# Patient Record
Sex: FEMALE | Race: OTHER | Employment: UNEMPLOYED | URBAN - METROPOLITAN AREA
[De-identification: names, ages, dates, MRNs, and addresses within clinical notes are randomized per-mention and may not be internally consistent; named-entity substitution may affect disease eponyms.]

---

## 2018-06-15 ENCOUNTER — HOSPITAL ENCOUNTER (OUTPATIENT)
Dept: LAB | Age: 58
Discharge: HOME | End: 2018-06-15
Payer: SELF-PAY

## 2018-06-15 ENCOUNTER — OFFICE VISIT (OUTPATIENT)
Dept: FAMILY MEDICINE CLINIC | Age: 58
End: 2018-06-15

## 2018-06-15 VITALS
HEIGHT: 62 IN | DIASTOLIC BLOOD PRESSURE: 79 MMHG | TEMPERATURE: 97.7 F | OXYGEN SATURATION: 97 % | SYSTOLIC BLOOD PRESSURE: 138 MMHG | HEART RATE: 77 BPM | BODY MASS INDEX: 50.24 KG/M2 | WEIGHT: 273 LBS | RESPIRATION RATE: 16 BRPM

## 2018-06-15 DIAGNOSIS — J31.0 RHINITIS, UNSPECIFIED TYPE: ICD-10-CM

## 2018-06-15 DIAGNOSIS — I10 ESSENTIAL HYPERTENSION: ICD-10-CM

## 2018-06-15 DIAGNOSIS — K21.9 GASTROESOPHAGEAL REFLUX DISEASE, ESOPHAGITIS PRESENCE NOT SPECIFIED: ICD-10-CM

## 2018-06-15 DIAGNOSIS — M06.9 RHEUMATOID ARTHRITIS INVOLVING MULTIPLE SITES, UNSPECIFIED RHEUMATOID FACTOR PRESENCE: ICD-10-CM

## 2018-06-15 DIAGNOSIS — M06.9 RHEUMATOID ARTHRITIS INVOLVING MULTIPLE SITES, UNSPECIFIED RHEUMATOID FACTOR PRESENCE: Primary | ICD-10-CM

## 2018-06-15 LAB
ALBUMIN SERPL-MCNC: 3.8 G/DL (ref 3.4–5)
ALBUMIN/GLOB SERPL: 0.8 {RATIO} (ref 0.8–1.7)
ALP SERPL-CCNC: 121 U/L (ref 45–117)
ALT SERPL-CCNC: 97 U/L (ref 13–56)
ANION GAP SERPL CALC-SCNC: 8 MMOL/L (ref 3–18)
AST SERPL-CCNC: 102 U/L (ref 15–37)
BASOPHILS # BLD: 0 K/UL (ref 0–0.06)
BASOPHILS NFR BLD: 1 % (ref 0–2)
BILIRUB SERPL-MCNC: 0.5 MG/DL (ref 0.2–1)
BUN SERPL-MCNC: 13 MG/DL (ref 7–18)
BUN/CREAT SERPL: 16 (ref 12–20)
CALCIUM SERPL-MCNC: 9.5 MG/DL (ref 8.5–10.1)
CHLORIDE SERPL-SCNC: 106 MMOL/L (ref 100–108)
CO2 SERPL-SCNC: 31 MMOL/L (ref 21–32)
CREAT SERPL-MCNC: 0.79 MG/DL (ref 0.6–1.3)
CRP SERPL-MCNC: 0.6 MG/DL (ref 0–0.3)
DIFFERENTIAL METHOD BLD: ABNORMAL
EOSINOPHIL # BLD: 0.2 K/UL (ref 0–0.4)
EOSINOPHIL NFR BLD: 3 % (ref 0–5)
ERYTHROCYTE [DISTWIDTH] IN BLOOD BY AUTOMATED COUNT: 13.6 % (ref 11.6–14.5)
ERYTHROCYTE [SEDIMENTATION RATE] IN BLOOD: 10 MM/HR (ref 0–30)
GLOBULIN SER CALC-MCNC: 4.5 G/DL (ref 2–4)
GLUCOSE SERPL-MCNC: 72 MG/DL (ref 74–99)
HCT VFR BLD AUTO: 47.4 % (ref 35–45)
HGB BLD-MCNC: 15.5 G/DL (ref 12–16)
LYMPHOCYTES # BLD: 2.5 K/UL (ref 0.9–3.6)
LYMPHOCYTES NFR BLD: 38 % (ref 21–52)
MCH RBC QN AUTO: 30.7 PG (ref 24–34)
MCHC RBC AUTO-ENTMCNC: 32.7 G/DL (ref 31–37)
MCV RBC AUTO: 93.9 FL (ref 74–97)
MONOCYTES # BLD: 0.5 K/UL (ref 0.05–1.2)
MONOCYTES NFR BLD: 8 % (ref 3–10)
NEUTS SEG # BLD: 3.3 K/UL (ref 1.8–8)
NEUTS SEG NFR BLD: 50 % (ref 40–73)
PLATELET # BLD AUTO: 149 K/UL (ref 135–420)
POTASSIUM SERPL-SCNC: 3.4 MMOL/L (ref 3.5–5.5)
PROT SERPL-MCNC: 8.3 G/DL (ref 6.4–8.2)
RBC # BLD AUTO: 5.05 M/UL (ref 4.2–5.3)
SODIUM SERPL-SCNC: 145 MMOL/L (ref 136–145)
WBC # BLD AUTO: 6.5 K/UL (ref 4.6–13.2)

## 2018-06-15 PROCEDURE — 85025 COMPLETE CBC W/AUTO DIFF WBC: CPT | Performed by: FAMILY MEDICINE

## 2018-06-15 PROCEDURE — 86235 NUCLEAR ANTIGEN ANTIBODY: CPT | Performed by: FAMILY MEDICINE

## 2018-06-15 PROCEDURE — 80053 COMPREHEN METABOLIC PANEL: CPT | Performed by: FAMILY MEDICINE

## 2018-06-15 PROCEDURE — 86038 ANTINUCLEAR ANTIBODIES: CPT | Performed by: FAMILY MEDICINE

## 2018-06-15 PROCEDURE — 36415 COLL VENOUS BLD VENIPUNCTURE: CPT | Performed by: FAMILY MEDICINE

## 2018-06-15 PROCEDURE — 86140 C-REACTIVE PROTEIN: CPT | Performed by: FAMILY MEDICINE

## 2018-06-15 PROCEDURE — 85652 RBC SED RATE AUTOMATED: CPT | Performed by: FAMILY MEDICINE

## 2018-06-15 PROCEDURE — 86200 CCP ANTIBODY: CPT | Performed by: FAMILY MEDICINE

## 2018-06-15 PROCEDURE — 86225 DNA ANTIBODY NATIVE: CPT | Performed by: FAMILY MEDICINE

## 2018-06-15 RX ORDER — ACETAMINOPHEN 325 MG/1
TABLET ORAL
COMMUNITY
End: 2019-11-20

## 2018-06-15 RX ORDER — RANITIDINE 150 MG/1
150 TABLET, FILM COATED ORAL 2 TIMES DAILY
Qty: 180 TAB | Refills: 1 | Status: SHIPPED | OUTPATIENT
Start: 2018-06-15 | End: 2019-11-20

## 2018-06-15 RX ORDER — FLUTICASONE PROPIONATE 50 MCG
2 SPRAY, SUSPENSION (ML) NASAL DAILY
Qty: 1 BOTTLE | Refills: 11 | Status: SHIPPED | OUTPATIENT
Start: 2018-06-15 | End: 2020-03-01

## 2018-06-15 NOTE — PROGRESS NOTES
[facility]    CC: [facility] for chronic disease management    HPI:     Rheumatoid Arthritis:  - Diagnosed 2 years ago  - Reports that blood work was positive  - Reports that it has affected her legs, shoulder, wrists, fingers, and back  - States she was never started on any immunosuppressive/immunotherapy  - Currently on no medication for her arthritis pain      HTN:  - On no medication  - Does not check BP at home  - Quit smoking 2 years ago  - Diet is unrestricted      GERD:  - Reports issues with reflux  - Using 's prescription of Zantac (Daily)  - reports it has been well controlled with Zantac  - Denies any side effects from the medication      ROS: Positive items marked in RED  CON: fever, chills  ENT: frontal headache, rhinorrhea, sneezing, post nasal drip, sore throat  Cardiovascular: CP, SOB  Resp: cough, SOB  GI: nausea, vomiting, diarrhea  : dysuria, hematuria  MS: arthralgias, myalgias  Neuro: numbness, tingling, weakness      Past Medical History:   Diagnosis Date    Arthritis     Hypertension     Diagnosed in [address] a few months ago       Past Surgical History:   Procedure Laterality Date    HX CHOLECYSTECTOMY  2013    HX COLONOSCOPY      [facility]       Family History   Problem Relation Age of Onset    Diabetes Mother     Hypertension Father     No Known Problems Sister     No Known Problems Brother     No Known Problems Sister     No Known Problems Sister     No Known Problems Brother     No Known Problems Brother     No Known Problems Brother        Social History     Social History    Marital status: SINGLE     Spouse name: N/A    Number of children: N/A    Years of education: N/A     Social History Main Topics    Smoking status: Former Smoker     Quit date: 1/1/2017    Smokeless tobacco: Never Used    Alcohol use No    Drug use: No    Sexual activity: Yes     Partners: Male     Birth control/ protection: None     Other Topics Concern    None     Social History Narrative    None       Allergies no known allergies      Current Outpatient Prescriptions:     acetaminophen (TYLENOL) 325 mg tablet, Take  by mouth every four (4) hours as needed for Pain., Disp: , Rfl:     Physical Exam:      /79 (BP 1 Location: Left arm, BP Patient Position: Sitting)  Pulse 77  Temp 97.7 °F (36.5 °C) (Oral)   Resp 16  Ht 5' 2\" (1.575 m)  Wt 273 lb (123.8 kg)  SpO2 97%  BMI 49.93 kg/m2    General: Obese habitus, NAD  Eyes: sclera clear bilaterally, no discharge noted, eyelids normal in appearance  HENT: NCAT, no sinus tenderness, nasal turbinates enlarged bilaterally, oropharynx clear, MMM  Lungs: CTAB, Normal respiratory effort and rate  CV: RRR, no MRGs  ABD: soft, non-tender, non-distended, normal bowel sounds  Ext: no joint swelling, erythema, or calor noted. no peripheral edema or digital cyanosis noted.    Skin: normal temperature, turgor, color, and texture  Psych: alert and oriented to person, place and time, normal affect  Neuro: speech normal, moving all extremities      Assessment/Plan     Rheumatoid Arthritis:  - Will check RA & CCP ABS, ESR, CRP, VIANEY W/ reflex cascade  - Will plan to refer to rheumatology, pending results of lab tests  - Handout given on RA care and RA diet      HTN:  - Currently stage I on no medication  - Patient counseled on lifestyle changes  - Will check CMP and CBC   - Handout given on DASH diet, low sodium diet, and reading sodium content on food labels  - Follow up in one month      GERD, Inadequately Controlled:  - Will start on Zantac regimen  - Follow up in one month      Rhinitis:  - Suspected cause of her frontal HAs  - Will start on Flonase regimen  - Handout given on rhinitis care        Shanta Schreiber MD  6/15/2018, 2:12 PM

## 2018-06-15 NOTE — PROGRESS NOTES
Chief Complaint   Patient presents with    Physical    Knee Pain     aching pain to bilaleral knees    Shoulder Pain     aching pain     Back Pain     aching pain      Visit Vitals    /79 (BP 1 Location: Left arm, BP Patient Position: Sitting)    Pulse 77    Temp 97.7 °F (36.5 °C) (Oral)    Resp 16    Ht 5' 2\" (1.575 m)    Wt 273 lb (123.8 kg)    SpO2 97%    BMI 49.93 kg/m2     PHQ over the last two weeks 6/15/2018   Little interest or pleasure in doing things Several days   Feeling down, depressed or hopeless Several days   Total Score PHQ 2 2      Visual Acuity Screening    Right eye Left eye Both eyes   Without correction: 20/50 20/50 20/40   With correction: 20/20 20/20 20/20

## 2018-06-15 NOTE — MR AVS SNAPSHOT
Tomi Mccormick [place] [address] [facility] Mart[address]. [address] [facility] 83 [address] 
[phone] Patient: Randee Holley MRN: [id number] LBF:[id number] Visit Information Date & Time Provider Department Dept. Phone Encounter #  
 6/15/2018  1:45 PM Ramakrishna Marie [id number] Follow-up Instructions Return in about 1 month (around 7/15/2018). Upcoming Health Maintenance Date Due Hepatitis C Screening 1960 DTaP/Tdap/Td series (1 - Tdap) 11/12/1981 PAP AKA CERVICAL CYTOLOGY 11/12/1981 BREAST CANCER SCRN MAMMOGRAM 11/12/2010 FOBT Q 1 YEAR AGE 50-75 11/12/2010 Influenza Age 5 to Adult 8/1/2018 Allergies as of 6/15/2018  Never Reviewed No Known Allergies Current Immunizations  Never Reviewed No immunizations on file. Not reviewed this visit You Were Diagnosed With   
  
 Codes Comments Rheumatoid arthritis involving multiple sites, unspecified rheumatoid factor presence [facility]    -  Primary ICD-10-CM: M06.9 ICD-9-CM: 714.0 Essential hypertension     ICD-10-CM: I10 
ICD-9-CM: 401.9 Rhinitis, unspecified type     ICD-10-CM: J31.0 ICD-9-CM: 472.0 Gastroesophageal reflux disease, esophagitis presence not specified     ICD-10-CM: K21.9 ICD-9-CM: 530.81 Vitals BP Pulse Temp Resp Height(growth percentile) Weight(growth percentile) 138/79 (BP 1 Location: Left arm, BP Patient Position: Sitting) 77 97.7 °F (36.5 °C) (Oral) 16 5' 2\" (1.575 m) 273 lb (123.8 kg) SpO2 BMI OB Status Smoking Status 97% 49.93 kg/m2 Menopause Former Smoker Vitals History BMI and BSA Data Body Mass Index Body Surface Area  
 49.93 kg/m 2 2.33 m 2 Preferred Pharmacy Pharmacy Name Phone [id number] [place], [address] [place] [address] Road [address] [place] [address] [phone] Your Updated Medication List  
  
   
 This list is accurate as of 6/15/18  2:36 PM.  Always use your most recent med list.  
  
  
  
  
 fluticasone 50 mcg/actuation nasal spray Commonly known as:  [employer] 2 Sprays by Both Nostrils route daily. raNITIdine 150 mg tablet Commonly known as:  ZANTAC Take 1 Tab by mouth two (2) times a day. TYLENOL 325 mg tablet Generic drug:  acetaminophen Take  by mouth every four (4) hours as needed for Pain. Prescriptions Sent to Pharmacy Refills  
 fluticasone (FLONASE) 50 mcg/actuation nasal spray 11 Si Sprays by Both Nostrils route daily. Class: Normal  
 Pharmacy: [address] [place] [address] Ph #: [phone] Route: Both Nostrils  
 raNITIdine (ZANTAC) 150 mg tablet 1 Sig: Take 1 Tab by mouth two (2) times a day. Class: Normal  
 Pharmacy: [address] [place] [address] Ph #: [phone] Route: Oral  
  
Follow-up Instructions Return in about 1 month (around 7/15/2018). To-Do List   
 06/15/2018 Lab:  VIANEY QL, W/REFLEX CASCADE   
  
 06/15/2018 Lab:  C REACTIVE PROTEIN, QT   
  
 06/15/2018 Lab:  CBC WITH AUTOMATED DIFF   
  
 06/15/2018 Lab:  METABOLIC PANEL, COMPREHENSIVE   
  
 06/15/2018 Lab:  RA + CCP ABS   
  
 06/15/2018 Lab:  SED RATE (ESR) Patient Instructions Artritis reumatoide: Instrucciones de cuidado - [ Rheumatoid Arthritis: Care Instructions ] Instrucciones de cuidado La artritis es un problema común de carloz que se caracteriza por la inflamación de las articulaciones. Hay muchos tipos de artritis. En la artritis reumatoide, el propio sistema inmunitario del organismo ataca las articulaciones. [place] causa dolor, rigidez e hinchazón en las articulaciones, sobre todo en las bruno y los pies. Puede dificultarle abrir frascos, escribir y [address] diarias.  A veces la artritis reumatoide también puede causar bultos debajo de la piel. Con el tiempo la artritis reumatoide puede dañar y deformar las articulaciones. El tratamiento temprano con medicamentos podría reducir kaylyn probabilidades de tener joelle discapacidad prolongada. La atención de seguimiento es joelle parte clave de sebastian tratamiento y seguridad. Asegúrese de hacer y acudir a todas las citas, y llame a sebastian médico si está teniendo problemas. También es joelle buena idea saber los resultados de los exámenes y mantener joelle lista de los medicamentos que francisco. Cómo puede cuidarse en el hogar? · Priya más ejercicios si sebastian médico se lo recomienda. Caminar es joelle buena opción. Si le duelen las rodillas o los tobillos, trate de montar en bicicleta estática o nadar. · Mueva cada articulación de forma suave en toda sebastian amplitud de movimiento joelle o dos veces al día. · Descanse las articulaciones cuando estén adoloridas o hayan hecho mucho esfuerzo. Los descansos cortos podrían ayudar más que quedarse en cama. · [place] y [place] un peso saludable. El ejercicio hecho regularmente y joelle dieta saludable le ayudarán a lograrlo. El sobrepeso puede tensar las articulaciones, [place] 1978 y [de-identified], y empeorar el dolor. Bajar incluso unas pocas libras podría ayudar. · Consuma suficiente calcio y vitamina D para ayudar a prevenir la osteoporosis, que causa huesos débiles. Hable con sebastian médico acerca de qué cantidad debe montez. · Proteja kaylyn articulaciones de las lesiones. No las use en exceso. Trate de limitar o evitar actividades que causan dolor o hinchazón en las articulaciones. Si lo necesita, use utensilios de cocina especiales y otros aparatos para ayudarse, wander andadores, tablillas (férulas) o bastones. · Use calor para aliviar el dolor. [place] kilo o duchas tibias. Utilice compresas calientes o joelle almohadilla térmica ajustada a baja temperatura. Duerma con Claryce Eric cobija eléctrica ajustada a joelle temperatura media. · Aplíquese hielo o joelle compresa fría sobre la selena nawaf 10 a 20 minutos cada vez. Póngase un paño ivey entre el hielo y la piel. · [employer] analgésicos (medicamentos para el dolor) exactamente según las indicaciones. ¨ Si el médico le recetó un analgésico, tómelo según las indicaciones. ¨ Si no está tomando un analgésico recetado, pregúntele a sebastian médico si puede montez aleisha de [employer]. · Participe de Karina Slice en el manejo de sebastian afección. [place] un plan de tratamiento con sebastian médico y aprenda todo lo que pueda sobre la artritis reumatoide. [place] le ayudará a controlar el dolor y [place]. Cuándo debe pedir ayuda? Llame a sebastian médico ahora mismo o busque atención médica inmediata si: 
? · Tiene fiebre o salpullido junto con dolor en las articulaciones. ? · Usted tiene un dolor en joelle articulación que es vallecillo intenso que no puede usar dicha articulación. ? · Tiene hinchazón, enrojecimiento o dolor repentinos en joelle o más articulaciones y no sabe por qué. ? · Tiene dolor en la espalda o el chritsopher, además de debilitamiento en los brazos o las piernas. ? · Pierde el control de la vejiga o los intestinos. ?Preste especial atención a los cambios en sebastian carloz y asegúrese de comunicarse con sebastian médico si: 
? · Tiene dolor en las articulaciones que dura más de 6 semanas. ? · Tiene efectos secundarios por los medicamentos para la artritis, wander dolor de [place], [place], [place] o heces oscuras parecidas al alquitrán. Dónde puede encontrar más información en inglés? Wyvonne Soda a http://christina-bj.info/. Escriba K205 en la búsqueda para aprender más acerca de \"Artritis reumatoide: Instrucciones de cuidado - [ Rheumatoid Arthritis: Care Instructions ]. \" 
Revisado: 31 octubre, 2016 Versión del contenido: 11.4 © 7649-0365 Healthwise, Simple Tithe.  Las instrucciones de cuidado fueron adaptadas bajo licencia por Good Help Connections (which disclaims liability or warranty for this information). Si usted tiene [place] afección médica o sobre estas instrucciones, siempre pregunte a sebastian profesional de carloz. [facility] niega toda garantía o responsabilidad por sebastian uso de esta información. Dieta para la artritis reumatoide: Instrucciones de cuidado - [ Rheumatoid Arthritis Diet: Care Instructions ] Instrucciones de cuidado La mejor dieta para personas con artritis reumatoide es joelle dieta latasha y equilibrada que sea baja en grasas saturadas y sal, y ana en [place] y carbohidratos complejos (granos integrales, frijoles, frutas y verduras). El aceite de pescado (ácidos grasos omega-3) tiene un efecto anastasiia en reducir la inflamación, y consumir pescado puede mejorar los síntomas. Las personas que tienen artritis reumatoide tienen un alto riesgo de desarrollar osteoporosis. Para ayudar a prevenir esta enfermedad, obtenga suficiente calcio y vitamina D. La atención de seguimiento es joelle parte clave de sebastian tratamiento y seguridad. Asegúrese de hacer y acudir a todas las citas, y llame a sebastina médico si está teniendo problemas. También es joelle buena idea saber los resultados de los exámenes y mantener joelle lista de los medicamentos que francisco. Cómo puede cuidarse en el hogar? · Trate de comer por lo menos 2 porciones de pescado a la semana. Los pescados grasosos, que contienen ácidos grasos omega-3, incluyen: ¨ Atún. ¨ Salmón. ¨ Caballa. ¨ Trucha de manasa. ¨ Arenque. ¨ Darren. · Si usted está embarazada, hable con sebastian médico sobre comer pescado. Las mujeres embarazadas no deben comer ciertos tipos de pescado que tienen alto contenido de lexie. · Usted puede obtener calcio y vitamina D bebiendo leche fortificada con vitamina D. Cuatro vasos de leche al día proporcionan alrededor de 1,200 miligramos (mg) de calcio. Otros alimentos comunes con calcio: ¨ Yogur (normal o bajo en grasa). Joelle porción de 8 onzas provee 415 mg de calcio. ¨ Queso cheddar. Joelle porción de 1½ onzas provee 306 mg. 
¨ Leche (descremada, al 2% o entera). Joelle porción de 1 taza provee aproximadamente 300 mg. ¨ Requesón (1% de grasa de [place]). Joelle porción de 1 taza provee 138 mg. 
· Si no puede comer o beber productos lácteos, puede obtener calcio y vitamina D de: 
¨ Jugo de naranja fortificado con calcio. Joelle porción de 1 taza provee 500 mg de calcio. ¨ Leche de soya enriquecida con calcio. Joelle porción de 1 taza provee 282 mg de calcio. ¨ Almendras. Joelle porción de 1 onza (alrededor de 24 almendras) provee 75 mg de calcio. ¨ Salmón enlatado. Joelle porción de 3 onzas provee 180 mg de calcio. ¨ Tofu (firme, elaborado con sulfato de calcio). Joelle porción de ½ taza provee 204 mg. 
· Es posible que deba montez un suplemento de calcio para asegurarse de que está recibiendo el calcio que necesita. Dónde puede encontrar más información en inglés? Andrew Marker a http://christina-bj.info/. Escriba Q201 en la búsqueda para aprender más acerca de \"Dieta para la artritis reumatoide: Instrucciones de cuidado - [ Rheumatoid Arthritis Diet: Care Instructions ]. \" 
Revisado: 12 [place], 2017 Versión del contenido: 11.4 © 9660-4038 Healthwise, Incorporated. Las instrucciones de cuidado fueron adaptadas bajo licencia por Good Help Connections (which disclaims liability or warranty for this information). Si usted tiene [place] afección médica o sobre estas instrucciones, siempre pregunte a sebastian profesional de carloz. Healthwise, Incorporated niega toda garantía o responsabilidad por sebastian uso de esta información. Rinitis: Instrucciones de cuidado - [ Rhinitis: Care Instructions ] Instrucciones de cuidado La rinitis es joelle hinchazón e irritación en la nariz. Con frecuencia, las alergias y las infecciones son la causa. Puede tener congestión o secreción nasal. Otros síntomas son la comezón y la irritación de ojos, oídos, garganta y boca. Si las alergias son la causa, es posible que el médico le yosvany pruebas para averiguar a qué es alérgico. Casimiro vez pueda detener los síntomas si maico las cosas que los causan. El médico puede sugerir o recetar medicamentos para [employer]. La atención de seguimiento es joelle parte clave de sebastian tratamiento y seguridad. Asegúrese de hacer y acudir a todas las citas, y llame a sebastian médico si está teniendo problemas. También es joelle buena idea saber los resultados de los exámenes y mantener joelle lista de los medicamentos que francisco. Cómo puede cuidarse en el hogar? · Si sebastian rinitis es causada por alergias, trate de averiguar qué es lo que [employer]. Ronda pasos para evitar los desencadenantes. ¨ Evite los trabajos [facility]. Estos pueden remover el polen y el moho. ¨ No fume ni permita que otros fumen cerca de usted. Si necesita ayuda para dejar de fumar, hable con sebastian médico sobre programas y medicamentos para dejar de fumar. Estos pueden aumentar kaylyn probabilidades de dejar el hábito para siempre. ¨ No use aerosoles, productos de limpieza ni perfumes. [address] sebastian casa y automóvil nawaf la época de floración si el polen es aleisha de los desencadenantes. ¨ Limpie sebastian casa con frecuencia para controlar el polvo. ¨ Mantenga las [place] fuera de sebastian casa. · Si sebastian médico le recomienda un medicamento de venta yan para [employer] síntomas, tómelo exactamente wandre le fue recetado. Llame a sebastian médico si kristen estar teniendo problemas con sebastian medicamento. · Use lavados nasales con solución salina (agua salada) para ayudar a mantener las fosas nasales despejadas y eliminar la mucosidad y las bacterias. Puede comprar gotas nasales de solución salina en un supermercado o joelle farmacia. O puede prepararlas usted mismo en casa agregándole 1 cucharadita de sal y 1 cucharadita de bicarbonato de sodio a 2 tazas de agua destilada.  Si las prepara usted mismo, llene Tammie de goma con la solución, introduzca la punta en la fosa nasal y apriete con suavidad. Suénese la nariz. Cuándo debe pedir ayuda? Llame a sebastian médico ahora mismo o busque atención médica inmediata si: 
? · Tiene problemas para respirar. ?Preste especial atención a los cambios en sebastian carloz y asegúrese de comunicarse con sebastian médico si: 
? · La mucosidad de la nariz se vuelve más espesa (wander pus) o contiene devonte. ? · Tiene síntomas nuevos o que empeoran. ? · No mejora wander se esperaba. Dónde puede encontrar más información en inglés? Carl Jacobs a http://christina-bj.info/. Shiloh Tejada I618 en la búsqueda para aprender más acerca de \"Rinitis: Instrucciones de cuidado - [ Rhinitis: Care Instructions ]. \" 
Revisado: 12 [place], 2017 Versión del contenido: 11.4 © 4045-5562 Healthwise, Incorporated. Las instrucciones de cuidado fueron adaptadas bajo licencia por Good Help Connections (which disclaims liability or warranty for this information). Si usted tiene [place] Wil[place] afección médica o sobre estas instrucciones, siempre pregunte a sebastian profesional de carloz. Healthwise, Incorporated niega toda garantía o responsabilidad por sebastian uso de esta información. Dieta DASH: Instrucciones de cuidado - [ DASH Diet: Care Instructions ] Instrucciones de cuidado La dieta DASH es un plan de alimentación que puede ayudar a bajar la presión arterial. DASH es la sigla en inglés de \"Dietary Approaches to Stop Hypertension\" (medidas dietéticas para disminuir la hipertensión). Hipertensión significa presión arterial radha. La dieta DASH se concentra en el consumo de alimentos con alto contenido de calcio, potasio y [place]. Estos nutrientes pueden disminuir la presión arterial. Los alimentos con el mayor contenido de [place] nutrientes son las frutas, las verduras, los productos lácteos de bajo contenido de [place], las nueces, las semillas y las legumbres.  Bobbi montez suplementos de calcio, potasio y magnesio, en lugar de comer alimentos ricos en estos nutrientes, no tiene el [facility] Books [employer]. La dieta DASH también incluye granos integrales, pescado y aves. La dieta DASH es aleisha de los cambios de estilo de ky que quizá le recomiende sebastian médico para reducir la presión arterial radha. Es posible que sebastian médico también quiera que usted reduzca la cantidad de sodio en sebastian Cecillia Right. Reducir el sodio mientras sigue la dieta DASH puede bajar la presión arterial incluso más que únicamente con la dieta DASH. La atención de seguimiento es joelle parte clave de sebastian tratamiento y seguridad. Asegúrese de hacer y acudir a todas las citas, y llame a sebastian médico si está teniendo problemas. También es joelle buena idea saber los resultados de los exámenes y mantener joelle lista de los medicamentos que francisco. Cómo puede cuidarse en el hogar? Cómo seguir la dieta DASH 
· Coma entre 4 y 5 porciones de fruta al día. Joelle porción incluye 1 fruta de [place], ½ taza de fruta enlatada o cortada en trozos, 1/4 taza de fruta seca o 4 onzas (½ taza) de jugo de frutas. Elija fruta con más frecuencia que jugo. · Consuma entre 4 y 11 porciones de verduras al día. Joelle porción incluye 1 taza de Marge o de verduras de hojas crudas, ½ taza de verduras cocidas o cortadas en trozos, o 4 onzas (½ taza) de jugo de verduras. Elija comer verduras con más frecuencia que montez sebastian jugo. · Consuma entre 2 y 3 porciones de lácteos semidescremados y descremados al día. Joelle porción incluye 8 onzas de [place], 1 taza de yogur o 1½ onzas de Adry-barre. · Coma entre 6 y 6 porciones de granos todos los [address] Joelle porción incluye 1 rebanada de pan, 1 onza de cereal seco, o ½ taza de arroz, pasta o cereal cocido. Trate de elegir productos de grano integral con la mayor frecuencia posible. · Limite la cantidad de [place] 60 [place]), aves y pescado a 2 porciones al día. [place] porción son 3 onzas, lo que es aproximadamente el tamaño de un zelda de naipes. · Coma entre 4 y 5 porciones de nueces, semillas y legumbres (frijoles [habichuelas], lentejas y arvejas [chícharos] secos y cocidos) a la semana. Joelle porción incluye 1/3 taza de nueces, 2 cucharadas de semillas o ½ taza de frijoles o arvejas cocidos. · [address] y aceites a 2 o 3 porciones al día. Joelle porción es 1 cucharadita de aceite vegetal o 2 cucharadas de aderezo para ensaladas. · Limite los dulces y el azúcar adicional a 5 porciones o menos por semana. Debbie Falling porción es 1 cucharada de [place] o [place], ½ taza de sorbete o 1 taza de limonada. · Consuma menos de 2,300 miligramos (mg) de sodio al día. Si limita sebastian consumo de sodio a 1,500 mg al día, puede reducir sebastian presión arterial aún más. Consejos para tener éxito · Inicie con cambios pequeños. No intente hacer cambios radicales en sebastian dieta de manera repentina. Podría sentir que extraña kaylyn comidas favoritas y, por lo [place], mel joelle mayor probabilidad de que no cumpla el plan. Boy [place]. Edgardo Lowery que esos cambios se conviertan en un hábito, incorpore algunos [employer]. · Pruebe algo de lo siguiente: 
¨ Fíjese el objetivo de comer joelle porción de fruta o de verduras en todas las comidas y los refrigerios. [place] facilitará alcanzar la cantidad diaria recomendada de frutas y verduras. ¨ Pruebe comer yogur cubierto con frutas frescas y nueces wander refrigerio o wander postre saludable. ¨ Agregue robson, tomate, pepino y cebolla a kaylyn sándwiches. ¨ Combine joelle masa de pizza precocida con queso mozzarella de bajo contenido de grasa (\"low-fat\") y cúbralo con abundantes verduras. Intente utilizar tomates, calabaza, espinaca, brócoli, zanahorias, coliflor y cebollas. ¨ Opte por comer joelle variedad de verduras cortadas en trozos con un aderezo de bajo contenido de grasa wander refrigerio, en lugar de \"chips\" (tipo mohan fritas) y un \"dip\" (producto untable). ¨ [place] semillas de girasol o almendras picadas [employer]. O intente agregar nueces o almendras picadas a las verduras cocidas. ¨ Pruebe algunas comidas vegetarianas con frijoles y arvejas. Cathye Dinesh o frijoles rojos a las ensaladas. Prepare burritos y tacos con puré de frijoles pintos o negros. Dónde puede encontrar más información en inglés? Lizbeth Chen a http://christina-bj.info/. Lakshmi So U742 en la búsqueda para aprender más acerca de \"Dieta DASH: Instrucciones de cuidado - [ DASH Diet: Care Instructions ]. \" 
Revisado: 21 septiembre, 2016 Versión del contenido: 11.4 © 7296-7829 Healthwise, Incorporated. Las instrucciones de cuidado fueron adaptadas bajo licencia por Good [employer] Connections (which disclaims liability or warranty for this information). Si usted tiene [place] afección médica o sobre estas instrucciones, siempre pregunte a sebastian profesional de carloz. Healthwise, Incorporated niega toda garantía o responsabilidad por sebastian uso de esta información. Dieta baja en sodio (2,000 miligramos): Instrucciones de cuidado - [ Low Sodium Diet (2,000 Milligram): Care Instructions ] Instrucciones de cuidado Demasiado sodio hace que el organismo retenga agua en exceso. [place] puede aumentar la presión arterial y obligar al corazón y a los riñones a trabajar Karalee Purchase. En casos muy graves, podrían tener que hospitalizarlo. Hasta podría poner en peligro la ky. Si limita el consumo de sodio, se sentirá mejor y reducirá sebastian riesgo de tener problemas graves. La diana más común de sodio es la sal. Las personas obtienen la mayor parte de la sal en sebastian dieta de los alimentos enlatados, preparados y de paquete. La comida rápida y los platillos de restaurante también tienen niveles muy altos de [place]. Es probable que sebastian médico le limite el sodio a menos de 2,000 miligramos (mg) al día.  Cyndy límite tiene en cuenta todo el sodio presente en los alimentos preparados y de Apolinar [place], y toda la sal que añada a kaylyn alimentos. La atención de seguimiento es joelle parte clave de sebastian tratamiento y seguridad. Asegúrese de hacer y acudir a todas las citas, y llame a sebastian médico si está teniendo problemas. También es joelle buena idea saber los resultados de los exámenes y mantener joelle lista de los medicamentos que francisco. Cómo puede cuidarse en el [facility]? Roberta las etiquetas de los alimentos · [address] latas y los alimentos de paquete. La [employer] qué cantidad de sodio (\"sodium\") hay en cada porción. Asegúrese de fijarse en el tamaño de la porción. Si usted come Mehreen[employer] porción, consumirá [employer]. · Las etiquetas de los alimentos también indican el Porcentaje del Valor Diario (\"Percent Daily Value\") recomendado para el sodio. Escoja productos con un bajo Porcentaje del Valor Diario de [place]. · Tenga en cuenta que el sodio puede venir en otras formas además de la sal, entre las que se incluyen el glutamato monosódico (MSG, por kaylyn siglas en inglés), el citrato de sodio y el bicarbonato de [place]. La comida asiática frecuentemente contiene MSG añadido. Si sale a comer, a veces puede pedir que no le pongan MSG ni sal a kaylyn alimentos. Compre alimentos bajos en sodio · Compre alimentos que indiquen en la etiqueta \"sin sal\" (\"unsalted\") (sin sal añadida), \"sin sodio\" (\"sodium-free\") (menos de 5 mg de sodio por porción) o \"bajo en sodio\" (\"low-sodium\") (menos de 140 mg de sodio por porción). Los alimentos que indiquen en la etiqueta \"reducido en sodio\" (\"reduced-sodium\") y \"ligero contenido de sodio\" (\"light sodium\") aún pueden contener demasiado sodio. Asegúrese de leer la etiqueta para verificar cuánto sodio está consumiendo. · Compre verduras frescas o congeladas que no tengan salsas [id number]. Compre las versiones de bajo sodio de verduras Pérez, sopas y otros productos enlatados. Prepare comidas bajas en sodio · Reduzca la cantidad de sal que [place] para cocinar. [place] le ayudará a acostumbrarse al [employer]. No añada marie después de mel cocinado. Joelle cucharadita de sal contiene alrededor de 2,300 mg de sodio. · Retire el salero de la martinez. · Sazone kaylyn comidas con ajo, jugo de wendy, cebolla, vinagre, hierbas y especias. No use salsa de soya, salsa de soya \"lite\", salsa para milo, sal de cebolla, sal de ajo o de apio, mostaza ni ketchup (salsa de tomate) en kaylyn comidas. · Use aderezos para ensaladas, salsas y ketchup de bajo contenido de [place]. O prepare kaylyn propios aderezos para ensaladas y salsas sin añadir sal. 
· Use menos sal (o nada) cuando la receta lo pida. Por lo general puede añadir la mitad de la cantidad de marie que pide la receta sin que esta pierda el sabor. Otros alimentos wander el arroz, las pastas y los cereales no necesitan sal adicional. 
· Enjuague las verduras enlatadas y cocínelas en agua fresca. [place] le cory algo de sal, xuan no toda. · Evite el agua que naturalmente tenga un alto contenido de sodio o que haya sido tratada con ablandadores, los cuales TUMBY [place]. Llame a sebastian compañía de agua local para averiguar cuál es el contenido de sodio del agua. Si compra agua embotellada, guevara la etiqueta y escoja joelle johnathan sin sodio. Evite los alimentos altos en sodio · Evite comer: ¨ Milo, pescados y aves [place], curados, salados y [place]. ¨ Jamón, tocino, perros calientes (\"hot dogs\") y milo frías. ¨ Queso común, alma y procesado y mantequilla de cacahuate (maní) común. ¨ Galletas saladas y otros refrigerios salados wander \"pretzels\", \"chips\" (wander mohan fritas) y palomitas de maíz saladas. ¨ Comidas preparadas y congeladas, a menos que tengan joelle etiqueta que diga \"bajo en sodio\" (\"low-sodium\"). ¨ [place]opas, caldos y consomés enlatados y secos o deshidratados, a menos que digan \"sin sodio\" (\"sodium-free\") o \"bajo en sodio\" (\"low-sodium\"). ¨ Verduras enlatadas, a menos que digan \"sin sodio\" (\"sodium-free\") o \"bajo en sodio\" (\"low-sodium\"). ¨ Med fritas (a la francesa), pizzas, tacos y otras comidas rápidas. ¨ Pepinillos, aceitunas, ketchup y otros condimentos, en especial la salsa de soya, a menos que digan \"sin sodio\" (\"sodium-free\") o \"bajo en sodio\" (\"low-sodium\"). Dónde puede encontrar más información en inglés? [place] a http://[email or web address]-bj.info/. Keith Her [id number] en la búsqueda para aprender más acerca de \"Dieta baja en sodio (2,000 miligramos): Instrucciones de cuidado - [ Low Sodium Diet (2,000 Milligram): Care Instructions ]. \" 
Revisado: 12 mayo, 2017 Versión del contenido: 11.4 © 9137-9466 Healthwise, Incorporated. Las instrucciones de cuidado fueron adaptadas bajo licencia por [employer] (which disclaims liability or warranty for this information). Si usted tiene [place] afección médica o sobre estas instrucciones, siempre pregunte a sebastian profesional de carloz. Healthwise, Incorporated niega toda garantía o responsabilidad por sebastian uso de esta información. Cómo leer las etiquetas de los alimentos para limitar el consumo de sodio: Instrucciones de cuidado - [ How to Read a Food Label to Limit Sodium: Care Instructions ] Instrucciones de cuidado El sodio hace que el cuerpo retenga agua en exceso. [place] puede aumentar la presión arterial y obligar al corazón y a los riñones a trabajar [place]. En casos muy graves, podrían tener que hospitalizarlo. Hasta podría poner en peligro la ky. Si limita el consumo de sodio, se sentirá mejor y reducirá sebastian riesgo de tener problemas graves. Los alimentos procesados, las comidas rápidas y los platillos de restaurante son las tuttle principales de sodio en la alimentación. El [facility] común del sodio es \"sal\". Trate de limitar la cantidad de sodio que consume a menos de 2,300 miligramos (mg) al día.  Si limita sebastian consumo de sodio a 1,500 mg al día, puede reducir sebastian presión arterial aún más. Teresa límite incluye toda la sal que consuma en los alimentos que cocine o en los de paquete. Lleve janine lista de todo lo que come y dante. La atención de seguimiento es janine parte clave de sebastian tratamiento y seguridad. Asegúrese de hacer y acudir a todas las citas, y llame a sebastian médico si está teniendo problemas. También es janine buena idea saber los resultados de los exámenes y mantener janine lista de los medicamentos que francisco. Cómo puede cuidarse en el hogar? Roberta la lista de ingredientes en las etiquetas de los alimentos · Roberta la lista de ingredientes en las etiquetas de los alimentos para saber cuánto sodio contienen. La etiqueta indica los ingredientes de un alimento en orden descendente (de mayor a herminia). Si la sal o el sodio es aleisha de los primeros elementos de la lista, jolene alimento puede contener mucho sodio. · El sodio tiene nombres [place]. El sodio también se conoce wander glutamato monosódico (MSG, por kaylyn siglas en inglés, común en la comida [place]), citrato de [place], alginato de [place], hidróxido de sodio y fosfato de sodio. Roberta la información nutricional en las etiquetas · La mayoría de los alimentos tienen janine etiqueta con información nutricional. Allí encontrará la cantidad de sodio que tiene janine porción del alimento. Roberta los datos acerca del tamaño de la porción y la cantidad de [place]. El tamaño de la porción se encuentra en la parte superior de la etiqueta, por lo general, debajo del título \"Información nutricional\" (Nutrition Facts). La cantidad de sodio se encuentra en la lista debajo del título. Se expresa en miligramos (mg). ¨ Verifique detenidamente el tamaño de la porción. Janine mary porción suele ser Ayla Heritage, y es posible que coma más de Ambika Guiles. Si teresa es el wendy, la cantidad de sodio que consuma será mayor que la que se menciona en la etiqueta.  Por ejemplo, si el tamaño de la porción de janine sopa enlatada es 1 taza y la cantidad de sodio es 470 mg, si francisco 2 tazas, consumirá 940 mg de sodio. · La información nutricional de las frutas y las verduras frescas no aparece en esos alimentos. Es posible que se encuentre detallada en algún lado de la evan. Estos alimentos suelen no tener sodio o son bajos en sodio. · La etiqueta de información nutricional también proporciona el porcentaje del valor diario de [place]. Cyndy valor es la cantidad de sodio recomendada que contiene joelle porción. El valor diario para el sodio es menos de 2,300 mg. Así, si el porcentaje del valor diario dice 50%, significa que joelle porción le está aportando la mitad, o sea, 1,150 mg. Compre alimentos con bajo contenido de [place] · Busque alimentos que hayan sido elaborados con herminia cantidad de sodio. Busque las siguientes palabras en la etiqueta. ¨ \"Unsalted\" (sin sal) significa que el alimento no tiene sodio agregado. Bobbi es posible que el alimento ya tenga sodio en forma natural. 
¨ \"Sodium-free\" (sin sodio) significa que joelle porción tiene menos de 5 mg de sodio. ¨ \"Very low sodium\" (muy bajo contenido de [place]) significa que joelle porción tiene 35 mg de sodio o menos. ¨ \"Low-sodium\" (bajo contenido de sodio) significa que joelle porción tiene 140 mg de sodio o menos. · \"Reduced-sodium\" (reducido contenido de sodio) significa que el alimento tiene un 25% menos de sodio que lo normal. [place] esta cantidad de sodio es aún muy radha. Trate de no comprar alimentos que digan \"reducido contenido de sodio\" en la etiqueta. · Compre verduras frescas o verduras congeladas que no tengan salsas añadidas. Compre las versiones de bajo sodio de verduras, sopas y otros productos enlatados. Dónde puede encontrar más información en inglés? Carl Jacobs a [email or web address]-bj.[email or web address]  
Escriba B757 en la búsqueda para aprender más acerca de \"Cómo leer las etiquetas de los alimentos para limitar el consumo de sodio: Instrucciones de cuidado - [ How to Read a Food Label to Limit Sodium: Care Instructions ]. \" 
Revisado: 12 altamirano, 2017 Versión del contenido: 11.4 © 3124-7135 Healthwise, Incorporated. Las instrucciones de cuidado fueron adaptadas bajo licencia por [employer] (which disclaims liability or warranty for this information). Si usted tiene [place] afección médica o sobre estas instrucciones, siempre pregunte a sebastian profesional de carloz. Healthwise, Incorporated niega toda garantía o responsabilidad por sebastian uso de esta información. Introducing [facility]! Dawna London introduces [employer] patient portal. Now you can access parts of your medical record, email your doctor's office, and request medication refills online. 1. In your internet browser, go to [email or web address] 2. Click on the First Time User? Click Here link in the Sign In box. You will see the New Member Sign Up page. 3. Enter your [employer] Access Code exactly as it appears below. You will not need to use this code after youve completed the sign-up process. If you do not sign up before the expiration date, you must request a new code. · [employer] Access Code: [email or web address] Expires: 8/28/2018  4:41 PM 
 
4. Enter the last four digits of your Social Security Number (xxxx) and Date of Birth (mm/dd/yyyy) as indicated and click Submit. You will be taken to the next sign-up page. 5. Create a [employer] ID. This will be your [employer] login ID and cannot be changed, so think of one that is secure and easy to remember. 6. Create a [employer] password. You can change your password at any time. 7. Enter your Password Reset Question and Answer. This can be used at a later time if you forget your password. 8. Enter your e-mail address. You will receive e-mail notification when new information is available in [address] 9. Click Sign Up. You can now view and download portions of your medical record. 10. Click the Download Summary menu link to download a portable copy of your medical information. If you have questions, please visit the Frequently Asked Questions section of the [employer] website. Remember, [employer] is NOT to be used for urgent needs. For medical emergencies, dial 911. Now available from your iPhone and Android! Please provide this summary of care documentation to your next provider. Your primary care clinician is listed as David Brumfield. If you have any questions after today's visit, please call [phone].

## 2018-06-15 NOTE — PATIENT INSTRUCTIONS
Artritis reumatoide: Instrucciones de cuidado - [ Rheumatoid Arthritis: Care Instructions ]  Instrucciones de cuidado    La artritis es un problema común de carloz que se caracteriza por la inflamación de las articulaciones. Hay muchos tipos de artritis. En la artritis reumatoide, el propio sistema inmunitario del organismo ataca las articulaciones. [place] causa dolor, rigidez e hinchazón en las articulaciones, sobre todo en las bruno y los pies. Puede dificultarle abrir frascos, escribir y [address] diarias. A veces la artritis reumatoide también puede causar bultos debajo de la piel. Con el tiempo la artritis reumatoide puede dañar y deformar las articulaciones. El tratamiento temprano con medicamentos podría reducir kaylyn probabilidades de tener joelle discapacidad prolongada. La atención de seguimiento es joelle parte clave de sebastian tratamiento y seguridad. Asegúrese de hacer y acudir a todas las citas, y llame a sebastian médico si está teniendo problemas. También es joelle buena idea saber los resultados de los exámenes y mantener joelle lista de los medicamentos que francisco. Cómo puede cuidarse en el hogar? · Priya más ejercicios si sebastian médico se lo recomienda. Caminar es joelle buena opción. Si le duelen las rodillas o los tobillos, trate de montar en bicicleta estática o nadar. · Mueva cada articulación de forma suave en toda sebastian amplitud de movimiento joelle o dos veces al día. · Descanse las articulaciones cuando estén adoloridas o hayan hecho mucho esfuerzo. Los descansos cortos podrían ayudar más que quedarse en cama. · [place] y [place] un peso saludable. El ejercicio hecho regularmente y joelle dieta saludable le ayudarán a lograrlo. El sobrepeso puede tensar las articulaciones, [place] 1978 y [de-identified], y empeorar el dolor. Bajar incluso unas pocas libras podría ayudar. · Consuma suficiente calcio y vitamina D para ayudar a prevenir la osteoporosis, que causa huesos débiles.  Hable con sebastian médico acerca de qué cantidad debe montez. · Proteja kaylyn articulaciones de las lesiones. No las use en exceso. Trate de limitar o evitar actividades que causan dolor o hinchazón en las articulaciones. Si lo necesita, use utensilios de cocina especiales y otros aparatos para ayudarse, wander andadores, tablillas (férulas) o bastones. · Use calor para aliviar el dolor. [place] kilo o duchas tibias. Utilice compresas calientes o joelle almohadilla térmica ajustada a baja temperatura. [place] con Johnna Ezio cobija eléctrica ajustada a joelle temperatura media. · Aplíquese hielo o joelle compresa fría sobre la selena nawaf 10 a 20 minutos cada vez. Póngase un paño ivey entre el hielo y la piel. · [employer] analgésicos (medicamentos para el dolor) exactamente según las indicaciones. ¨ Si el médico le recetó un analgésico, tómelo según las indicaciones. ¨ Si no está tomando un analgésico recetado, pregúntele a sebastian médico si puede montez aleisha de [employer]. · Participe de Tori Licea en el manejo de sebastian afección. [place] un plan de tratamiento con sebastian médico y aprenda todo lo que pueda sobre la artritis reumatoide. [place] le ayudará a controlar el dolor y [place]. Cuándo debe pedir ayuda? Llame a sebastian médico ahora mismo o busque atención médica inmediata si:  ? · Tiene fiebre o salpullido junto con dolor en las articulaciones. ? · Usted tiene un dolor en joelle articulación que es vallecillo intenso que no puede usar dicha articulación. ? · Tiene hinchazón, enrojecimiento o dolor repentinos en joelle o más articulaciones y no sabe por qué. ? · Tiene dolor en la espalda o el christopher, además de debilitamiento en los brazos o las piernas. ? · Pierde el control de la vejiga o los intestinos. ?Preste especial atención a los cambios en sebastian carloz y asegúrese de comunicarse con sebastian médico si:  ? · Tiene dolor en las articulaciones que dura más de 6 semanas.    ? · Tiene efectos secundarios por los medicamentos para la artritis, wander dolor de LAURI, náuseas, acidez gástrica o heces oscuras parecidas al alquitrán. Dónde puede encontrar más información en inglés? Carl Jacobs a [email or web address]-bj.[email or web address] Mago K205 en la búsqueda para aprender más acerca de \"Artritis reumatoide: Instrucciones de cuidado - [ Rheumatoid Arthritis: Care Instructions ]. \"  Revisado: 31 octubre, 2016  Versión del contenido: 11.4  © 8577-2092 Healthwise, Incorporated. Las instrucciones de cuidado fueron adaptadas bajo licencia por Good [employer]iesta [employer] Connections (which disclaims liability or warranty for this information). Si usted tiene [place] afección médica o sobre estas instrucciones, siempre pregunte a sebastian profesional de carloz. Healthwise, Incorporated niega toda garantía o responsabilidad por sebastian uso de esta información. Dieta para la artritis reumatoide: Instrucciones de cuidado - [ Rheumatoid Arthritis Diet: Care Instructions ]  Instrucciones de cuidado    La mejor dieta para personas con artritis reumatoide es joelle dieta latasha y equilibrada que sea baja en grasas saturadas y marie, y ana en Rajeev Kye y carbohidratos complejos (granos integrales, frijoles, frutas y verduras). El aceite de pescado (ácidos grasos omega-3) tiene un efecto anastasiia en reducir la inflamación, y consumir pescado puede mejorar los síntomas. Las personas que tienen artritis reumatoide tienen un alto riesgo de desarrollar osteoporosis. Para ayudar a prevenir esta enfermedad, obtenga suficiente calcio y vitamina D. La atención de seguimiento es joelle parte clave de sebastian tratamiento y seguridad. Asegúrese de hacer y acudir a todas las citas, y llame a sebastian médico si está teniendo problemas. También es joelle buena idea saber los resultados de los exámenes y mantener joelle lista de los medicamentos que francisco. Cómo puede cuidarse en el hogar? · Trate de comer por lo menos 2 porciones de pescado a la semana.  Los pescados grasosos, que contienen ácidos grasos omega-3, incluyen:  ¨ Atún. ¨ Salmón. ¨ Caballa. ¨ Trucha de manasa. ¨ Arenque. ¨ Darren. · Si usted está embarazada, hable con sebastian médico sobre comer pescado. Las mujeres embarazadas no deben comer ciertos tipos de pescado que tienen alto contenido de lexie. · Usted puede obtener calcio y vitamina D bebiendo leche fortificada con vitamina D. Cuatro vasos de leche al día proporcionan alrededor de 1,200 miligramos (mg) de calcio. Otros alimentos comunes con calcio:  ¨ Yogur (normal o bajo en grasa). Janine porción de 8 onzas provee 415 mg de calcio. ¨ Queso cheddar. Janine porción de 1½ onzas provee 306 mg.  ¨ Leche (descremada, al 2% o entera). Janine porción de 1 taza provee aproximadamente 300 mg. ¨ Requesón (1% de grasa de [place]). Janine porción de 1 taza provee 138 mg.  · Si no puede comer o beber productos lácteos, puede obtener calcio y vitamina D de:  ¨ Jugo de naranja fortificado con calcio. Janine porción de 1 taza provee 500 mg de calcio. ¨ Leche de soya enriquecida con calcio. Janine porción de 1 taza provee 282 mg de calcio. ¨ Almendras. Janine porción de 1 onza (alrededor de 24 almendras) provee 75 mg de calcio. ¨ Salmón enlatado. Janine porción de 3 onzas provee 180 mg de calcio. ¨ Tofu (firme, elaborado con sulfato de calcio). Janine porción de ½ taza provee 204 mg.  · Es posible que deba montez un suplemento de calcio para asegurarse de que está recibiendo el calcio que necesita. Dónde puede encontrar más información en inglés? Mireille Jimenez a [email or web address]-bj.info/. Escriba Q201 en la búsqueda para aprender más acerca de \"Dieta para la artritis reumatoide: Instrucciones de cuidado - [ Rheumatoid Arthritis Diet: Care Instructions ]. \"  Revisado: 12 mayo, 2017  Versión del contenido: 11.4  © 6666-1233 Healthwise, DecisionView. Las instrucciones de cuidado fueron adaptadas bajo licencia por Good Help Connections (which disclaims liability or warranty for this information).  Si usted tiene [place] afección médica o sobre estas instrucciones, siempre pregunte a sebastian profesional de carloz. [facility] Incorporated niega toda garantía o responsabilidad por sebastian uso de esta información. Rinitis: Instrucciones de cuidado - [ Rhinitis: Care Instructions ]  Instrucciones de cuidado  La rinitis es joelle hinchazón e irritación en la nariz. Con frecuencia, las alergias y las infecciones son la causa. Puede tener congestión o secreción nasal. Otros síntomas son la comezón y la irritación de ojos, oídos, garganta y boca. Si las alergias son la causa, es posible que el médico le yosvany pruebas para averiguar a qué es alérgico. Casimiro vez pueda detener los síntomas si maico las cosas que los causan. El médico puede sugerir o recetar medicamentos para [employer]. La atención de seguimiento es joelle parte clave de sebastian tratamiento y seguridad. Asegúrese de hacer y acudir a todas las citas, y llame a sebastian médico si está teniendo problemas. También es joelle buena idea saber los resultados de los exámenes y mantener joelle lista de los medicamentos que francisco. Cómo puede cuidarse en el hogar? · Si sebastian rinitis es causada por alergias, trate de averiguar qué es lo que [employer]. [place] pasos para evitar los desencadenantes. ¨ Evite los trabajos [facility]. Estos pueden remover el polen y el moho. ¨ No fume ni permita que otros fumen cerca de usted. Si necesita ayuda para dejar de fumar, hable con sebastian médico sobre programas y medicamentos para dejar de fumar. Estos pueden aumentar kaylyn probabilidades de dejar el hábito para siempre. ¨ No use aerosoles, productos de limpieza ni perfumes. [address] sebastian casa y automóvil nawaf la época de floración si el polen es aleisha de los desencadenantes. ¨ Limpie sebastian casa con frecuencia para controlar el polvo. ¨ Mantenga las [place] fuera de sebastian casa.   · Si sebastian médico le recomienda un medicamento de venta yan para [employer] síntomas, tómelo exactamente [employer] recetado. Llame a sebastian médico si kristen estar teniendo problemas con sebastian medicamento. · Use lavados nasales con solución salina (agua salada) para ayudar a mantener las fosas nasales despejadas y eliminar la mucosidad y las bacterias. Puede comprar gotas nasales de solución salina en un supermercado o joelle farmacia. O puede prepararlas usted mismo en casa agregándole 1 cucharadita de sal y 1 cucharadita de bicarbonato de sodio a 2 tazas de agua destilada. Si las prepara usted mismo, llene joelle sandra de goma con la solución, introduzca la punta en la fosa nasal y apriete con suavidad. Suénese la nariz. Cuándo debe pedir ayuda? Llame a sebastian médico ahora mismo o busque atención médica inmediata si:  ? · Tiene problemas para respirar. ?Preste especial atención a los cambios en sebastian carloz y asegúrese de comunicarse con sebastian médico si:  ? · La mucosidad de la nariz se vuelve más espesa (wander pus) o contiene devonte. ? · Tiene síntomas nuevos o que empeoran. ? · No mejora wander se esperaba. Dónde puede encontrar más información en inglés? Radha Isabela a [email or web address]-bj.[email or web address]/. Alana Gasteluming Y429 en la búsqueda para aprender más acerca de \"Rinitis: Instrucciones de cuidado - [ Rhinitis: Care Instructions ]. \"  Revisado: 12 altamirano, 2017  Versión del contenido: 11.4  © 6454-0043 Healthwise, Incorporated. Las instrucciones de cuidado fueron adaptadas bajo licencia por Good Help Connections (which disclaims liability or warranty for this information). Si usted tiene [place] afección médica o sobre estas instrucciones, siempre pregunte a sebastian profesional de carloz. [facility], Incorporated niega toda garantía o responsabilidad por sebastian uso de esta información.        Dieta DASH: Instrucciones de cuidado - [ DASH Diet: Care Instructions ]  Instrucciones de cuidado    La dieta DASH es un plan de alimentación que puede ayudar a bajar la presión arterial. DASH es la sigla en inglés de \"Dietary Approaches to Stop Hypertension\" (medidas dietéticas para disminuir la hipertensión). Hipertensión significa presión arterial radha. La dieta DASH se concentra en el consumo de alimentos con alto contenido de calcio, potasio y [place]. Estos nutrientes pueden disminuir la presión arterial. Los alimentos con el mayor contenido de [place] nutrientes son las frutas, las verduras, los productos lácteos de bajo contenido de Richard Bowling, las nueces, las semillas y las legumbres. Bobbi montez suplementos de calcio, potasio y magnesio, en lugar de comer alimentos ricos en estos nutrientes, no tiene el mismo efecto. La dieta DASH también incluye granos integrales, pescado y aves. La dieta DASH es aleisha de los cambios de estilo de ky que quizá le recomiende sebastian médico para reducir la presión arterial radha. Es posible que sebastian médico también quiera que usted reduzca la cantidad de sodio en sebastian Patsey Her. Reducir el sodio mientras sigue la dieta DASH puede bajar la presión arterial incluso más que únicamente con la dieta DASH. La atención de seguimiento es joelle parte clave de sebastian tratamiento y seguridad. Asegúrese de hacer y acudir a todas las citas, y llame a sebastian médico si está teniendo problemas. También es joelle buena idea saber los resultados de los exámenes y mantener joelle lista de los medicamentos que francisco. Cómo puede cuidarse en el hogar? Cómo seguir la dieta DASH  · Coma entre 4 y 5 porciones de fruta al día. Joelle porción incluye 1 fruta de [place], ½ taza de fruta enlatada o cortada en trozos, 1/4 taza de fruta seca o 4 onzas (½ taza) de jugo de frutas. Elija fruta con más frecuencia que jugo. · Consuma entre 4 y 11 porciones de verduras al día. Joelle porción incluye 1 taza de Marge o de verduras de hojas crudas, ½ taza de verduras cocidas o cortadas en trozos, o 4 onzas (½ taza) de jugo de verduras. Elija comer verduras con más frecuencia que montez sebastian jugo. · Consuma entre 2 y 3 porciones de lácteos semidescremados y descremados al día.  Nissa Garcia porción incluye 8 onzas de [place], 1 taza de [place] o 1½ onzas de [place]. · Coma entre 6 y 6 porciones de granos todos los [address] Joelle porción incluye 1 rebanada de pan, 1 onza de cereal seco, o ½ taza de arroz, pasta o cereal cocido. Trate de elegir productos de grano integral con la mayor frecuencia posible. · Limite la cantidad de [place] aves y pescado a 2 porciones al día. Cherylyn Antes porción son 3 onzas, lo que es aproximadamente el tamaño de un zelda de naipes. · Coma entre 4 y 5 porciones de nueces, semillas y legumbres (frijoles [habichuelas], lentejas y arvejas [chícharos] secos y cocidos) a la semana. Joelle porción incluye 1/3 taza de nueces, 2 cucharadas de semillas o ½ taza de frijoles o arvejas cocidos. · [address] [facility] y aceites a 2 o 3 porciones al día. Joelle porción es 1 cucharadita de aceite vegetal o 2 cucharadas de aderezo para ensaladas. · Limite los dulces y el azúcar adicional a 5 porciones o menos por semana. Cherylyn Antes porción es 1 cucharada de [place] o [place], ½ taza de sorbete o 1 taza de limonada. · Consuma menos de 2,300 miligramos (mg) de sodio al día. Si limita sebastian consumo de sodio a 1,500 mg al día, puede reducir sebastian presión arterial aún más. Consejos para tener éxito  · Inicie con cambios pequeños. No intente hacer cambios radicales en sebastian dieta de manera repentina. Podría sentir que extraña kaylyn comidas favoritas y, por lo [place], mel joelle mayor probabilidad de que no cumpla el plan. Rhea Arvizu y Sergio. Wil [place] que esos cambios se conviertan en un hábito, incorpore algunos [employer]. · Pruebe algo de lo siguiente:  ¨ Fíjese el objetivo de comer joelle porción de fruta o de verduras en todas las comidas y los refrigerios. [place] facilitará alcanzar la cantidad diaria recomendada de frutas y verduras. ¨ Pruebe comer yogur cubierto con frutas frescas y nueces wander refrigerio o wander postre saludable. ¨ Agregue robson, tomate, pepino y cebolla a kaylyn sándwiches.   ¨ Combine joelle masa de pizza precocida con queso mozzarella de bajo contenido de grasa (\"low-fat\") y cúbralo con abundantes verduras. Intente utilizar tomates, calabaza, espinaca, brócoli, zanahorias, coliflor y cebollas. ¨ Opte por comer joelle variedad de verduras cortadas en trozos con un aderezo de bajo contenido de grasa wander refrigerio, en lugar de \"chips\" (tipo mohan fritas) y un \"dip\" (producto untable). ¨ [place] semillas de girasol o almendras picadas [employer]. O intente agregar nueces o almendras picadas a las verduras cocidas. ¨ Pruebe algunas comidas vegetarianas con frijoles y arvejas. Suellyn Lucy o frijoles rojos a las ensaladas. Prepare burritos y tacos con puré de frijoles pintos o negros. Dónde puede encontrar más información en inglés? Pernell Benitez a [email or web address]-[email or web address] Theodore Montero M340 en la búsqueda para aprender más acerca de \"Dieta DASH: Instrucciones de cuidado - [ DASH Diet: Care Instructions ]. \"  Revisado: 21 septiembre, 2016  Versión del contenido: 11.4  © [id number] Healthwise, Incorporated. Las instrucciones de cuidado fueron adaptadas bajo licencia por Good Help Connections (which disclaims liability or warranty for this information). Si usted tiene [place] afección médica o sobre estas instrucciones, siempre pregunte a sebastian profesional de carloz. Healthwise, Incorporated niega toda garantía o responsabilidad por sebastian uso de esta información. Dieta baja en sodio (2,000 miligramos): Instrucciones de cuidado - [ Low Sodium Diet (2,000 Milligram): Care Instructions ]  Instrucciones de cuidado    Demasiado sodio hace que el organismo retenga agua en exceso. [place] puede aumentar la presión arterial y obligar al corazón y a los riñones a trabajar Marni Sour. En casos muy graves, podrían tener que hospitalizarlo. Hasta podría poner en peligro la ky. Si limita el consumo de sodio, se sentirá mejor y reducirá sebastian riesgo de tener problemas graves.   La diana más común de sodio es la marie. Las personas obtienen la mayor parte de la sal en sebastian dieta de los alimentos enlatados, preparados y de paquete. La comida rápida y los platillos de restaurante también tienen niveles muy altos de [place]. Es probable que sebastian médico le limite el sodio a menos de 2,000 miligramos (mg) al día. Cyndy límite tiene en cuenta todo el sodio presente en los alimentos preparados y de [place] [employer], y toda la sal que añada a kaylyn alimentos. La atención de seguimiento es joelle parte clave de sebastian tratamiento y seguridad. Asegúrese de hacer y acudir a todas las citas, y llame a sebastian médico si está teniendo problemas. También es joelle buena idea saber los resultados de los exámenes y mantener joelle lista de los medicamentos que francisco. Cómo puede cuidarse en el hogar? Roberta las etiquetas de los alimentos  · Roberta las etiquetas de las latas y los alimentos de paquete. [employer] qué cantidad de sodio (\"sodium\") hay en cada porción. Asegúrese de fijarse en el tamaño de la porción. Si usted come Mehreen[employer] porción, consumirá [employer]. · Las etiquetas de los alimentos también indican el Porcentaje del Valor Diario (\"Percent Daily Value\") recomendado para el sodio. Escoja productos con un bajo Porcentaje del Valor Diario de [place]. · Tenga en cuenta que el sodio puede venir en otras formas además de la sal, entre las que se incluyen el glutamato monosódico (MSG, por kaylyn siglas en inglés), el citrato de sodio y el bicarbonato de [place]. La comida asiática frecuentemente contiene MSG añadido. Si sale a comer, a veces puede pedir que no le pongan MSG ni sal a kaylyn alimentos. Compre alimentos bajos en sodio  · Compre alimentos que indiquen en la etiqueta \"sin sal\" (\"unsalted\") (sin sal añadida), \"sin sodio\" (\"sodium-free\") (menos de 5 mg de sodio por porción) o \"bajo en sodio\" (\"low-sodium\") (menos de 140 mg de sodio por porción).  Los alimentos que indiquen en la etiqueta \"reducido en sodio\" (\"reduced-sodium\") y \"ligero contenido de sodio\" (\"light sodium\") aún pueden contener [employer]. Asegúrese de leer la etiqueta para verificar cuánto sodio está consumiendo. · Compre verduras frescas o congeladas que no tengan salsas [id number]. Compre las versiones de bajo sodio de verduras [place], sopas y otros productos enlatados. Prepare comidas bajas en sodio  · Reduzca la cantidad de sal que [place] para cocinar. [place] le ayudará a acostumbrarse al [employer]. No añada marie después de mel cocinado. Joelle cucharadita de sal contiene alrededor de 2,300 mg de sodio. · Retire el salero de la martinez. · Sazone kaylyn comidas con ajo, jugo de wendy, cebolla, vinagre, hierbas y especias. No use salsa de soya, salsa de soya \"lite\", salsa para milo, sal de cebolla, sal de ajo o de apio, mostaza ni ketchup (salsa de tomate) en kaylyn comidas. · Use aderezos para ensaladas, salsas y ketchup de bajo contenido de [place]. O prepare kaylyn propios aderezos para ensaladas y salsas sin añadir sal.  · Use menos sal (o nada) cuando la receta lo pida. Por lo general puede añadir la mitad de la cantidad de marie que pide la receta sin que esta pierda el sabor. Otros alimentos wander el arroz, las pastas y los cereales no necesitan sal adicional.  · Enjuague las verduras enlatadas y cocínelas en agua fresca. Turner le cory algo de sal, xuan no toda. · Evite el agua que naturalmente tenga un alto contenido de sodio o que haya sido tratada con ablandadores, los cuales [place]. Llame a sebastian compañía de agua local para averiguar cuál es el contenido de sodio del agua. Si compra agua embotellada, guevara la etiqueta y escoja joelle johnathan sin sodio. Evite los alimentos altos en sodio  · Evite comer:  ¨ Milo, pescados y aves [place], curados, salados y [place]. ¨ Jamón, tocino, perros calientes (\"hot dogs\") y milo frías. ¨ Queso común, alma y procesado y mantequilla de cacahuate (maní) común.   ¨ Galletas saladas y otros refrigerios salados wander \"pretzels\", \"chips\" (wander mohan fritas) y palomitas de [employer]. ¨ Comidas preparadas y congeladas, a menos que tengan joelle etiqueta que diga \"bajo en sodio\" (\"low-sodium\"). ¨ Sopas, caldos y consomés enlatados y secos o deshidratados, a menos que digan \"sin sodio\" (\"sodium-free\") o \"bajo en sodio\" (\"low-sodium\"). ¨ Verduras enlatadas, a menos que digan \"sin sodio\" (\"sodium-free\") o \"bajo en sodio\" (\"low-sodium\"). ¨ Med fritas (a la francesa), pizzas, tacos y otras comidas rápidas. ¨ Pepinillos, aceitunas, ketchup y otros condimentos, en especial la salsa de soya, a menos que digan \"sin sodio\" (\"sodium-free\") o \"bajo en sodio\" (\"low-sodium\"). Dónde puede encontrar más información en inglés? Wanda Rich a [email or web address] [facility] C532 en la búsqueda para aprender más acerca de \"Dieta baja en sodio (2,000 miligramos): Instrucciones de cuidado - [ Low Sodium Diet (2,000 Milligram): Care Instructions ]. \"  Revisado: 12 altamirano, 2017  Versión del contenido: 11.4  © 5385-9428 Healthwise, Incorporated. Las instrucciones de cuidado fueron adaptadas bajo licencia por Good Help Connections (which disclaims liability or warranty for this information). Si usted tiene [place] afección médica o sobre estas instrucciones, siempre pregunte a sebastian profesional de carloz. Healthwise, Incorporated niega toda garantía o responsabilidad por sebastian uso de esta información. Cómo leer las etiquetas de los alimentos para limitar el consumo de sodio: Instrucciones de cuidado - [ How to Read a Food Label to Limit Sodium: Care Instructions ]  Instrucciones de cuidado  El sodio hace que el cuerpo retenga agua en exceso. [place] puede aumentar la presión arterial y obligar al corazón y a los riñones a trabajar [employer]. En casos muy graves, podrían tener que hospitalizarlo. Hasta podría poner en peligro la ky. Si limita el consumo de sodio, se sentirá mejor y reducirá sebastian riesgo de tener problemas graves.   Los alimentos procesados, las comidas rápidas y los platillos de restaurante son las tuttle principales de sodio en la alimentación. El [address] común del sodio es \"sal\". Trate de limitar la cantidad de sodio que consume a menos de 2,300 miligramos (mg) al día. Si limita sebastian consumo de sodio a 1,500 mg al día, puede reducir sebastian presión arterial aún más. Cyndy límite incluye toda la sal que consuma en los alimentos que cocine o en los de paquete. Lleve joelle lista de todo lo que come y dante. La atención de seguimiento es joelle parte clave de sebastian tratamiento y seguridad. Asegúrese de hacer y acudir a todas las citas, y llame a sebastian médico si está teniendo problemas. También es joelle buena idea saber los resultados de los exámenes y mantener joelle lista de los medicamentos que francisco. Cómo puede cuidarse en el hogar? Roberta la lista de ingredientes en las etiquetas de los alimentos  · Roberta la lista de ingredientes en las etiquetas de los alimentos para saber cuánto sodio contienen. La etiqueta indica los ingredientes de un alimento en orden descendente (de mayor a herminia). Si la sal o el sodio es aleisha de los primeros elementos de la lista, jolene alimento puede contener mucho sodio. · El sodio tiene nombres [place]. El sodio también se conoce wander glutamato monosódico (MSG, por kaylyn siglas en inglés, común en la comida [place]), citrato de Ch[place], alginato de [place], hidróxido de sodio y fosfato de sodio. Roberta la información nutricional en las etiquetas  · La mayoría de los alimentos tienen joelle etiqueta con información nutricional. Allí encontrará la cantidad de sodio que tiene joelle porción del alimento. Roberta los datos acerca del tamaño de la porción y la cantidad de [place]. El tamaño de la porción se encuentra en la parte superior de la etiqueta, por lo general, debajo del título \"Información nutricional\" (Nutrition Facts). La cantidad de sodio se encuentra en la lista debajo del título. Se expresa en miligramos (mg). ¨ Verifique detenidamente el tamaño de la porción.  [facility] porción suele ser Freada Buster, y es posible que coma más de [facility]. Si teresa es el wendy, la cantidad de sodio que consuma será mayor que la que se menciona en la etiqueta. Por ejemplo, si el tamaño de la porción de joelle sopa enlatada es 1 taza y la cantidad de sodio es 470 mg, si francisco 2 tazas, consumirá 940 mg de sodio. · La información nutricional de las frutas y las verduras frescas no aparece en esos alimentos. Es posible que se encuentre detallada en algún lado de la evan. Estos alimentos suelen no tener sodio o son bajos en sodio. · La etiqueta de información nutricional también proporciona el porcentaje del valor diario de [place]. Teresa valor es la cantidad de sodio recomendada que contiene joelle porción. El valor diario para el sodio es menos de 2,300 mg. Así, si el porcentaje del valor diario dice 50%, significa que joelle porción le está aportando la mitad, o sea, 1,150 mg. Compre alimentos con bajo contenido de sodio  · Busque alimentos que hayan sido elaborados con herminia cantidad de sodio. Busque las siguientes palabras en la etiqueta. ¨ \"Unsalted\" (sin sal) significa que el alimento no tiene sodio agregado. Bobbi es posible que el alimento ya tenga sodio en forma natural.  ¨ \"Sodium-free\" (sin sodio) significa que joelle porción tiene menos de 5 mg de sodio. ¨ \"Very low sodium\" (muy bajo contenido de [place]) significa que joelle porción tiene 35 mg de sodio o menos. ¨ \"Low-sodium\" (bajo contenido de sodio) significa que joelle porción tiene 140 mg de sodio o menos. · \"Reduced-sodium\" (reducido contenido de sodio) significa que el alimento tiene un 25% menos de sodio que lo normal. [place], esta cantidad de sodio es aún muy radha. Trate de no comprar alimentos que digan \"reducido contenido de sodio\" en la etiqueta. · Compre verduras frescas o verduras congeladas que no tengan salsas añadidas. Compre las versiones de bajo sodio de verduras, sopas y otros productos enlatados.   
Dónde puede encontrar más información en inglés? [place] a http://christina-bj.info/. Escriba B757 en la búsqueda para aprender más acerca de \"Cómo leer las etiquetas de los alimentos para limitar el consumo de sodio: Instrucciones de cuidado - [ How to Read a Food Label to Limit Sodium: Care Instructions ]. \"  Revisado: 12 altamirano, 2017  Versión del contenido: 11.4  © 7359-3897 Healthwise, Incorporated. Las instrucciones de cuidado fueron adaptadas bajo licencia por [employer] (which disclaims liability or warranty for this information). Si usted tiene [place] afección médica o sobre estas instrucciones, siempre pregunte a sebastian profesional de carloz. Healthwise, Incorporated niega toda garantía o responsabilidad por sebastian uso de esta información.

## 2018-06-15 NOTE — PROGRESS NOTES
1. Have you been to the ER, urgent care clinic since your last visit? Hospitalized since your last visit? No     2. Have you seen or consulted any other health care providers outside of the [address] [place] [address] since your last visit? Include any pap smears or colon screening.  No     Chief Complaint   Patient presents with    Physical    Knee Pain     aching pain to bilaleral knees    Shoulder Pain     aching pain     Back Pain     aching pain

## 2018-06-16 LAB
ANA SER QL: POSITIVE
ANTICHROMATIN ABS, ACHRLT: <0.2 AI (ref 0–0.9)
DSDNA AB SER-ACNC: 8 IU/ML (ref 0–9)
ENA SM AB SER-ACNC: <0.2 AI (ref 0–0.9)
ENA SM+RNP AB SER-ACNC: 0.3 AI (ref 0–0.9)
RNP ABS, RNPRLT: 6.9 AI (ref 0–0.9)
SEE BELOW:, 164879: ABNORMAL

## 2018-06-17 LAB
CCP IGA+IGG SERPL IA-ACNC: 8 UNITS (ref 0–19)
RHEUMATOID FACT SERPL-ACNC: 19.3 IU/ML (ref 0–13.9)

## 2018-06-20 DIAGNOSIS — M05.79 RHEUMATOID ARTHRITIS INVOLVING MULTIPLE SITES WITH POSITIVE RHEUMATOID FACTOR (HCC): Primary | ICD-10-CM

## 2018-06-21 NOTE — PROGRESS NOTES
Patient contacted about lab results. Will refer to rheumatology. Patient will have daughter contact us for address, as she does not know the area.

## 2018-06-27 DIAGNOSIS — M05.79 RHEUMATOID ARTHRITIS INVOLVING MULTIPLE SITES WITH POSITIVE RHEUMATOID FACTOR (HCC): Primary | ICD-10-CM

## 2018-06-27 RX ORDER — MELOXICAM 7.5 MG/1
7.5 TABLET ORAL DAILY
Qty: 30 TAB | Refills: 1 | Status: SHIPPED | OUTPATIENT
Start: 2018-06-27 | End: 2018-12-03 | Stop reason: ALTCHOICE

## 2018-08-07 ENCOUNTER — OFFICE VISIT (OUTPATIENT)
Dept: FAMILY MEDICINE CLINIC | Age: 58
End: 2018-08-07

## 2018-08-07 ENCOUNTER — HOSPITAL ENCOUNTER (OUTPATIENT)
Dept: LAB | Age: 58
Discharge: HOME | End: 2018-08-07
Payer: SELF-PAY

## 2018-08-07 VITALS
BODY MASS INDEX: 50.64 KG/M2 | RESPIRATION RATE: 16 BRPM | HEIGHT: 62 IN | SYSTOLIC BLOOD PRESSURE: 129 MMHG | OXYGEN SATURATION: 99 % | TEMPERATURE: 97.4 F | HEART RATE: 66 BPM | DIASTOLIC BLOOD PRESSURE: 76 MMHG | WEIGHT: 275.2 LBS

## 2018-08-07 DIAGNOSIS — M32.9 SYSTEMIC LUPUS ERYTHEMATOSUS ARTHRITIS (HCC): ICD-10-CM

## 2018-08-07 DIAGNOSIS — M05.9 RHEUMATOID ARTHRITIS WITH POSITIVE RHEUMATOID FACTOR, INVOLVING UNSPECIFIED SITE (HCC): Primary | ICD-10-CM

## 2018-08-07 DIAGNOSIS — R74.8 ELEVATED LIVER ENZYMES: ICD-10-CM

## 2018-08-07 PROCEDURE — 36415 COLL VENOUS BLD VENIPUNCTURE: CPT | Performed by: FAMILY MEDICINE

## 2018-08-07 PROCEDURE — 80076 HEPATIC FUNCTION PANEL: CPT | Performed by: FAMILY MEDICINE

## 2018-08-07 PROCEDURE — 80074 ACUTE HEPATITIS PANEL: CPT | Performed by: FAMILY MEDICINE

## 2018-08-07 RX ORDER — PREDNISONE 5 MG/1
TABLET ORAL DAILY
COMMUNITY
End: 2020-03-01

## 2018-08-07 RX ORDER — SULFASALAZINE 500 MG/1
500 TABLET ORAL 2 TIMES DAILY
COMMUNITY
End: 2020-03-01

## 2018-08-07 NOTE — PROGRESS NOTES
Dexter Mortensen    CC: F/U of rheumatoid arthritis    HPI:     Rheumatoid Arthritis:  - Saw rheumatologist on 7/20/2018 and was diagnoses with SLE arthritis  - Lab work and XR were done  - Started on sulfasalazine and prednisone regimen  - Patient got steroid injection at that visit  - Has been taking the medication as prescribed  - Reports possible side effect of cramps  - Reports that pain has improved since her prior visit  - Next appointment with rheumatology is on 8/16/2018      Elevated Liver Enzymes:  - Lab work ordered by rheumatologist for further evaluation  - Patient denies any alcohol use and reports only using 2 tablets of tylenol daily      ROS: Positive items marked in RED  CON: fever, chills, fatigue  Cardiovascular: palpitations, CP  Resp: SOB, cough  GI: nausea, vomiting, diarrhea  : dysuria, hematuria      Past Medical History:   Diagnosis Date    Arthritis     Hypertension     Diagnosed in [address] a few months ago       Past Surgical History:   Procedure Laterality Date    HX CHOLECYSTECTOMY  2013    HX COLONOSCOPY      Completed in [facility]       Family History   Problem Relation Age of Onset    Diabetes Mother     Hypertension Father     No Known Problems Sister     No Known Problems Brother     No Known Problems Sister     No Known Problems Sister     No Known Problems Brother     No Known Problems Brother     No Known Problems Brother        Social History     Social History    Marital status: SINGLE     Spouse name: N/A    Number of children: N/A    Years of education: N/A     Social History Main Topics    Smoking status: Former Smoker     Quit date: 1/1/2017    Smokeless tobacco: Never Used    Alcohol use No    Drug use: No    Sexual activity: Yes     Partners: Male     Birth control/ protection: None     Other Topics Concern    None     Social History Narrative       No Known Allergies      Current Outpatient Prescriptions:     predniSONE (DELTASONE) 5 mg tablet, Take  by mouth daily. , Disp: , Rfl:     sulfaSALAzine (AZULFIDINE) 500 mg tablet, Take 500 mg by mouth two (2) times a day., Disp: , Rfl:     meloxicam (MOBIC) 7.5 mg tablet, Take 1 Tab by mouth daily. Indications: Rheumatoid Arthritis, Disp: 30 Tab, Rfl: 1    raNITIdine (ZANTAC) 150 mg tablet, Take 1 Tab by mouth two (2) times a day., Disp: 180 Tab, Rfl: 1    acetaminophen (TYLENOL) 325 mg tablet, Take  by mouth every four (4) hours as needed for Pain., Disp: , Rfl:     fluticasone (FLONASE) 50 mcg/actuation nasal spray, 2 Sprays by Both Nostrils route daily. , Disp: 1 Bottle, Rfl: 11    Physical Exam:      /76 (BP 1 Location: Left arm, BP Patient Position: Sitting)  Pulse 66  Temp 97.4 °F (36.3 °C) (Oral)   Resp 16  Ht 5' 2\" (1.575 m)  Wt 275 lb 3.2 oz (124.8 kg)  SpO2 99%  BMI 50.33 kg/m2    General: Obese habitus, NAD  Eyes: sclera clear bilaterally, no discharge noted, eyelids normal in appearance  HENT: NCAT  Lungs: CTAB, Normal respiratory effort and rate  CV: RRR, no MRGs  ABD: soft, non-tender, non-distended, normal bowel sounds  Ext: no peripheral edema or digital cyanosis noted. Skin: normal temperature, turgor, color, and texture  Psych: alert and oriented to person, place and time, normal affect  Neuro: speech normal, moving all extremities, gait is normal    Bilateral Knee XR (7/20/2018):  1.  No acute osseous abnormalities either knee.        2.  Bilateral osteoarthrosis, as described     CPK (07/20/2018 10:21 AM)  CPK (07/20/2018 10:21 AM)   Component Value Ref Range    (H) 30 - 165 U/L     ANTI MITOCHONDRIAL ANTIBODY (07/20/2018 10:21 AM)  ANTI MITOCHONDRIAL ANTIBODY (07/20/2018 10:21 AM)   Component Value Ref Range   Mitochondrial Antibody 6.0  Comment:                                   Negative    0.0 - 20.0                                  Equivocal  20.1 - 24.9                                  Positive         >24.9  Mitochondrial (M2) Antibodies are found in 90-96% of  patients with primary biliary cirrhosis. 0.0 - 20.0 Units     SMOOTH MUSCLE (ACTIN ANTIBODIES) (07/20/2018 10:21 AM)  SMOOTH MUSCLE (ACTIN ANTIBODIES) (07/20/2018 10:21 AM)   Component Value Ref Range   Smooth (Actin) Muscle Abs 24 (H)  Comment:                    Negative                     0 - 19                   Weak positive               20 - 30                   Moderate to strong positive     >30  Actin Antibodies are found in 52-85% of patients with  autoimmune hepatitis or chronic active hepatitis and  in 22% of patients with primary biliary cirrhosis. 0 - 19 Units     C Reactive Protein (07/20/2018 10:21 AM)  C Reactive Protein (07/20/2018 10:21 AM)   Component Value Ref Range   C-REACTIVE PROTEIN 0.5 0.0 - 0.5 mg/dL     SJOGREN ANTIBODIES (07/20/2018 10:21 AM)  SJOGREN ANTIBODIES (07/20/2018 10:21 AM)   Component Value Ref Range   SS-A Antibody <0.2 <1.0 AI   SS-B Antibody 0.2 <1.0 AI     Urinalysis (07/20/2018 10:21 AM)  Urinalysis (07/20/2018 10:21 AM)   Component Value Ref Range   Urine pH 7.0 5.0 - 8.0 pH   Urine Protein Screen Negative Negative, Trace mg/dL   Urine Glucose Negative Negative mg/dL   Urine Ketones Negative Negative mg/dL   Urine Occult Blood Negative Negative   Urine Specific Gravity 1.017 1.005 - 1.030   Urine Nitrite Negative Negative   Urine Leukocyte Esterase Negative Negative   Urine Bilirubin Negative Negative   Urine Urobilinogen 4.0 <2.0 mg/dL   Urine RBC 0-2 Negative, 0-2 /hpf   Urine WBC 0-2 0 - 2 /hpf   Squamous Epithelial Cells 0-2 0 - 2 /hpf         Assessment/Plan     Rheumatoid Arthritis/SLE Arthritis, Improving:  - Will defer management to Rheumatology  - Patient advised to discuss her concern of possible cramps 2/2 her medication with the rheumatologist  - Handout given on lupus care  - Follow up in one month      Elevated Liver Panel:  - Etiology unclear. Suspect autoimmune hepatitis.   - Will check liver function panel and hepatitis panel  - Handout given on liver function test  - Follow up in one month        Charli Najera MD  8/7/2018, 10:27 AM

## 2018-08-07 NOTE — PATIENT INSTRUCTIONS
Pruebas funcionales hepáticas: Sobre estas pruebas - [ Liver Function Tests: About These Tests ]  ¿Qué es? Las pruebas funcionales hepáticas revisan lo karley que funciona el hígado. Algunas pruebas miden la cantidad de determinadas enzimas en la devonte para examinar si el hígado está dañado o inflamado. Otras pruebas miden lo karley que el hígado es capaz de producir proteínas importantes o eliminar los productos de desecho del cuerpo. Sanchez médico utilizará los [place] de las pruebas para ayudar a detectar determinadas afecciones, wander hepatitis, cirrosis o problemas de la vesícula biliar. El hecho de que las pruebas den resultados que no son normales no siempre significa que haya un problema de hígado. Sanchez médico puede determinar si hay un problema basándose en kaylyn resultados. Las pruebas podrían incluir:  · Fosfatasa alcalina (ALP, por kaylyn siglas en inglés). Esta prueba mide la cantidad de la enzima ALP en la devonte. · Proteína total. Joelle prueba de proteína sérica total mide las cantidades de [address] grupos principales de proteínas en la devonte (albúmina y globulina) y la cantidad total de proteína. · Bilirrubina. Esta prueba mide el nivel de bilirrubina en la [place]. Cuando los niveles de bilirrubina son altos, la piel y parte camille de los ojos pueden parecer rojelio (ictericia). [place] puede estar causado por joelle enfermedad hepática. · Aspartato aminotransferasa (AST, por kaylyn siglas en inglés) y alanina aminotransferasa (ALT, por kaylyn siglas en inglés). Estas pruebas miden la cantidad de las enzimas AST y ALT en la devonte. (La aminotransferasa también es conocida wander transaminasa. Los niveles altos podrían llamarse transaminitis). ¿Por qué se hace esta prueba? Las pruebas funcionales hepáticas revisan lo karley que funciona el hígado. Algunas pruebas ayudan a mostrar si usted tiene el hígado dañado o inflamado.  Sanchez médico podría indicarle pruebas funcionales hepáticas si usted tiene síntomas de enfermedad del hígado. Estas pruebas también pueden realizarse para determinar la eficacia del tratamiento para la enfermedad hepática. ¿Cómo puede prepararse para la prueba? Por lo general, usted no necesita prepararse antes de someterse a estas pruebas. Sebastian médico le puede zhou algunas instrucciones específicas para que usted se prepare. ¿Qué ocurre nawaf la prueba? Un profesional de [employer] francisco joelle muestra de [place]. ¿Qué ocurre después de la prueba? Probablemente pueda irse a casa de inmediato. ¿Cuándo debe pedir ayuda? Vigile atentamente los [facility], y asegúrese de comunicarse con sebastian médico si tiene algún problema. La atención de seguimiento es joelle parte clave de sebastian tratamiento y seguridad. Asegúrese de hacer y acudir a todas las citas, y llame a sebastian médico si está teniendo problemas. También es joelle buena idea mantener joelle lista de los medicamentos que francisco. Pregúntele a sebastian médico cuándo puede esperar [employer] de la prueba. ¿Dónde puede encontrar más información en inglés? Laurie Muhammad a http://christina-bj.info/. Beverley Shan [id number] en la búsqueda para aprender más acerca de \"Pruebas funcionales hepáticas: Sobre estas pruebas - [ Liver Function Tests: About These Tests ]. \"  Revisado: 9 octubre, 2017  Versión del contenido: 11.7  © 5578-4354 [employer], Incorporated. Las instrucciones de cuidado fueron adaptadas bajo licencia por Good Help Connections (which disclaims liability or warranty for this information). Si usted tiene [place] afección médica o sobre estas instrucciones, siempre pregunte a sebastian profesional de carloz. Healthwise, Incorporated niega toda garantía o responsabilidad por sebastian uso de esta información. Lupus: Instrucciones de cuidado - [ Lupus: Care Instructions ]  Instrucciones de cuidado  El lupus es joelle enfermedad a rochelle plazo que puede causar inflamación, dolor y daño en los tejidos del cuerpo. Es joelle enfermedad autoinmunitaria. [place] significa que el sistema inmunitario ataca kaylyn propios tejidos. El lupus puede causarle problemas en la piel, los riñones, el corazón, los [place], los nervios o las células sanguíneas. Esta información es sobre el lupus eritematoso sistémico (SLE, por kaylyn siglas en inglés). El SLE es el tipo más común y [place] grave de lupus. Bobbi hay otros tipos de lupus, wander el lupus discoide o cutáneo, el lupus sistémico inducido por fármacos y el lupus .  Cuando tiene síntomas de lupus, tiene exacerbaciones o recaídas. Cuando los síntomas mejoran, usted está en remisión. El lupus puede empeorar muy rápidamente. No hay manera de saber cuándo se producirá joelle exacerbación ni lo grave que será. Cuando tiene Drema Apo exacerbación del lupus, puede tener síntomas nuevos así wander síntomas que ya tuvo anteriormente. Conozca las señales de sebastian cuerpo que indiquen joelle recaída, tales wander dolor articular, salpullido, fiebre o sentirse más cansado. Cuando observe alguna de [employer], tome las medidas necesarias para controlar los síntomas. La atención de seguimiento es joelle parte clave de sebastian tratamiento y seguridad. Asegúrese de hacer y acudir a todas las citas, y llame a sebastian médico si está teniendo problemas. También es joelle buena idea saber los resultados de kaylyn exámenes y mantener joelle lista de los medicamentos que francisco. ¿Cómo puede cuidarse en el hogar? Reduzca el estrés y la fatiga  · Mantenga sebastian programa diario tan simple wander sea posible. · Mantenga la lista de cosas pendientes tan corta wander pueda. · Yosvany ejercicio con regularidad. Caminar o nadar a diario, por ejemplo, puede reducir el estrés, despejarle la mente, mejorar sebastian Maikel de ánimo y ayudarle a combatir la fatiga. · Utilice meditación, yoga o imágenes guiadas para relajarse. · Descanse lo suficiente. Algunas personas que tienen lupus necesitan dormir hasta 12 horas todas las noches. · Siga sebastian propio ritmo. No yosvany demasiadas actividades.   · Pida ayuda a otras personas. No intente hacer todo por sí solo. · [place] breves descansos en kaylyn actividades habituales. Piense en reducir la cantidad de horas de trabajo cuando los síntomas son intensos. · Si piensa que la depresión o ansiedad está aumentándole el cansancio, consulte a sebastian médico o profesional de la carloz mental, o a ambos. Cuídese la piel  · Consulte a sebastian médico sobre el uso de cremas con corticosteroides para tratar los síntomas de la piel. · Si le [employer] en que un salpullido causado por el lupus se le manifiesta en la ayse o si tiene cicatrices causadas por el lupus, puede utilizar [place], wander [place], para cubrir el salpullido o las cicatrices. · Manténgase fuera de los yolande del sol, sobre todo cuando son más alyse, por lo general entre las 10 a.m. y las 4 p.m. Si va a exponerse al sol, cúbrase los brazos y UNTERTAUTENDORFERAMT, y use un sombrero. Asegúrese de usar un protector solar de amplio espectro que tenga factor de protección (SPF, por kaylyn siglas en inglés) de 48 o más alto. Póngase más protector solar después de nadar, sudar o secarse. Practique un buen cuidado personal  · Aprenda más sobre el lupus y cómo cuidarse. · [place] los medicamentos exactamente según las indicaciones. Llame a sebastian médico si tiene algún problema con los medicamentos. · No fume. Si necesita ayuda para dejar de fumar, hable con sebastian médico sobre programas y medicamentos para dejar de fumar. Estos pueden aumentar kaylyn probabilidades de dejar el hábito para siempre. · Siga joelle Merrilyn Sheila y saludable. Florestine Breech dieta equilibrada incluye granos integrales, productos lácteos, frutas y verduras, y proteínas. Coma joelle variedad de alimentos de cada aleisha de esos grupos para obtener todos los nutrientes que necesita. · Evite estar en contacto con personas que tienen un resfriado o la gripe. Estas enfermedades pueden causar recaídas del lupus.  Hable con sebastian médico respecto a la vacuna contra la gripe (\"flu shot\") y la vacuna antineumocócica. Si se enferma o piensa que está a punto de [employer], hable con sebastian médico para que los síntomas cheri tratados de inmediato. · Cepíllese los dientes y use hilo dental a diario. Visite al [place] al año. · Hágase exámenes de la vista con regularidad. · Establezca un sistema de apoyo con kaylyn familiares, amigos y profesionales de la carloz. ¿Cuándo debe pedir ayuda? Llame al 911 en cualquier momento que considere que necesita atención de [place]. Por ejemplo, llame si:    · Tiene síntomas de un ataque al corazón. Estos pueden incluir:  ¨ Dolor o presión en el pecho, o joelle sensación extraña en el pecho. ¨ Sudoración. ¨ Falta de aire. ¨ Náuseas o vómito. ¨ Dolor, presión o joelle sensación extraña en la espalda, el christopher, la mandíbula, la parte superior del abdomen, o en aleisha o ambos hombros o brazos. ¨ Aturdimiento o debilidad repentina. ¨ Latidos del corazón rápidos o irregulares.    Después de llamar al 911, es posible que el operador le diga que mastique 1 aspirina para adultos o 2 a 4 aspirinas de dosis baja. Espere la ambulancia. No trate de conducir por sí mismo.   Llame a sebastian médico ahora mismo o busque atención médica inmediata si:    · Le falta el aire.     · Orina con devonte u orina con menos frecuencia y en cantidades menores que las habituales.     · Tiene fiebre.     · Se siente deprimido o nota cambios de comportamiento o en sebastian manera de pensar.     · Está mareado o siente debilidad muscular.     · Se le hincha la parte inferior de las piernas o los pies.    Preste especial atención a los cambios en sebastian carloz y asegúrese de comunicarse con sebastian médico si:    · Presenta algún síntoma nuevo o empeoran los que ya tenía. Estos pueden incluir dolor o hinchazón de las articulaciones, aumento de la fatiga, pérdida del apetito, caída del damaris, salpullido en la piel, o nuevas llagas en la boca o la nariz. ¿Dónde puede encontrar más información en inglés?   Marleni Jones a http://christina-bj.info/. Beatris Junior [id number] en la búsqueda para aprender más acerca de \"Lupus: Instrucciones de cuidado - [ Lupus: Care Instructions ]. \"  Revisado: 10 octubre, 2017  Versión del contenido: 11.7  © 3447-6433 Healthwise, Incorporated. Las instrucciones de cuidado fueron adaptadas bajo licencia por Good Help Connections (which disclaims liability or warranty for this information). Si usted tiene [place] afección médica o sobre estas instrucciones, siempre pregunte a sebastian profesional de carloz. Healthwise, Incorporated niega toda garantía o responsabilidad por sebastian uso de esta información.

## 2018-08-07 NOTE — PROGRESS NOTES
Chief Complaint   Patient presents with    Follow Up Chronic Condition     1. Have you been to the ER, urgent care clinic since your last visit? Hospitalized since your last visit? No    2. Have you seen or consulted any other health care providers outside of the [address] [place] [address] since your last visit? Include any pap smears or colon screening.  No     Visit Vitals    /76 (BP 1 Location: Left arm, BP Patient Position: Sitting)    Pulse 66    Temp 97.4 °F (36.3 °C) (Oral)    Resp 16    Ht 5' 2\" (1.575 m)    Wt 275 lb 3.2 oz (124.8 kg)    SpO2 99%    BMI 50.33 kg/m2       PHQ over the last two weeks 8/7/2018   Little interest or pleasure in doing things Nearly every day   Feeling down, depressed, irritable, or hopeless Several days   Total Score PHQ 2 4

## 2018-08-07 NOTE — MR AVS SNAPSHOT
Tomi Mccormick [place] [address] [facility]. [address] [facility] 83 [address] 
[phone] Patient: Tari Mendoza MRN: [id number] PEE:[id number] Visit Information Date & Time Provider Department Dept. Phone Encounter #  
 8/7/2018 10:45 AM Alvin Ingram, [address] [place], [place] [id number] Follow-up Instructions Return in about 1 month (around 9/7/2018). Upcoming Health Maintenance Date Due Hepatitis C Screening 1960 DTaP/Tdap/Td series (1 - Tdap) 11/12/1981 PAP AKA CERVICAL CYTOLOGY 11/12/1981 BREAST CANCER SCRN MAMMOGRAM 11/12/2010 FOBT Q 1 YEAR AGE 50-75 11/12/2010 Influenza Age 5 to Adult 8/1/2018 Allergies as of 8/7/2018  Review Complete On: 8/7/2018 By: Chanda Ford LPN No Known Allergies Current Immunizations  Never Reviewed No immunizations on file. Not reviewed this visit You Were Diagnosed With   
  
 Codes Comments Rheumatoid arthritis with positive rheumatoid factor, involving unspecified site [facility])    -  Primary ICD-10-CM: M05.9 ICD-9-CM: 714.0 Systemic lupus erythematosus arthritis (HCC)     ICD-10-CM: M32.9 ICD-9-CM: 710.0 Elevated liver enzymes     ICD-10-CM: R74.8 ICD-9-CM: 790.5 Vitals BP Pulse Temp Resp Height(growth percentile) Weight(growth percentile) 129/76 (BP 1 Location: Left arm, BP Patient Position: Sitting) 66 97.4 °F (36.3 °C) (Oral) 16 5' 2\" (1.575 m) 275 lb 3.2 oz (124.8 kg) SpO2 BMI OB Status Smoking Status 99% 50.33 kg/m2 Menopause Former Smoker Vitals History BMI and BSA Data Body Mass Index Body Surface Area  
 50.33 kg/m 2 2.34 m 2 Preferred Pharmacy Pharmacy Name Phone [id number] [address], [address] [place] [address] [phone] Your Updated Medication List  
  
   
This list is accurate as of 8/7/18 11:11 AM.  Always use your most recent med list.  
  
  
  
  
 fluticasone 50 mcg/actuation nasal spray Commonly known as:  Artie [place] 2 Sprays by Both Nostrils route daily. meloxicam 7.5 mg tablet Commonly known as:  MOBIC Take 1 Tab by mouth daily. Indications: Rheumatoid Arthritis  
  
 predniSONE 5 mg tablet Commonly known as:  Zia Gun Take  by mouth daily. raNITIdine 150 mg tablet Commonly known as:  ZANTAC Take 1 Tab by mouth two (2) times a day. sulfaSALAzine 500 mg tablet Commonly known as:  AZULFIDINE Take 500 mg by mouth two (2) times a day. TYLENOL 325 mg tablet Generic drug:  acetaminophen Take  by mouth every four (4) hours as needed for Pain. Follow-up Instructions Return in about 1 month (around 9/7/2018). To-Do List   
 08/07/2018 Lab:  HEPATIC FUNCTION PANEL   
  
 08/07/2018 Lab:  HEPATITIS C AB   
  
 08/07/2018 Lab:  HEPATITIS PANEL, ACUTE Patient Instructions Pruebas funcionales hepáticas: Sobre estas pruebas - [ Liver Function Tests: About These Tests ] Ghulam Dash es? Las pruebas funcionales hepáticas revisan lo karley que funciona el hígado. Algunas pruebas miden la cantidad de determinadas enzimas en la devonte para examinar si el hígado está dañado o inflamado. Otras pruebas miden lo karley que el hígado es capaz de producir proteínas importantes o eliminar los productos de desecho del cuerpo. Sanchez médico utilizará los [employer] de las pruebas para ayudar a detectar determinadas afecciones, wander hepatitis, cirrosis o problemas de la vesícula biliar. El hecho de que las pruebas den resultados que no son normales no siempre significa que haya un problema de hígado. Sanchez médico puede determinar si hay un problema basándose en kaylyn resultados. Las pruebas podrían incluir: · Fosfatasa alcalina (ALP, por kaylyn siglas en inglés). Esta prueba mide la cantidad de la enzima ALP en la devonte.  
· Proteína total. Janine prueba de proteína sérica total mide las cantidades de [employer] principales de proteínas en la devonte (albúmina y globulina) y la cantidad total de proteína. · Bilirrubina. Esta prueba mide el nivel de bilirrubina en la [place]. Cuando los niveles de bilirrubina son altos, la piel y parte camille de los ojos pueden parecer rojelio (ictericia). [place] puede estar causado por joelle enfermedad hepática. · Aspartato aminotransferasa (AST, por kaylyn siglas en inglés) y alanina aminotransferasa (ALT, por kaylyn siglas en inglés). Estas pruebas miden la cantidad de las enzimas AST y ALT en la devonte. (La aminotransferasa también es conocida wander transaminasa. Los niveles altos podrían llamarse transaminitis). Por qué se hace esta prueba? Las pruebas funcionales hepáticas revisan lo karley que funciona el hígado. Algunas pruebas ayudan a mostrar si usted tiene el hígado dañado o inflamado. Sebsatian médico podría indicarle pruebas funcionales hepáticas si usted tiene síntomas de enfermedad del hígado. Estas pruebas también pueden realizarse para determinar la eficacia del tratamiento para la enfermedad hepática. Cómo puede prepararse para la prueba? Por lo general, usted no necesita prepararse antes de someterse a estas pruebas. Sebastian médico le puede zhou algunas instrucciones específicas para que usted se prepare. Ornigele Montserrat nawaf la prueba? Un profesional de [facility] francisco joelle muestra de Maral. Orlene Montserrat después de la prueba? Probablemente pueda irse a casa de inmediato. Cuándo debe pedir ayuda? Vigile atentamente los [place], y asegúrese de comunicarse con sebastian médico si tiene algún problema. La atención de seguimiento es joelle parte clave de sebastian tratamiento y seguridad. Asegúrese de hacer y acudir a todas las citas, y llame a sebastian médico si está teniendo problemas. También es joelle buena idea mantener joelle lista de los medicamentos que francisco. Pregúntele a sebastian médico cuándo puede esperar [employer] de la prueba. Dónde puede encontrar más información en inglés? Laurie Muhammad a http://christina-bj.info/. Beverley Wynn [id number] en la búsqueda para aprender más acerca de \"Pruebas funcionales hepáticas: Sobre estas pruebas - [ Liver Function Tests: About These Tests ]. \" 
Revisado: 2017 Versión del contenido: 11.7 © 1552-7736 Healthwise, Incorporated. Las instrucciones de cuidado fueron adaptadas bajo licencia por Good Help [employer]ctions (which disclaims liability or warranty for this information). Si usted tiene [place] afección médica o sobre estas instrucciones, siempre pregunte a sebastian profesional de carloz. Healthwise, Incorporated niega toda garantía o responsabilidad por sebastian uso de esta información. Lupus: Instrucciones de cuidado - [ Lupus: Care Instructions ] Instrucciones de cuidado El lupus es joelle enfermedad a rochelle plazo que puede causar inflamación, dolor y [employer] tejidos del cuerpo. Es joelle enfermedad autoinmunitaria. [place] significa que el sistema inmunitario ataca kaylyn propios tejidos. El lupus puede causarle problemas en la piel, los riñones, el corazón, los [place], los nervios o las células sanguíneas. Esta información es sobre el lupus eritematoso sistémico (SLE, por kaylyn siglas en inglés). El SLE es el tipo más común y Maisha Moulder grave de lupus. Bobbi hay otros tipos de lupus, wander el lupus discoide o cutáneo, el lupus sistémico inducido por fármacos y el lupus . 
Cuando tiene síntomas de lupus, tiene exacerbaciones o recaídas. Cuando los síntomas mejoran, usted está en remisión. El lupus puede empeorar muy rápidamente. No hay manera de saber cuándo se producirá joelle exacerbación ni lo grave que será. Cuando tiene Betty Roller exacerbación del lupus, puede tener síntomas nuevos así wander síntomas que ya tuvo anteriormente. Conozca las señales de sebastian cuerpo que indiquen joelle recaída, tales wander dolor articular, salpullido, fiebre o sentirse más cansado.  Cuando observe alguna de [employer], tome las medidas necesarias para Mary Ellen Davenport. La atención de seguimiento es joelle parte clave de sebastian tratamiento y seguridad. Asegúrese de hacer y acudir a todas las citas, y llame a sebastian médico si está teniendo problemas. También es joelle buena idea saber los resultados de kaylyn exámenes y mantener joelle lista de los medicamentos que francisco. Cómo puede cuidarse en el hogar? Kacie Del Valle y la fatiga · Mantenga sebastian programa diario tan simple wander sea posible. · Mantenga la lista de cosas pendientes tan corta wander pueda. · Yosvany ejercicio con regularidad. Caminar o nadar a diario, por ejemplo, puede reducir el estrés, despejarle la mente, mejorar sebastian Maikel de ánimo y ayudarle a combatir la fatiga. · Utilice meditación, yoga o imágenes guiadas para relajarse. · Descanse lo suficiente. Algunas personas que tienen lupus necesitan dormir hasta 12 horas todas las noches. · Siga sebastian propio ritmo. No yosvany demasiadas actividades. · Pida ayuda a [employer]. No intente hacer todo por sí solo. · Elsie breves descansos en kaylyn actividades habituales. Piense en reducir la cantidad de horas de trabajo cuando los síntomas son intensos. · Si piensa que la depresión o ansiedad está aumentándole el cansancio, consulte a sebastian médico o profesional de la carloz mental, o a ambos. Cuídese la piel · Consulte a sebastian médico sobre el uso de cremas con corticosteroides para tratar los síntomas de la piel. · Si le [employer] en que un salpullido causado por el lupus se le manifiesta en la ayse o si tiene cicatrices causadas por el lupus, puede utilizar [place], wander [place], para cubrir el salpullido o las cicatrices. · Manténgase fuera de los yolande del sol, sobre todo cuando son más alyse, por lo general entre las 10 a.m. y las 4 p.m. Si va a exponerse al sol, cúbrase los brazos y UNTERTAUTENDORFERAMT, y use un sombrero.  Asegúrese de usar un protector solar de amplio espectro que tenga factor de protección (SPF, por kaylyn siglas en inglés) de 48 o más alto. Póngase más protector solar después de nadar, sudar o secarse. Practique un buen cuidado personal 
· Aprenda más sobre el lupus y cómo cuidarse. · [place] los medicamentos exactamente según las indicaciones. Llame a sebastian médico si tiene algún problema con los medicamentos. · No fume. Si necesita ayuda para dejar de fumar, hable con sebastian médico sobre programas y medicamentos para dejar de fumar. Estos pueden aumentar kaylyn probabilidades de dejar el hábito para siempre. · Siga joelle [place] y saludable. Hu [place]k dieta equilibrada incluye granos integrales, productos lácteos, frutas y verduras, y proteínas. Coma joelle variedad de alimentos de cada aleisha de esos grupos para obtener todos los nutrientes que necesita. · Evite estar en contacto con personas que tienen un resfriado o la gripe. Estas enfermedades pueden causar recaídas del lupus. Hable con sebastian médico respecto a la vacuna contra la gripe (\"flu shot\") y la vacuna antineumocócica. Si se enferma o piensa que está a punto de [employer], hable con sebastian médico para que los síntomas cheri tratados de inmediato. · Cepíllese los dientes y use hilo dental a diario. Visite al [place] al año. · Hágase exámenes de la vista con regularidad. · Establezca un sistema de apoyo con kaylyn familiares, amigos y profesionales de la carloz. Cuándo debe pedir ayuda? Llame al 911 en cualquier momento que considere que necesita atención de [place]. Por ejemplo, llame si: 
  · Tiene síntomas de un ataque al corazón. Estos pueden incluir: ¨ Dolor o presión en el pecho, o joelle sensación extraña en el pecho. ¨ Sudoración. ¨ Falta de aire. ¨ Náuseas o vómito. ¨ Dolor, presión o joelle sensación extraña en la espalda, el christopher, la mandíbula, la parte superior del abdomen, o en aleisha o ambos hombros o brazos. ¨ Aturdimiento o debilidad repentina. ¨ Latidos del corazón rápidos o irregulares.  
 Después de llamar al 911, es posible que el operador le diga que mastique 1 aspirina para adultos o 2 a 4 aspirinas de dosis baja. Espere la ambulancia. No trate de conducir por sí mismo. 
 Llame a sebastian médico ahora mismo o busque atención médica inmediata si: 
  · Le falta el aire.  
  · Orina con devonte u orina con menos frecuencia y en cantidades menores que las habituales.  
  · Tiene fiebre.  
  · Se siente deprimido o nota cambios de comportamiento o en sebastian manera de pensar.  
  · Está mareado o siente debilidad muscular.  
  · Se le hincha la parte inferior de las piernas o los pies.  
 Preste especial atención a los cambios en sebastian carloz y asegúrese de comunicarse con sebastian médico si: 
  · Presenta algún síntoma nuevo o empeoran los que ya tenía. Estos pueden incluir dolor o hinchazón de las articulaciones, aumento de la fatiga, pérdida del apetito, caída del damaris, salpullido en la piel, o nuevas llagas en la boca o la nariz. Dónde puede encontrar más información en inglés? Jeffrey Harrell a http://christina-bj.[email or web address] Dmitriy Rios B142 en la búsqueda para aprender más acerca de \"Lupus: Instrucciones de cuidado - [ Lupus: Care Instructions ]. \" 
Revisado: 10 octubre, 2017 Versión del contenido: 11.7 © 2352-0431 Healthwise, Incorporated. Las instrucciones de cuidado fueron adaptadas bajo licencia por Good Help Connections (which disclaims liability or warranty for this information). Si usted tiene [place] afección médica o sobre estas instrucciones, siempre pregunte a sebastian profesional de carloz. Healthwise, Incorporated niega toda garantía o responsabilidad por sebastian uso de esta información. Introducing \[facility] [place] [facility]\"" & HEALTH SERVICES! Dear Severino Valero: Thank you for requesting a [employer] account. Our records indicate that you already have an active [employer] account. You can access your account anytime at [email or web address]. [employer] Did you know that you can access your hospital and ER discharge instructions at any time in [employer]? You can also review all of your test results from your hospital stay or ER visit. Additional Information If you have questions, please visit the Frequently Asked Questions section of the [employer] website at [email or web address] Remember, [employer] is NOT to be used for urgent needs. For medical emergencies, dial 911. Now available from your iPhone and Android! Please provide this summary of care documentation to your next provider. Your primary care clinician is listed as Thomas Quintanilla. If you have any questions after today's visit, please call [phone].

## 2018-08-08 LAB
ALBUMIN SERPL-MCNC: 3.5 G/DL (ref 3.4–5)
ALBUMIN/GLOB SERPL: 0.8 {RATIO} (ref 0.8–1.7)
ALP SERPL-CCNC: 120 U/L (ref 45–117)
ALT SERPL-CCNC: 90 U/L (ref 13–56)
AST SERPL-CCNC: 74 U/L (ref 15–37)
BILIRUB DIRECT SERPL-MCNC: 0.2 MG/DL (ref 0–0.2)
BILIRUB SERPL-MCNC: 0.4 MG/DL (ref 0.2–1)
GLOBULIN SER CALC-MCNC: 4.4 G/DL (ref 2–4)
HAV IGM SER QL: NEGATIVE
HBV CORE IGM SER QL: NEGATIVE
HBV SURFACE AG SER QL: <0.1 INDEX
HBV SURFACE AG SER QL: NEGATIVE
HCV AB SER IA-ACNC: >11 INDEX
HCV AB SERPL QL IA: POSITIVE
HCV COMMENT,HCGAC: ABNORMAL
PROT SERPL-MCNC: 7.9 G/DL (ref 6.4–8.2)
SP1: ABNORMAL
SP2: ABNORMAL
SP3: ABNORMAL

## 2018-08-14 DIAGNOSIS — B19.20 HEPATITIS C VIRUS INFECTION WITHOUT HEPATIC COMA, UNSPECIFIED CHRONICITY: Primary | ICD-10-CM

## 2018-08-20 ENCOUNTER — TELEPHONE (OUTPATIENT)
Dept: FAMILY MEDICINE CLINIC | Age: 58
End: 2018-08-20

## 2018-08-20 NOTE — TELEPHONE ENCOUNTER
Daughter called stating that her mother went to rheumatologist and mother informed Dr. Luis Antonio Urena that she was tested for Hep C. Dr. Luis Antonio Urena had diagnosed patient's mother with lupus and she was diagnosed with RA in [facility]. Daughter stated that Dr Luis Antonio Urena told her that her mother does not have RA nor lupus now that he has been informed that she tested positive for Hep C. Daughter is unsure if rheumatologist is correct. Daughter would like to know if that makes since.      Please contact Roxy Babb (daughter): [phone]

## 2018-08-21 NOTE — TELEPHONE ENCOUNTER
Daughter ( Ms Kierra San) made aware that per Dr Myah Randall has been linked as a cause of rheumatic diseases.

## 2018-10-04 ENCOUNTER — HOSPITAL ENCOUNTER (OUTPATIENT)
Dept: ULTRASOUND IMAGING | Age: 58
Discharge: HOME | End: 2018-10-04
Attending: INTERNAL MEDICINE
Payer: SELF-PAY

## 2018-10-04 ENCOUNTER — HOSPITAL ENCOUNTER (OUTPATIENT)
Dept: LAB | Age: 58
End: 2018-10-04
Attending: INTERNAL MEDICINE
Payer: SELF-PAY

## 2018-10-04 DIAGNOSIS — B19.20 HEPATITIS C: ICD-10-CM

## 2018-10-04 PROCEDURE — 76705 ECHO EXAM OF ABDOMEN: CPT

## 2018-10-08 ENCOUNTER — HOSPITAL ENCOUNTER (OUTPATIENT)
Dept: LAB | Age: 58
Discharge: HOME | End: 2018-10-08
Payer: SELF-PAY

## 2018-10-08 LAB
ALBUMIN SERPL-MCNC: 3.6 G/DL (ref 3.4–5)
ALBUMIN/GLOB SERPL: 0.8 {RATIO} (ref 0.8–1.7)
ALP SERPL-CCNC: 118 U/L (ref 45–117)
ALT SERPL-CCNC: 125 U/L (ref 13–56)
AST SERPL-CCNC: 123 U/L (ref 15–37)
BASOPHILS # BLD: 0 K/UL (ref 0–0.1)
BASOPHILS NFR BLD: 1 % (ref 0–2)
BILIRUB DIRECT SERPL-MCNC: 0.2 MG/DL (ref 0–0.2)
BILIRUB SERPL-MCNC: 0.5 MG/DL (ref 0.2–1)
DIFFERENTIAL METHOD BLD: ABNORMAL
EOSINOPHIL # BLD: 0.2 K/UL (ref 0–0.4)
EOSINOPHIL NFR BLD: 3 % (ref 0–5)
ERYTHROCYTE [DISTWIDTH] IN BLOOD BY AUTOMATED COUNT: 13.7 % (ref 11.6–14.5)
ERYTHROCYTE [SEDIMENTATION RATE] IN BLOOD: 21 MM/HR (ref 0–30)
GLOBULIN SER CALC-MCNC: 4.3 G/DL (ref 2–4)
HCT VFR BLD AUTO: 45.5 % (ref 35–45)
HGB BLD-MCNC: 14.7 G/DL (ref 12–16)
INR PPP: 1 (ref 0.8–1.2)
IRON SATN MFR SERPL: 24 %
IRON SERPL-MCNC: 94 UG/DL (ref 50–175)
LYMPHOCYTES # BLD: 1.8 K/UL (ref 0.9–3.6)
LYMPHOCYTES NFR BLD: 33 % (ref 21–52)
MCH RBC QN AUTO: 30.9 PG (ref 24–34)
MCHC RBC AUTO-ENTMCNC: 32.3 G/DL (ref 31–37)
MCV RBC AUTO: 95.8 FL (ref 74–97)
MONOCYTES # BLD: 0.4 K/UL (ref 0.05–1.2)
MONOCYTES NFR BLD: 8 % (ref 3–10)
NEUTS SEG # BLD: 3.2 K/UL (ref 1.8–8)
NEUTS SEG NFR BLD: 55 % (ref 40–73)
PLATELET # BLD AUTO: 130 K/UL (ref 135–420)
PMV BLD AUTO: 14.1 FL (ref 9.2–11.8)
PROT SERPL-MCNC: 7.9 G/DL (ref 6.4–8.2)
PROTHROMBIN TIME: 12.8 SEC (ref 11.5–15.2)
RBC # BLD AUTO: 4.75 M/UL (ref 4.2–5.3)
TIBC SERPL-MCNC: 392 UG/DL (ref 250–450)
WBC # BLD AUTO: 5.7 K/UL (ref 4.6–13.2)

## 2018-10-08 PROCEDURE — 83883 ASSAY NEPHELOMETRY NOT SPEC: CPT

## 2018-10-08 PROCEDURE — 85025 COMPLETE CBC W/AUTO DIFF WBC: CPT | Performed by: INTERNAL MEDICINE

## 2018-10-08 PROCEDURE — 86704 HEP B CORE ANTIBODY TOTAL: CPT | Performed by: INTERNAL MEDICINE

## 2018-10-08 PROCEDURE — 36415 COLL VENOUS BLD VENIPUNCTURE: CPT | Performed by: INTERNAL MEDICINE

## 2018-10-08 PROCEDURE — 86235 NUCLEAR ANTIGEN ANTIBODY: CPT | Performed by: INTERNAL MEDICINE

## 2018-10-08 PROCEDURE — 82172 ASSAY OF APOLIPOPROTEIN: CPT

## 2018-10-08 PROCEDURE — 83010 ASSAY OF HAPTOGLOBIN QUANT: CPT

## 2018-10-08 PROCEDURE — 87902 NFCT AGT GNTYP ALYS HEP C: CPT | Performed by: INTERNAL MEDICINE

## 2018-10-08 PROCEDURE — 82977 ASSAY OF GGT: CPT

## 2018-10-08 PROCEDURE — 86038 ANTINUCLEAR ANTIBODIES: CPT | Performed by: INTERNAL MEDICINE

## 2018-10-08 PROCEDURE — 85652 RBC SED RATE AUTOMATED: CPT | Performed by: INTERNAL MEDICINE

## 2018-10-08 PROCEDURE — 82784 ASSAY IGA/IGD/IGG/IGM EACH: CPT | Performed by: INTERNAL MEDICINE

## 2018-10-08 PROCEDURE — 86706 HEP B SURFACE ANTIBODY: CPT | Performed by: INTERNAL MEDICINE

## 2018-10-08 PROCEDURE — 80076 HEPATIC FUNCTION PANEL: CPT | Performed by: INTERNAL MEDICINE

## 2018-10-08 PROCEDURE — 82390 ASSAY OF CERULOPLASMIN: CPT | Performed by: INTERNAL MEDICINE

## 2018-10-08 PROCEDURE — 87522 HEPATITIS C REVRS TRNSCRPJ: CPT | Performed by: INTERNAL MEDICINE

## 2018-10-08 PROCEDURE — 82247 BILIRUBIN TOTAL: CPT

## 2018-10-08 PROCEDURE — 86225 DNA ANTIBODY NATIVE: CPT | Performed by: INTERNAL MEDICINE

## 2018-10-08 PROCEDURE — 83540 ASSAY OF IRON: CPT | Performed by: INTERNAL MEDICINE

## 2018-10-08 PROCEDURE — 83516 IMMUNOASSAY NONANTIBODY: CPT | Performed by: INTERNAL MEDICINE

## 2018-10-08 PROCEDURE — 87340 HEPATITIS B SURFACE AG IA: CPT | Performed by: INTERNAL MEDICINE

## 2018-10-08 PROCEDURE — 82103 ALPHA-1-ANTITRYPSIN TOTAL: CPT | Performed by: INTERNAL MEDICINE

## 2018-10-08 PROCEDURE — 85610 PROTHROMBIN TIME: CPT | Performed by: INTERNAL MEDICINE

## 2018-10-08 PROCEDURE — 83520 IMMUNOASSAY QUANT NOS NONAB: CPT | Performed by: INTERNAL MEDICINE

## 2018-10-08 PROCEDURE — 84460 ALANINE AMINO (ALT) (SGPT): CPT

## 2018-10-08 PROCEDURE — 86708 HEPATITIS A ANTIBODY: CPT | Performed by: INTERNAL MEDICINE

## 2018-10-09 LAB
A1AT SERPL-MCNC: 145 MG/DL (ref 90–200)
ACTIN IGG SERPL-ACNC: 18 UNITS (ref 0–19)
ANA SER QL: POSITIVE
ANTICHROMATIN ABS, ACHRLT: <0.2 AI (ref 0–0.9)
C-ANCA TITR SER IF: NORMAL TITER
CERULOPLASMIN SERPL-MCNC: 25.4 MG/DL (ref 19–39)
DSDNA AB SER-ACNC: 8 IU/ML (ref 0–9)
ENA SM AB SER-ACNC: <0.2 AI (ref 0–0.9)
ENA SM+RNP AB SER-ACNC: 0.2 AI (ref 0–0.9)
HAV AB SER QL IA: NEGATIVE
HBV CORE AB SERPL QL IA: NEGATIVE
HBV SURFACE AB SER QL IA: NEGATIVE
HBV SURFACE AB SERPL IA-ACNC: <3.1 MIU/ML
HBV SURFACE AG SER QL: 0.74 INDEX
HBV SURFACE AG SER QL: NEGATIVE
HEP BS AB COMMENT,HBSAC: ABNORMAL
MYELOPEROXIDASE AB SER IA-ACNC: <9 U/ML (ref 0–9)
P-ANCA ATYPICAL TITR SER IF: NORMAL TITER
P-ANCA TITR SER IF: NORMAL TITER
PROTEINASE3 AB SER IA-ACNC: <3.5 U/ML (ref 0–3.5)
RNP ABS, RNPRLT: >8 AI (ref 0–0.9)
SEE BELOW:, 164879: ABNORMAL

## 2018-10-10 LAB
A1AT PHENOTYP SERPL IFE: NORMAL
A1AT SERPL-MCNC: 147 MG/DL (ref 90–200)
IGA SERPL-MCNC: 212 MG/DL (ref 87–352)
IGE SERPL-ACNC: 194 IU/ML (ref 0–100)
IGG SERPL-MCNC: 1781 MG/DL (ref 700–1600)
IGM SERPL-MCNC: 176 MG/DL (ref 26–217)

## 2018-10-12 LAB
HCV GENOTYPE: NORMAL
HCV GENOTYPE: NORMAL
HCV GENTYP SERPL NAA+PROBE: 3
HCV GENTYP SERPL NAA+PROBE: 3
HCV RNA SERPL NAA+PROBE-ACNC: NORMAL IU/ML
HCV RNA SERPL NAA+PROBE-ACNC: NORMAL IU/ML
HCV RNA SERPL NAA+PROBE-LOG IU: 6.15 LOG10 IU/ML
HCV RNA SERPL NAA+PROBE-LOG IU: 6.26 LOG10 IU/ML
PLEASE NOTE, 550474: NORMAL
PLEASE NOTE, 550474: NORMAL
TEST INFORMATION, 550045: NORMAL
TEST INFORMATION, 550045: NORMAL

## 2018-10-17 ENCOUNTER — HOSPITAL ENCOUNTER (OUTPATIENT)
Dept: LAB | Age: 58
Discharge: HOME | End: 2018-10-17
Payer: SELF-PAY

## 2018-10-17 DIAGNOSIS — B19.20 HEPATITIS C: ICD-10-CM

## 2018-10-17 PROCEDURE — 83883 ASSAY NEPHELOMETRY NOT SPEC: CPT

## 2018-10-17 PROCEDURE — 82977 ASSAY OF GGT: CPT

## 2018-10-17 PROCEDURE — 82247 BILIRUBIN TOTAL: CPT

## 2018-10-17 PROCEDURE — 83010 ASSAY OF HAPTOGLOBIN QUANT: CPT

## 2018-10-17 PROCEDURE — 36415 COLL VENOUS BLD VENIPUNCTURE: CPT | Performed by: INTERNAL MEDICINE

## 2018-10-17 PROCEDURE — 84460 ALANINE AMINO (ALT) (SGPT): CPT

## 2018-10-17 PROCEDURE — 82172 ASSAY OF APOLIPOPROTEIN: CPT

## 2018-11-13 LAB
Lab: NORMAL
REFERENCE LAB,REFLB: NORMAL
TEST DESCRIPTION:,ATST: NORMAL

## 2018-12-03 ENCOUNTER — OFFICE VISIT (OUTPATIENT)
Dept: FAMILY MEDICINE CLINIC | Age: 58
End: 2018-12-03

## 2018-12-03 VITALS
BODY MASS INDEX: 50.42 KG/M2 | DIASTOLIC BLOOD PRESSURE: 70 MMHG | TEMPERATURE: 97.7 F | WEIGHT: 274 LBS | HEART RATE: 64 BPM | RESPIRATION RATE: 18 BRPM | OXYGEN SATURATION: 97 % | HEIGHT: 62 IN | SYSTOLIC BLOOD PRESSURE: 133 MMHG

## 2018-12-03 DIAGNOSIS — G89.4 CHRONIC PAIN SYNDROME: Primary | ICD-10-CM

## 2018-12-03 DIAGNOSIS — B18.2 CHRONIC HEPATITIS C WITHOUT HEPATIC COMA (HCC): ICD-10-CM

## 2018-12-03 PROBLEM — E66.01 OBESITY, MORBID (HCC): Status: ACTIVE | Noted: 2018-12-03

## 2018-12-03 RX ORDER — KETOROLAC TROMETHAMINE 30 MG/ML
30 INJECTION, SOLUTION INTRAMUSCULAR; INTRAVENOUS ONCE
Qty: 1 VIAL | Refills: 0
Start: 2018-12-03 | End: 2018-12-03

## 2018-12-03 RX ORDER — DICLOFENAC SODIUM 50 MG/1
50 TABLET, DELAYED RELEASE ORAL 3 TIMES DAILY
Qty: 90 TAB | Refills: 3 | Status: SHIPPED | OUTPATIENT
Start: 2018-12-03 | End: 2019-03-27

## 2018-12-03 RX ORDER — CHLORPHENIRAMINE MALEATE 4 MG
1 TABLET ORAL 2 TIMES DAILY
COMMUNITY
Start: 2018-08-16 | End: 2019-07-24 | Stop reason: SDUPTHER

## 2018-12-03 NOTE — PATIENT INSTRUCTIONS
Hepatitis C: Instrucciones de cuidado - [ Hepatitis C: Care Instructions ]  Instrucciones de cuidado    La hepatitis C es joelle infección del hígado causada por un virus. Teresa se contagia cuando la devonte o los líquidos corporales de joelle persona infectada entran en el organismo de [place]. [place] ocurre con mayor frecuencia cuando se comparten agujas contaminadas con el virus. En el pasado, la gente se contagiaba con teresa virus por transfusiones de [place] y trasplantes de [employer]. Bobbi desde 1992, toda la devonte y los órganos donados se analizan para determinar si tienen hepatitis C. Así que contagiarse con teresa virus de esta manera es algo muy raro. Con menos frecuencia, la hepatitis C se puede contagiar a través de [employer] sexuales y por compartir artículos wander cuchillas de afeitar o cepillos de dientes. Las agujas para tatuajes y los \"piercings\" corporales también pueden transmitirel virus. El virus no siempre causa síntomas. Bobbi usted podría sentirse cansado. Y podría tener dolor de georgina, dolor de músculos, náuseas y dolor en la parte superior derecha del abdomen. Otros síntomas incluyen piel amarillenta y [place]. El tratamiento en el hogar puede ayudarle a [employer] síntomas. Y sebastian médico podría recetarle medicamentos antivirales. La infección a rochelle plazo puede conducir a daños graves en el hígado. Por lo tanto, asegúrese de acudir a todas las citas de Sergey Macias. La atención de seguimiento es joelle parte clave de sebastian tratamiento y seguridad. Asegúrese de hacer y acudir a todas las citas, y llame a sebastian médico si está teniendo problemas. También es joelle buena idea saber los resultados de kaylyn exámenes y mantener joelle lista de los medicamentos que francisco. ¿Cómo puede cuidarse en el hogar? · Sea amelia con los medicamentos. Si sebastian médico le receta un medicamento antiviral, tómelo exactamente según las indicaciones. Llame a sebastian médico si kristen estar teniendo problemas con sebastian medicamento.   · No clara alcohol. El alcohol puede dañar el hígado. Avísele a sebastian médico si necesita ayuda para dejarlo. La asesoría psicológica, los grupos de apoyo y a veces algunos medicamentos pueden ayudarle a mantenerse sobrio. · No consuma drogas ilegales y no use medicamentos herbarios. Pueden empeorar los problemas del hígado. · Asegúrese de que sebastian médico sepa todos los medicamentos que francisco. Algunos medicamentos, wander el acetaminofén (Tylenol), pueden [employer] problemas del hígado. No tome ningún medicamento nuevo a menos que sebastian médico se lo indique. [place] incluye medicamentos de [address] · Mantenga un estilo de ky saludable. Priya abundante ejercicio si se siente capaz de hacerlo. Siga joelle dieta saludable. · Clara abundantes líquidos, los suficientes para que sebastian orina sea de color amarillo isidoro o transparente wander el agua. Si tiene [employer], el corazón o el hígado y tiene que Staci's líquidos, hable con sebastian médico antes de aumentar sebastian consumo. · Si no lo ha hecho todavía, vacúnese para protegerse de la hepatitis A y B, la gripe y el neumococo.  · La infección puede causarle comezón. Nile Salk y evite el sol. Trate de usar ropa de algodón. Hable con sebastian médico acerca de medicamentos de venta yan para el picor. Estos incluyen difenhidramina (Benadryl) y clorfeniramina (Chlor-[place]eton). Siga las instrucciones de la etiqueta. · Si se siente deprimido, hable con sebastian médico acerca del tratamiento. Muchas personas se deprimen cuando tienen enfermedades crónicas. Tenga en cuenta que un medicamento antiviral puede empeorar la depresión. Para evitar contagiar la hepatitis C a otras personas  · Avísele a las personas con las que vive o tiene relaciones sexuales acerca de sebastian enfermedad lo antes que pueda. · No comparta agujas para inyectarse drogas. No comparta otros objetos (wander algodón, cucharas y [place]) con otros.  Averig[place]e si hay un programa de intercambio de agujas en sebastian localidad y utilícelo. Inscríbase en un programa para el tratamiento de la drogadicción. · Tenga relaciones sexuales más seguras. Reduzca la cantidad de parejas sexuales si tiene más de Tez Patrick. A menos que esté en jolele relación de larga duración en la que ninguno de los dos tenga relaciones sexuales con [employer], use siempre condones de látex cuando tenga relaciones sexuales. · No done devonte ni productos de [place], semen ni óvulos. · Asegúrese de que todo el equipo esté esterilizado si se va a hacer un tatuaje, un \"piercing\" corporal o si va a recibir acupuntura. · No comparta artículos personales. Estos incluyen cuchillas de afeitar, cepillos dentales, toallas y emil de uñas. · Avise de sebastian enfermedad a sebastian médico, dentista o cualquier otra persona que pudiera estar en contacto con sebastian devonte. · Evite que otras personas entren en contacto con sebastian devonte y otros líquidos corporales. Mantenga cubiertos cualquier Pablo francis. · [employer] bruno a fondo con [facility] y [place] wander cualquier objeto que haya estado en contacto con sebastian devonte. ¿Cuándo debe pedir ayuda? Llame al 911 en cualquier momento que considere que necesita atención de [place]. Por ejemplo, llame si:    · Se desmayó (perdió el conocimiento).     · Tiene graves dificultades para respirar.     · Se siente muy confuso y no puede pensar con claridad.     · [place] o algo parecido a granos de café molido.     · [place] heces son de color rojizo o muy sanguinolentas (con devonte).    Llame a sebastian médico ahora mismo o busque atención médica inmediata si:    · Tiene cualquier dificultad para respirar.     · Tiene nuevos moretones o manchas de devonte debajo de la piel.     · [place] heces son negruzcas y parecidas al alquitrán o tienen rastros de [place].     · Tiene señales de necesitar más líquidos.  Tiene los ojos hundidos y la boca seca, y [facility] solo poca cantidad de color oscuro.    Preste especial atención a los cambios en sebastian carloz y asegúrese de comunicarse con sebastian médico si:    · Le sangra la [place].     · Le sangran las encías cuando se cepilla los dientes. ¿Dónde puede encontrar más información en inglés? Bienvenido Bertrand a http://christina-bj.info/. Jenaro [id number] en la búsqueda para aprender más acerca de \"Hepatitis C: Instrucciones de cuidado - [ Hepatitis C: Care Instructions ]. \"  Revisado: 18 noviembre, 2017  Versión del contenido: 11.8  © 1242-2667 Healthwise, Incorporated. Las instrucciones de cuidado fueron adaptadas bajo licencia por Good The Simple [employer] (which disclaims liability or warranty for this information). Si usted tiene [place] afección médica o sobre estas instrucciones, siempre pregunte a sebastian profesional de carloz. Healthwise, Incorporated niega toda garantía o responsabilidad por sebastian uso de esta información. Aprenda sobre la hepatitis C - [ Learning About Hepatitis C ]  ¿Qué es la hepatitis C? La hepatitis C es joelle infección del hígado. Es causada por el virus de la hepatitis C. El virus se transmite a través de la devonte y de los líquidos corporales infectados. La hepatitis C a menudo se transmite cuando joelle persona comparte agujas infectadas utilizadas para inyectarse drogas ilegales. También se puede transmitir cuando joelle persona utiliza joelle aguja con devonte infectada. [place] podría ocurrir al [employer] un tatuaje o \"piercing\". O podría suceder al ponerse joelle inyección en algunos países en vías de desarrollo en donde se utilizan agujas más de joelle vez para zhou inyecciones. En casos raros, joelle madre con hepatitis C puede contagiar el virus a sebastian bebé en el momento del nacimiento. O un trabajador de atención médica puede exponerse accidentalmente a devonte infectada con hepatitis C. Hay un riesgo muy pequeño de contraer el virus por contacto sexual. El riesgo es mayor si sebastian ian sexual también tiene VIH u otra infección de transmisión sexual, o si tiene usted muchas parejas sexuales.   Usted no puede infectarse de la hepatitis C debido a un contacto casual. [place] es un contacto wander abrazarse, besarse, estornudar, toser y compartir alimentos o bebidas. ¿Qué sucede cuando usted tiene hepatitis C? Algunas personas que contraen hepatitis C la tienen nawaf un tiempo corto y [place]. [place] se llama hepatitis C aguda. Bobbi la mayoría de las personas tienen hepatitis C crónica. [place] puede conducir a daño hepático, así wander a cirrosis, cáncer de hígado e insuficiencia hepática. Los expertos recomiendan que determinados grupos de personas se amy la prueba para el virus. [place] incluye a personas que tienen señales de enfermedad del hígado o que alguna vez jackson compartido agujas al usar drogas ilegales. Pregúntele a sebastian médico si hacerse la prueba es adecuado para usted. También puede comprar joelle prueba para hacerse en casa llamada \"Home Access Hepatitis C Check kit\" disponible en la mayoría de las farmacias. Si la prueba indica que usted ha estado expuesto al virus en el pasado, asegúrese de hablar con sebastian médico para averiguar si tiene el virus actualmente. ¿Cuáles son los síntomas? [address] con hepatitis C no tienen síntomas al principio. Los síntomas podrían incluir:  · Cansancio. · Dolor de georgina. · Músculos adoloridos. · Náuseas. · Dolor en la parte superior derecha del abdomen. · Coloración amarillenta de la piel y de los ojos (ictericia). · Norlene Clore. ¿Cómo se puede prevenir la hepatitis C? No hay vacuna para prevenir la enfermedad. Cualquier persona que tenga hepatitis C puede transmitir el virus a otra persona. Usted puede montez medidas para reducir las probabilidades de infección. · No comparta agujas para inyectarse drogas. · [address] en un entorno de atención de [place]. Use guantes y ropa de protección. Deseche adecuadamente agujas y otros objetos afilados.   · Asegúrese de que todos los instrumentos y los suministros estén esterilizados si se va a hacer un tatuaje, un \"piercing\" o si le van a hacer acupuntura. Para evitar contagiar la hepatitis C, si la tiene:  · No comparta agujas ni otros enseres, wander algodón, cucharas y [place], si [place] agujas para inyectarse drogas. · Mantenga cubiertos los venegas, los raspones y las [place]. [place] prevendrá que otras personas entren en contacto con sebastian devonte y otros líquidos corporales. Deseche cualquier artículo empapado de devonte, wander, por ejemplo, vendas usadas. · No done devonte ni semen. · [place] y todo lo que haya estado en contacto con sebastian devonte. Use jabón y [place]. · No comparta sebastian cepillo de dientes, navajas de afeitar, cortaúñas ni nada que pueda [employer] · Use condones de látex al [employer] sexuales si tiene VIH, más de Brandi ian sexual o joelle infección de transmisión sexual.  ¿Cómo se trata la hepatitis C?  · Si tiene hepatitis C aguda, sebastian médico probablemente le recete un medicamento. · Si tiene hepatitis C crónica, el tratamiento depende de si tiene daño hepático, otros problemas de carloz y la cantidad que tenga de virus en sebastian organismo y de qué tipo sea. · Usted tendrá que corie a sebastian médico con regularidad para [employer] de devonte a fin de revisarse el hígado. La atención de seguimiento es joelle parte clave de sebastian tratamiento y seguridad. Asegúrese de hacer y acudir a todas las citas, y llame a sebastian médico si está teniendo problemas. También es joelle buena idea saber los resultados de kaylyn exámenes y mantener joelle lista de los medicamentos que francisco. ¿Dónde puede encontrar más información en inglés? Joyce Man a http://christina-bj.info/. Escriba C666 en la búsqueda para aprender más acerca de \"Aprenda sobre la hepatitis C - [ Learning About Hepatitis C ]. \"  Revisado: 18 noviembre, 2017  Versión del contenido: 11.8  © 8311-2754 Healthwise, [employer].  Las instrucciones de cuidado fueron adaptadas bajo licencia por Good Help Connections (which disclaims liability or warranty for this information). Si usted tiene [place] afección médica o sobre estas instrucciones, siempre pregunte a sebastian profesional de carloz. [facility] niega toda garantía o responsabilidad por sebastian uso de esta información.

## 2018-12-03 NOTE — PROGRESS NOTES
1. Have you been to the ER, urgent care clinic since your last visit? Hospitalized since your last visit? No    2. Have you seen or consulted any other health care providers outside of the [facility] since your last visit? Include any pap smears or colon screening.  Yes Rheumatologist, liver specialist.    Chief Complaint   Patient presents with    Pain (Chronic)     Generalized pain because Rheumatologist stopped her medications for liver test.     Visit Vitals  /70   Pulse 64   Temp 97.7 °F (36.5 °C) (Oral)   Resp 18   Ht 5' 2\" (1.575 m)   Wt 274 lb (124.3 kg)   SpO2 97%   BMI 50.12 kg/m²

## 2018-12-03 NOTE — PROGRESS NOTES
Reynaldo Mortensen    CC: F/U of Rheumatoid Arthritis and Elevated Liver Enzymes    HPI:     Rheumatoid Arthritis/Elevated Liver Enzymes:  - Got requested blood work  - Positive hepatitis C test discussed with rheumatologist  - Rheumatologist told her that her pain issues are likely 2/2 Hepatitis C  - Has stopped her treatment until her hepatitis C is first treated  - Currently seeing Dr. Flako Schreiber for her hepatitis C infection   - Has not started treatment due to cost  - New insurance will become active at the beginning of the year. Plans to try starting her treatment at that time.   - Currently in a lot of pain and would to get something today for her pain      ROS: Positive items marked in RED  CON: fever, chills  Cardiovascular: palpitations, CP  Resp: SOB, cough  GI: nausea, vomiting, diarrhea  : dysuria, hematuria      Past Medical History:   Diagnosis Date    Arthritis     Hepatitis C     Hypertension     Diagnosed in [place] a few months ago       Past Surgical History:   Procedure Laterality Date    HX CHOLECYSTECTOMY  2013    HX [facility]ONOSCOPY      [facility]       Family History   Problem Relation Age of Onset    Diabetes Mother     Hypertension Father     No Known Problems Sister     No Known Problems Brother     No Known Problems Sister     No Known Problems Sister     No Known Problems Brother     No Known Problems Brother     No Known Problems Brother        Social History     Socioeconomic History    Marital status: SINGLE     Spouse name: Not on file    Number of children: Not on file    Years of education: Not on file    Highest education level: Not on file   Tobacco Use    Smoking status: Former Smoker     Last attempt to quit: 2017     Years since quittin.9    Smokeless tobacco: Never Used   Substance and Sexual Activity    Alcohol use: No    Drug use: No    Sexual activity: Yes     Partners: Male     Birth control/protection: None       No Known Allergies      Current Outpatient Medications:     clotrimazole (LOTRIMIN) 1 % topical cream, Apply 1 Applicator to affected area two (2) times a day., Disp: , Rfl:     acetaminophen (TYLENOL) 325 mg tablet, Take  by mouth every four (4) hours as needed for Pain., Disp: , Rfl:     fluticasone (FLONASE) 50 mcg/actuation nasal spray, 2 Sprays by Both Nostrils route daily. , Disp: 1 Bottle, Rfl: 11    raNITIdine (ZANTAC) 150 mg tablet, Take 1 Tab by mouth two (2) times a day., Disp: 180 Tab, Rfl: 1    predniSONE (DELTASONE) 5 mg tablet, Take  by mouth daily. , Disp: , Rfl:     sulfaSALAzine (AZULFIDINE) 500 mg tablet, Take 500 mg by mouth two (2) times a day., Disp: , Rfl:     meloxicam (MOBIC) 7.5 mg tablet, Take 1 Tab by mouth daily. Indications: Rheumatoid Arthritis, Disp: 30 Tab, Rfl: 1    Physical Exam:      /70   Pulse 64   Temp 97.7 °F (36.5 °C) (Oral)   Resp 18   Ht 5' 2\" (1.575 m)   Wt 274 lb (124.3 kg)   SpO2 97%   BMI 50.12 kg/m²     General: Obese habitus, NAD  Eyes: sclera clear bilaterally, no discharge noted, eyelids normal in appearance  HENT: NCAT  Lungs: CTAB, Normal respiratory effort and rate  CV: RRR, no MRGs  ABD: soft, non-tender, non-distended, normal bowel sounds  Skin: normal temperature, turgor, color, and texture  Psych: alert and oriented to person, place and time, normal affect  Neuro: speech normal, moving all extremities, gait is normal      Results for Scheryl Bloodgood (MRN [id number]):   Ref. Range 8/7/2018 11:17   Bilirubin, total Latest Ref Range: 0.2 - 1.0 MG/DL 0.4   Bilirubin, direct Latest Ref Range: 0.0 - 0.2 MG/DL 0.2   Protein, total Latest Ref Range: 6.4 - 8.2 g/dL 7.9   Albumin Latest Ref Range: 3.4 - 5.0 g/dL 3.5   Globulin Latest Ref Range: 2.0 - 4.0 g/dL 4.4 (H)   A-G Ratio Latest Ref Range: 0.8 - 1.7   0.8   ALT (SGPT) Latest Ref Range: 13 - 56 U/L 90 (H)   AST Latest Ref Range: 15 - 37 U/L 74 (H)   Alk.  phosphatase Latest Ref Range: 45 - 117 U/L 120 (H)   Hepatitis A, IgM Latest Ref Range: NEG   NEGATIVE   Hepatitis B surface Ag Latest Ref Range: <1.00 Index <0.10   Hep B surface Ag Interp. Latest Ref Range: NEG   NEGATIVE   Hepatitis B core, IgM Latest Ref Range: NEG   NEGATIVE   Hepatitis C virus Ab Latest Ref Range: <0.80 Index >11.00 (H)   Hep C  virus Ab Interp.  Latest Ref Range: NEG   POSITIVE (A)   Hep C  virus Ab comment Latest Units:   Pend       Assessment/Plan     Chronic Pain:  - Likely 2/2 HCV-related rheumatic disease   - Toradol injection given in office   - Will start on diclofenac regimen  - Advised on importance of treating hepatitis C infection  - F/U in one month      Chronic Hepatitis C:  - Will defer treatment to GI  - Handout given on hepatitis C and hepatitis C care  - F/U in one month        Martha Islas MD  12/3/2018, 11:50 AM

## 2018-12-04 PROBLEM — B18.2 CHRONIC HEPATITIS C WITHOUT HEPATIC COMA (HCC): Status: ACTIVE | Noted: 2018-12-04

## 2018-12-04 PROBLEM — G89.4 CHRONIC PAIN SYNDROME: Status: ACTIVE | Noted: 2018-12-04

## 2019-01-11 ENCOUNTER — HOSPITAL ENCOUNTER (OUTPATIENT)
Dept: LAB | Age: 59
Discharge: HOME | End: 2019-01-11
Payer: SELF-PAY

## 2019-01-11 ENCOUNTER — OFFICE VISIT (OUTPATIENT)
Dept: FAMILY MEDICINE CLINIC | Age: 59
End: 2019-01-11

## 2019-01-11 VITALS
WEIGHT: 277 LBS | DIASTOLIC BLOOD PRESSURE: 66 MMHG | HEART RATE: 70 BPM | SYSTOLIC BLOOD PRESSURE: 140 MMHG | BODY MASS INDEX: 50.97 KG/M2 | TEMPERATURE: 97.2 F | OXYGEN SATURATION: 98 % | RESPIRATION RATE: 22 BRPM | HEIGHT: 62 IN

## 2019-01-11 DIAGNOSIS — R06.02 SOB (SHORTNESS OF BREATH): ICD-10-CM

## 2019-01-11 DIAGNOSIS — Z23 ENCOUNTER FOR IMMUNIZATION: ICD-10-CM

## 2019-01-11 DIAGNOSIS — M79.89 LEFT LEG SWELLING: Primary | ICD-10-CM

## 2019-01-11 DIAGNOSIS — M79.89 LEFT LEG SWELLING: ICD-10-CM

## 2019-01-11 DIAGNOSIS — Z12.31 ENCOUNTER FOR SCREENING MAMMOGRAM FOR MALIGNANT NEOPLASM OF BREAST: ICD-10-CM

## 2019-01-11 LAB — D DIMER PPP FEU-MCNC: 0.78 UG/ML(FEU)

## 2019-01-11 PROCEDURE — 82306 VITAMIN D 25 HYDROXY: CPT

## 2019-01-11 PROCEDURE — 85379 FIBRIN DEGRADATION QUANT: CPT

## 2019-01-11 PROCEDURE — 36415 COLL VENOUS BLD VENIPUNCTURE: CPT

## 2019-01-11 NOTE — PATIENT INSTRUCTIONS
Ecocardiograma transtorácico: Sobre esta prueba - [ Transthoracic Echocardiogram: About This Test ]  ¿Qué es? El ecocardiograma (también llamado \"eco\") utiliza ondas de aldo para crear joelle imagen del corazón. Un dispositivo llamado transductor envía ondas sonoras que rebotan en sebastian corazón y regresan al transductor. Estos ecos se convierten en imágenes en movimiento de sebastian corazón que pueden verse en joelle pantalla. En un ecocardiograma transtorácico (TTE, por kaylyn siglas en inglés), el transductor se mueve a través de sebastian pecho o sebastian abdomen. El TTE es el tipo más común de ecocardiograma. ¿Por qué se hace esta prueba? Esta prueba se realiza para revisar la carloz de sebastian corazón. Se [place] por muchas razones. Es posible que sebastian médico le realice un ecocardiograma para:  · Verificar si hay un soplo cardíaco.  · Detectar la causa de la falta de aire o de dolor o presión inexplicables en el pecho. · Verificar lo karley que el corazón está bombeando Maral. · Verificar lo karley que están funcionando las válvulas cardíacas. · Detectar coágulos de devonte en el corazón. ¿Qué ocurre nawaf la prueba? · Se deberá quitar la ropa de la cintura para [facility]. Le pueden zhou un protector de mercedes o de papel para que lo use nawaf la prueba. · Deberá recostarse boca arriba o sobre sebastian lado adrian en joelle cama o martinez. · Es posible que reciba medicamentos a través de joelle vena (por vía intravenosa o IV). La IV puede utilizarse para administrarle un material de [place], que ayuda a sebastian médico a tener joelle buena visión de sebastian corazón. · Se le colocarán pequeños parches o almohadillas (electrodos) en los brazos y las piernas para registrar sebastian frecuencia cardíaca nawaf la prueba. · Le aplicarán joelle pequeña cantidad de gel sobre un lado del pecho para ayudar a recibir las [employer] [employer] [place]. · El transductor se presiona firmemente contra la piel y se mueve lentamente hacia atrás y [place].  Por lo general, se mueve a diferentes zonas de sebastian pecho o sebastian abdomen para obtener vistas específicas de sebastian corazón. · Se le pedirá que yosvany varias cosas, wander permanecer muy quieto, inhalar y exhalar muy lentamente, contener la respiración o acostarse sobre sebastian lado adrian. Esta prueba suele durar de 30 a 60 minutos. ¿Qué más debe saber acerca de la prueba? · No tendrá ningún dolor por un ecocardiograma. Es posible que sienta un dolor breve y maribel cuando la aguja intravenosa (IV) se coloque en joelle vena del brazo. · No pasa electricidad a través de sebastian cuerpo nawaf la prueba. No hay peligro de recibir joelle descarga eléctrica. · No recibe ninguna radiación. ¿Qué ocurre después de la prueba? · Probablemente podrá irse a sebastian casa enseguida. · Puede volver a kaylyn actividades habituales de inmediato. La atención de seguimiento es joelle parte clave de sebastian tratamiento y seguridad. Asegúrese de hacer y acudir a todas las citas, y llame a sebastian médico si está teniendo problemas. También es joelle buena idea mantener joelle lista de los medicamentos que francisco. Pregúntele a sebastian médico cuándo puede esperar [employer] de la prueba. ¿Dónde puede encontrar más información en inglés? Oziel Harrell a http://christina-bj.info/. Escriba E130 en la búsqueda para aprender más acerca de \"Ecocardiograma transtorácico: Sobre esta prueba - [ Transthoracic Echocardiogram: About This Test ]. \"  Revisado: 6 paulina, 2017  Versión del contenido: 11.8  © 5495-5924 Healthwise, Incorporated. Las instrucciones de cuidado fueron adaptadas bajo licencia por Good Help Connections (which disclaims liability or warranty for this information). Si usted tiene [place] afección médica o sobre estas instrucciones, siempre pregunte a sebastian profesional de carloz. [facility] niega toda garantía o responsabilidad por sebastian uso de esta información.

## 2019-01-11 NOTE — PROGRESS NOTES
1. Have you been to the ER, urgent care clinic since your last visit? Hospitalized since your last visit? No    2. Have you seen or consulted any other health care providers outside of the [address] [place] [address] since your last visit? Include any pap smears or colon screening. Yes Gi 2018 September    Chief Complaint   Patient presents with    Follow-up    Other     Chest swellingm SOB with wheezing in the stairs, Legs sweeling and cold     After obtaining consent, and per orders of Dr. Kaila Zavaleta, injection of Flu given by Nina Spann LPN. Patient instructed to remain in clinic for 20 minutes afterwards, and to report any adverse reaction to me immediately.         Visit Vitals  /66   Pulse 70   Temp 97.2 °F (36.2 °C) (Oral)   Resp 22   Ht 5' 2\" (1.575 m)   Wt 277 lb (125.6 kg)   SpO2 98%   BMI 50.66 kg/m²

## 2019-01-12 LAB — 25(OH)D3 SERPL-MCNC: 21.9 NG/ML (ref 30–100)

## 2019-01-14 NOTE — PROGRESS NOTES
Claudio Mortensen    CC: Leg Swelling    HPI:     -Started after she flew to [facility]  -Initially swelling was bilateral in LEs  -Swelling improved on right side, but they did not improve on left  -Swelling has been a constant issue  -Swelling of left leg is associated with pain  -Has tried elevating leg  -Also endorses issues with SOB/fatigue/chest discomfort (Not true pain) with climbing stairs      ROS: Positive items marked in RED  CON: fever, chills  Cardiovascular: palpitations, CP  Resp: SOB (See HPI), cough  GI: nausea, vomiting, diarrhea  : dysuria, hematuria      Past Medical History:   Diagnosis Date    Arthritis     Hepatitis C     Hypertension     Diagnosed in [place] a few months ago       Past Surgical History:   Procedure Laterality Date    HX CHOLECYSTECTOMY  2013    HX COLONOSCOPY      Completed in [facility]       Family History   Problem Relation Age of Onset    Diabetes Mother     Hypertension Father     No Known Problems Sister     No Known Problems Brother     No Known Problems Sister     No Known Problems Sister     No Known Problems Brother     No Known Problems Brother     No Known Problems Brother        Social History     Socioeconomic History    Marital status: SINGLE     Spouse name: Not on file    Number of children: Not on file    Years of education: Not on file    Highest education level: Not on file   Tobacco Use    Smoking status: Former Smoker     Last attempt to quit: 2017     Years since quittin.0    Smokeless tobacco: Never Used   Substance and Sexual Activity    Alcohol use: No    Drug use: No    Sexual activity: Yes     Partners: Male     Birth control/protection: None       No Known Allergies      Current Outpatient Medications:     varicella-zoster recombinant, PF, (SHINGRIX, PF,) 50 mcg/0.5 mL susr injection, 0.5mL by IntraMUSCular route once now and then repeat in 2-6 months, Disp: 0.5 mL, Rfl: 1    clotrimazole (LOTRIMIN) 1 % topical cream, Apply 1 Applicator to affected area two (2) times a day., Disp: , Rfl:     acetaminophen (TYLENOL) 325 mg tablet, Take  by mouth every four (4) hours as needed for Pain., Disp: , Rfl:     fluticasone (FLONASE) 50 mcg/actuation nasal spray, 2 Sprays by Both Nostrils route daily. , Disp: 1 Bottle, Rfl: 11    raNITIdine (ZANTAC) 150 mg tablet, Take 1 Tab by mouth two (2) times a day., Disp: 180 Tab, Rfl: 1    diclofenac EC (VOLTAREN) 50 mg EC tablet, Take 1 Tab by mouth three (3) times daily. , Disp: 90 Tab, Rfl: 3    predniSONE (DELTASONE) 5 mg tablet, Take  by mouth daily. , Disp: , Rfl:     sulfaSALAzine (AZULFIDINE) 500 mg tablet, Take 500 mg by mouth two (2) times a day., Disp: , Rfl:     Physical Exam:      /66   Pulse 70   Temp 97.2 °F (36.2 °C) (Oral)   Resp 22   Ht 5' 2\" (1.575 m)   Wt 277 lb (125.6 kg)   SpO2 98%   BMI 50.66 kg/m²     General: obese habitus, NAD, conversant  Eyes: sclera clear bilaterally, no discharge noted, eyelids normal in appearance  HENT: NCAT  Lungs: CTAB, normal respiratory effort and rate  CV: RRR, no MRGs  ABD: soft, non-tender, non-distended, normal bowel sounds  Ext: Left lower leg notably larger than right. Left upper calf with some tenderness.   Skin: normal temperature, turgor, color, and texture  Psych: alert and oriented to person, place and situation, normal affect  Neuro: speech normal, moving all extremities, gait normal      Assessment/Plan     Left Leg Swelling:  -Concern for possible DVT, given obesity and occurrence of symptom after flight to [facility] [[email or web address]  -D-dimer and PVL ordered  -Follow-up in 1 month (Or sooner if needed)      Shortness of Breath:  -Unclear wether this is worsening of her fatigue that was previously thought to be due to rheumatoid disease  -Concern for possible component of heart failure, especially considering recent issue with swelling of lower extremities  -Will check vitamin D/B12 level, as this maybe a source of her fatigue   -D-dimer, cardiac enzymes, and echocardiogram ordered  -Handout given on echocardiogram  -Follow-up in 1 month (Or sooner if needed)      Health Maintenance:  -Flu shot given and mammogram ordered  -Prescriptions given for TDAP and Shingrix        Yoni Mcduffie MD  1/11/2019

## 2019-01-18 ENCOUNTER — TELEPHONE (OUTPATIENT)
Dept: FAMILY MEDICINE CLINIC | Age: 59
End: 2019-01-18

## 2019-01-18 DIAGNOSIS — R06.02 SOB (SHORTNESS OF BREATH): Primary | ICD-10-CM

## 2019-01-18 NOTE — TELEPHONE ENCOUNTER
Patients daughter called and stated [facility] is [facility] with Dr. Cyndie Gant. Daughter is requesting a referral and stated that she forgot to ask you at last appointment. Per that office they need a referral. Please call daughter back.

## 2019-01-22 DIAGNOSIS — R06.02 SOB (SHORTNESS OF BREATH): Primary | ICD-10-CM

## 2019-01-22 DIAGNOSIS — R79.89 ELEVATED D-DIMER: ICD-10-CM

## 2019-01-22 NOTE — TELEPHONE ENCOUNTER
Per Dr. Valdes Left, appointment for PVL moved for tomorrow at 3:00 pm and CTA for chest scheduled at 4:15 pm. Let patient know that can not eat 4 hours before test and needs to bring id and check at 2:30 pm

## 2019-01-22 NOTE — TELEPHONE ENCOUNTER
I called daughter and left a message with her to please tell mother that patient needs to call [facility] to schedule an appointment for scan (leg) and CT for (chest-lungs). Per Dr Jolly Gomes, patient's blood test indicates that she possibly has a clot. Home phone number [phone] and cell phone number [phone] updated in [place]. Also called patient's both phone numbers and left a message with [facility] phone number to call as soon as possible to schedule both appointments.

## 2019-01-23 ENCOUNTER — HOSPITAL ENCOUNTER (EMERGENCY)
Age: 59
Discharge: HOME | End: 2019-01-23
Attending: EMERGENCY MEDICINE
Payer: MEDICAID

## 2019-01-23 ENCOUNTER — HOSPITAL ENCOUNTER (OUTPATIENT)
Dept: VASCULAR SURGERY | Age: 59
Discharge: HOME | End: 2019-01-23
Attending: FAMILY MEDICINE
Payer: MEDICAID

## 2019-01-23 ENCOUNTER — HOSPITAL ENCOUNTER (OUTPATIENT)
Dept: CT IMAGING | Age: 59
End: 2019-01-23
Attending: FAMILY MEDICINE
Payer: MEDICAID

## 2019-01-23 ENCOUNTER — APPOINTMENT (OUTPATIENT)
Dept: NUCLEAR MEDICINE | Age: 59
End: 2019-01-23
Attending: EMERGENCY MEDICINE
Payer: MEDICAID

## 2019-01-23 VITALS
DIASTOLIC BLOOD PRESSURE: 140 MMHG | RESPIRATION RATE: 15 BRPM | HEIGHT: 65 IN | HEART RATE: 80 BPM | BODY MASS INDEX: 46.15 KG/M2 | WEIGHT: 277 LBS | OXYGEN SATURATION: 100 % | TEMPERATURE: 98.1 F | SYSTOLIC BLOOD PRESSURE: 158 MMHG

## 2019-01-23 DIAGNOSIS — R07.9 CHRONIC CHEST PAIN: ICD-10-CM

## 2019-01-23 DIAGNOSIS — R06.02 CHRONIC SHORTNESS OF BREATH: ICD-10-CM

## 2019-01-23 DIAGNOSIS — G89.29 CHRONIC CHEST PAIN: ICD-10-CM

## 2019-01-23 DIAGNOSIS — R03.0 ELEVATED BLOOD PRESSURE READING: Primary | ICD-10-CM

## 2019-01-23 DIAGNOSIS — M79.89 LEFT LEG SWELLING: ICD-10-CM

## 2019-01-23 LAB
ANION GAP BLD CALC-SCNC: 12 MMOL/L (ref 10–20)
BASOPHILS # BLD: 0.1 K/UL (ref 0–0.1)
BASOPHILS NFR BLD: 1 % (ref 0–2)
BNP SERPL-MCNC: 29 PG/ML (ref 0–900)
BUN BLD-MCNC: 12 MG/DL (ref 7–18)
CA-I BLD-MCNC: 0.93 MMOL/L (ref 1.12–1.32)
CHLORIDE BLD-SCNC: 107 MMOL/L (ref 100–108)
CO2 BLD-SCNC: 26 MMOL/L (ref 19–24)
CREAT UR-MCNC: 0.5 MG/DL (ref 0.6–1.3)
DIFFERENTIAL METHOD BLD: ABNORMAL
EOSINOPHIL # BLD: 0.2 K/UL (ref 0–0.4)
EOSINOPHIL NFR BLD: 3 % (ref 0–5)
ERYTHROCYTE [DISTWIDTH] IN BLOOD BY AUTOMATED COUNT: 14 % (ref 11.6–14.5)
GLUCOSE BLD STRIP.AUTO-MCNC: 86 MG/DL (ref 74–106)
HCT VFR BLD AUTO: 46.9 % (ref 35–45)
HCT VFR BLD CALC: 45 % (ref 36–49)
HGB BLD-MCNC: 15.1 G/DL (ref 12–16)
HGB BLD-MCNC: 15.3 G/DL (ref 12–16)
LYMPHOCYTES # BLD: 2.4 K/UL (ref 0.9–3.6)
LYMPHOCYTES NFR BLD: 32 % (ref 21–52)
MCH RBC QN AUTO: 30.9 PG (ref 24–34)
MCHC RBC AUTO-ENTMCNC: 32.2 G/DL (ref 31–37)
MCV RBC AUTO: 96.1 FL (ref 74–97)
MONOCYTES # BLD: 0.7 K/UL (ref 0.05–1.2)
MONOCYTES NFR BLD: 10 % (ref 3–10)
NEUTS SEG # BLD: 4 K/UL (ref 1.8–8)
NEUTS SEG NFR BLD: 54 % (ref 40–73)
PLATELET # BLD AUTO: 124 K/UL (ref 135–420)
PMV BLD AUTO: 13.6 FL (ref 9.2–11.8)
POTASSIUM BLD-SCNC: 5.2 MMOL/L (ref 3.5–5.5)
RBC # BLD AUTO: 4.88 M/UL (ref 4.2–5.3)
SODIUM BLD-SCNC: 139 MMOL/L (ref 136–145)
TROPONIN I SERPL-MCNC: <0.02 NG/ML (ref 0–0.04)
WBC # BLD AUTO: 7.4 K/UL (ref 4.6–13.2)

## 2019-01-23 PROCEDURE — 85025 COMPLETE CBC W/AUTO DIFF WBC: CPT

## 2019-01-23 PROCEDURE — 83880 ASSAY OF NATRIURETIC PEPTIDE: CPT

## 2019-01-23 PROCEDURE — 99285 EMERGENCY DEPT VISIT HI MDM: CPT

## 2019-01-23 PROCEDURE — 93971 EXTREMITY STUDY: CPT

## 2019-01-23 PROCEDURE — 80047 BASIC METABLC PNL IONIZED CA: CPT

## 2019-01-23 PROCEDURE — 93005 ELECTROCARDIOGRAM TRACING: CPT

## 2019-01-23 PROCEDURE — 74011250637 HC RX REV CODE- 250/637: Performed by: EMERGENCY MEDICINE

## 2019-01-23 PROCEDURE — 84484 ASSAY OF TROPONIN QUANT: CPT

## 2019-01-23 RX ORDER — ENOXAPARIN SODIUM 150 MG/ML
130 INJECTION SUBCUTANEOUS
Status: DISCONTINUED | OUTPATIENT
Start: 2019-01-23 | End: 2019-01-23

## 2019-01-23 RX ORDER — ACETAMINOPHEN 500 MG
1000 TABLET ORAL
Status: COMPLETED | OUTPATIENT
Start: 2019-01-23 | End: 2019-01-23

## 2019-01-23 RX ADMIN — ACETAMINOPHEN 1000 MG: 500 TABLET, FILM COATED ORAL at 19:52

## 2019-01-23 NOTE — ED NOTES
Attempted IV access with no success. Patient states she usually has the IV in her hand but as this is for a CTA it must be higher than hand.  Provider made aware, will look with US

## 2019-01-23 NOTE — ED NOTES
Pt was seen by PCP for chronic CP, had elevated d-dimer and had PVL today. Has ordered CTA but outpatient imaging center did not perform outpt CTA d/t elevated d-dimer but referred pt to ED for CTA. Last Cr 0.79 6/15/18 I performed a brief evaluation, including history and physical, of the patient here in triage and I have determined that pt will need further treatment and evaluation from the main side ER physician. I have placed initial orders to help in expediting patients care. January 23, 2019 at 5:10 PM - Jonnie Sanabria Visit Vitals BP (!) 164/95 (BP 1 Location: Right arm, BP Patient Position: At rest) Pulse 89 Temp 98.1 °F (36.7 °C) Resp 16 Ht 5' 5\" (1.651 m) Wt 125.6 kg (277 lb) SpO2 100% BMI 46.10 kg/m²

## 2019-01-23 NOTE — ED TRIAGE NOTES
Had elevated d dimer today. Over a month of chest pain and sob . Leg swelling. Had PVL as outpt and was to have ct chest. outpt wouldn't do it due to elevated d dimer. Told to go to ED.

## 2019-01-23 NOTE — ED PROVIDER NOTES
[facility] DEPARTMENT HISTORY AND PHYSICAL EXAM 
 
5:25 PM 
 
 
Date: 1/23/2019 Patient Name: Breanne Fagan History of Presenting Illness Chief Complaint Patient presents with  Chest Pain  Shortness of Breath History Provided By: Patient and Patient's Daughter Chief Complaint: Chest Pain Duration: Several months Timing:  Intermittent Location: N/A Quality: N/A Severity: Moderate Modifying Factors: N/A Associated Symptoms: SOB, leg swelling, and cough. Denies any other associate signs or symptoms. Additional History (Context): Breanne Fagan is a 62 y.o. female with a history of hypertension and hepatitis C who presents with moderate intermittent chest pain with associated SOB for several months. The patient was referred to the ED by Carlos Everett MD (PCP) to get a CTA for PE. Patient seeing her PCP and being worked up as an outpatient. Daughter reports recent negative ultrasound today, she was supposed to get the CTA as well but it could not be preformed because pt did not have a pre-authorization so here PCP told her to come to the ER to get it done. The daughter states her legs were swollen bilaterally last week and now improved. Daughter reports chronic cough. Former smoker. The patient is not currently on hypertension medication. Patient is Setswana speaking only. From [address] by patient's daughter. PCP: Carlos Everett MD 
 
Current Outpatient Medications Medication Sig Dispense Refill  varicella-zoster recombinant, PF, (SHINGRIX, PF,) 50 mcg/0.5 mL susr injection 0.5mL by IntraMUSCular route once now and then repeat in 2-6 months 0.5 mL 1  clotrimazole (LOTRIMIN) 1 % topical cream Apply 1 Applicator to affected area two (2) times a day.  diclofenac EC (VOLTAREN) 50 mg EC tablet Take 1 Tab by mouth three (3) times daily. 90 Tab 3  predniSONE (DELTASONE) 5 mg tablet Take  by mouth daily.  sulfaSALAzine (AZULFIDINE) 500 mg tablet Take 500 mg by mouth two (2) times a day.  acetaminophen (TYLENOL) 325 mg tablet Take  by mouth every four (4) hours as needed for Pain.  fluticasone (FLONASE) 50 mcg/actuation nasal spray 2 Sprays by Both Nostrils route daily. 1 Bottle 11  
 raNITIdine (ZANTAC) 150 mg tablet Take 1 Tab by mouth two (2) times a day. 180 Tab 1 Past History Past Medical History: 
Past Medical History:  
Diagnosis Date  Arthritis  Hepatitis C   
 Hypertension Diagnosed in [place] a few months ago Past Surgical History: 
Past Surgical History:  
Procedure Laterality Date  HX CHOLECYSTECTOMY  2013  HX COLONOSCOPY Completed in [facility]  
 
 
Family History: 
Family History Problem Relation Age of Onset  Diabetes Mother  Hypertension Father  No Known Problems Sister  No Known Problems Brother  No Known Problems Sister  No Known Problems Sister  No Known Problems Brother  No Known Problems Brother  No Known Problems Brother Social History: 
Social History Tobacco Use  Smoking status: Former Smoker Last attempt to quit: 2017 Years since quittin.0  Smokeless tobacco: Never Used Substance Use Topics  Alcohol use: No  
 Drug use: No  
 
 
Allergies: 
No Known Allergies Review of Systems Review of Systems Constitutional: Negative for chills and fever. HENT: Negative for ear pain and sore throat. Eyes: Negative for pain and visual disturbance. Respiratory: Positive for cough and shortness of breath. Cardiovascular: Positive for chest pain. Negative for palpitations. Gastrointestinal: Negative for abdominal pain, diarrhea, nausea and vomiting. Genitourinary: Negative for flank pain. Musculoskeletal: Negative for back pain and neck pain. Neurological: Negative for syncope and headaches. Psychiatric/Behavioral: Negative for agitation. The patient is not nervous/anxious. All other systems reviewed and are negative. Physical Exam  
 
Visit Vitals BP (!) 158/140 Pulse 80 Temp 98.1 °F (36.7 °C) Resp 15 Ht 5' 5\" (1.651 m) Wt 125.6 kg (277 lb) SpO2 100% BMI 46.10 kg/m² Physical Exam  
Constitutional: She is oriented to person, place, and time. She appears well-developed and well-nourished. Overweight. HENT:  
Head: Normocephalic and atraumatic. Mouth/Throat: Oropharynx is clear and moist.  
Eyes: Pupils are equal, round, and reactive to light. No scleral icterus. Neck: Neck supple. No tracheal deviation present. Cardiovascular: Normal rate and regular rhythm. No murmur heard. Pulmonary/Chest: Effort normal and breath sounds normal. No respiratory distress. Abdominal: Soft. There is no tenderness. Musculoskeletal: Normal range of motion. She exhibits no deformity. 0 to 1+ bilateral lower extremity edema. Neurological: She is alert and oriented to person, place, and time. No gross neuro deficit Skin: Skin is warm and dry. No rash noted. She is not diaphoretic. Psychiatric: She has a normal mood and affect. Nursing note and vitals reviewed. Diagnostic Study Results Labs - Labs Reviewed CBC WITH AUTOMATED DIFF - Abnormal; Notable for the following components:  
    Result Value HCT 46.9 (*) PLATELET 840 (*) MPV 13.6 (*) All other components within normal limits POC CHEM8 - Abnormal; Notable for the following components:  
 CO2, POC 26 (*) Creatinine, POC 0.5 (*) Calcium, ionized (POC) 0.93 (*) All other components within normal limits NT-PRO BNP  
TROPONIN I Radiologic Studies -  
NM LUNG PERFUSION W VENT Final Result IMPRESSION:  
  
Normal pulmonary perfusion. _______________ Preliminary report provided by on call Radiology resident.  
_______________ Medical Decision Making I am the first provider for this patient. I reviewed the vital signs, available nursing notes, past medical history, past surgical history, family history and social history. Vital Signs-Reviewed the patient's vital signs. Pulse Oximetry Analysis -  100% on room air (Interpretation) WNL EKG: Interpreted by the EP. Time 16:50 Rate 86. Normal axis. Intervals WNL. No acute ST elevations. Nonspecific ST-T wave abnormality. Records Reviewed: Nursing Notes and Old Medical Records (Time of Review: 5:25 PM) MDM, Progress Notes, Reevaluation, and Consults: 
 
 
ED Course as of Jan 26 0725 Wed Jan 23, 2019  
1801 CP/SOB intermittent for months being worked by PCP as outpatient, sent to ED specifically for CTA to rule out pulmonary embolus as she was unable to get it as an outpatient today because she needed a prior authorization. [KG] [id number] Nursing and tech unable to get IV, will place US guided IV for CTA. [KG] ED Course User Index [KG] Nicci Masters, DO Unable to place US guided IV, incredibly difficult to find any vessels appropriate for access in her [facility] of upper arm for CTA. An IV was placed in the left hand by nursing so we will pursue VQ scan to evaluate for pulmonary embolus, oxygen saturation 100% on room air. Will also check labs per taining to her chronic complaint but do not suspect ACS, BNP sent to eval for CHF. The patient was given: 
Medications  
acetaminophen (TYLENOL) tablet 1,000 mg (1,000 mg Oral Given 1/23/19 1952) technetium pentetate (Tc-DTPA) injection 1 millicurie (1 millicurie Inhalation Given 1/23/19 2111) technetium albumin aggregated (MAA) solution 5.3 millicurie (5.3 millicuries IntraVENous Given 1/23/19 2111) Diagnosis Clinical Impression: 1. Elevated blood pressure reading 2. Chronic chest pain 3. Chronic shortness of breath Disposition: Sign out to Dr. Marlen Del Rosario Follow-up Information Follow up With Specialties Details Why Contact Info Preston Hu MD Family Practice Call in 2 days [phone] 
[phone] [facility] EMERGENCY DEPT Emergency Medicine  If symptoms worsen, As needed [address] 
[phone] Medication List  
  
ASK your doctor about these medications   
clotrimazole 1 % topical cream 
Commonly known as:  LOTRIMIN 
  
diclofenac EC 50 mg EC tablet Commonly known as:  VOLTAREN Take 1 Tab by mouth three (3) times daily. fluticasone 50 mcg/actuation nasal spray Commonly known as:  [place] Para 2 Sprays by Both Nostrils route daily. predniSONE 5 mg tablet Commonly known as:  DELTASONE 
  
raNITIdine 150 mg tablet Commonly known as:  ZANTAC Take 1 Tab by mouth two (2) times a day. sulfaSALAzine 500 mg tablet Commonly known as:  AZULFIDINE 
  
TYLENOL 325 mg tablet Generic drug:  acetaminophen 
  
varicella-zoster recombinant (PF) 50 mcg/0.5 mL Susr injection Commonly known as:  SHINGRIX (PF) 
0.5mL by IntraMUSCular route once now and then repeat in 2-6 months 
  
  
 
_______________________________ Scribe Attestation Vilma Messina acting as a scribe for and in the presence of Ilia Carrasco DO     
January 26, 2019 at [facility] AM 
    
Provider Attestation:     
I personally performed the services described in the documentation, reviewed the documentation, as recorded by the scribe in my presence, and it accurately and completely records my words and actions.  January 26, 2019 at 7:25 AM - Ilia Carrasco DO

## 2019-01-24 LAB
ATRIAL RATE: 86 BPM
CALCULATED P AXIS, ECG09: 69 DEGREES
CALCULATED R AXIS, ECG10: 55 DEGREES
CALCULATED T AXIS, ECG11: 37 DEGREES
DIAGNOSIS, 93000: NORMAL
P-R INTERVAL, ECG05: 130 MS
Q-T INTERVAL, ECG07: 396 MS
QRS DURATION, ECG06: 88 MS
QTC CALCULATION (BEZET), ECG08: 473 MS
VENTRICULAR RATE, ECG03: 86 BPM

## 2019-01-24 NOTE — ED NOTES
Provider in the room attempting to obtain an IV with US, 18 G in L  Upper arm was obtained, but it was not patent. ED tech also attempted to obtain an IV but was not able to find a patent vein. Attempts were made to contact PICC team but they are not in the hospital at this time.

## 2019-01-24 NOTE — DISCHARGE INSTRUCTIONS
Patient Education     Please return tomorrow during business hours, ideally int he morning for CTA testing as your will special IV access. Presión arterial elevada: Instrucciones de cuidado - [ Elevated Blood Pressure: Care Instructions ]  Instrucciones de cuidado    La presión arterial es joelle medida de la fuerza que ejerce la devonte contra las buck de las arterias. Es normal que la presión arterial suba y baje a lo rochelle del día. Bobbi si se mantiene radha por un tiempo, usted tiene presión arterial radha. Dos números indican sebastian presión arterial. El primer número es la presión sistólica. Muestra qué tan tr presiona la devonte cuando el corazón está bombeando. El ale número es la presión diastólica. Muestra qué tan tr presiona la [employer] latidos, cuando el corazón está relajado y llenándose de Jose garcia. Rajani Ores arterial ideal para adultos es menos de 120/80 (diga \"120 sobre 80\"). La presión arterial radha es de 140/90 o superior. Usted tiene la presión arterial radha si el número de [place] es 140 o superior o el número de [facility] es 90 o superior, o ambas cosas. La prueba principal para la presión arterial radha es simple, rápida e indolora. Para diagnosticar presión arterial radha, sebastian médico examinará sebastian presión arterial en momentos diferentes. Después de tomarle la presión, es posible que sebastian médico le pida que se vuelva a montez la presión en casa. Si se le diagnostica presión arterial radha, puede colaborar con sebastian médico para elaborar un plan a rochelle plazo para manejarla. La atención de seguimiento es joelle parte clave de sebastian tratamiento y seguridad. Asegúrese de hacer y acudir a todas las citas, y llame a sebastian médico si está teniendo problemas. También es joelle buena idea saber los resultados de los exámenes y mantener joelle lista de los medicamentos que francisco. ¿Cómo puede cuidarse en el hogar? · No fume. Fumar aumenta sebastian riesgo de ataque cerebral y ataque al corazón.  Si necesita ayuda para dejarlo, hable con sebastian médico sobre programas y medicamentos para dejar de fumar. Estos pueden aumentar kaylyn probabilidades de dejar de fumar para siempre. · Mantenga un peso saludable. · Trate de limitar la cantidad de sodio que ingiere a menos de 2,300 miligramos (mg) al día. Sebastian médico podría pedirle que trate de ingerir menos de 1,500 mg al día. · Manténgase físicamente activo. Priya al menos 30 minutos de ejercicio la mayoría de los días de la [place]. Caminar es joelle buena opción. Es posible que también quiera hacer otras actividades, wander correr, nadar, [employer], o practicar tenis o deportes de equipo. · No tome alcohol o limite la cantidad que dante. Hable con sebastian médico acerca de si puede montez alcohol. · Coma abundantes frutas, verduras y productos lácteos bajos en grasa. Consuma menos grasa saturada y total.  · Aprenda a revisarse la presión arterial en sebastian hogar. ¿Cuándo debe pedir ayuda? Llame a sebastian médico ahora mismo o busque atención médica inmediata si:  ? · Sebastian presión arterial es mucho más radha de lo normal (wander 180/110 o superior). ? · Ashleigh que la presión arterial radha está causando síntomas wander:  ¨ Dolor de georgina intenso. [address]. ? Vigile muy de cerca los cambios en sebastian carloz, y asegúrese de comunicarse con sebastian médico si:  ? · No mejora wander se esperaba. ¿Dónde puede encontrar más información en inglés? Tami [place] a http://christina-bj.info/. Wandy Akers F701 en la búsqueda para aprender más acerca de \"Presión arterial elevada: Instrucciones de cuidado - [ Elevated Blood Pressure: Care Instructions ]. \"  Revisado: 21 septiembre, 2016  Versión del contenido: 11.4  © 0447-1411 Healthwise, [place]. Las instrucciones de cuidado fueron adaptadas bajo licencia por [employer] (which disclaims liability or warranty for this information). Si usted tiene [place] afección médica o sobre estas instrucciones, siempre pregunte a sebastian profesional de carloz. [facility] niega toda garantía o responsabilidad por sebastian uso de esta información. Patient Education        Dolor de pecho: Instrucciones de cuidado - [ Chest Pain: Care Instructions ]  Instrucciones de cuidado    El dolor de pecho puede tener muchas causas. Algunas no son graves y mejorarán por sí solas en pocos días. Bobbi algunos tipos de dolor de pecho requieren más pruebas y [place]. Es posible que sebastian médico le haya recomendado joelle visita de seguimiento dentro de las 8 a 12 horas siguientes. Si no mejora, es posible que necesite [address] pruebas o [place]. Aunque sebastian médico le haya dado de radha, es necesario que esté atento a cualquier problema que se presente. El médico le hizo un cuidadoso chequeo, bobbi a veces los problemas pueden aparecer posteriormente. Si tiene nuevos síntomas o éstos no mejoran, obtenga atención médica de inmediato. Si tiene dolor o presión en el pecho que empeora o es diferente y que dura más de 5 minutos, o se desmayó (perdió el conocimiento), llame al 911 o busque otra ayuda de emergencia de inmediato. Acudir a joelle consulta médica es sólo un paso en sebastian tratamiento. Aunque se sienta mejor, todavía deberá hacer lo que sebastian médico le recomiende, wander asistir a todas las visitas de seguimiento sugeridas y montez los medicamentos exactamente [employer]	[place] fueron indicados. [place] le ayudará a recuperarse y prevenir problemas futuros. ¿Cómo puede cuidarse en el hogar? · Descanse hasta que se sienta mejor. · [place] kaylyn medicamentos exactamente wander le fueron recetados. Llame a sebastian médico si kristen estar teniendo problemas con sebastian medicamento. · No conduzca después de montez un analgésico (medicamento para el dolor) recetado. ¿Cuándo debe pedir ayuda? Llame al 911 si:    · Se desmayó (perdió el conocimiento).     · Tiene graves dificultades para respirar.     · Tiene síntomas de un ataque al corazón. Estos podrían incluir:  ?  Pennelope Lalitha en el pecho, o joelle sensación extraña en el pecho.  ? Sudoración. ? Falta de aire. ? Náuseas o vómito. ? Dolor, presión o joelle sensación extraña en la espalda, el christopher, la mandíbula, la parte superior del abdomen o en aleisha o ambos hombros o brazos. ? Aturdimiento o debilidad repentina. ? Latidos del corazón rápidos o irregulares. Después de llamar al 911, es posible que el operador le diga que mastique 1 aspirina para adultos o de 2 a 4 aspirinas de dosis baja. Espere a joelle ambulancia. No intente conducir usted mismo.    Llame hoy a sebastian médico si:    · Tiene cualquier dificultad para respirar.     · El dolor en el pecho empeora.     · Siente mareos o aturdimiento, o que está a punto de desmayarse.     · No mejora wander se esperaba.     · Tiene dolor de pecho nuevo o diferente. ¿Dónde puede encontrar más información en inglés? Samuel Sagastume a http://christina-bj.info/. Escriba A120 en la búsqueda para aprender más acerca de \"Dolor de pecho: Instrucciones de cuidado - [ Chest Pain: Care Instructions ]. \"  Revisado: 23 septiembre, 2018  Versión del contenido: 11.9  © [id number] Healthwise, Incorporated. Las instrucciones de cuidado fueron adaptadas bajo licencia por [employer] [place] [employer] (which disclaims liability or warranty for this information). Si usted tiene [place] afección médica o sobre estas instrucciones, siempre pregunte a sebastian profesional de carloz. Healthwise, Incorporated niega toda garantía o responsabilidad por sebastian uso de esta información. Patient Education        Falta de aire: Instrucciones de cuidado - [ Shortness of Breath: Care Instructions ]  Instrucciones de cuidado  La falta de aire tiene muchas causas. A veces, las afecciones [address] [place] [address], wandre la ansiedad, pueden ocasionar falta de [place]. Algunas personas tienen joelle leve falta de aire cuando hacen ejercicio.  Las dificultades para respirar también pueden ser síntoma de un problema grave, wander asma, enfermedad de los pulmones, Nakia Holden en Chris Castillo y neumonía. Si continúa sebastian falta de aire, es posible que necesite exámenes y tratamiento. Esté alerta a cualquier cambio en la respiración y otros síntomas. La atención de seguimiento es joelle parte clave de sebastian tratamiento y seguridad. Asegúrese de hacer y acudir a todas las citas, y llame a sebastian médico si está teniendo problemas. También es joelle buena idea saber los resultados de kaylyn exámenes y mantener joelle lista de los medicamentos que francisco. ¿Cómo puede cuidarse en el hogar? · No fume ni permita que otros fumen cerca de usted. Si necesita ayuda para dejar de fumar, hable con sebastian médico [employer] y medicamentos para dejar de fumar. Éstos pueden aumentar kaylyn probabilidades de dejar el hábito para siempre. · Descanse y duerma lo suficiente. · [employer] medicamentos exactamente wander le fueron recetados. Llame a sebastian médico si kristen que está teniendo un problema con sebastian medicamento. · Encuentre maneras saludables de [employer]. ? [employer] a diario. ? Duerma lo suficiente. ? Coma con regularidad y karley. ¿Cuándo debe pedir ayuda? Llame al 911 en cualquier momento que considere que necesita atención de emergencia. Por ejemplo, llame si:    · Tiene joelle grave falta de aire.     · Tiene síntomas de un ataque al corazón. Estos podrían incluir:  ? Mary Duos en el pecho, o joelle sensación extraña en el pecho.  ? Sudoración. ? Falta de aire. ? Náuseas o vómito. ? Dolor, presión o joelle sensación extraña en la espalda, el christopher, la mandíbula, la parte superior del abdomen, o en aleisha o ambos hombros o brazos. ? Aturdimiento o debilidad repentina. ? Latidos cardíacos rápidos o irregulares. Cuando llame al 911, es posible que el operador le diga que mastique 1 aspirina para adultos o 2 a 4 aspirinas de dosis baja. Espere a la ambulancia.  No trate de conducir usted mismo un automóvil.    Llame a sebastian médico ahora mismo o busque atención médica inmediata si:    · La falta de aire empeora o empieza a tener respiración sibilante (con silbidos). La respiración sibilante es un aldo maribel que se hace al respirar.     · Se despierta en la noche sin aire o necesita elevar sebastian georgina sobre varias almohadas para poder respirar.     · Le falta el aire después de joelle actividad suave o cuando está descansando.    Preste especial atención a los cambios en sebastian carloz y asegúrese de comunicarse con sebastian médico si:    · No mejora en los siguientes 1 a 2 días. ¿Dónde puede encontrar más información en inglés? Terence Houser a http://christina-bj.info/. Jenaro S780 en la búsqueda para aprender más acerca de \"Falta de aire: Instrucciones de cuidado - [ Shortness of Breath: Care Instructions ]. \"  Revisado: 5 septiembre, 2018  Versión del contenido: 11.9  © 9933-7672 Healthwise, Incorporated. Las instrucciones de cuidado fueron adaptadas bajo licencia por Good Help [employer] (which disclaims liability or warranty for this information). Si usted tiene [place] afección médica o sobre estas instrucciones, siempre pregunte a sebastian profesional de carloz. Healthwise, Incorporated niega toda garantía o responsabilidad por sebastian uso de esta información.

## 2019-01-24 NOTE — ED NOTES
I have reviewed discharge instructions with the patient and daughter. The patient and daughter verbalized understanding. Patient armband removed and shredded. No prescriptions given. Pt ambulated out of the ED accompanied by daughter.

## 2019-01-24 NOTE — ED NOTES
7:19 PM :Pt care assumed from Dr. Kandi Montes De Oca, DO 
 , ED provider. Pt complaint(s), current treatment plan, progression and available diagnostic results have been discussed thoroughly. Rounding occurred: yes Intended Disposition:TBD Pending diagnostic reports and/or labs (please list): Lung perfusion w/ vent Lung perfusion w/ vent: 
Finding: Low probability for PE. 
 
9:33 PM 
Received pt in sign-out from Dr. Selma Lama, pending VQ scan, which was low probability PE. Work-up likewise unremarkable. Pt doing ok. Discussed results and poc for dc home, symptom management, follow-up, return precautions.

## 2019-01-24 NOTE — ED NOTES
It was explained to the patient (using her daughter as an ) that if we could obtain an IV in her hand, then a VQ scan could be done to test if she has a PE, patient was willing to stay for this test.

## 2019-01-25 ENCOUNTER — TELEPHONE (OUTPATIENT)
Dept: FAMILY MEDICINE CLINIC | Age: 59
End: 2019-01-25

## 2019-01-28 NOTE — TELEPHONE ENCOUNTER
Returned patient's call. Daughter does not remember why she called 3 days ago, she have not more questions.

## 2019-01-29 NOTE — TELEPHONE ENCOUNTER
Daughter Jacqueline Granados called to tell us that her mother's insurance did not get the referral for the allergy's appointment at Dr Olga Estrada on Friday 02-01-19  Verified addres, and uppdated it on [place]. Also mailed the referral to patient and faxed it again to Allergy's off.

## 2019-03-27 ENCOUNTER — OFFICE VISIT (OUTPATIENT)
Dept: ORTHOPEDIC SURGERY | Age: 59
End: 2019-03-27

## 2019-03-27 VITALS
TEMPERATURE: 96.6 F | OXYGEN SATURATION: 100 % | HEART RATE: 82 BPM | DIASTOLIC BLOOD PRESSURE: 94 MMHG | WEIGHT: 280 LBS | BODY MASS INDEX: 46.65 KG/M2 | SYSTOLIC BLOOD PRESSURE: 170 MMHG | RESPIRATION RATE: 16 BRPM | HEIGHT: 65 IN

## 2019-03-27 DIAGNOSIS — G89.29 CHRONIC PAIN OF LEFT ANKLE: ICD-10-CM

## 2019-03-27 DIAGNOSIS — S80.12XA CONTUSION OF LEFT KNEE AND LOWER LEG, INITIAL ENCOUNTER: ICD-10-CM

## 2019-03-27 DIAGNOSIS — S80.02XA CONTUSION OF LEFT KNEE AND LOWER LEG, INITIAL ENCOUNTER: ICD-10-CM

## 2019-03-27 DIAGNOSIS — M76.62 ACHILLES TENDINITIS OF LEFT LOWER EXTREMITY: Primary | ICD-10-CM

## 2019-03-27 DIAGNOSIS — M79.672 LEFT FOOT PAIN: ICD-10-CM

## 2019-03-27 DIAGNOSIS — I87.8 VENOUS STASIS: ICD-10-CM

## 2019-03-27 DIAGNOSIS — M25.572 CHRONIC PAIN OF LEFT ANKLE: ICD-10-CM

## 2019-03-27 RX ORDER — DICLOFENAC SODIUM 75 MG/1
75 TABLET, DELAYED RELEASE ORAL 2 TIMES DAILY WITH MEALS
Qty: 60 TAB | Refills: 0 | Status: SHIPPED | OUTPATIENT
Start: 2019-03-27 | End: 2019-04-23 | Stop reason: SDUPTHER

## 2019-03-27 RX ORDER — CEPHALEXIN 500 MG/1
500 CAPSULE ORAL 4 TIMES DAILY
Qty: 20 CAP | Refills: 0 | Status: SHIPPED | OUTPATIENT
Start: 2019-03-27 | End: 2019-04-01

## 2019-03-27 RX ORDER — FAMOTIDINE 40 MG/1
40 TABLET, FILM COATED ORAL DAILY
Qty: 30 TAB | Refills: 0 | Status: SHIPPED | OUTPATIENT
Start: 2019-03-27 | End: 2019-04-23 | Stop reason: SDUPTHER
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
## 2019-03-27 NOTE — PROGRESS NOTES
1. Have you been to the ER, urgent care clinic since your last visit? Hospitalized since your last visit? No 
 
2. Have you seen or consulted any other health care providers outside of the [address] [place] [address] since your last visit? Include any pap smears or colon screening.  No

## 2019-03-27 NOTE — PATIENT INSTRUCTIONS
Achilles Tendinitis: Exercises Complete at least 2 sessions of each exercise each day to get started (no days off). If beneficial and able to fit into your schedule, more sessions are recommended. Towel stretch (calf) 1. Sit with your legs extended and knees straight. 2. Place a towel around your foot just under the toes. 3. Hold each end of the towel in each hand, with your hands above your knees. 4. Pull back with the towel so that your foot stretches toward you. 5. Hold the position for 30 seconds. 6. Repeat 3 times a session. 7. When 3 sets are completed, slightly bend the knee by placing a towel or pillow under it and completing 3 more sets per leg. Calf wall stretch 1. Stand facing a wall with your hands on the wall at about eye level. Put your affected leg about a step behind your other leg. 2. Keeping your back leg straight and your back heel on the floor, bend your front knee and gently bring your hip and chest toward the wall until you feel a stretch in the calf of your back leg. 3. Hold the stretch for 30 seconds. 4. Repeat 3 times per leg. 5. Repeat steps 1 through 4, but this time keep your back knee bent. Stair stretch 1. Stand with the balls of both feet on the edge of a step or curb. With at least one hand, hold onto something solid for balance, such as a banister or handrail. 2. Keeping your knees straight, slowly let the heels hang down off of the step or curb until you feel a stretch in the back of your calf and/or Achilles area. 3. Hold this position for 30 seconds. 4. Repeat 3 times a session. This stretch should also be repeated with your knees slightly bent. If too easy, progress to one leg at a time. Heel raises (eccentric emphasis) 1. Stand on a step with your heel off the edge of the step. Hold on to a handrail or wall for balance.  
2. Push up on your toes, then slowly count to 5 as you lower yourself back down until your heel is below the step. If it hurts to push up on your toes, try putting most of your weight on your other foot as you push up, or try using your arms to help you. If you can't do this exercise without causing pain, stop the exercise. 3. Repeat 10 times for 3 sets.

## 2019-03-29 ENCOUNTER — TELEPHONE (OUTPATIENT)
Dept: ORTHOPEDIC SURGERY | Age: 59
End: 2019-03-29

## 2019-03-29 NOTE — TELEPHONE ENCOUNTER
Patient daughter Jennifer Dickey ( on hipaa ) called for . Ms. Etelvina Burciaga said that Roberta Guerra told them if the patient swelling passes the blanka he placed on her leg to call him. Ms. Etelvina Burciaga said that the Swelling has passed the blanka. Ms. Etelvina Burciaga is requesting a call back as soon as possible  Tel. [phone]. Note : patient seen by Roberta Guerra on 03/27/19 [facility].

## 2019-03-29 NOTE — TELEPHONE ENCOUNTER
Dr. Stephanie Basilio outlined the area on the skin with a skin marker to track the progression of any redness. If the redness has progressed beyond the outline of the marker on the skin then the patient should go to the ED.     Asha Hearn PA-C  3/29/2019   4:19 PM

## 2019-04-04 ENCOUNTER — OFFICE VISIT (OUTPATIENT)
Dept: FAMILY MEDICINE CLINIC | Age: 59
End: 2019-04-04

## 2019-04-04 ENCOUNTER — HOSPITAL ENCOUNTER (OUTPATIENT)
Dept: LAB | Age: 59
Discharge: HOME | End: 2019-04-04
Payer: MEDICAID

## 2019-04-04 VITALS
TEMPERATURE: 96.8 F | WEIGHT: 279 LBS | HEIGHT: 65 IN | HEART RATE: 62 BPM | OXYGEN SATURATION: 100 % | BODY MASS INDEX: 46.48 KG/M2 | DIASTOLIC BLOOD PRESSURE: 76 MMHG | RESPIRATION RATE: 16 BRPM | SYSTOLIC BLOOD PRESSURE: 137 MMHG

## 2019-04-04 DIAGNOSIS — L03.116 CELLULITIS OF LEFT LOWER EXTREMITY: ICD-10-CM

## 2019-04-04 DIAGNOSIS — L03.116 CELLULITIS OF LEFT LOWER EXTREMITY: Primary | ICD-10-CM

## 2019-04-04 LAB
BASOPHILS # BLD: 0 K/UL (ref 0–0.1)
BASOPHILS NFR BLD: 1 % (ref 0–2)
DIFFERENTIAL METHOD BLD: ABNORMAL
EOSINOPHIL # BLD: 0.3 K/UL (ref 0–0.4)
EOSINOPHIL NFR BLD: 5 % (ref 0–5)
ERYTHROCYTE [DISTWIDTH] IN BLOOD BY AUTOMATED COUNT: 13.2 % (ref 11.6–14.5)
HCT VFR BLD AUTO: 44.9 % (ref 35–45)
HGB BLD-MCNC: 14.4 G/DL (ref 12–16)
LYMPHOCYTES # BLD: 2.3 K/UL (ref 0.9–3.6)
LYMPHOCYTES NFR BLD: 40 % (ref 21–52)
MCH RBC QN AUTO: 30 PG (ref 24–34)
MCHC RBC AUTO-ENTMCNC: 32.1 G/DL (ref 31–37)
MCV RBC AUTO: 93.5 FL (ref 74–97)
MONOCYTES # BLD: 0.6 K/UL (ref 0.05–1.2)
MONOCYTES NFR BLD: 11 % (ref 3–10)
NEUTS SEG # BLD: 2.5 K/UL (ref 1.8–8)
NEUTS SEG NFR BLD: 43 % (ref 40–73)
PLATELET # BLD AUTO: 204 K/UL (ref 135–420)
PMV BLD AUTO: 14.2 FL (ref 9.2–11.8)
RBC # BLD AUTO: 4.8 M/UL (ref 4.2–5.3)
WBC # BLD AUTO: 5.7 K/UL (ref 4.6–13.2)

## 2019-04-04 PROCEDURE — 85025 COMPLETE CBC W/AUTO DIFF WBC: CPT

## 2019-04-04 PROCEDURE — 36415 COLL VENOUS BLD VENIPUNCTURE: CPT

## 2019-04-04 RX ORDER — CEPHALEXIN 500 MG/1
500 CAPSULE ORAL 4 TIMES DAILY
COMMUNITY
End: 2019-04-19

## 2019-04-04 RX ORDER — DOXYCYCLINE 100 MG/1
100 CAPSULE ORAL
COMMUNITY
Start: 2019-03-30 | End: 2019-04-09

## 2019-04-04 NOTE — PATIENT INSTRUCTIONS
Celulitis: Instrucciones de cuidado - [ Cellulitis: Care Instructions ]  Instrucciones de cuidado    La celulitis es joelle infección cutánea causada por bacterias, con mayor frecuencia estreptococos o estafilococos. Suele producirse tras joelle ruptura en la piel por joelle raspadura, tito, mordedura o punción, o tras un salpullido. La celulitis puede tratarse sin hacer pruebas para detectar sebastian causa. Bobbi sebastian médico podría hacer pruebas, si es necesario, para detectar bacterias específicas, wander Staphylococcus aureus resistente a la meticilina (MRSA, por kaylyn siglas en inglés). El médico lo lucero examinado minuciosamente, bobbi pueden presentarse problemas más tarde. Si nota algún problema o nuevos síntomas, busque tratamiento médico de inmediato. La atención de seguimiento es joelle parte clave de sebastian tratamiento y seguridad. Asegúrese de hacer y acudir a todas las citas, y llame a sebastian médico si está teniendo problemas. También es joelle buena idea saber los resultados de kaylyn exámenes y mantener joelle lista de los medicamentos que francisco. ¿Cómo puede cuidarse en el hogar? · [place] kaylyn antibióticos de la manera indicada. No deje de tomarlos solo porque se sienta mejor. Debe montez todos los antibióticos hasta terminarlos. · Eleve la selena infectada sobre joelle almohada para reducir el dolor y la hinchazón. Trate de mantener la selena por encima del nivel del corazón tan a menudo wander sea posible. · Si sebastian médico le dijo cómo cuidarse la herida, siga las instrucciones de sebastian médico. Si no le harry instrucciones, siga estos consejos generales:  ? Lávese la herida con agua limpia 2 veces al día. No use peróxido de hidrógeno (agua [place]) ni alcohol, los cuales pueden retrasar la sanación. ? Puede cubrirse la herida con joelle capa delgada de vaselina y [place]elon [place] venda no adherente. ? Aplíquese más vaselina y reemplace la venda según sea necesario. · Sea amelia con los medicamentos.  [place] los analgésicos (medicamentos para el dolor) exactamente wander le fueron indicados. ? Si el [employer] harry un analgésico recetado, tómelo wander se lo indicó. ? Si usted no está tomando un analgésico recetado, pregúntele a sebastian médico si puede montez un medicamento de [employer] to [facility]. Para prevenir la celulitis en el futuro  · Trate de evitar los venegas, los rasguños u otras lesiones en la piel. La celulitis suele ocurrir con mayor frecuencia donde haya joelle ruptura de la piel. · Si tiene [place], tito, [place] leve o mordedura, lávese la herida con agua limpia lo antes que pueda para ayudar a evitar joelle infección. No use peróxido de hidrógeno (agua [place]) ni alcohol, los cuales pueden retrasar la sanación. · Si tiene hinchazón en las piernas (edema), el uso de medias de soporte y un buen cuidado de la piel pueden ayudarle a prevenir llagas en la piel y celulitis. · Cuide de kaylyn pies, sobre todo si tiene diabetes u otras afecciones que aumenten el riesgo de [place]. Use zapatos y calcetines. No camine descalzo. Si tiene pie de atleta u otros problemas cutáneos en los pies, hable con sebastian médico de cómo tratarlos. ¿Cuándo debe pedir ayuda? Llame a sebastian médico ahora mismo o busque atención médica inmediata si:    · Tiene señales de que la infección está empeorando, tales wander:  ? Aumento del dolor, la hinchazón, la temperatura o el enrojecimiento. ? Vetas rojizas que salen de la selena. ? Pus que sale de la selena. ? Elbridge Esters.     · Tiene un salpullido.    Preste especial atención a los cambios en sebastian carloz y asegúrese de comunicarse con sebastian médico si:    · No mejora wander se esperaba. ¿Dónde puede encontrar más información en inglés? Lucia Hammond a http://christina-bj.info/. Tremayne Resides en la búsqueda para aprender más acerca de \"Celulitis: Instrucciones de cuidado - [ Cellulitis: Care Instructions ]. \"  Revisado: 17 oskar, 2018  Versión del contenido: 11.9  © 1607-3076 [employer].  Las instrucciones de cuidado fueron adaptadas bajo [place] por [employer] (which disclaims liability or warranty for this information). Si usted tiene [place] afección médica o sobre estas instrucciones, siempre pregunte a sebastian profesional de carloz. [facility] niega toda garantía o responsabilidad por sebastian uso de esta información.

## 2019-04-04 NOTE — PROGRESS NOTES
[facility]    CC: F/U of Cellulitis    HPI:     Cellulitis:  -LLE  -Associated with redness, pain, calor, swelling, and subjective fever  -Diagnosed by Orthopedist ~3/27/2019  -Was started on Keflex  -Went to ED for evaluation due to worsening cellulitis on 3/30/2018  -Keflex D/C'd and she was started on doxycycline regimen  -Taking antibiotic as prescribed and denies any side effects of issues with the medication  -Cellulitis has been improving      ROS: Positive items marked in RED  CON: fever, chills  Cardiovascular: palpitations, CP  Resp: SOB, cough  GI: nausea, vomiting, diarrhea  : dysuria, hematuria      Past Medical History:   Diagnosis Date    Arthritis     Hepatitis C     Hypertension     Diagnosed in [place] a few months ago       Past Surgical History:   Procedure Laterality Date    HX CHOLECYSTECTOMY      HX COLONOSCOPY      Completed in [facility]       Family History   Problem Relation Age of Onset    Diabetes Mother     Hypertension Father     No Known Problems Sister     No Known Problems Brother     No Known Problems Sister     No Known Problems Sister     No Known Problems Brother     No Known Problems Brother     No Known Problems Brother        Social History     Socioeconomic History    Marital status: SINGLE     Spouse name: Not on file    Number of children: Not on file    Years of education: Not on file    Highest education level: Not on file   Tobacco Use    Smoking status: Former Smoker     Last attempt to quit: 2017     Years since quittin.2    Smokeless tobacco: Never Used   Substance and Sexual Activity    Alcohol use: No    Drug use: No    Sexual activity: Yes     Partners: Male     Birth control/protection: None       No Known Allergies      Current Outpatient Medications:     diclofenac EC (VOLTAREN) 75 mg EC tablet, Take 1 Tab by mouth two (2) times daily (with meals). , Disp: 60 Tab, Rfl: 0    famotidine (PEPCID) 40 mg tablet, Take 1 Tab by mouth daily. , Disp: 30 Tab, Rfl: 0    clotrimazole (LOTRIMIN) 1 % topical cream, Apply 1 Applicator to affected area two (2) times a day., Disp: , Rfl:     predniSONE (DELTASONE) 5 mg tablet, Take  by mouth daily. , Disp: , Rfl:     sulfaSALAzine (AZULFIDINE) 500 mg tablet, Take 500 mg by mouth two (2) times a day., Disp: , Rfl:     acetaminophen (TYLENOL) 325 mg tablet, Take  by mouth every four (4) hours as needed for Pain., Disp: , Rfl:     fluticasone (FLONASE) 50 mcg/actuation nasal spray, 2 Sprays by Both Nostrils route daily. , Disp: 1 Bottle, Rfl: 11    raNITIdine (ZANTAC) 150 mg tablet, Take 1 Tab by mouth two (2) times a day., Disp: 180 Tab, Rfl: 1    Physical Exam:      /76   Pulse 62   Temp 96.8 °F (36 °C) (Oral)   Resp 16   Ht 5' 5\" (1.651 m)   Wt 279 lb (126.6 kg)   SpO2 100%   BMI 46.43 kg/m²     General: obese habitus, NAD, conversant  Eyes: sclera clear bilaterally, no discharge noted, eyelids normal in appearance  HENT: NCAT  Lungs: CTAB, normal respiratory effort and rate  CV: RRR, no MRGs  ABD: soft, non-tender, non-distended, normal bowel sounds  Ext: LLE non tender. Skin: normal temperature. slight erythema noted of left lower leg as pictured below.     Psych: alert and oriented to person, place and situation, normal affect  Neuro: speech normal, moving all extremities      Assessment/Plan     Cellulitis of Left Lower Extremity, Improving:  -Advised to complete antibiotic as prescribed  -CBC ordered  -Handout given on cellulitis care  -F/U in one week        Nitin Garcia MD  4/4/2019, 1:58 PM

## 2019-04-04 NOTE — PROGRESS NOTES
Chief Complaint   Patient presents with    Follow-up     follow up on swelling in left leg     1. Have you been to the ER, urgent care clinic since your last visit? Hospitalized since your last visit? Went to [facility] on 3/26 and 3/30.    2. Have you seen or consulted any other health care providers outside of the [address] [place] [address] since your last visit? Include any pap smears or colon screening. Saw Dr. Michelle Moore.     Visit Vitals  /76   Pulse 62   Temp 96.8 °F (36 °C) (Oral)   Resp 16   Ht 5' 5\" (1.651 m)   Wt 279 lb (126.6 kg)   SpO2 100%   BMI 46.43 kg/m²

## 2019-04-23 DIAGNOSIS — M76.62 ACHILLES TENDINITIS OF LEFT LOWER EXTREMITY: ICD-10-CM

## 2019-04-23 RX ORDER — DICLOFENAC SODIUM 75 MG/1
TABLET, DELAYED RELEASE ORAL
Qty: 60 TAB | Refills: 0 | Status: SHIPPED | OUTPATIENT
Start: 2019-04-23 | End: 2019-05-22

## 2019-04-23 RX ORDER — FAMOTIDINE 40 MG/1
TABLET, FILM COATED ORAL
Qty: 30 TAB | Refills: 0 | Status: SHIPPED | OUTPATIENT
Start: 2019-04-23 | End: 2019-05-15 | Stop reason: SDUPTHER

## 2019-05-15 DIAGNOSIS — M76.62 ACHILLES TENDINITIS OF LEFT LOWER EXTREMITY: ICD-10-CM

## 2019-05-15 RX ORDER — FAMOTIDINE 40 MG/1
TABLET, FILM COATED ORAL
Qty: 30 TAB | Refills: 0 | Status: SHIPPED | OUTPATIENT
Start: 2019-05-15 | End: 2019-11-18

## 2019-05-16 ENCOUNTER — OFFICE VISIT (OUTPATIENT)
Dept: FAMILY MEDICINE CLINIC | Age: 59
End: 2019-05-16

## 2019-05-16 VITALS
OXYGEN SATURATION: 98 % | WEIGHT: 279 LBS | RESPIRATION RATE: 14 BRPM | BODY MASS INDEX: 46.48 KG/M2 | HEART RATE: 64 BPM | TEMPERATURE: 98.2 F | HEIGHT: 65 IN | DIASTOLIC BLOOD PRESSURE: 71 MMHG | SYSTOLIC BLOOD PRESSURE: 110 MMHG

## 2019-05-16 DIAGNOSIS — B18.2 CHRONIC HEPATITIS C WITHOUT HEPATIC COMA (HCC): ICD-10-CM

## 2019-05-16 DIAGNOSIS — M76.62 LEFT ACHILLES TENDINITIS: Primary | ICD-10-CM

## 2019-05-16 DIAGNOSIS — L03.116 CELLULITIS OF LEFT LOWER EXTREMITY: ICD-10-CM

## 2019-05-16 DIAGNOSIS — M25.871 ANKLE IMPINGEMENT SYNDROME, RIGHT: ICD-10-CM

## 2019-05-16 NOTE — PROGRESS NOTES
1. Have you been to the ER, urgent care clinic since your last visit? Hospitalized since your last visit? No    2. Have you seen or consulted any other health care providers outside of the [address] [place] [address] since your last visit? Include any pap smears or colon screening.  No     Patient was advised by Madelaine Pritchett to stop all medications before beginning Hep C treatment

## 2019-05-16 NOTE — PROGRESS NOTES
[address] CC: *** HPI:  
5 months 
-dr. Gilford Jesus in march 22 of this mo Given a boot 2-3 weeks 
righ anllke No fall or ws Cellulitis Hepatis C: 
tomorro appot with garst 
- Monday medteudk - Timing/onset: - Duration:  
- Location:  
- Quality:  
- Severity:  
- Context:  
- Progression/Course: - Associated Symptoms/signs: - Tried: - Alleviating factors:  
- Aggravating factors: - Triggers:  
- Exposures: sick contacts, smoke, fumes - Notable pertinent PMH of *** Health Maintenance: 
 
Health Maintenance Due Topic Date Due  Pneumococcal 0-64 years (1 of 1 - PPSV23) 11/12/1966  
 DTaP/Tdap/Td series (1 - Tdap) 11/12/1981  PAP AKA CERVICAL CYTOLOGY  11/12/1981  Shingrix Vaccine Age 50> (1 of 2) 11/12/2010  BREAST CANCER SCRN MAMMOGRAM  11/12/2010  
 FOBT Q 1 YEAR AGE 50-75  11/12/2010 ROS: Positive items marked in RED 
CON: fever, chills Cardiovascular: palpitations, CP Resp: SOB, cough GI: nausea, vomiting, diarrhea : dysuria, hematuria Lymph/Heme: swollen/enlarged lymph nodes, tender/painful lymph nodes, easy bruising/bleeding SKIN: rash, itching Eyes: itchy eyes, watery eyes, red eyes ENT: rhinorrhea, sneezing, post nasal drip, sore throat MS: arthralgias, myalgias Neuro: numbness, weakness Psych: depression, anxiety Endo: polyuria, polydipsia, heat intolerance, cold intolerance Past Medical History:  
Diagnosis Date  Arthritis  Hepatitis C   
 Hypertension Diagnosed in [place] a few months ago Past Surgical History:  
Procedure Laterality Date  HX CHOLECYSTECTOMY  2013  HX COLONOSCOPY Com[facility] in [facility]  
 
 
Family History Problem Relation Age of Onset  Diabetes Mother  Hypertension Father  No Known Problems Sister  No Known Problems Brother  No Known Problems Sister  No Known Problems Sister  No Known Problems Brother  No Known Problems Brother  No Known Problems Brother Social History Socioeconomic History  Marital status: SINGLE Spouse name: Not on file  Number of children: Not on file  Years of education: Not on file  Highest education level: Not on file Tobacco Use  Smoking status: Former Smoker Last attempt to quit: 2017 Years since quittin.3  Smokeless tobacco: Never Used Substance and Sexual Activity  Alcohol use: No  
 Drug use: No  
 Sexual activity: Yes  
  Partners: Male Birth control/protection: None No Known Allergies Current Outpatient Medications:  
  famotidine (PEPCID) 40 mg tablet, TAKE 1 TABLET BY MOUTH EVERY DAY, Disp: 30 Tab, Rfl: 0 
  diclofenac EC (VOLTAREN) 75 mg EC tablet, TAKE 1 TABLET BY MOUTH TWICE A DAY WITH MEALS, Disp: 60 Tab, Rfl: 0 
  clotrimazole (LOTRIMIN) 1 % topical cream, Apply 1 Applicator to affected area two (2) times a day., Disp: , Rfl:  
  predniSONE (DELTASONE) 5 mg tablet, Take  by mouth daily. , Disp: , Rfl:  
  sulfaSALAzine (AZULFIDINE) 500 mg tablet, Take 500 mg by mouth two (2) times a day., Disp: , Rfl:  
  acetaminophen (TYLENOL) 325 mg tablet, Take  by mouth every four (4) hours as needed for Pain., Disp: , Rfl:  
  fluticasone (FLONASE) 50 mcg/actuation nasal spray, 2 Sprays by Both Nostrils route daily. , Disp: 1 Bottle, Rfl: 11 
  raNITIdine (ZANTAC) 150 mg tablet, Take 1 Tab by mouth two (2) times a day., Disp: 180 Tab, Rfl: 1 Physical Exam:  
  
/71   Pulse 64   Temp 98.2 °F (36.8 °C) (Oral)   Resp 14   Ht 5' 5\" (1.651 m)   Wt 279 lb (126.6 kg)   SpO2 98%   BMI 46.43 kg/m² General:  WD, WN, NAD, conversant Eyes: sclera clear bilaterally, no discharge noted, eyelids normal in appearance HENT: NCAT, nasal turbinates, oropharynx clear, MMM Neck: supple, no lymphadenopathy Lungs: CTAB, normal respiratory effort and rate CV: RRR, no MRGs ABD: soft, non-tender, non-distended, normal bowel sounds Ext: no peripheral edema or digital cyanosis noted Skin: normal temperature, turgor, color, and texture Psych: alert and oriented to person, place and situation, normal affect Neuro: speech normal, moving all extremities, gait normal 
 
 
Assessment/Plan Health Maintenance: - Dougie Oliveira MD 
5/16/2019, 4:36 PM

## 2019-05-22 ENCOUNTER — OFFICE VISIT (OUTPATIENT)
Dept: ORTHOPEDIC SURGERY | Age: 59
End: 2019-05-22

## 2019-05-22 VITALS
HEIGHT: 65 IN | DIASTOLIC BLOOD PRESSURE: 83 MMHG | BODY MASS INDEX: 45.72 KG/M2 | RESPIRATION RATE: 18 BRPM | SYSTOLIC BLOOD PRESSURE: 138 MMHG | OXYGEN SATURATION: 97 % | HEART RATE: 62 BPM | TEMPERATURE: 98.1 F | WEIGHT: 274.4 LBS

## 2019-05-22 DIAGNOSIS — I87.8 VENOUS STASIS: ICD-10-CM

## 2019-05-22 DIAGNOSIS — M76.62 ACHILLES TENDINITIS OF LEFT LOWER EXTREMITY: Primary | ICD-10-CM

## 2019-05-22 RX ORDER — FAMOTIDINE 40 MG/1
40 TABLET, FILM COATED ORAL DAILY
Qty: 30 TAB | Refills: 0 | Status: SHIPPED | OUTPATIENT
Start: 2019-05-22 | End: 2019-07-26 | Stop reason: SDUPTHER

## 2019-05-22 RX ORDER — ETODOLAC 400 MG/1
400 TABLET, FILM COATED ORAL 2 TIMES DAILY WITH MEALS
Qty: 60 TAB | Refills: 0 | Status: SHIPPED | OUTPATIENT
Start: 2019-05-22 | End: 2019-11-20

## 2019-05-22 NOTE — PROGRESS NOTES
AMBULATORY PROGRESS NOTE      Patient: Jc Roberts             MRN: [id number]     SSN: [id number] Body mass index is 45.66 kg/m². YOB: 1960     AGE: 62 y.o. EX: female    PCP: Darshana Escalante MD     IMPRESSION/DIAGNOSIS AND TREATMENT PLAN     DIAGNOSES  1. Achilles tendinitis of left lower extremity    2. Venous stasis        Orders Placed This Encounter    REFERRAL TO PHYSICAL THERAPY    etodolac (LODINE) 400 mg tablet    famotidine (PEPCID) 40 mg tablet      Jc Roberts understands her diagnoses and the proposed plan. Plan:    1) Referral to Physical Therapy. 2) Lodine 400 m PO BID; 60 tablets, 0 refills. 3) Pepcid 40 m PO every day; 30 tablets, 0 refills. 4) Anticipate MRI if condition does not improve. 5) Continue activity modification as directed. RTO - 5 weeks     HPI AND EXAMINATION     Jc Roberts IS A 62 y.o. female who presents to my outpatient office for follow up of Achilles tendinitis of the LLE and a contusion of the left knee and lower leg. At the last visit, I provided an order for a short left CAM walker boot with wedges, prescribed Voltaren 75 mg, Pepcid 40 mg, and Keflex 500 mg, provided an HEP, and instructed the patient to continue activity modification as directed. Since we saw her last, Ms. Brenda Bender reports that she is still experiencing pain in her left Achilles tendon and that she has a difficult time walking due to the pain. She notes that she has developed pain in her medial right ankle within the past few weeks, as well. She adds that she has been experiencing pain along her lateral foot, along the left 5th metatarsal, too. She has been seen by a vascular specialist in the past, and she reports that her vascular studies came back normal. Ms. Brenda Bender reports that the Voltaren only helped a bit with her pain. She states that she experiences more pain when she wears the CAM walker boot. She denies receiving a referral to PT.  She notes that she does have h/o RA. The patient has h/o hepatitis C and RA. She denies h/o liver failure. She starts a new medication for her liver on June 1st, which she will take for two months. She will be unable to take any other medications during her liver treatment. Visit Vitals  /83 (BP 1 Location: Left arm, BP Patient Position: Sitting)   Pulse 62   Temp 98.1 °F (36.7 °C) (Oral)   Resp 18   Ht 5' 5\" (1.651 m)   Wt 274 lb 6.4 oz (124.5 kg)   SpO2 97%   BMI 45.66 kg/m²     Appearance: Alert, well appearing and pleasant patient who is in no distress, oriented to person, place/time, and who follows commands. This patient is accompanied in the examination room by her daughter and . Dementia: no dementia  Psychiatric: Affect and mood are appropriate. Patient arrives to office via: without assistive device  HEENT: Head normocephalic & atraumatic. Both pupils are round, non icteric sclera              Eye: EOM are intact and sclera are clear               Neck: ROM WNL and JVD neck is not present              Hearings Intact, does not require hearing aid device  Respiratory: Breathing is unlabored without accessory chest muscle use  Cardiovascular/Peripheral Vascular: Normal Pulses to each foot     Left ACHILLES GASTROCNEMIUS COMPLEX,PLANTAR FASCIA     Gait: antalgic  Tenderness: mild Achilles tendon sheath Insertional point               NO Noninsertional Achilles tendon tenderness              Calf tenderness: Absent for calf or gastrocnemius muscle regions              Soft, supple, non tender, non taut lower extremity compartments              NONE to Medial Malleolar, 4/5 Met base midfoot, achilles, tib post, or              NONE to syndesmosis.               no to plantar fascia, or central calcaneal region  Deformity/Swelling:  YES  Insertional point of Distal Achilles Tendon:               NO Fusiform noninsertional focal tendinopathy              NO Mervin's Deformity Present  Cutaneous: No rashes, skin patches, wounds, or abrasions to the lower legs                      Warm and Normal color. No regions of expressible drainage. Medial Border of Tibia Region: absent                      Skin color, texture, turgor normal.                       Anterior lower tibia area is reddened in color, relative to the right leg, and warm to the touch                      Pre-tibial edema is present. Joint Motion: Not tested at this time. Neurologic Exam: Neuro: Motor: normal 5/5 strength in all tested muscle groups and Sensory : no sensory deficits noted. No abnormal hand/wrist, foot/ankle, or facial/neck tremors. Contractures: Gastrocnemius or Achilles Contractures absent. Joint / Tendon Stability:               No anterolateral or varus instability of the Ankle or Subtalar Joints              No peroneal tendon instability present with ankle circumduction  Alignment:  Normal Foot Alignment and  Flexible  Vascular: Normal Pulses/ NL Capillary refill, No evidence of DVT seen on physical exam.              No calf swelling, no tenderness to calf. Varicosities Lower Limbs : few large varicose veins present  Lymphatic: pre-tibial edema is present    ANKLE/FOOT right    Tenderness: Mild tenderness to the anterior tibial tendon. Cutaneous: WNL. Joint Motion: WNL. Joint / Tendon Stability: No Ankle or Subtalar instability or joint laxity. No peroneal sublux ability or dislocation  Alignment: Forefoot, Midfoot, Hindfoot WNL. Neuro Motor/Sensory: NL/NL. Vascular: NL foot/ankle pulses. Lymphatics: No extremity lymphedema, No calf swelling, no tenderness to calf muscles.     CHART REVIEW     Past Medical History:   Diagnosis Date    Arthritis     Hepatitis C     Hypertension     Diagnosed in [place] a few months ago     Current Outpatient Medications   Medication Sig    etodolac (LODINE) 400 mg tablet Take 400 mg by mouth two (2) times daily (with meals).  famotidine (PEPCID) 40 mg tablet Take 1 Tab by mouth daily.  raNITIdine (ZANTAC) 150 mg tablet Take 1 Tab by mouth two (2) times a day.  famotidine (PEPCID) 40 mg tablet TAKE 1 TABLET BY MOUTH EVERY DAY    clotrimazole (LOTRIMIN) 1 % topical cream Apply 1 Applicator to affected area two (2) times a day.  predniSONE (DELTASONE) 5 mg tablet Take  by mouth daily.  sulfaSALAzine (AZULFIDINE) 500 mg tablet Take 500 mg by mouth two (2) times a day.  acetaminophen (TYLENOL) 325 mg tablet Take  by mouth every four (4) hours as needed for Pain.  fluticasone (FLONASE) 50 mcg/actuation nasal spray 2 Sprays by Both Nostrils route daily. No current facility-administered medications for this visit. No Known Allergies  Past Surgical History:   Procedure Laterality Date    HX CHOLECYSTECTOMY      HX COLONOSCOPY      Completed in  [address] Not on file   Tobacco Use    Smoking status: Former Smoker     Last attempt to quit: 2017     Years since quittin.3    Smokeless tobacco: Never Used   Substance and Sexual Activity    Alcohol use: No    Drug use: No    Sexual activity: Yes     Partners: Male     Birth control/protection: None     Family History   Problem Relation Age of Onset    Diabetes Mother     Hypertension Father     No Known Problems Sister     No Known Problems Brother     No Known Problems Sister     No Known Problems Sister     No Known Problems Brother     No Known Problems Brother     No Known Problems Brother         REVIEW OF SYSTEMS : 2019  ALL BELOW ARE Negative except : SEE HPI     Constitutional: Negative for fever, chills and weight loss. Neg Weight Loss  Cardiovascular: Negative for chest pain, claudication and leg swelling.  SOB, ELLIS   Gastrointestinal/Urological: Negative for  pain, N/V/D/C, Blood in stool or urine,dysuria                         Hematuria, Incontinence, pelvic pain  Musculoskeletal: see HPI. Neurological: Negative for dizziness and weakness, headaches,Visual Changes             Confusion,  Or Seizures,   Psychiatric/Behavioral: Negative for depression, memory loss and substance abuse. Extremities:  Negative for hair changes, rash or skin lesion changes. Hematologic: Negative for Bleeding problems, bruising, pallor or swollen lymph nodes. Peripheral Vascular: No calf pain, vascular vein tenderness to calf pain              No calf throbbing, posterior knee throbbing pain     DIAGNOSTIC IMAGING     No notes on file    Please see above section of this report. I have personally reviewed the results of the above study. The interpretation of this study is my professional opinion. Written by Rafa Barger, as dictated by Dr. Anneliese Soriano. I, Dr. Anneliese Soriano, confirm that all documentation is accurate.

## 2019-05-22 NOTE — PROGRESS NOTES
1. Have you been to the ER, urgent care clinic since your last visit? Hospitalized since your last visit? No    2. Have you seen or consulted any other health care providers outside of the [address] [place] [address] since your last visit? Include any pap smears or colon screening.  No

## 2019-05-22 NOTE — PATIENT INSTRUCTIONS
Achilles Tendon: Exercises Your Care Instructions Here are some examples of exercises for your achilles tendon. Start each exercise slowly. Ease off the exercise if you start to have pain. Your doctor or physical therapist will tell you when you can start these exercises and which ones will work best for you. Toe stretch Toe stretch 1. Sit in a chair, and extend your affected leg so that your heel is on the floor. 2. With your hand, reach down and pull your big toe up and back. Pull toward your ankle and away from the floor. 3. Hold the position for at least 15 to 30 seconds. 4. Repeat 2 to 4 times a session, several times a day. Calf-plantar fascia stretch 1. Sit with your legs extended and knees straight. 2. Place a towel around your foot just under the toes. 3. Hold each end of the towel in each hand, with your hands above your knees. 4. Pull back with the towel so that your foot stretches toward you. 5. Hold the position for at least 15 to 30 seconds. 6. Repeat 2 to 4 times a session, up to 5 sessions a day. Floor stretch 1. Stand about 2 feet from a wall, and place your hands on the wall at about shoulder height. Or you can stand behind a chair, placing your hands on the back of it for balance. 2. Step back with the leg you want to stretch. Keep the leg straight, and press your heel into the floor with your toe turned slightly in. 
3. Lean forward, and bend your other leg slightly. Feel the stretch in the Achilles tendon of your back leg. Hold for at least 15 to 30 seconds. 4. Repeat 2 to 4 times a session, up to 5 sessions a day. Stair stretch 1. Stand with the balls of both feet on the edge of a step or curb (or a medium-sized phone book). With at least one hand, hold onto something solid for balance, such as a banister or handrail.  
2. Keeping your affected leg straight, slowly let that heel hang down off of the step or curb until you feel a stretch in the back of your calf and/or Achilles area. Some of your weight should still be on the other leg. 3. Hold this position for at least 15 to 30 seconds. 4. Repeat 2 to 4 times a session, up to 5 times a day or whenever your Achilles tendon starts to feel tight. This stretch can also be done with your knee slightly bent. Strength exercise 1. This exercise will get you started on building strength after an Achilles tendon injury. Your doctor or physical therapist can help you move on to more challenging exercises as you heal and get stronger. 2. Stand on a step with your heel off the edge of the step. Hold on to a handrail or wall for balance. 3. Push up on your toes, then slowly count to 10 as you lower yourself back down until your heel is below the step. If it hurts to push up on your toes, try putting most of your weight on your other foot as you push up, or try using your arms to help you. If you can't do this exercise without causing pain, stop the exercise and talk to your doctor. 4. Repeat the exercise 8 to 12 times, half with the knee straight and half with the knee bent. Follow-up care is a key part of your treatment and safety. Be sure to make and go to all appointments, and call your doctor if you are having problems. It's also a good idea to know your test results and keep a list of the medicines you take. Where can you learn more? Go to http://christina-bj.info/. Enter P578 in the search box to learn more about \"Achilles Tendon: Exercises. \" Current as of: September 20, 2018 Content Version: 11.9 © 3347-5190 [employer]. Care instructions adapted under license by [employer] (which disclaims liability or warranty for this information).  If you have questions about a medical condition or this instruction, always ask your healthcare professional. [employer] disclaims any warranty or liability for your use of this information.

## 2019-05-28 ENCOUNTER — ANESTHESIA EVENT (OUTPATIENT)
Dept: ENDOSCOPY | Age: 59
End: 2019-05-28
Payer: MEDICAID

## 2019-05-28 NOTE — PERIOP NOTES
PAT - SURGICAL PRE-ADMISSION INSTRUCTIONS    NAME:  Adalberto Ingram                                                          TODAY'S DATE:  5/28/2019    SURGERY DATE:  5/29/2019                                  SURGERY ARRIVAL TIME:   0715 (TBV)    1. Do NOT eat or drink anything, including candy or gum, after MIDNIGHT on 5/28/19 , unless you have specific instructions from your Surgeon or Anesthesia Provider to do so. 2. No smoking on the day of surgery. 3. No alcohol 24 hours prior to the day of surgery. 4. No recreational drugs for one week prior to the day of surgery. 5. Leave all valuables, including money/purse, at home. 6. Remove all jewelry, nail polish, makeup (including mascara); no lotions, powders, deodorant, or perfume/cologne/after shave. 7. Glasses/Contact lenses and Dentures may be worn to the hospital.  They will be removed prior to surgery. 8. Call your doctor if symptoms of a cold or illness develop within 24 ours prior to surgery. 9. AN ADULT MUST DRIVE YOU HOME AFTER OUTPATIENT SURGERY. 10. If you are having an OUTPATIENT procedure, please make arrangements for a responsible adult to be with you for 24 hours after your surgery. 11. If you are admitted to the hospital, you will be assigned to a bed after surgery is complete. Normally a family member will not be able to see you until you are in your assigned bed. 15. Family is encouraged to accompany you to the hospital.  We ask visitors in the treatment area to be limited to ONE person at a time to ensure patient privacy. EXCEPTIONS WILL BE MADE AS NEEDED. 15. Children under 12 are discouraged from entering the treatment area and need to be supervised by an adult when in the waiting room. Special Instructions:    Complete bowel prep per MD instructions. Patient Prep:    shower with anti-bacterial soap    These surgical instructions were reviewed with PATIENT'S DAUGHTER during the PAT PHONE CALL. Directions:   On the morning of surgery, please go to the [facility]. Enter the building from the [facility] entrance, 1st floor (next to the Emergency Room entrance). Take the elevator to the 2nd floor. Sign in at the Registration Desk.     If you have any questions and/or concerns, please do not hesitate to call:  (Prior to the day of surgery)  [facility] unit:  [phone]  (Day of surgery)  [facility] unit:  [phone]

## 2019-05-29 ENCOUNTER — HOSPITAL ENCOUNTER (OUTPATIENT)
Age: 59
Setting detail: OUTPATIENT SURGERY
Discharge: HOME | End: 2019-05-29
Attending: INTERNAL MEDICINE | Admitting: INTERNAL MEDICINE
Payer: MEDICAID

## 2019-05-29 ENCOUNTER — ANESTHESIA (OUTPATIENT)
Dept: ENDOSCOPY | Age: 59
End: 2019-05-29
Payer: MEDICAID

## 2019-05-29 VITALS
DIASTOLIC BLOOD PRESSURE: 88 MMHG | BODY MASS INDEX: 46.32 KG/M2 | OXYGEN SATURATION: 98 % | TEMPERATURE: 98 F | SYSTOLIC BLOOD PRESSURE: 152 MMHG | RESPIRATION RATE: 16 BRPM | HEIGHT: 65 IN | WEIGHT: 278 LBS | HEART RATE: 81 BPM

## 2019-05-29 PROCEDURE — 76060000032 HC ANESTHESIA 0.5 TO 1 HR: Performed by: INTERNAL MEDICINE

## 2019-05-29 PROCEDURE — 77030031670 HC DEV INFL ENDOTEK BIG60 MRTM -B: Performed by: INTERNAL MEDICINE

## 2019-05-29 PROCEDURE — 77030021593 HC FCPS BIOP ENDOSC BSC -A: Performed by: INTERNAL MEDICINE

## 2019-05-29 PROCEDURE — 88305 TISSUE EXAM BY PATHOLOGIST: CPT

## 2019-05-29 PROCEDURE — 74011250636 HC RX REV CODE- 250/636: Performed by: NURSE ANESTHETIST, CERTIFIED REGISTERED

## 2019-05-29 PROCEDURE — 74011250636 HC RX REV CODE- 250/636

## 2019-05-29 PROCEDURE — 76040000007: Performed by: INTERNAL MEDICINE

## 2019-05-29 RX ORDER — SODIUM CHLORIDE 0.9 % (FLUSH) 0.9 %
5-40 SYRINGE (ML) INJECTION AS NEEDED
Status: DISCONTINUED | OUTPATIENT
Start: 2019-05-29 | End: 2019-05-29 | Stop reason: HOSPADM

## 2019-05-29 RX ORDER — SODIUM CHLORIDE 0.9 % (FLUSH) 0.9 %
5-40 SYRINGE (ML) INJECTION EVERY 8 HOURS
Status: DISCONTINUED | OUTPATIENT
Start: 2019-05-29 | End: 2019-05-29 | Stop reason: HOSPADM

## 2019-05-29 RX ORDER — FAMOTIDINE 20 MG/1
20 TABLET, FILM COATED ORAL ONCE
Status: DISCONTINUED | OUTPATIENT
Start: 2019-05-29 | End: 2019-05-29 | Stop reason: HOSPADM

## 2019-05-29 RX ORDER — OMEPRAZOLE 20 MG/1
20 CAPSULE, DELAYED RELEASE ORAL DAILY
COMMUNITY
End: 2019-11-20

## 2019-05-29 RX ORDER — DEXTROMETHORPHAN/PSEUDOEPHED 2.5-7.5/.8
1.2 DROPS ORAL
Status: DISCONTINUED | OUTPATIENT
Start: 2019-05-29 | End: 2019-05-29 | Stop reason: HOSPADM

## 2019-05-29 RX ORDER — PROPOFOL 10 MG/ML
INJECTION, EMULSION INTRAVENOUS AS NEEDED
Status: DISCONTINUED | OUTPATIENT
Start: 2019-05-29 | End: 2019-05-29 | Stop reason: HOSPADM

## 2019-05-29 RX ORDER — SODIUM CHLORIDE, SODIUM LACTATE, POTASSIUM CHLORIDE, CALCIUM CHLORIDE 600; 310; 30; 20 MG/100ML; MG/100ML; MG/100ML; MG/100ML
25 INJECTION, SOLUTION INTRAVENOUS CONTINUOUS
Status: DISCONTINUED | OUTPATIENT
Start: 2019-05-29 | End: 2019-05-29 | Stop reason: HOSPADM

## 2019-05-29 RX ADMIN — PROPOFOL 30 MG: 10 INJECTION, EMULSION INTRAVENOUS at 09:38

## 2019-05-29 RX ADMIN — PROPOFOL 20 MG: 10 INJECTION, EMULSION INTRAVENOUS at 09:39

## 2019-05-29 RX ADMIN — PROPOFOL 20 MG: 10 INJECTION, EMULSION INTRAVENOUS at 09:30

## 2019-05-29 RX ADMIN — PROPOFOL 40 MG: 10 INJECTION, EMULSION INTRAVENOUS at 09:34

## 2019-05-29 RX ADMIN — PROPOFOL 20 MG: 10 INJECTION, EMULSION INTRAVENOUS at 09:35

## 2019-05-29 RX ADMIN — PROPOFOL 20 MG: 10 INJECTION, EMULSION INTRAVENOUS at 09:29

## 2019-05-29 RX ADMIN — PROPOFOL 30 MG: 10 INJECTION, EMULSION INTRAVENOUS at 09:37

## 2019-05-29 RX ADMIN — PROPOFOL 30 MG: 10 INJECTION, EMULSION INTRAVENOUS at 09:32

## 2019-05-29 RX ADMIN — PROPOFOL 40 MG: 10 INJECTION, EMULSION INTRAVENOUS at 09:31

## 2019-05-29 RX ADMIN — PROPOFOL 30 MG: 10 INJECTION, EMULSION INTRAVENOUS at 09:28

## 2019-05-29 RX ADMIN — PROPOFOL 50 MG: 10 INJECTION, EMULSION INTRAVENOUS at 09:27

## 2019-05-29 RX ADMIN — SODIUM CHLORIDE, SODIUM LACTATE, POTASSIUM CHLORIDE, AND CALCIUM CHLORIDE: 600; 310; 30; 20 INJECTION, SOLUTION INTRAVENOUS at 09:21

## 2019-05-29 RX ADMIN — PROPOFOL 30 MG: 10 INJECTION, EMULSION INTRAVENOUS at 09:33

## 2019-05-29 RX ADMIN — PROPOFOL 40 MG: 10 INJECTION, EMULSION INTRAVENOUS at 09:36

## 2019-05-29 NOTE — PERIOP NOTES
Consent and pre-admission information completed via translation phone in [address] [place] [address] number: [phone]

## 2019-05-29 NOTE — DISCHARGE INSTRUCTIONS
DISCHARGE SUMMARY from Nurse    PATIENT INSTRUCTIONS:    After general anesthesia or intravenous sedation, for 24 hours or while taking prescription Narcotics:  · Limit your activities  · Do not drive and operate hazardous machinery  · Do not make important personal or business decisions  · Do  not drink alcoholic beverages  · If you have not urinated within 8 hours after discharge, please contact your surgeon on call. Report the following to your surgeon:  · Excessive pain, swelling, redness or odor of or around the surgical area  · Temperature over 100.5  · Nausea and vomiting lasting longer than 4 hours or if unable to take medications  · Any signs of decreased circulation or nerve impairment to extremity: change in color, persistent  numbness, tingling, coldness or increase pain  · Any questions    What to do at Home:  Recommended activity: {discharge activity:67307}, ***    If you experience any of the following symptoms ***, please follow up with ***. *  Please give a list of your current medications to your Primary Care Provider. *  Please update this list whenever your medications are discontinued, doses are      changed, or new medications (including over-the-counter products) are added. *  Please carry medication information at all times in case of emergency situations. These are general instructions for a healthy lifestyle:    No smoking/ No tobacco products/ Avoid exposure to second hand smoke  Surgeon General's Warning:  Quitting smoking now greatly reduces serious risk to your health.     Obesity, smoking, and sedentary lifestyle greatly increases your risk for illness    A healthy diet, regular physical exercise & weight monitoring are important for maintaining a healthy lifestyle    You may be retaining fluid if you have a history of heart failure or if you experience any of the following symptoms:  Weight gain of 3 pounds or more overnight or 5 pounds in a week, increased swelling in our hands or feet or shortness of breath while lying flat in bed. Please call your doctor as soon as you notice any of these symptoms; do not wait until your next office visit. Recognize signs and symptoms of STROKE:    F-face looks uneven    A-arms unable to move or move unevenly    S-speech slurred or non-existent    T-time-call 911 as soon as signs and symptoms begin-DO NOT go       Back to bed or wait to see if you get better-TIME IS BRAIN. Warning Signs of HEART ATTACK     Call 911 if you have these symptoms:   Chest discomfort. Most heart attacks involve discomfort in the center of the chest that lasts more than a few minutes, or that goes away and comes back. It can feel like uncomfortable pressure, squeezing, fullness, or pain.  Discomfort in other areas of the upper body. Symptoms can include pain or discomfort in one or both arms, the back, neck, jaw, or stomach.  Shortness of breath with or without chest discomfort.  Other signs may include breaking out in a cold sweat, nausea, or lightheadedness. Don't wait more than five minutes to call 911 - MINUTES MATTER! Fast action can save your life. Calling 911 is almost always the fastest way to get lifesaving treatment. Emergency Medical Services staff can begin treatment when they arrive -- up to an hour sooner than if someone gets to the hospital by car. The discharge information has been reviewed with the {PATIENT PARENT GUARDIAN:63638}. The {PATIENT PARENT GUARDIAN:50911} verbalized understanding. Discharge medications reviewed with the {Dishcarge meds reviewed YZTE:69314} and appropriate educational materials and side effects teaching were provided.   Patient {ARMBANDS:20124}    ___________________________________________________________________________________________________________________________________

## 2019-05-29 NOTE — ANESTHESIA POSTPROCEDURE EVALUATION
Procedure(s):  COLONOSCOPY.     MAC    Anesthesia Post Evaluation      Multimodal analgesia: multimodal analgesia not used between 6 hours prior to anesthesia start to PACU discharge  Patient location during evaluation: bedside  Patient participation: complete - patient participated  Level of consciousness: awake  Pain score: 0  Airway patency: patent  Anesthetic complications: no  Cardiovascular status: acceptable  Respiratory status: acceptable  Hydration status: acceptable  Post anesthesia nausea and vomiting:  none      Vitals Value Taken Time   /88 5/29/2019  9:54 AM   Temp 36.7 °C (98 °F) 5/29/2019  9:45 AM   Pulse 81 5/29/2019  9:54 AM   Resp 16 5/29/2019  9:55 AM   SpO2 98 % 5/29/2019  9:54 AM

## 2019-05-29 NOTE — PROCEDURES
Colonoscopy Report    Patient: Denzel Voss MRN: [id number]  SSN: [id number]    YOB: 1960  Age: 62 y.o. Sex: female      Date of Procedure: 5/29/2019    IMPRESSION:  1. Hyperplastic appearing polyps in the sigmoid colon status post forceps removed, 4-5 mm, flat, x 3.   2. Internal hemorrhoids    RECOMMENDATIONS:  1. Resume regular diet, Recommend high fiber. 2. Will contact with polyp results in 2 weeks. These results will determine timing to next screening. Patient will be instructed to contact our office if they have not received the results by three weeks. Indication:  Screening colonoscopy  Procedure Performed: Colonoscopy biopsy, polypectomy (cold biopsy)  Endoscopist: Barb Lopes MD  Assistant: Endoscopy Technician-1: Melany Boast, CNA  Endoscopy Technician-2: Jesus Alberto Stephens  Endoscopy RN-1: Gabby Duff RN  Endoscopy RN-2: Almeda Holter, RN  ASA: ASA 2 - Patient with mild systemic disease with no functional limitations  Mallampati Score: II (soft palate, uvula, fauces visible)  Anesthesia: MAC anesthesia Propofol  Endoscope:     [x]  CF H 190AL   []  PCF H190AL   []  GIF H 190    Extent of Examination:cecum, which was identified by the ileocecal valve and appendiceal orifice  Quality of Preparation:     []  Excellent   [x]  Very Good   [] Fair but adequate   [] Fair, inadequate   []  Poor      Technique: The procedure was discussed with the patient including risks, benefits, alternatives including risks of IV sedation, bleeding, perforation and missed polyp. A safety timeout was performed. The patient was given incremental doses of intravenous sedation to achieve moderate sedation. The patients vital signs were monitored at all times including heart rate, rhythm, blood pressure and oxygen saturation. The patient was placed in left lateral position. When adequate sedation was achieved a perianal inspection and a digital rectal exam were performed.  Video colonoscope was introduced into the rectum and advanced under direct vision up to the cecum, which was identified by the ileocecal valve and appendiceal orifice. The cecum was identified by IC valve, appendiceal orifice and crows foot. With adequate insufflation and maneuvering of the withdrawing scope, the colonic mucosa was visualized carefully. Retroflexion was performed in the rectum and the distal rectum visualized. The patient tolerated the procedure very well and was transferred to recovery area. Findings:  1.  Hyperplastic appearing polyps in the sigmoid colon status post forceps removed, 4-5 mm, flat, x 3.   2. Internal hemorrhoids    EBL:Minimal  Specimen:   ID Type Source Tests Collected by Time Destination   1 : bx sigmoid polyps x3 Preservative Sigmoid  Will Swain MD 5/29/2019 2749 Pathology       Gene MD Jeramie  May 29, 2019  10:22 AM

## 2019-05-29 NOTE — H&P
Gastroenterology Consult     Referring Physician: Анна Evangelista    Consult Date: 2019     Subjective:     Chief Complaint: Screening  History of Present Illness: Denzel Voss is a 62 y.o. female who is seen in consultation for screening colonoscopy. Past Medical History:   Diagnosis Date    Arthritis     Asthma     Hepatitis C     starting treatment 2019    Hypertension     Diagnosed in [place] a few months ago    Psychiatric disorder     SOME DEPRESSION     Past Surgical History:   Procedure Laterality Date    HX CHOLECYSTECTOMY  2013    HX COLONOSCOPY      Completed in [facility]      Family History   Problem Relation Age of Onset    Diabetes Mother     Hypertension Father     No Known Problems Sister     No Known Problems Brother     No Known Problems Sister     No Known Problems Sister     No Known Problems Brother     No Known Problems Brother     No Known Problems Brother      Social History     Tobacco Use    Smoking status: Former Smoker     Last attempt to quit: 2017     Years since quittin.4    Smokeless tobacco: Never Used   Substance Use Topics    Alcohol use: No      No Known Allergies  Current Facility-Administered Medications   Medication Dose Route Frequency    lactated Ringers infusion  25 mL/hr IntraVENous CONTINUOUS    famotidine (PEPCID) tablet 20 mg  20 mg Oral ONCE        Review of Systems:  A detailed 10 organ review of systems is obtained with pertinent positives as listed in the History of Present Illness and Past Medical History. All others are negative. Objective:     Physical Exam:  Visit Vitals  /88   Pulse 88   Temp 98.6 °F (37 °C)   Resp 16   Ht 5' 5\" (1.651 m)   Wt 126.1 kg (278 lb)   SpO2 98%   Breastfeeding? No   BMI 46.26 kg/m²        HEENT: Sclerae anicteric. Cardiovascular: Regular rate and rhythm. Respiratory:  Comfortable breathing with no accessory muscle use. GI:  Abdomen nondistended, soft, and nontender. Neurological:  Gross memory appears intact. Patient is alert and oriented.       Assessment/Plan:     Colonoscopy as planned

## 2019-05-29 NOTE — ROUTINE PROCESS
Patient taken to recovery by this RN and CRNA, bedside report given to [facility] RN. Patient stable and on monitor.

## 2019-05-29 NOTE — PERIOP NOTES
Pre-Op Summary    Pt arrived via car with family/friend and is oriented to time, place, person and situation. Patient with steady gait with none assistive devices. Visit Vitals  /88   Pulse 88   Temp 98.6 °F (37 °C)   Resp 16   Ht 5' 5\" (1.651 m)   Wt 126.1 kg (278 lb)   SpO2 98%   Breastfeeding? No   BMI 46.26 kg/m²       Peripheral IV located on Right hand . Patients belongings are located with patient    Patient's point of contact is daughter, Bishop Fontenot and their contact number is: [phone] They will be leaving and coming back. They are able to receive medication information. They will be their ride home.

## 2019-05-29 NOTE — ANESTHESIA PREPROCEDURE EVALUATION
Relevant Problems   No relevant active problems       Anesthetic History   No history of anesthetic complications            Review of Systems / Medical History  Patient summary reviewed, nursing notes reviewed and pertinent labs reviewed    Pulmonary            Asthma        Neuro/Psych         Psychiatric history    Comments: Depression  Cardiovascular    Hypertension              Exercise tolerance: >4 METS     GI/Hepatic/Renal           Liver disease     Endo/Other        Morbid obesity and arthritis     Other Findings              Physical Exam    Airway  Mallampati: I  TM Distance: 4 - 6 cm  Neck ROM: normal range of motion   Mouth opening: Normal     Cardiovascular    Rhythm: regular  Rate: normal         Dental    Dentition: Poor dentition  Comments: Multiple missing/chipped teeth upper and lower jaw    Pulmonary  Breath sounds clear to auscultation               Abdominal  GI exam deferred       Other Findings            Anesthetic Plan    ASA: 3  Anesthesia type: MAC          Induction: Intravenous  Anesthetic plan and risks discussed with: Patient

## 2019-05-30 PROBLEM — M76.62 LEFT ACHILLES TENDINITIS: Status: ACTIVE | Noted: 2019-05-30

## 2019-05-30 NOTE — PROGRESS NOTES
[facility]    CC: F/U of Cellulitis    HPI:     Achilles Tendinitis:  -Left side  -Issue for 5 months  -Seeing Dr. Asia Alegre or issue  -Given a boot  -Next appointment is on 2019      Ankle Pain:  -Right ankle on anterior of ankle  -Issue for the past 2-3 weeks  -Denies any fall, trauma, or injury      Cellulitis of Left Lower Extremity:  -Got requested CBC  -Completed prescribed antibiotic  -Reports resolution of issue.        Hepatitis C:  -Will start treatment soon for hepatitis C  -Has appointment tomorrow with GI specialist  -States she will need to stop her medications while receiving her treatment      ROS: Positive items marked in RED  CON: fever, chills  Cardiovascular: palpitations, CP  Resp: SOB, cough  GI: nausea, vomiting, diarrhea  : dysuria, hematuria      Past Medical History:   Diagnosis Date    Arthritis     Hepatitis C     Hypertension     Diagnosed in [place] a few months ago       Past Surgical History:   Procedure Laterality Date    HX CHOLECYSTECTOMY  2013    HX COLONOSCOPY      Completed in [facility]       Family History   Problem Relation Age of Onset    Diabetes Mother     Hypertension Father     No Known Problems Sister     No Known Problems Brother     No Known Problems Sister     No Known Problems Sister     No Known Problems Brother     No Known Problems Brother     No Known Problems Brother        Social History     Socioeconomic History    Marital status: SINGLE     Spouse name: Not on file    Number of children: Not on file    Years of education: Not on file    Highest education level: Not on file   Tobacco Use    Smoking status: Former Smoker     Last attempt to quit: 2017     Years since quittin.3    Smokeless tobacco: Never Used   Substance and Sexual Activity    Alcohol use: No    Drug use: No    Sexual activity: Yes     Partners: Male     Birth control/protection: None       No Known Allergies      Current Outpatient Medications:     famotidine (PEPCID) 40 mg tablet, TAKE 1 TABLET BY MOUTH EVERY DAY, Disp: 30 Tab, Rfl: 0    diclofenac EC (VOLTAREN) 75 mg EC tablet, TAKE 1 TABLET BY MOUTH TWICE A DAY WITH MEALS, Disp: 60 Tab, Rfl: 0    clotrimazole (LOTRIMIN) 1 % topical cream, Apply 1 Applicator to affected area two (2) times a day., Disp: , Rfl:     predniSONE (DELTASONE) 5 mg tablet, Take  by mouth daily. , Disp: , Rfl:     sulfaSALAzine (AZULFIDINE) 500 mg tablet, Take 500 mg by mouth two (2) times a day., Disp: , Rfl:     acetaminophen (TYLENOL) 325 mg tablet, Take  by mouth every four (4) hours as needed for Pain., Disp: , Rfl:     fluticasone (FLONASE) 50 mcg/actuation nasal spray, 2 Sprays by Both Nostrils route daily. , Disp: 1 Bottle, Rfl: 11    raNITIdine (ZANTAC) 150 mg tablet, Take 1 Tab by mouth two (2) times a day., Disp: 180 Tab, Rfl: 1    Physical Exam:      /71   Pulse 64   Temp 98.2 °F (36.8 °C) (Oral)   Resp 14   Ht 5' 5\" (1.651 m)   Wt 279 lb (126.6 kg)   SpO2 98%   BMI 46.43 kg/m²     General: obese habitus, NAD, conversant  Eyes: sclera clear bilaterally, no discharge noted, eyelids normal in appearance  HENT: NCAT  Lungs: CTAB, normal respiratory effort and rate  CV: RRR, no MRGs  ABD: soft, non-tender, non-distended, normal bowel sounds  Ext: Pain reported with palpation of insertional site of left achilles tendon. Anterior of right ankle tender to palpation (No swelling, erythema, or calor noted)  Skin: normal temperature, turgor, color, and texture  Psych: alert and oriented to person, place and situation, normal affect  Neuro: speech normal, moving all extremities, gait normal    Results for Venice Celestin (MRN [id number]):   Ref.  Range 4/4/2019 13:57   WBC Latest Ref Range: 4.6 - 13.2 K/uL 5.7   RBC Latest Ref Range: 4.20 - 5.30 M/uL 4.80   HGB Latest Ref Range: 12.0 - 16.0 g/dL 14.4   HCT Latest Ref Range: 35.0 - 45.0 % 44.9   MCV Latest Ref Range: 74.0 - 97.0 FL 93.5   MCH Latest Ref Range: 24.0 - 34.0 PG 30.0   MCHC Latest Ref Range: 31.0 - 37.0 g/dL 32.1   RDW Latest Ref Range: 11.6 - 14.5 % 13.2   PLATELET Latest Ref Range: 135 - 420 K/uL 204   MPV Latest Ref Range: 9.2 - 11.8 FL 14.2 (H)   NEUTROPHILS Latest Ref Range: 40 - 73 % 43   LYMPHOCYTES Latest Ref Range: 21 - 52 % 40   MONOCYTES Latest Ref Range: 3 - 10 % 11 (H)   EOSINOPHILS Latest Ref Range: 0 - 5 % 5   BASOPHILS Latest Ref Range: 0 - 2 % 1   DF Latest Units:   AUTOMATED   ABS. NEUTROPHILS Latest Ref Range: 1.8 - 8.0 K/UL 2.5   ABS. LYMPHOCYTES Latest Ref Range: 0.9 - 3.6 K/UL 2.3   ABS. MONOCYTES Latest Ref Range: 0.05 - 1.2 K/UL 0.6   ABS. EOSINOPHILS Latest Ref Range: 0.0 - 0.4 K/UL 0.3   ABS. BASOPHILS Latest Ref Range: 0.0 - 0.1 K/UL 0.0       Assessment/Plan       Right Ankle Impingement Syndrome:  -Suspected diagnosis.  Possible 2/2 over use/gait changes due to achilles tendinitis on left foot  -Patient counseled on suspected diagnosis and recommended treatment  -Advised to discuss issue with orthopedist on 5/22/2019  -F/U in one month for chronic disease management      Left Achillis Tendinitis:  -Will defer management to orthopedist  -F/U in one month for chronic disease management      Chronic Hepatitis C:  -Will defer management to GI  -Need to obtain records from office, when she begins treatment  -F/U in one month for chronic disease management      Cellulitis of Left Lower Extremity, Resolved:  -No indication for further intervention  -F/U in one month for chronic disease management        Corry Garcia MD  5/16/2019, 4:36 PM

## 2019-06-28 ENCOUNTER — HOSPITAL ENCOUNTER (OUTPATIENT)
Dept: LAB | Age: 59
Discharge: HOME | End: 2019-06-28

## 2019-06-28 LAB — SENTARA SPECIMEN COL,SENBCF: NORMAL

## 2019-06-28 PROCEDURE — 99001 SPECIMEN HANDLING PT-LAB: CPT

## 2019-07-24 ENCOUNTER — OFFICE VISIT (OUTPATIENT)
Dept: FAMILY MEDICINE CLINIC | Age: 59
End: 2019-07-24

## 2019-07-24 VITALS
HEIGHT: 65 IN | HEART RATE: 72 BPM | SYSTOLIC BLOOD PRESSURE: 125 MMHG | TEMPERATURE: 98.3 F | BODY MASS INDEX: 46.32 KG/M2 | DIASTOLIC BLOOD PRESSURE: 69 MMHG | WEIGHT: 278 LBS | OXYGEN SATURATION: 97 % | RESPIRATION RATE: 14 BRPM

## 2019-07-24 DIAGNOSIS — M05.80 POLYARTHRITIS WITH POSITIVE RHEUMATOID FACTOR (HCC): ICD-10-CM

## 2019-07-24 DIAGNOSIS — B37.2 CUTANEOUS CANDIDIASIS: Primary | ICD-10-CM

## 2019-07-24 RX ORDER — HYDROCORTISONE 25 MG/G
CREAM TOPICAL 2 TIMES DAILY
Qty: 30 G | Refills: 0 | Status: SHIPPED | OUTPATIENT
Start: 2019-07-24 | End: 2019-11-20

## 2019-07-24 RX ORDER — CHLORPHENIRAMINE MALEATE 4 MG
TABLET ORAL 2 TIMES DAILY
Qty: 15 G | Refills: 0 | Status: SHIPPED | OUTPATIENT
Start: 2019-07-24 | End: 2019-11-20

## 2019-07-24 NOTE — PROGRESS NOTES
1. Have you been to the ER, urgent care clinic since your last visit? Hospitalized since your last visit? No    2. Have you seen or consulted any other health care providers outside of the [address] [place] [address] since your last visit? Include any pap smears or colon screening.  No

## 2019-07-24 NOTE — PROGRESS NOTES
[facility]    CC: Rash    HPI:     Rash:  -Recurrent issue for 1 year  -Comes and goes  -Occurs in folds of skin  -Recent flare started 3 weeks ago -Associated with erythema and itchiness  -Recent flare occurred around 3 weeks ago  -Has improved but skin site over rashes darkened      ROS: Positive items marked in RED  CON: fever, chills  Cardiovascular: palpitations, CP  Resp: SOB, cough  GI: nausea, vomiting, diarrhea  : dysuria, hematuria      Past Medical History:   Diagnosis Date    Arthritis     Asthma     Hepatitis C     starting treatment 2019    Hypertension     Diagnosed in [place] a few months ago    Psychiatric disorder     SOME DEPRESSION       Past Surgical History:   Procedure Laterality Date    COLONOSCOPY N/A 2019    COL[facility]COPY    HX CHOLECYSTECTOMY      HX COLONOSCOPY      Completed in [facility]       Family History   Problem Relation Age of Onset    Diabetes Mother     Hypertension Father     No Known Problems Sister     No Known Problems Brother     No Known Problems Sister     No Known Problems Sister     No Known Problems Brother     No Known Problems Brother     No Known Problems Brother        Social History     Socioeconomic History    Marital status: SINGLE     Spouse name: Not on file    Number of children: Not on file    Years of education: Not on file    Highest education level: Not on file   Tobacco Use    Smoking status: Former Smoker     Last attempt to quit: 2017     Years since quittin.5    Smokeless tobacco: Never Used   Substance and Sexual Activity    Alcohol use: No    Drug use: No    Sexual activity: Yes     Partners: Male     Birth control/protection: None       No Known Allergies      Current Outpatient Medications:     omeprazole (PRILOSEC) 20 mg capsule, Take 20 mg by mouth daily. , Disp: , Rfl:     etodolac (LODINE) 400 mg tablet, Take 400 mg by mouth two (2) times daily (with meals). , Disp: 60 Tab, Rfl: 0    famotidine (PEPCID) 40 mg tablet, Take 1 Tab by mouth daily. , Disp: 30 Tab, Rfl: 0    famotidine (PEPCID) 40 mg tablet, TAKE 1 TABLET BY MOUTH EVERY DAY, Disp: 30 Tab, Rfl: 0    clotrimazole (LOTRIMIN) 1 % topical cream, Apply 1 Applicator to affected area two (2) times a day., Disp: , Rfl:     predniSONE (DELTASONE) 5 mg tablet, Take  by mouth daily. , Disp: , Rfl:     sulfaSALAzine (AZULFIDINE) 500 mg tablet, Take 500 mg by mouth two (2) times a day., Disp: , Rfl:     acetaminophen (TYLENOL) 325 mg tablet, Take  by mouth every four (4) hours as needed for Pain., Disp: , Rfl:     fluticasone (FLONASE) 50 mcg/actuation nasal spray, 2 Sprays by Both Nostrils route daily. , Disp: 1 Bottle, Rfl: 11    raNITIdine (ZANTAC) 150 mg tablet, Take 1 Tab by mouth two (2) times a day., Disp: 180 Tab, Rfl: 1    Physical Exam:      /69   Pulse 72   Temp 98.3 °F (36.8 °C) (Oral)   Resp 14   Ht 5' 5\" (1.651 m)   Wt 278 lb (126.1 kg)   SpO2 97%   BMI 46.26 kg/m²     General: obese habitus, NAD, conversant  Eyes: sclera clear bilaterally, no discharge noted, eyelids normal in appearance  HENT: NCAT  Lungs: CTAB, normal respiratory effort and rate  CV: RRR, no MRGs  ABD: soft, non-tender, non-distended, normal bowel sounds  Ext: no peripheral edema or digital cyanosis noted  Skin: hyperpigmented skin noted under her left breast extending to crease on left side of torso which is notably erythematous  Psych: alert and oriented to person, place and situation, normal affect  Neuro: speech normal, moving all extremities, gait normal      Assessment/Plan     Cutaneous Candidiasis:  -Likely diagnosis  -Patient counseled on suspected diagnosis and recommended care  -Started on Lotrimin and hydrocortisone regimen  -Handout given on Candida dermatitis care  -F/U in 2 weeks      Polyarthritis:  -RNP AB, CRP, VIANEY, and rheumatoid factor ordered  -F/U in 2 rachelle Law MD  7/24/2019, 12:32 PM

## 2019-07-24 NOTE — PATIENT INSTRUCTIONS
Candidiasis cutánea: Instrucciones de cuidado - [ Yeast Skin Infection: Care Instructions ]  Instrucciones de cuidado    El hongo en forma de levadura (cándida) normalmente habita en la piel. A veces, los hongos en forma de levadura pueden multiplicarse en exceso en ciertas zonas de la piel y causar joelle infección. La infección forma zonas wheat, escamosas y [address] en la piel que pueden zhou comezón. Las zonas comunes de candidiasis cutánea son pliegues de piel bajo los senos o la región abdominal. Las zonas cálidas y [address] en los pliegues de piel pueden hacer que sea más fácil que la cándida se multiplique de [place]. Las infecciones por hongos en forma de levadura también pueden encontrarse en otras partes del cuerpo richy la taurus o las [place]. Probablemente le den joelle crema o pomada que contenga un medicamento antimicótico. Richy ejemplos de estos medicamentos se incluyen el miconazol y el clotrimazol. Se la aplica en la piel para tratar la infección. Sebastian médico podría darle joelle receta para la crema o la pomada. O william vez pueda comprarla sin receta en la mayoría de las farmacias. Si la infección es grave, el médico le recetará pastillas antimicóticas. Joelle candidiasis suele irse después de aproximadamente joelle semana de [place]. Bobbi es importante que use el medicamento por el tiempo que el médico le diga que lo yosvany. La atención de seguimiento es joelle parte clave de sebastian tratamiento y seguridad. Asegúrese de hacer y acudir a todas las citas, y llame a sebastian médico si está teniendo problemas. También es joelle buena idea saber los resultados de kaylyn exámenes y mantener joelle lista de los medicamentos que francisco. ¿Cómo puede cuidarse en el hogar? · Sea amelia con los medicamentos. [place] kaylyn medicamentos exactamente richy se los recetaron. Llame a sebastian médico si piensa que está teniendo un problema con sebastian medicamento. · Mantenga la piel limpia y seca.  Sebastian médico podría sugerirle que use en los pliegues de la piel talco que contenga un medicamento antimicótico.  · Use ropa holgada. ¿Cuándo debe pedir ayuda? Llame a sebastian médico ahora mismo o busque atención médica inmediata si:    · Tiene síntomas de infección, tales wander:  ? Aumento del dolor, la hinchazón, la temperatura o el enrojecimiento. ? Vetas rojizas que salen de la selena. ? Pus que sale de la selena. ? Tony [place] especial atención a los cambios en sebastian carloz y asegúrese de comunicarse con sebastian médico si:    · No mejora wander se esperaba. ¿Dónde puede encontrar más información en inglés? Alanna Chacon a http://christina-bj.info/. Emily Flores [id number] en la búsqueda para aprender más acerca de \"Candidiasis cutánea: Instrucciones de cuidado - [ Yeast Skin Infection: Care Instructions ]. \"  Revisado: 1 oskar, 2019  Versión del contenido: 12.1  © 8586-5710 [employer]. Las instrucciones de cuidado fueron adaptadas bajo licencia por [employer] (which disclaims liability or warranty for this information). Si usted tiene [place] afección médica o sobre estas instrucciones, siempre pregunte a sebastian profesional de carloz. [employer] niega toda garantía o responsabilidad por sebastian uso de esta información.

## 2019-07-25 LAB — C-REACTIVE PROTEIN, QT, 006627: 0.3 MG/DL (ref 0–0.5)

## 2019-07-26 DIAGNOSIS — M76.62 ACHILLES TENDINITIS OF LEFT LOWER EXTREMITY: ICD-10-CM

## 2019-07-26 LAB
ANA HOMOGENEOUS, 8012: NEGATIVE TITER
ANA NUCLEOLAR, 8013: NEGATIVE TITER
ANA PERIPHERAL, 8014: NEGATIVE TITER
ANA SPECKLED, 8011: NEGATIVE TITER
CENTROMERE ANTIBODIES, 8254: NEGATIVE TITER

## 2019-07-26 RX ORDER — FAMOTIDINE 40 MG/1
TABLET, FILM COATED ORAL
Qty: 30 TAB | Refills: 0 | Status: SHIPPED | OUTPATIENT
Start: 2019-07-26 | End: 2020-02-24 | Stop reason: SDUPTHER

## 2019-11-05 ENCOUNTER — OFFICE VISIT (OUTPATIENT)
Dept: ORTHOPEDIC SURGERY | Age: 59
End: 2019-11-05

## 2019-11-05 VITALS
DIASTOLIC BLOOD PRESSURE: 80 MMHG | HEART RATE: 68 BPM | TEMPERATURE: 97.4 F | HEIGHT: 65 IN | WEIGHT: 288.4 LBS | BODY MASS INDEX: 48.05 KG/M2 | OXYGEN SATURATION: 99 % | SYSTOLIC BLOOD PRESSURE: 164 MMHG | RESPIRATION RATE: 16 BRPM

## 2019-11-05 DIAGNOSIS — M79.672 LEFT FOOT PAIN: ICD-10-CM

## 2019-11-05 DIAGNOSIS — M76.62 ACHILLES TENDINITIS OF LEFT LOWER EXTREMITY: Primary | ICD-10-CM

## 2019-11-05 DIAGNOSIS — M25.572 CHRONIC PAIN OF LEFT ANKLE: ICD-10-CM

## 2019-11-05 DIAGNOSIS — G89.29 CHRONIC PAIN OF LEFT ANKLE: ICD-10-CM

## 2019-11-05 RX ORDER — FAMOTIDINE 40 MG/1
40 TABLET, FILM COATED ORAL DAILY
Qty: 30 TAB | Refills: 0 | Status: SHIPPED | OUTPATIENT
Start: 2019-11-05 | End: 2019-11-20

## 2019-11-05 RX ORDER — METHYLPREDNISOLONE 4 MG/1
4 TABLET ORAL
Qty: 21 TAB | Refills: 0 | Status: SHIPPED | OUTPATIENT
Start: 2019-11-05 | End: 2019-11-20

## 2019-11-05 NOTE — PROGRESS NOTES
AMBULATORY PROGRESS NOTE      Patient: Oval Arrow             MRN: [id number]     SSN: [id number] Body mass index is 47.99 kg/m². YOB: 1960     AGE: 62 y.o. EX: female    PCP: Radha Briscoe MD     IMPRESSION/DIAGNOSIS AND TREATMENT PLAN     DIAGNOSES  1. Achilles tendinitis of left lower extremity    2. Left foot pain    3. Chronic pain of left ankle        Orders Placed This Encounter    AMB SUPPLY ORDER    AMB SUPPLY ORDER    [99831] Ankle 2V    [72103] Foot Min 3V    MRI ANKLE LT WO CONT    methylPREDNISolone (MEDROL DOSEPACK) 4 mg tablet    famotidine (PEPCID) 40 mg tablet      Oval Arrow understands her diagnoses and the proposed plan. So, we will stop all other nonsteroidal anti-inflammatory agents and will use Prednisone. She continues to have pain and discomfort to her hindfoot along the Achilles tendon, the noninsertional portion of the Achilles tendon. She had x-rays today, three views of the left foot, AP, lateral, and oblique. I see no fracture, subluxation, or dislocation. I see no calcific bone spurs seen at the insertion point of the Achilles tendon, but she continues to have discomfort in this region. An MRI will be obtained. This is an item of medical necessity to assess the Achilles tendon to make sure there is no interstitial tearing of the Achilles tendon. We have tried numerous anti-inflammatory medications on her, and she is still having discomfort. We will try and give her a short CAM walker boot and try to use her walker. Plan:    1) MRI without IV Contrast of the left ankle; please assess the Achilles tendon. 2) DME Order: Black short right CAM walker boot. 3) DME Order: Oswaldo Yap (LMS). 4) Medrol DosePak. Reduce dose to 1 twice a day if GI upset occurs. 5) Pepcid 40 m PO every day; 30 tablets, 0 refills. 6) Continue activity modification as directed.        RTO - after MRI     HPI AND EXAMINATION     Oval Arrow IS A 62 y.o. female who presents to my outpatient office for follow up of Achilles tendinitis of the LLE and a contusion of the left knee and lower leg. At the last visit, I provided a referral to PT, prescribed Lodine 400 mg and Pepcid 40 mg, instructed the patient to continue activity modification as directed, and to anticipate an MRI if condition does not improve. Since we saw her last, Ms. Raquel Lewis states that she was last seen here for her Achilles tendinitis in May. She notes that she did not go to PT, as she was performing the exercises at home. She notes that her pain never fully went away and that she has been experiencing constant pain in her Achilles tendon. She recalls that the CAM walker boot was too heavy for her the last time she wore it and that this caused her more discomfort. She reports that when she stands, she feels as though she might fall due to pain. The patient denies any GI issues or bariatric procedures. She is not currently taking blood thinners. She recalls that she was given Voltaren and Lodine in the past. She notes that neither of these medications helped with her pain. The patient's first language is Turkish and her daughter provided translation for her. The patient has h/o hepatitis C and RA. She denies h/o liver failure. She starts a new medication for her liver on June 1st, which she will take for two months. She will be unable to take any other medications during her liver treatment. Visit Vitals  /80   Pulse 68   Temp 97.4 °F (36.3 °C) (Oral)   Resp 16   Ht 5' 5\" (1.651 m)   Wt 288 lb 6.4 oz (130.8 kg)   SpO2 99%   BMI 47.99 kg/m²     Appearance: Alert, well appearing and pleasant patient who is in no distress, oriented to person, place/time, and who follows commands. This patient is accompanied in the examination room by her daughter and granddaughter. Dementia: no dementia  Psychiatric: Affect and mood are appropriate.  Patient arrives to office via: without assistive device  HEENT: Head normocephalic & atraumatic. Both pupils are round, non icteric sclera              Eye: EOM are intact and sclera are clear               Neck: ROM WNL and JVD neck is not present              Hearings Intact, does not require hearing aid device  Respiratory: Breathing is unlabored without accessory chest muscle use  Cardiovascular/Peripheral Vascular: Normal Pulses to each foot     Left ACHILLES GASTROCNEMIUS COMPLEX,PLANTAR FASCIA     Gait: antalgic  Tenderness: mild Achilles tendon sheath Insertional point               NO Noninsertional Achilles tendon tenderness              Calf tenderness: Absent for calf or gastrocnemius muscle regions              Soft, supple, non tender, non taut lower extremity compartments              NONE to Medial Malleolar, 4/5 Met base midfoot, achilles, tib post, or              NONE to syndesmosis. no to plantar fascia, or central calcaneal region  Deformity/Swelling:  YES  Insertional point of Distal Achilles Tendon:               NO Fusiform noninsertional focal tendinopathy              NO Mervin's Deformity Present  Cutaneous: No rashes, skin patches, wounds, or abrasions to the lower legs                      Warm and Normal color. No regions of expressible drainage. Medial Border of Tibia Region: absent                      Skin color, texture, turgor normal.                       Pre-tibial edema is present. Joint Motion: Not tested at this time. Neurologic Exam: Neuro: Motor: normal 5/5 strength in all tested muscle groups and Sensory : no sensory deficits noted. No abnormal hand/wrist, foot/ankle, or facial/neck tremors. Contractures: Gastrocnemius or Achilles Contractures absent.    Joint / Tendon Stability:               No anterolateral or varus instability of the Ankle or Subtalar Joints              No peroneal tendon instability present with ankle circumduction  Alignment:  Normal Foot Alignment and  Flexible  Vascular: Normal Pulses/ NL Capillary refill, No evidence of DVT seen on physical exam.              No calf swelling, no tenderness to calf. Varicosities Lower Limbs : few large varicose veins present  Lymphatic: pre-tibial edema is present    CHART REVIEW     Past Medical History:   Diagnosis Date    Arthritis     Asthma     Hepatitis C     starting treatment 6/1/2019    Hypertension     Diagnosed in [place] a few months ago    Psychiatric disorder     SOME DEPRESSION     Current Outpatient Medications   Medication Sig    methylPREDNISolone (MEDROL DOSEPACK) 4 mg tablet Take 1 Tab by mouth Specific Days and Specific Times.  famotidine (PEPCID) 40 mg tablet Take 1 Tab by mouth daily.  famotidine (PEPCID) 40 mg tablet TOME MAJOR TABLETA TODOS LOS ALICEA    hydrocortisone (HYTONE) 2.5 % topical cream Apply  to affected area two (2) times a day. use thin layer    clotrimazole (LOTRIMIN) 1 % topical cream Apply  to affected area two (2) times a day.  omeprazole (PRILOSEC) 20 mg capsule Take 20 mg by mouth daily.  etodolac (LODINE) 400 mg tablet Take 400 mg by mouth two (2) times daily (with meals).  famotidine (PEPCID) 40 mg tablet TAKE 1 TABLET BY MOUTH EVERY DAY    predniSONE (DELTASONE) 5 mg tablet Take  by mouth daily.  sulfaSALAzine (AZULFIDINE) 500 mg tablet Take 500 mg by mouth two (2) times a day.  acetaminophen (TYLENOL) 325 mg tablet Take  by mouth every four (4) hours as needed for Pain.  fluticasone (FLONASE) 50 mcg/actuation nasal spray 2 Sprays by Both Nostrils route daily.  raNITIdine (ZANTAC) 150 mg tablet Take 1 Tab by mouth two (2) times a day. No current facility-administered medications for this visit.       No Known Allergies  Past Surgical History:   Procedure Laterality Date    COLONOSCOPY N/A 5/29/2019    COLONOSCOPY performed by Teri Mays MD at [address] [facility] [facility]  2013    HX COLONOSCOPY Completed in [address]  Not on file   Tobacco Use    Smoking status: Former Smoker     Last attempt to quit: 2017     Years since quittin.8    Smokeless tobacco: Never Used   Substance and Sexual Activity    Alcohol use: No    Drug use: No    Sexual activity: Yes     Partners: Male     Birth control/protection: None     Family History   Problem Relation Age of Onset    Diabetes Mother     Hypertension Father     No Known Problems Sister     No Known Problems Brother     No Known Problems Sister     No Known Problems Sister     No Known Problems Brother     No Known Problems Brother     No Known Problems Brother         REVIEW OF SYSTEMS : 2019  ALL BELOW ARE Negative except : SEE HPI     Constitutional: Negative for fever, chills and weight loss. Neg Weight Loss  Cardiovascular: Negative for chest pain, claudication and leg swelling. SOB, ELLIS   Gastrointestinal/Urological: Negative for  pain, N/V/D/C, Blood in stool or urine,dysuria                         Hematuria, Incontinence, pelvic pain  Musculoskeletal: see HPI. Neurological: Negative for dizziness and weakness, headaches,Visual Changes             Confusion,  Or Seizures,   Psychiatric/Behavioral: Negative for depression, memory loss and substance abuse. Extremities:  Negative for hair changes, rash or skin lesion changes. Hematologic: Negative for Bleeding problems, bruising, pallor or swollen lymph nodes. Peripheral Vascular: No calf pain, vascular vein tenderness to calf pain              No calf throbbing, posterior knee throbbing pain     DIAGNOSTIC IMAGING     No notes on file    Please see above section of this report. I have personally reviewed the results of the above study. The interpretation of this study is my professional opinion. Written by Eugenio Polanco, as dictated by Dr. Marina Cano.  I, Dr. Marina Cano, confirm that all documentation is accurate.

## 2019-11-05 NOTE — PATIENT INSTRUCTIONS
You have been provided with an order for durable medical equipment that you may  at an outside facility as our office does not carry the equipment you need. You may pick it up at any medical supply company you like. Listed below are a few different locations for your convenience:    [address] [facility]  [address], [address] [place] [address]  Phone: [phone]           Lesión en los tendones (tendinopatía): Instrucciones de cuidado - [ Tendon Injury (Tendinopathy): Care Instructions ]  Instrucciones de cuidado    Los tendones son tejidos flexibles y resistentes que unen los músculos a los [place]. Un tendón puede doler o desgarrarse debido al uso excesivo o por el envejecimiento, sobre todo los tendones de los hombros, los codos, las [place], las caderas, las rodillas o los tobillos. Las [employer] tendones también reciben el nombre de tendinitis o tendinopatía. Las lesiones en los tendones pueden aparecer por cualquier movimiento repetitivo que hace al hacer un trabajo, practicar deportes o en las actividades diarias. El codo de [place] es joelle de las lesiones más comunes de los tendones. La mayoría de los problemas en los tendones se pueden tratar con un analgésico (medicamento para el dolor) de venta [place], reposo, [employer] actividades y fisioterapia. La atención de seguimiento es joelle parte clave de sebastian tratamiento y seguridad. Asegúrese de hacer y acudir a todas las citas, y llame a sebastian médico si está teniendo problemas. También es joelle buena idea saber los resultados de kaylyn exámenes y mantener joelle lista de los medicamentos que francisco. ¿Cómo puede cuidarse en el hogar? · Descanse la selena adolorida. Es posible que nawaf algún tiempo deba dejar la actividad que harry origen al dolor en el tendón. · [place] un analgésico de venta yan, wander acetaminofén (Tylenol), ibuprofeno (Advil, Motrin) o naproxeno (Aleve). Roberta y siga todas las instrucciones de la [place].   · No tome dos o [id number] [facility] analgésicos al mismo tiempo a menos que el médico se lo haya indicado. Muchos analgésicos contienen acetaminofén, es decir, Tylenol. El exceso de acetaminofén (Tylenol) puede ser dañino. · Aplíquese hielo o joelle compresa fría sobre la selena adolorida nawaf 10 a 20 minutos cada vez. Trate de hacer esto cada 1 a 2 horas nawaf los 3 días siguientes (cuando esté despierto) o hasta que baje la hinchazón. Póngase un paño ivey entre el hielo y la piel. · Eleve la selena adolorida sobre joelle almohada mientras se aplica hielo o en cualquier momento en que se siente o se acueste nawaf los 3 días siguientes. Trate de mantenerla por encima del nivel del corazón. [place] ayudará a reducir la hinchazón. · Siga los consejos de sebastian médico para usar y cuidar un cabestrillo, joelle tablilla ([employer]) o un yeso. En algunos casos, es posible que tenga que usarlos nawaf un tiempo para ayudar a sanar el tendón. · Siga los consejos de sebastian médico para hacer estiramientos y fisioterapia. Mueva suavemente la articulación en toda sebastian amplitud de movimiento. [place] prevendrá la rigidez de la articulación. · Retome kaylyn actividades poco a Juan Carlos Chandrakant calentamiento antes de [employer], así wander estiramientos después de terminarla. También puede tratar de [employer]	[place]. Por ejemplo, si un deporte le causa dolor en el tendón, alterne el deporte con [place]. Si el uso de [employer] produce dolor, cambmo de [place] o sebastian forma de agarrarla. Deje de hacer la actividad si le causa dolor. Después de la [place], aplíquese hielo para prevenir el dolor y la hinchazón. · No fume. Fumar puede retrasar el proceso de sanación. Si necesita ayuda para dejar de fumar, hable con sebastian médico [employer] y medicamentos para dejar de fumar. Pueden aumentar kaylyn probabilidades de dejar el hábito para siempre. ¿Cuándo debe pedir ayuda?   Preste especial atención a los cambios en sebastian carloz y asegúrese de comunicarse con sebastian médico si:    · El dolor empeora.     · No mejora wander se esperaba. ¿Dónde puede encontrar más información en inglés? Johann Patel a http://christina-bj.info/. Escriba A157 en la búsqueda para aprender más acerca de \"Lesión en los tendones (tendinopatía): Instrucciones de cuidado - [ Tendon Injury (Tendinopathy): Care Instructions ]. \"  Revisado: 26 junio, 2019  Versión del contenido: 12.2  © 3183-2698 Healthwise, Incorporated. Las instrucciones de cuidado fueron adaptadas bajo licencia por [employer] (which disclaims liability or warranty for this information). Si usted tiene [place] afección médica o sobre estas instrucciones, siempre pregunte a sebastian profesional de carloz. Healthwise, Incorporated niega toda garantía o responsabilidad por sebastian uso de esta información.

## 2019-11-05 NOTE — PROGRESS NOTES
1. Have you been to the ER, urgent care clinic since your last visit? Hospitalized since your last visit? No    2. Have you seen or consulted any other health care providers outside of the [address] [place] [address] since your last visit? Include any pap smears or colon screening.  No

## 2019-11-14 ENCOUNTER — HOSPITAL ENCOUNTER (OUTPATIENT)
Dept: MRI IMAGING | Age: 59
Discharge: HOME | End: 2019-11-14
Attending: ORTHOPAEDIC SURGERY
Payer: MEDICAID

## 2019-11-14 DIAGNOSIS — M76.62 ACHILLES TENDINITIS OF LEFT LOWER EXTREMITY: ICD-10-CM

## 2019-11-14 PROCEDURE — 73721 MRI JNT OF LWR EXTRE W/O DYE: CPT

## 2019-11-18 ENCOUNTER — ANESTHESIA EVENT (OUTPATIENT)
Dept: ENDOSCOPY | Age: 59
End: 2019-11-18
Payer: MEDICAID

## 2019-11-19 ENCOUNTER — HOSPITAL ENCOUNTER (OUTPATIENT)
Age: 59
Setting detail: OUTPATIENT SURGERY
Discharge: HOME | End: 2019-11-19
Attending: INTERNAL MEDICINE | Admitting: INTERNAL MEDICINE
Payer: MEDICAID

## 2019-11-19 ENCOUNTER — ANESTHESIA (OUTPATIENT)
Dept: ENDOSCOPY | Age: 59
End: 2019-11-19
Payer: MEDICAID

## 2019-11-19 VITALS
TEMPERATURE: 98.5 F | RESPIRATION RATE: 16 BRPM | HEIGHT: 63 IN | WEIGHT: 285.44 LBS | SYSTOLIC BLOOD PRESSURE: 142 MMHG | BODY MASS INDEX: 50.57 KG/M2 | OXYGEN SATURATION: 99 % | HEART RATE: 73 BPM | DIASTOLIC BLOOD PRESSURE: 71 MMHG

## 2019-11-19 PROCEDURE — 88305 TISSUE EXAM BY PATHOLOGIST: CPT

## 2019-11-19 PROCEDURE — 76060000031 HC ANESTHESIA FIRST 0.5 HR: Performed by: INTERNAL MEDICINE

## 2019-11-19 PROCEDURE — 74011250636 HC RX REV CODE- 250/636: Performed by: NURSE ANESTHETIST, CERTIFIED REGISTERED

## 2019-11-19 PROCEDURE — 74011000250 HC RX REV CODE- 250: Performed by: NURSE ANESTHETIST, CERTIFIED REGISTERED

## 2019-11-19 PROCEDURE — 77030021593 HC FCPS BIOP ENDOSC BSC -A: Performed by: INTERNAL MEDICINE

## 2019-11-19 PROCEDURE — 77030037186 HC VLV ENDOSC STRL DEFENDO DISP MVAT -A: Performed by: INTERNAL MEDICINE

## 2019-11-19 PROCEDURE — 88342 IMHCHEM/IMCYTCHM 1ST ANTB: CPT

## 2019-11-19 PROCEDURE — 76040000019: Performed by: INTERNAL MEDICINE

## 2019-11-19 RX ORDER — FENTANYL CITRATE 50 UG/ML
INJECTION, SOLUTION INTRAMUSCULAR; INTRAVENOUS AS NEEDED
Status: DISCONTINUED | OUTPATIENT
Start: 2019-11-19 | End: 2019-11-19 | Stop reason: HOSPADM

## 2019-11-19 RX ORDER — SODIUM CHLORIDE 0.9 % (FLUSH) 0.9 %
5-40 SYRINGE (ML) INJECTION EVERY 8 HOURS
Status: CANCELLED | OUTPATIENT
Start: 2019-11-19

## 2019-11-19 RX ORDER — DEXTROMETHORPHAN/PSEUDOEPHED 2.5-7.5/.8
1.2 DROPS ORAL
Status: CANCELLED | OUTPATIENT
Start: 2019-11-19

## 2019-11-19 RX ORDER — SODIUM CHLORIDE 0.9 % (FLUSH) 0.9 %
5-40 SYRINGE (ML) INJECTION AS NEEDED
Status: CANCELLED | OUTPATIENT
Start: 2019-11-19

## 2019-11-19 RX ORDER — PROPOFOL 10 MG/ML
INJECTION, EMULSION INTRAVENOUS AS NEEDED
Status: DISCONTINUED | OUTPATIENT
Start: 2019-11-19 | End: 2019-11-19 | Stop reason: HOSPADM

## 2019-11-19 RX ORDER — SODIUM CHLORIDE, SODIUM LACTATE, POTASSIUM CHLORIDE, CALCIUM CHLORIDE 600; 310; 30; 20 MG/100ML; MG/100ML; MG/100ML; MG/100ML
100 INJECTION, SOLUTION INTRAVENOUS CONTINUOUS
Status: DISCONTINUED | OUTPATIENT
Start: 2019-11-19 | End: 2019-11-19 | Stop reason: HOSPADM

## 2019-11-19 RX ORDER — LIDOCAINE HYDROCHLORIDE 20 MG/ML
INJECTION, SOLUTION EPIDURAL; INFILTRATION; INTRACAUDAL; PERINEURAL AS NEEDED
Status: DISCONTINUED | OUTPATIENT
Start: 2019-11-19 | End: 2019-11-19 | Stop reason: HOSPADM

## 2019-11-19 RX ADMIN — FENTANYL CITRATE 25 MCG: 50 INJECTION, SOLUTION INTRAMUSCULAR; INTRAVENOUS at 09:21

## 2019-11-19 RX ADMIN — PROPOFOL 10 MG: 10 INJECTION, EMULSION INTRAVENOUS at 09:23

## 2019-11-19 RX ADMIN — PROPOFOL 100 MG: 10 INJECTION, EMULSION INTRAVENOUS at 09:22

## 2019-11-19 RX ADMIN — SODIUM CHLORIDE, SODIUM LACTATE, POTASSIUM CHLORIDE, AND CALCIUM CHLORIDE 100 ML/HR: 600; 310; 30; 20 INJECTION, SOLUTION INTRAVENOUS at 07:37

## 2019-11-19 RX ADMIN — LIDOCAINE HYDROCHLORIDE 50 MG: 20 INJECTION, SOLUTION INTRAVENOUS at 09:21

## 2019-11-19 RX ADMIN — PROPOFOL 20 MG: 10 INJECTION, EMULSION INTRAVENOUS at 09:24

## 2019-11-19 RX ADMIN — LIDOCAINE HYDROCHLORIDE 50 MG: 20 INJECTION, SOLUTION INTRAVENOUS at 09:22

## 2019-11-19 RX ADMIN — PROPOFOL 10 MG: 10 INJECTION, EMULSION INTRAVENOUS at 09:25

## 2019-11-19 NOTE — PERIOP NOTES
Pre-Op Summary    Pt arrived via car with family/friend and is oriented to time, place, person and situation. Patient with steady gait with none assistive devices. Visit Vitals  BP (!) 166/100   Pulse 71   Temp 98.5 °F (36.9 °C)   Resp 17   Ht 5' 3\" (1.6 m)   Wt 129.5 kg (285 lb 7 oz)   SpO2 97%   Breastfeeding? No   BMI 50.56 kg/m²       Peripheral IV located on Left hand . Patients belongings are located American  (daughter). Patient's point [facility] and their contact number is: [phone]. They will be in the waiting room. They are able to receive medication information. They will be their ride home.

## 2019-11-19 NOTE — ADDENDUM NOTE
Addendum  created 11/19/19 1200 by Ede Sandoval MD    [address] recorded in [address] [place] [address], [place] [id number] filed, Sign clinical note

## 2019-11-19 NOTE — DISCHARGE INSTRUCTIONS
Patient Education        Endoscopia del tracto gastrointestinal superior: Amarilis Judy en el [facility] - [ Upper GI Endoscopy: What to Expect at Home ]  Sebastian recuperación  Después de la endoscopia, usted permanecerá en el hospital o la clínica nawaf 1 a 2 horas. [place] permitirá que pase el efecto de los medicamentos. Podrá regresar a sebastian hogar después de que sebastian médico o enfermero(a) compruebe que no está teniendo ningún problema. Si tuvo un tratamiento nawaf la prueba, es posible que tenga que pasar la noche en el hospital. Podría tener dolor de garganta nawaf un día o dos después de la prueba. Esta hoja de [employer] idea general sobre qué esperar después de la prueba. ¿Cómo puede cuidarse en el [facility]? Actividad  · Descanse lo que necesite después de regresar a sebastian casa. · Usted debería poder retomar mahogany actividades habituales el día después de la prueba. Dieta  · Siga las indicaciones de sebastian médico para comer después de la prueba. · Clara abundante líquido (a menos que el médico le haya indicado lo contrario). Medicamentos  · Si le duele la garganta al día siguiente de la prueba, use un aerosol (\"spray\") de venta yan para adormecerla. La atención de seguimiento es joelle parte clave de sebastian tratamiento y seguridad. Asegúrese de hacer y acudir a todas las citas, y llame a sebastian médico si está teniendo problemas. También es joelle buena idea saber los resultados de los exámenes y mantener joelle lista de los medicamentos que francisco. ¿Cuándo debe pedir ayuda? Llame al 911 en cualquier momento que considere que necesita atención de emergencia.  Por ejemplo, llame si:    · Se desmayó (perdió el conocimiento).     · Tose devonte.     · Vomita devonte o algo parecido a granos de café molido.     · Las heces son de color rojizo o muy sanguinolentas (con devonte).    Llame a sebastian médico ahora mismo o busque atención médica inmediata si:    · Tiene dificultades para tragar.     · Tiene dolor abdominal.     · [place] heces son negruzcas y parecidas al alquitrán o tienen rastros de [place].     · Siente el estómago revuelto o no puede retener líquidos en el estómago.    Preste especial atención a los cambios en sebastian carloz y asegúrese de comunicarse con sebastian médico si:    · Después de aleisha o dos días, aún le duele la garganta.     · No mejora wander se esperaba. ¿Dónde puede encontrar más información en inglés? Johann Early a http://christina-bj.info/. Adolfo Matthew R331 en la búsqueda para aprender más acerca de \"Endoscopia del tracto gastrointestinal superior: Kalpana Cueto en el [facility] - [ Upper GI Endoscopy: What to Expect at [facility] ]. \"  Revisado: 7 noviembre, 2018  Versión del contenido: 12.2  © 8381-1121 Healthwise, Incorporated. Las instrucciones de cuidado fueron adaptadas bajo licencia por [employer] (which disclaims liability or warranty for this information). Si usted tiene [place] afección médica o sobre estas instrucciones, siempre pregunte a sebastian profesional de carloz. Healthwise, Incorporated niega toda garantía o responsabilidad por sebastian uso de esta información. Patient armband removed and given to patient to take home.   Patient was informed of the privacy risks if armband lost or stolen

## 2019-11-19 NOTE — ANESTHESIA POSTPROCEDURE EVALUATION
Procedure(s):  ESOPHAGOGASTRODUODENOSCOPY (EGD).     MAC    Anesthesia Post Evaluation      Multimodal analgesia: multimodal analgesia not used between 6 hours prior to anesthesia start to PACU discharge  Patient location during evaluation: bedside  Patient participation: complete - patient participated  Level of consciousness: awake  Pain score: 0  Airway patency: patent  Anesthetic complications: no  Cardiovascular status: acceptable and stable  Respiratory status: acceptable and room air  Hydration status: acceptable  Post anesthesia nausea and vomiting:  none      Vitals Value Taken Time   /71 11/19/2019  9:35 AM   Temp     Pulse 73 11/19/2019  9:35 AM   Resp 16 11/19/2019  9:35 AM   SpO2 99 % 11/19/2019  9:35 AM

## 2019-11-19 NOTE — H&P
Gastroenterology Consult     Referring Physician: Maria Eugenia Bailey  Consult Date: 2019     Subjective:     Chief Complaint: GERD    History of Present Illness: Gena Goldman is a 61 y.o. female who is seen in consultation for GERD. Past Medical History:   Diagnosis Date    Arthritis     Asthma     Hepatitis C     starting treatment 2019    Hypertension     Diagnosed in [place] a few months ago    Psychiatric disorder     SOME DEPRESSION     Past Surgical History:   Procedure Laterality Date    COLONOSCOPY N/A 2019    [facility]COPY    HX CHOLECYSTECTOMY      HX COLONOSCOPY      [email or web address]      Family History   Problem Relation Age of Onset    Diabetes Mother     Hypertension Father     No Known Problems Sister     No Known Problems Brother     No Known Problems Sister     No Known Problems Sister     No Known Problems Brother     No Known Problems Brother     No Known Problems Brother      Social History     Tobacco Use    Smoking status: Former Smoker     Last attempt to quit: 2017     Years since quittin.8    Smokeless tobacco: Never Used   Substance Use Topics    Alcohol use: No      No Known Allergies  Current Facility-Administered Medications   Medication Dose Route Frequency    lactated Ringers infusion  100 mL/hr IntraVENous CONTINUOUS        Review of Systems:  A detailed 10 organ review of systems is obtained with pertinent positives as listed in the History of Present Illness and Past Medical History. All others are negative. Objective:     Physical Exam:  Visit Vitals  BP (!) 166/100   Pulse 71   Temp 98.5 °F (36.9 °C)   Resp 17   Ht 5' 3\" (1.6 m)   Wt 129.5 kg (285 lb 7 oz)   SpO2 97%   Breastfeeding? No   BMI 50.56 kg/m²      HEENT: Sclerae anicteric. Cardiovascular: Regular rate and rhythm. Respiratory:  Comfortable breathing with no accessory muscle use.    GI:  Abdomen nondistended, soft, and nontender. Neurological:  Gross memory appears intact. Patient is alert and oriented.     Assessment/Plan:     EGD as planned

## 2019-11-19 NOTE — ANESTHESIA PREPROCEDURE EVALUATION
Relevant Problems   No relevant active problems       Anesthetic History   No history of anesthetic complications            Review of Systems / Medical History  Patient summary reviewed, nursing notes reviewed and pertinent labs reviewed    Pulmonary            Asthma : well controlled       Neuro/Psych         Psychiatric history     Cardiovascular    Hypertension: well controlled              Exercise tolerance: >4 METS     GI/Hepatic/Renal           Liver disease     Endo/Other        Morbid obesity and arthritis     Other Findings   Comments: HEPATITIS C           Physical Exam    Airway  Mallampati: III  TM Distance: < 4 cm  Neck ROM: normal range of motion, short neck   Mouth opening: Normal     Cardiovascular  Regular rate and rhythm,  S1 and S2 normal,  no murmur, click, rub, or gallop  Rhythm: regular  Rate: normal         Dental    Dentition: Poor dentition     Pulmonary  Breath sounds clear to auscultation               Abdominal  GI exam deferred       Other Findings            Anesthetic Plan    ASA: 3  Anesthesia type: MAC          Induction: Intravenous  Anesthetic plan and risks discussed with: Patient

## 2019-11-19 NOTE — PERIOP NOTES
Phase 2 Recovery Summary    Report received from SUZAN ernst. MD discussed procedure with patient. DC instructions provided. Vitals:    11/19/19 0725 11/19/19 0933 11/19/19 0935   BP: (!) 166/100 142/71 142/71   Pulse: 71 76 73   Resp: 17 18 16   Temp: 98.5 °F (36.9 °C)     SpO2: 97% 98% 99%   Weight: 129.5 kg (285 lb 7 oz)     Height: 5' 3\" (1.6 m)         oriented to time, place, person and situation          Lines and Drains  Peripheral Intravenous Line:   and Epidural:      Wound        Patient assisted to chair with minimal assist. Pateint tolerating liquids well. Patient's family at bedside. Discharge discussed with patient and family. No questions or concerns at this time. Patient had time to ask any questions. Patient discharged to home, with daughter.       Anabelle Banda RN

## 2019-11-19 NOTE — PROCEDURES
Endoscopy Procedure Note    Patient: Prudence Query MRN: [id number]  SSN: [id number]    YOB: 1960  Age: 61 y.o. Sex: female      Date/Time:  11/19/2019 9:31 AM    Esophagogastroduodenoscopy (EGD) Procedure Note    Procedure: Esophagogastroduodenoscopy with biopsy    IMPRESSION:   1. Normal proximal and mid esophagus   2. Small hiatal hernia, sliding type  3. LA classification grade A esophagitis   4. Small shallow peptic ulcer of antrum, non bleeding, with gastritis status post biopsies for evaluation of HPylori infection. 5. Normal appearing duodenum    RECOMMENDATIONS:  1. Resume regular diet. 2. Protonix once daily  3. Will follow up biopsies  4. Avoid NSAIDs    Indication: Abdominal pain, epigastric, GERD  :  Thomes Halsted, MD  Assistants: Endoscopy Technician-1: Cristobal Prince  Endoscopy RN-1: Lucian Mccracken RN    Referring Provider:   Preston Zavaleta MD  History: The history and physical exam were reviewed and updated. Endoscope: Olympus GIF-190 diagnostic endoscope  Extent of Exam: second portion of the duodenum  ASA: ASA 3 - Patient with moderate systemic disease with functional limitations  Anethesia/Sedation:  MAC anesthesia Propofol    Description of the procedure: The procedure was discussed with the patient including risks, benefits, alternatives including risks of iv sedation, bleeding, perforation and aspiration. A safety timeout was performed. The patient was placed in the left lateral decubitus position. A bite block was placed. The patient was given incremental doses of intravenous sedation until moderate sedation was achieved. The patients vital signs were monitored at all times including heart rate/rhythm, blood pressure and oxygen saturation. The endoscope was then passed under direct visualization to the second portion of the duodenum. The endoscope was then slowly withdrawn while visualizing the mucosa.   In the stomach a retroflexion was performed and gastric fundus and cardia visualized. The endoscope was then slowly withdrawn. The patient was then transferred to recovery in stable condition. Findings:   1.. Normal proximal and mid esophagus   2. Small hiatal hernia, sliding type  3. LA classification grade A esophagitis   4. Small shallow peptic ulcer of antrum, non bleeding, with gastritis status post biopsies for evaluation of HPylori infection. 5. Normal appearing duodenum      Specimens:   ID Type Source Tests Collected by Time Destination   1 : bx r/o H. Pylori  Preservative Gastric  Damien MD Danny 11/19/2019 8239 Pathology               Complications:   None; patient tolerated the procedure well.     EBL:Minimal    Discharge disposition:  Home in the company of  when able to ambulate    Yung Franco MD  November 19, 2019  9:31 AM

## 2019-11-20 ENCOUNTER — OFFICE VISIT (OUTPATIENT)
Dept: FAMILY MEDICINE CLINIC | Age: 59
End: 2019-11-20

## 2019-11-20 VITALS
TEMPERATURE: 98.1 F | HEIGHT: 63 IN | HEART RATE: 71 BPM | WEIGHT: 285 LBS | BODY MASS INDEX: 50.5 KG/M2 | SYSTOLIC BLOOD PRESSURE: 136 MMHG | OXYGEN SATURATION: 96 % | DIASTOLIC BLOOD PRESSURE: 75 MMHG | RESPIRATION RATE: 16 BRPM

## 2019-11-20 DIAGNOSIS — K27.9 PEPTIC ULCER: ICD-10-CM

## 2019-11-20 DIAGNOSIS — K20.90 ESOPHAGITIS: ICD-10-CM

## 2019-11-20 DIAGNOSIS — Z23 ENCOUNTER FOR IMMUNIZATION: ICD-10-CM

## 2019-11-20 DIAGNOSIS — K29.70 GASTRITIS, PRESENCE OF BLEEDING UNSPECIFIED, UNSPECIFIED CHRONICITY, UNSPECIFIED GASTRITIS TYPE: ICD-10-CM

## 2019-11-20 DIAGNOSIS — M05.79 RHEUMATOID ARTHRITIS INVOLVING MULTIPLE SITES WITH POSITIVE RHEUMATOID FACTOR (HCC): Primary | ICD-10-CM

## 2019-11-20 DIAGNOSIS — B18.2 CHRONIC HEPATITIS C WITHOUT HEPATIC COMA (HCC): ICD-10-CM

## 2019-11-20 DIAGNOSIS — K44.9 HIATAL HERNIA: ICD-10-CM

## 2019-11-20 NOTE — PATIENT INSTRUCTIONS
Hernia de hiato: Instrucciones de cuidado - [ Hiatal Hernia: Care Instructions ] Instrucciones de cuidado Joelle hernia de hiato ocurre cuando parte del [employer] la cavidad del [place]. Joelle hernia de hiato podría permitir que los ácidos y jugos del estómago retrocedan hacia el esófago (reflujo del ácido). [place] puede causar joelle sensación de ardor, aumento de la temperatura, calor o dolor detrás del esternón. Esta sensación podría ocurrir con frecuencia después de comer, poco después de acostarse o al inclinarse hacia adelante, y podría aparecer y desaparecer. También le podría producir un sabor agrio en la boca. Estos síntomas se conocen comúnmente wander acidez estomacal o reflujo. Bobbi no todas las hernias de hiato causan estos síntomas. La atención de seguimiento es joelle parte clave de sanchez tratamiento y seguridad. Asegúrese de hacer y acudir a todas las citas, y llame a sanchez médico si está teniendo problemas. También es joelle buena idea saber los resultados de kaylyn exámenes y mantener joelle lista de los medicamentos que francisco. Cómo puede cuidarse en el hogar? · [employer] medicamentos exactamente wander le fueron recetados. Llame a sanchez médico si kristen estar teniendo problemas con sanchez medicamento. · No tome aspirina u otros medicamentos antiinflamatorios no esteroideos (SHIMON), tales wander ibuprofeno (Advil, Motrin) o naproxeno (Aleve), a menos que sanchez médico se lo indique. Pregúntele a sanchez médico qué puede montez para el dolor. · Sanchez médico le podría recomendar medicamentos de [employer]. Para la indigestión leve u ocasional, los antiácidos wander Tums, Gaviscon, Maalox o Mylanta le podrían ayudar. Sanchez médico también le podría recomendar reductores de ácido de venta yan, wander famotidina (Pepcid AC), cimetidina (Tagamet HB), ranitidina (Zantac 75 y Zantac 150) u omeprazol (Prilosec). Roberta y siga todas las instrucciones de la [place].  Si [place] estos medicamentos con frecuencia, hable con sanchez médico. 
 · Cambie kaylyn hábitos alimenticios. ? Es mejor comer varias comidas pequeñas en lugar de dos o feroz comidas abundantes. ? Después de comer, espere de 2 a 3 horas antes de acostarse. No es joelle buena idea comer refrigerios tarde en la noche. 
? El chocolate, la menta y el alcohol pueden empeorar la acidez estomacal. Estos relajan la válvula que se encuentra entre el [facility] y [place]. ? Los alimentos condimentados o con mucho ácido (wander tomates y naranjas) y el café pueden agravar los síntomas de acidez en algunas personas. Si kaylyn síntomas empeoran después de comer un determinado alimento, se recomienda que deje de comer jolene alimento para corie si kaylyn síntomas mejoran. · No fume ni masque tabaco. 
· Si tiene acidez estomacal por la noche, eleve la cabecera de sebastian cama entre 6 y 8 pulgadas (entre 15 y 20 cm) colocando el beth sobre ladrillos o poniendo joelle cuña de espuma debajo de la cabecera de sebastian colchón. (Agregar almohadas adicionales no da resultado). · No use ropa ajustada alredChongqing Yade Technologyor [employer] del cuerpo. · Baje de peso, si necesita hacerlo. Perder sólo de 5 a 10 libras (2.3 a 4.5 kg) puede ayudarle. Cuándo debe pedir ayuda? Llame al 911 en cualquier momento que considere que necesita atención de [place]. Por ejemplo, llame si: 
  · Se desmayó (perdió el conocimiento).   · Vomita devonte o algo parecido a granos de café molido.  
  · Las heces son de color rojizo o muy sanguinolentas (con devonte).   · Siente dolor o presión en el pecho. Estos síntomas podrían estar acompañados de: 
? Sudoración. ? Falta de aire. ? Náuseas o vómito. ? Dolor que se extiende del pecho al christopher, la [place], o hacia aleisha o ambos hombros o [id number] ? Sensación de [facility]. ? Pulso rápido o irregular. Después de llamar al  911, mastique 1 aspirina para adultos. Espere joelle ambulancia. No trate de conducir usted mismo un automóvil.  Llame a sebastian médico ahora mismo o busque atención médica inmediata si: 
  · Tiene dolor abdominal intenso.  
  · Mahogany heces son negruzcas y parecidas al alquitrán o tienen rastros de [place].  
  · Tiene dificultades para tragar.  
  · Pierde peso y no sabe por qué.  
 Preste especial atención a los cambios en sebastian carloz y asegúrese de comunicarse con sebastian médico si: 
  · No mejora wander se esperaba. Dónde puede encontrar más información en inglés? Maryann Priest a http://christina-bj.info/. Lester Medina [id number] en la búsqueda para aprender más acerca de \"Hernia de hiato: Instrucciones de cuidado - [ Hiatal Hernia: Care Instructions ]. \" 
Revisado: 7 noviembre, 2018 Versión del contenido: 12.2 © 5518-1784 Healthwise, Incorporated. Las instrucciones de cuidado fueron adaptadas bajo licencia por Good [employer] Connections (which disclaims liability or warranty for this information). Si usted tiene [place] afección médica o sobre estas instrucciones, siempre pregunte a sebastian profesional de carloz. Healthwise, Incorporated niega toda garantía o responsabilidad por sebastian uso de esta información. Gastritis: Instrucciones de cuidado - [ Gastritis: Care Instructions ] Instrucciones de cuidado La gastritis es dolor y malestar estomacal. Sucede cuando algo irrita el revestimiento del BJURHOLM. Hay muchas cosas que pueden causarla. Entre estas se incluyen joelle infección wander la gripe o algo que ha comido o bebido. Los medicamentos o joelle llaga en el recubrimiento del estómago ([place]) también pueden causarla. Puede tener abotagamiento y dolor abdominal. Podría eructar, vomitar y tener revoltura estomacal. 
Usted debería poder aliviar el problema tomando medicamentos. Y william vez sería útil cambiar la alimentación. Si la gastritis continúa, sebastian médico podría recetarle medicamentos. La atención de seguimiento es joelle parte clave de sebastian tratamiento y seguridad.  Asegúrese de hacer y acudir a todas las citas, y llame a sebastian médico si está teniendo problemas. También es joelle buena idea saber los resultados de mahogany exámenes y mantener joelle lista de los medicamentos que francisco. Cómo puede cuidarse en el hogar? · Si sebastian médico le recetó antibióticos, tómelos según las indicaciones. No deje de tomarlos por el hecho de sentirse mejor. Debe montez todos los antibióticos hasta terminarlos. · Sea amelia con los medicamentos. Si sebastian médico le recetó un medicamento para reducir el ácido estomacal, tómelo según las indicaciones. Llame a sebastian médico si kristen estar teniendo problemas con sebastian medicamento. · No tome ningún otro medicamento, incluyendo analgésicos (medicamentos para el dolor) de venta yan, sin consultar con sebastian médico dianne. · Si sebastian médico le recomienda medicamentos de venta yan para reducir el ácido estomacal, tales wander Pepcid AC, Prilosec, Tagamet HB o Zantac 75, siga las instrucciones de la etiqueta. · Clara abundantes líquidos (los suficientes wander para que sebastian orina sea de color amarillo isidoro o transparente wander el agua) para prevenir la deshidratación. Elija montez agua y otros líquidos lizzy sin cafeína. Si tiene [employer], el corazón o el hígado y tiene que [place] líquidos, hable con sebastian médico antes de aumentar sebastian consumo. · Limite la cantidad de alcohol que dante. · Evite el café, el té, las bebidas de cola, el chocolate y otros alimentos que contengan cafeína. Aumentan el ácido estomacal. 

Cuándo debe pedir ayuda? Llame al 911 en cualquier momento que considere que necesita atención de [place]. Por ejemplo, llame si: 
  · Vomita devonte o algo parecido a granos de café molido.  
  · Mahogany heces son de color rojizo o muy sanguinolentas (con devonte).  
 Llame a sebastian médico ahora mismo o busque atención médica inmediata si: 
  · Empieza a respirar en forma acelerada y no ha producido orina en las últimas 8 horas.  
  · No puede retener líquidos en el estómago.  Preste especial atención a los cambios en sebastian carloz y asegúrese de comunicarse con sebastian médico si: 
  · No mejora wander se esperaba. Dónde puede encontrar más información en inglés? Dougie Riggs a http://tracie.info/. [place] [id number] en la búsqueda para aprender más acerca de \"Gastritis: Instrucciones de cuidado - [ Gastritis: Care Instructions ]. \" 
Revisado: 7 noviembre, 2018 Versión del contenido: 12.2 © 9612-7123 Healthwise, Incorporated. Las instrucciones de cuidado fueron adaptadas bajo licencia por Good Help Connections (which disclaims liability or warranty for this information). Si usted tiene [place] afección médica o sobre estas instrucciones, siempre pregunte a sebastian profesional de carloz. Healthwise, Incorporated niega toda garantía o responsabilidad por sebastian uso de esta información. H. pylori: Sobre esta prueba - [ H. Pylori: About This Test ] Qué es esta prueba? Las pruebas de H. pylori se utilizan para detectar joelle infección por bacterias Helicobacter pylori en el estómago y la parte superior del intestino ivey. Las bacterias H. pylori pueden causar úlceras gastroduodenales. Bobbi la mayoría de las personas que tienen teresa tipo de bacterias en sebastian aparato digestivo no tienen Brinda. Pueden usarse diferentes pruebas para detectar joelle infección por H. pylori. · Prueba de anticuerpos en la devonte. Esta prueba sirve para corie si el cuerpo ha elaborado anticuerpos para combatir las bacterias H. pylori. · Prueba del aliento con urea marcada. Esta prueba examina el aliento para corie si usted tiene bacterias H. pylori en el estómago. · Prueba de antígenos en heces. Esta prueba detecta sustancias en la materia fecal (heces) que activan el sistema inmunitario para combatir joelle infección por H. pylori. (Estas sustancias se llaman antígenos de H. pylori). · Biopsia de estómago.  Se francisco joelle pequeña muestra (biopsia) del revestimiento del estómago y del intestino ivey. Se analizan las muestras para detectar H. pylori. Por qué se hace esta prueba? Las pruebas de H. pylori se hacen para: · Averiguar si joelle infección por bacterias H. pylori pudiera estar causando Jodell Brilliant o irritación del revestimiento estomacal (gastritis). · Saber si el tratamiento para la infección ha funcionado. Cómo puede prepararse para la prueba? Prueba de anticuerpos en la [place] · No necesita hacer nada antes de someterse a esta prueba. Prueba del aliento con urea marcada, prueba de antígenos en heces o biopsia de estómago · Algunos medicamentos que usted francisco pueden [employer]IG[employer] de [place]. Informe a sebastian médico acerca de todos los medicamentos recetados o de venta yan que esté tomando. Sebastian médico podría recomendarle que deje de montez algunos de kaylyn medicamentos. Prueba del aliento con urea marcada o biopsia de AppinionsURHOLM · Se le pedirá que no coma ni radha nada nawaf un determinado período de tiempo antes de la prueba del aliento o la biopsia de estómago. Siga las instrucciones de sebastian médico acerca de cuánto tiempo tiene que pasar sin comer ni beber antes de la prueba. Si se va a someter a joelle biopsia de AppinionsURHOLM, sebastian médico le dará instrucciones acerca de cómo prepararse. Arlyne [place] nawaf la prueba? Prueba de anticuerpos en la [place] · Un profesional de la carloz le francisco joelle [place] de [place]. Prueba del aliento con urea [employer] Se francisco joelle muestra de aliento cuando usted sopla en un globo o sopla burbujas en joelle botella de líquido. El profesional de la carloz: · Obtendrá joelle muestra de sebastian aliento antes de que comience la prueba. · Nathaly Quiñonez a tragar Vazquez Maiers o un poco de agua que contiene material marcado o radiactivo. · Obtendrá más muestras de sebastian aliento. Las muestras se analizarán para corie si contienen material elaborado cuando las bacterias H. pylori entran en contacto con el material marcado o radiactivo. Prueba de antígenos en heces Para esta prueba, se le puede pedir que recolecte la muestra de heces en sebastian hogar. Para recolectar la Jimenezpedro tiene que: · Defecar en un recipiente seco. Pueden recogerse heces sólidas o líquidas. Tenga cuidado de no dejar que entre orina ni papel higiénico en la muestra de heces. · Volver a colocar la tapa del recipiente. Etiquete el recipiente con sebastian nombre, el [place] de sebastian médico y la fecha de recolección de la muestra. · [employer] las bruno después de recolectar la Jimenez. · Llevar la [place] al consultorio del médico o al laboratorio lo más pronto que pueda. Biopsia de General [place] · Se utiliza un procedimiento llamado endoscopia para obtener muestras de tejido del estómago y de la parte superior del intestino ivey. Se analizan las muestras de tejido en un laboratorio para corie si contienen H. pylori. La atención de seguimiento es joelle parte clave de sebastian tratamiento y seguridad. Asegúrese de hacer y acudir a todas las citas, y llame a sebastian médico si está teniendo problemas. También es joelle buena idea mantener joelle lista de los medicamentos que francisco. Pregúntele a sebastian médico cuándo puede esperar [place] de la prueba. Dónde puede encontrar más información en inglés? Mei Benoit a http://christina-bj.[email or web address] [place] K157 en la búsqueda para aprender Sami Moreno de \"H. pylori: Sobre esta prueba - [ H. Pylori: About This Test ]. \" 
Revisado: 28 marzo, 2019 Versión del contenido: 12.2 © 9625-1606 [employer] Incorporated. Las instrucciones de cuidado fueron adaptadas bajo licencia por Good Help Connections (which disclaims liability or warranty for this information). Si usted tiene [place] afección médica o sobre estas instrucciones, siempre pregunte a sebastian profesional de carloz. [facility], Incorporated niega toda garantía o responsabilidad por sebastian uso de esta información. Esofagitis: Instrucciones de cuidado - [ Esophagitis: Care Instructions ] Instrucciones de cuidado La esofagitis es [place] irritación del esófago, el tubo que transporta los alimentos desde la garganta hasta el BJURHOLM. El reflujo ácido es la causa más común de esta afección. Cuando se tiene reflujo, el ácido y los jugos estomacales se desplazan [place] arriba. [place] puede causar dolor o joelle sensación de ardor en el pecho. Usted podría tener dolor de garganta. Casimiro vez tenga dificultades para tragar. Otras causas de esta afección incluyen algunos medicamentos y suplementos. Las alergias o joelle infección también pueden causarla. Sebastian médico le preguntará sobre kaylyn síntomas y kaylyn antecedentes de [place]. Él o silvia podría hacer pruebas para determinar la causa de kaylyn síntomas. El tratamiento depende de lo que esté causando el problema. El tratamiento podría incluir hacer cambios en sebastian alimentación o montez medicamentos para [employer]. También podría incluir cambiar un medicamento que le esté causando los síntomas. Si tiene reflujo, la [employer] reduce el ácido estomacal ayuda a sanar a sebastian cuerpo. Podría tardar de 1 a 3 semanas en sanar. La atención de seguimiento es joelle parte clave de sebastian tratamiento y seguridad. Asegúrese de hacer y acudir a todas las citas, y llame a sebastian médico si está teniendo problemas. También es joelle buena idea saber los resultados de kaylyn exámenes y mantener joelle lista de los medicamentos que francisco. Cómo puede cuidarse en el hogar? · Si usted tiene reflujo ácido, sebastian médico puede recomendarle que: 
? Coma varias comidas pequeñas en lugar de dos o feroz comidas grandes. Después de comer, espere entre 2 y 3 horas antes de [place]. ? Evite el chocolate, la menta, el alcohol y las comidas picantes. ? No fume ni use tabaco sin humo. Fumar puede empeorar esta afección.  Si necesita ayuda para dejar de fumar, hable con sebastian médico sobre programas y medicamentos para dejar de fumar. Estos pueden aumentar mahogany probabilidades de dejar de fumar para siempre. ? Eleve la cabecera de la cama entre 6 y 8 pulgadas si tiene síntomas por la noche. 
? Baje de peso si tiene sobrepeso. ? [place] un antiácido de venta yan, wander Maalox, Mylanta o Tums. Tenga cuidado cuando tome medicamentos antiácidos de [employer]. Muchos de estos medicamentos contienen aspirina. Roberta la etiqueta para asegurarse de no estar tomando más de la dosis recomendada. Demasiada aspirina puede ser perjudicial. 
? [place] inhibidores de la secreción ácida más potentes. Sofía Gun son famotidina (wander Pepcid), omeprazol (wander Prilosec) y ranitidina (wander Zantac). · Si sebastian afección está causada por joelle infección, joelle alergia u otros problemas, use el medicamento o los tratamientos que le recomiende el médico. 
· Sea amelia con los medicamentos. [place] mahogany medicamentos exactamente wander se los recetaron. Llame a sebastian médico si piensa que está teniendo un problema con sebastian medicamento. Cuándo debe pedir ayuda? Llame al 911 en cualquier momento que considere que necesita atención de [place]. Por ejemplo, llame si: 
  · Tiene dolor en el pecho que es distinto o peor de lo habitual.  
  · Tiene dolor abdominal intenso.  
  · Tiene vómito intenso.  
 Llame a sebastian médico ahora mismo o busque atención médica inmediata si: 
  · Tiene dolor abdominal nuevo o que empeora.  
  · Mahogany heces son negruzcas y parecidas al alquitrán o tienen rastros de devonte.  
  · Tiene nueva o mayor dificultad para tragar.  
  · Vomita devonte.  
 Preste especial atención a los cambios en sebastian carloz y asegúrese de comunicarse con sebastian médico si: 
  · La comida parece quedarle atorada en la garganta o el pecho.  
  · No mejora wander se esperaba. Dónde puede encontrar más información en inglés? Alonso Arias a [email or web address]-bj.[email or web address]  
Yaneth Gloria R149 en la búsqueda para aprender Arvilla Mimi de \"Esofagitis: Instrucciones de cuidado - [ Esophagitis: Care Instructions ]. \" 
Revisado: 7 noviembre, 2018 Versión del contenido: 12.2 © 5114-4459 [employer]. Las instrucciones de cuidado fueron adaptadas bajo licencia por Good Help Connections (which disclaims liability or warranty for this information). Si usted tiene [place] afección médica o sobre estas instrucciones, siempre pregunte a sebastian profesional de carloz. [employer] niega toda garantía o responsabilidad por sebastian uso de esta información.

## 2019-11-20 NOTE — PROGRESS NOTES
Cielo Mortensen    CC: Follow-up for chronic disease management    HPI:     Stomach Ulcer:  -Found yesterday during EGD  -Was associated with stomach pain  -Taking famotidine for issue  -Will have follow-up visit with GI specialist in March  -EGD report reviewed during visit.   Report showed peptic ulcer, gastritis, esophagitis, and hiatal hernia      Hepatitis C Infection:  -2 months ago completed treatment for hepatitis C  -One month ago was tested and infection was clear      Rheumatoid arthritis:  -Not seeing rheumatology as rheumatologist was waiting for her to be treated for hepatitis C  -Patient reports she continues to have pain      ROS: Positive items marked in RED  CON: fever, chills  Cardiovascular: palpitations, CP  Resp: SOB, cough  GI: nausea, vomiting, diarrhea  : dysuria, hematuria      Past Medical History:   Diagnosis Date    Arthritis     Asthma     Hepatitis C     starting treatment 2019    Hypertension     Diagnosed in [place] a few months ago    Psychiatric disorder     SOME DEPRESSION       Past Surgical History:   Procedure Laterality Date    COLONOSCOPY N/A 2019    COLON[facility] performed [facility] Sue [facility]autista [facility] at [facility] ENDOSCOPY    HX CHOLECYSTECTOMY      HX COLONOSCOPY      Completed in [facility]       Family History   Problem Relation Age of Onset    Diabetes Mother     Hypertension Father     No Known Problems Sister     No Known Problems Brother     No Known Problems Sister     No Known Problems Sister     No Known Problems Brother     No Known Problems Brother     No Known Problems Brother        Social History     Socioeconomic History    Marital status: SINGLE     Spouse name: Not on file    Number of children: Not on file    Years of education: Not on file    Highest education level: Not on file   Tobacco Use    Smoking status: Former Smoker     Last attempt to quit: 2017     Years since quittin.8    Smokeless tobacco: Never Used   Substance and Sexual Activity    Alcohol use: No    Drug use: No    Sexual activity: Yes     Partners: Male     Birth control/protection: None       No Known Allergies      Current Outpatient Medications:     methylPREDNISolone (MEDROL DOSEPACK) 4 mg tablet, Take 1 Tab by mouth Specific Days and Specific Times. , Disp: 21 Tab, Rfl: 0    famotidine (PEPCID) 40 mg tablet, Take 1 Tab by mouth daily. , Disp: 30 Tab, Rfl: 0    famotidine (PEPCID) 40 mg tablet, TOME MAJOR TABLETA TODOS LOS ALICEA, Disp: 30 Tab, Rfl: 0    hydrocortisone (HYTONE) 2.5 % topical cream, Apply  to affected area two (2) times a day. use thin layer, Disp: 30 g, Rfl: 0    clotrimazole (LOTRIMIN) 1 % topical cream, Apply  to affected area two (2) times a day., Disp: 15 g, Rfl: 0    omeprazole (PRILOSEC) 20 mg capsule, Take 20 mg by mouth daily. , Disp: , Rfl:     etodolac (LODINE) 400 mg tablet, Take 400 mg by mouth two (2) times daily (with meals). , Disp: 60 Tab, Rfl: 0    predniSONE (DELTASONE) 5 mg tablet, Take  by mouth daily. , Disp: , Rfl:     sulfaSALAzine (AZULFIDINE) 500 mg tablet, Take 500 mg by mouth two (2) times a day., Disp: , Rfl:     acetaminophen (TYLENOL) 325 mg tablet, Take  by mouth every four (4) hours as needed for Pain., Disp: , Rfl:     fluticasone (FLONASE) 50 mcg/actuation nasal spray, 2 Sprays by Both Nostrils route daily. , Disp: 1 Bottle, Rfl: 11    raNITIdine (ZANTAC) 150 mg tablet, Take 1 Tab by mouth two (2) times a day., Disp: 180 Tab, Rfl: 1  No current facility-administered medications for this visit.      Physical Exam:      /75   Pulse 71   Temp 98.1 °F (36.7 °C) (Oral)   Resp 16   Ht 5' 3\" (1.6 m)   Wt 285 lb (129.3 kg)   SpO2 96%   BMI 50.49 kg/m²     General: Obese habitus, NAD, conversant  Eyes: sclera clear bilaterally, no discharge noted, eyelids normal in appearance  HENT: NCAT  Lungs: CTAB, normal respiratory effort and rate  CV: RRR, no MRGs  ABD: soft, non-tender, non-distended, normal bowel sounds  Skin: normal temperature, turgor, color, and texture  Psych: alert and oriented to person, place and situation, normal affect  Neuro: speech normal, moving all extremities, gait normal      Assessment/Plan     Rheumatoid Arthritis, inadequately controlled:  -Referred to rheumatology  -Follow-up in 3 months      Chronic Hepatitis C infection, resolved:  -Need to obtain records from GI specialist for review  -Follow-up in 3 months      Gastritis/Esophagitis/Hiatal Hernia/Peptic Ulcer:  -Will defer management to GI  -Patient counseled on findings on EGD  -Patient advised on importance of taking medication prescribed by GI specialist for issue  -Handouts given on hiatal hernia care, gastritis care, H. pylori test, and esophagitis care  -Follow-up in 3 months        Rob Lundborg, MD  11/20/2019, 11:02 AM

## 2019-12-03 ENCOUNTER — OFFICE VISIT (OUTPATIENT)
Dept: ORTHOPEDIC SURGERY | Age: 59
End: 2019-12-03

## 2019-12-03 VITALS
BODY MASS INDEX: 50.5 KG/M2 | RESPIRATION RATE: 15 BRPM | OXYGEN SATURATION: 100 % | TEMPERATURE: 97.4 F | WEIGHT: 285 LBS | SYSTOLIC BLOOD PRESSURE: 138 MMHG | DIASTOLIC BLOOD PRESSURE: 67 MMHG | HEART RATE: 76 BPM | HEIGHT: 63 IN

## 2019-12-03 DIAGNOSIS — M76.62 ACHILLES TENDINITIS OF LEFT LOWER EXTREMITY: Primary | ICD-10-CM

## 2019-12-03 RX ORDER — AMOXICILLIN 500 MG/1
TABLET, FILM COATED ORAL
COMMUNITY
End: 2020-03-01

## 2019-12-03 RX ORDER — FAMOTIDINE 40 MG/1
40 TABLET, FILM COATED ORAL DAILY
Qty: 30 TAB | Refills: 0 | Status: SHIPPED | OUTPATIENT
Start: 2019-12-03 | End: 2020-03-01 | Stop reason: SDUPTHER

## 2019-12-03 RX ORDER — ETODOLAC 400 MG/1
400 TABLET, FILM COATED ORAL 2 TIMES DAILY WITH MEALS
Qty: 60 TAB | Refills: 0 | Status: SHIPPED | OUTPATIENT
Start: 2019-12-03 | End: 2020-09-24 | Stop reason: SDUPTHER

## 2019-12-03 NOTE — PROGRESS NOTES
AMBULATORY PROGRESS NOTE      Patient: Jen Antonio             MRN: [id number]     SSN: [id number] Body mass index is 50.49 kg/m². YOB: 1960     AGE: 61 y.o. EX: female    PCP: Marlena Lee MD     IMPRESSION/DIAGNOSIS AND TREATMENT PLAN     DIAGNOSES  1. Achilles tendinitis of left lower extremity        Orders Placed This Encounter    REFERRAL TO PHYSICAL THERAPY    etodolac (LODINE) 400 mg tablet    famotidine (PEPCID) 40 mg tablet      Jen Antonio understands her diagnoses and the proposed plan. Dr. Alejo Valle translated for me and interviewed her. She has Achilles tendinosis on this left side with associated Achilles tendinitis as a zone of tendinopathy. Recommendation is for physical therapy. If she is not improved, then she will require a sharp excisional debridement of this area with retrocalcaneal bursectomy and calcaneal ostectomy and repairing the Achilles tendon back to bone with the Arthrex suture bridge anchor technique. Plan:    1) Referral to Physical Therapy. 2) Lodine 400 m PO BID; 60 tablets, 0 refills. 3) Pepcid 40 m PO every day; 30 tablets, 0 refills. 4) Continue activity modification as directed. RTO - 5 weeks     HPI AND EXAMINATION     Jen Antonio IS A 61 y.o. female who presents to my outpatient office for follow up of Achilles tendinitis of the LLE and a contusion of the left knee and lower leg. At the last visit, I ordered an MRI of the left ankle, provided orders for a black short CAM walker boot and walker, prescribed Medrol DosePak and Pepcid 40 mg, and instructed the patient to continue activity modification as directed. Since we saw her last, Ms. Renee Webster states that the CAM walker boot has not provided much pain relief. She finds that she experiences pain when she walks. She reports that the Medrol DosePak did not help with her pain. MRI results have been reviewed with the patient.      The [place] [facility] [place] Dr. Toan Temple provided translation for her. The patient has h/o hepatitis C and RA. She denies h/o liver failure. She starts a new medication for her liver on June 1st, which she will take for two months. She will be unable to take any other medications during her liver treatment. Visit Vitals  /67 (BP 1 Location: Left arm, BP Patient Position: Sitting)   Pulse 76   Temp 97.4 °F (36.3 °C) (Oral)   Resp 15   Ht 5' 3\" (1.6 m)   Wt 285 lb (129.3 kg)   SpO2 100%   BMI 50.49 kg/m²     Appearance: Alert, well appearing and pleasant patient who is in no distress, oriented to person, place/time, and who follows commands. This patient is accompanied in the examination room by her daughter. Dementia: no dementia  Psychiatric: Affect and mood are appropriate. Patient arrives to office via: without assistive device  HEENT: Head normocephalic & atraumatic. Both pupils are round, non icteric sclera              Eye: EOM are intact and sclera are clear               Neck: ROM WNL and JVD neck is not present              Hearings Intact, does not require hearing aid device  Respiratory: Breathing is unlabored without accessory chest muscle use  Cardiovascular/Peripheral Vascular: Normal Pulses to each foot     Left ACHILLES GASTROCNEMIUS COMPLEX,PLANTAR FASCIA     Gait: antalgic  Tenderness: mild Achilles tendon sheath Insertional point               NO Noninsertional Achilles tendon tenderness              Calf tenderness: Absent for calf or gastrocnemius muscle regions              Soft, supple, non tender, non taut lower extremity compartments              NONE to Medial Malleolar, 4/5 Met base midfoot, achilles, tib post, or              NONE to syndesmosis.               no to plantar fascia, or central calcaneal region  Deformity/Swelling:  YES  Insertional point of Distal Achilles Tendon:               NO Fusiform noninsertional focal tendinopathy              NO Mervin's Deformity Present  Cutaneous: No rashes, skin patches, wounds, or abrasions to the lower legs                      Warm and Normal color. No regions of expressible drainage. Medial Border of Tibia Region: absent                      Skin color, texture, turgor normal.                       Pre-tibial edema is present. Joint Motion: Not tested at this time. Neurologic Exam: Neuro: Motor: normal 5/5 strength in all tested muscle groups and Sensory : no sensory deficits noted. No abnormal hand/wrist, foot/ankle, or facial/neck tremors. Contractures: Gastrocnemius or Achilles Contractures absent. Joint / Tendon Stability:               No anterolateral or varus instability of the Ankle or Subtalar Joints              No peroneal tendon instability present with ankle circumduction  Alignment:  Normal Foot Alignment and  Flexible  Vascular: Normal Pulses/ NL Capillary refill, No evidence of DVT seen on physical exam.              No calf swelling, no tenderness to calf. Varicosities Lower Limbs : few large varicose veins present  Lymphatic: pre-tibial edema is present    CHART REVIEW     Past Medical History:   Diagnosis Date    Arthritis     Asthma     Gastritis     Hepatitis C     starting treatment 6/1/2019    Hypertension     Diagnosed in [place] a few months ago    Psychiatric disorder     SOME DEPRESSION     Current Outpatient Medications   Medication Sig    amoxicillin 500 mg tab Take  by mouth.  etodolac (LODINE) 400 mg tablet Take 400 mg by mouth two (2) times daily (with meals).  famotidine (PEPCID) 40 mg tablet Take 1 Tab by mouth daily.  famotidine (PEPCID) 40 mg tablet TOME MAJOR TABLETA TODOS LOS ALICEA    predniSONE (DELTASONE) 5 mg tablet Take  by mouth daily.  sulfaSALAzine (AZULFIDINE) 500 mg tablet Take 500 mg by mouth two (2) times a day.     fluticasone (FLONASE) 50 mcg/actuation nasal spray 2 Sprays by Both Nostrils route daily. No current facility-administered medications for this visit. No Known Allergies  Past Surgical History:   Procedure Laterality Date    COLONOSCOPY N/A 2019    COLON[facility] by Damien [facility] at [facility] ENDOSCOPY    HX CHOLECYSTECTOMY      HX COLONOSCOPY      Completed in [address] Not on file   Tobacco Use    Smoking status: Former Smoker     Last attempt to quit: 2017     Years since quittin.9    Smokeless tobacco: Never Used   Substance and Sexual Activity    Alcohol use: No    Drug use: No    Sexual activity: Yes     Partners: Male     Birth control/protection: None     Family History   Problem Relation Age of Onset    Diabetes Mother     Hypertension Father     No Known Problems Sister     No Known Problems Brother     No Known Problems Sister     No Known Problems Sister     No Known Problems Brother     No Known Problems Brother     No Known Problems Brother         REVIEW OF SYSTEMS : 12/3/2019  ALL BELOW ARE Negative except : SEE HPI     Constitutional: Negative for fever, chills and weight loss. Neg Weight Loss  Cardiovascular: Negative for chest pain, claudication and leg swelling. SOB, ELLIS   Gastrointestinal/Urological: Negative for  pain, N/V/D/C, Blood in stool or urine,dysuria                         Hematuria, Incontinence, pelvic pain  Musculoskeletal: see HPI. Neurological: Negative for dizziness and weakness, headaches,Visual Changes             Confusion,  Or Seizures,   Psychiatric/Behavioral: Negative for depression, memory loss and substance abuse. Extremities:  Negative for hair changes, rash or skin lesion changes. Hematologic: Negative for Bleeding problems, bruising, pallor or swollen lymph nodes.   Peripheral Vascular: No calf pain, vascular vein tenderness to calf pain              No calf throbbing, posterior knee throbbing pain     DIAGNOSTIC IMAGING     No notes on file MRI Results (most recent):  Results from [place] encounter on 11/14/19   MRI ANKLE LT WO CONT    Narrative EXAM: MRI OF THE LEFT ANKLE    CLINICAL INDICATIONS/HISTORY: Left ankle pain, swelling, greatest in the region  of the Achilles tendon. COMPARISON: None     TECHNIQUE: Sagittal T1 and STIR; axial PD and T2 with fat saturation; coronal T2  with fat saturation and axial oblique PD images of the ankle were obtained.    _______________    FINDINGS:    ANKLE LIGAMENTS:  Anterior and posterior inferior tibiofibular ligaments and syndesmosis: Intact  Anterior and posterior talofibular and the calcaneofibular ligaments: There is  mild thickening of the anterior talo fibular ligament, which is intact in  appearance. The calcaneofibular and posterior talofibular ligaments are normal  in appearance. Deltoid and spring ligaments: Superficial and deep components of the deltoid  ligament appear intact. Tibial spring ligament complex appears within normal  limits. ANKLE AND FOOT TENDONS:  Peroneus longus and brevis tendons: The peroneal tendons are normally located. The superior peroneal retinaculum is intact. No evidence of peroneal tendon tear  or tenosynovitis. Tibialis posterior tendon: Posterior tibial tendon is of uniform thickness and  morphology. No significant fluid accumulation surrounding the posterior tibial  tendon. Flexor digitorum longus and flexor hallucis longus tendons: Intact and normal in  signal intensity. Extensor tendons: Intact and normal in signal intensity. Achilles tendon: There is tendinosis of the distal 6 cm of the Achilles tendon  without evidence of discrete partial thickness fiber disruption. There is mild  peritendinitis noted. No significant retrocalcaneal bursitis or sympathetic  calcaneal insertion marrow edema. OSSEOUS:  Ankle: Alignment at the tibiotalar articulation is within normal limits.  Mild  chondrosis of the lateral talar shoulder noted without evidence of significant  subchondral marrow edema or subchondral cystic change. No evidence of fracture,  stress fracture, or marrow stress reaction. Hindfoot: Joint spaces of the subtalar articulations appear within normal  limits. Potential fibrous coalition between the anterior process of the  calcaneus and the posterior lateral navicular. Talonavicular and calcaneocuboid  joints both appear within normal limits. Midfoot: Mild chondrosis across the navicular-middle cuneiform noted. Remaining  navicular cuneiform, intercuneiform, and tarsometatarsal articulations appear  within normal limits. .    OTHER:  Sinus tarsi: Normal fat signal intensity. Plantar fascia: Intact, normal in caliber and signal intensity. Tarsal tunnel: Normal in appearance. Regional soft tissues: No abnormal soft tissue fluid collections. _______________      Impression IMPRESSION:      1. Tendinosis of the distal left Achilles tendon without findings to suggest  superimposed partial-thickness Achilles tendon tear. Mild peritendinitis is  present without findings of retrocalcaneal bursitis or reactive marrow edema at  the calcaneal insertion. 2. 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 3. Mild tibiotalar and navicular-cuneiform chondrosis. Please see above section of this report. I have personally reviewed the results of the above study. The interpretation of this study is my professional opinion. Written by Stewart Nguyen, as dictated by Dr. Araceli Vaughan. I, Dr. Araceli Vaughan, confirm that all documentation is accurate.

## 2019-12-03 NOTE — PROGRESS NOTES
1. Have you been to the ER, urgent care clinic since your last visit? Hospitalized since your last visit? No    2. Have you seen or consulted any other health care providers outside of the [address] [place] [address] since your last visit? Include any pap smears or colon screening.  No

## 2019-12-17 ENCOUNTER — HOSPITAL ENCOUNTER (OUTPATIENT)
Dept: PHYSICAL THERAPY | Age: 59
Discharge: HOME | End: 2019-12-17
Payer: MEDICAID

## 2019-12-17 PROCEDURE — 97110 THERAPEUTIC EXERCISES: CPT

## 2019-12-17 PROCEDURE — 97016 VASOPNEUMATIC DEVICE THERAPY: CPT

## 2019-12-17 PROCEDURE — 97162 PT EVAL MOD COMPLEX 30 MIN: CPT

## 2019-12-17 NOTE — PROGRESS NOTES
[facility] THERAPY AT [facility], [address] [facility] - Phone: [phone]  Fax: [phone] / [address] [address] PHYSICAL THERAPY SERVICES  Patient Name: Nino Traore : 1960   Medical   Diagnosis: Achilles tendinitis of left lower extremity [M76.62] Treatment Diagnosis: Achilles tendinitis of left lower extremity [M76.62]   Onset Date:      Referral Source: Kate Ahn MD [facility]): 2019   Prior Hospitalization: See medical history Provider #: [id number]   Prior Level of Function: Able to walk without left foot pain. Comorbidities: Multiple joint arthritis, Bilateral knee, hip and back pain   Medications: Verified on Patient Summary List   The Plan of Care and following information is based on the information from the initial evaluation.   ===========================================================================================  Assessment / key information:  Patient is 61 y.o. y o female who presents to [address] [place] [address] with diagnosis of Achilles tendinitis of left lower extremity [M76.62]. Patient reports left foot and achilles tendon pain which began insidiously one year ago. Pain level is a 5/10 to 10/10 which increases with walking, decreases with resting. It can awaken her at night and is worse when first rising in the AM or when going from sit to stand after extended sitting. She had an MRI of the left ankle which showed tendinosis of the distal achilles tendon. Upon objective evaluation, patient demonstrates impaired and painful AROM of left ankle/foot as follows:  PF 50, DF -20, Inv 10, Ev 10 and impaired strength of left ankle/foot as follows:  PF 2/5, DF 5/5, Inv 4/5, Ev 4/5. Patient is ambulating with left antalgic gait. There is tenderness to palpation over left gastrocnemius, soleus, peroneal muscles, achilles tendon and plantar fascia.   There is 3 cm of edema at left malleoli. Patient scored 28 on FOTO indicating decreased function and quality of life. Functional limitations include sleeping, standing, walking, stairs. Patient can benefit from PT to decrease edema and pain, increase ROM, strength and balance to improve functional ability.    ==================================================================================  Eval Complexity: History: MEDIUM  Complexity : 1-2 comorbidities / personal factors will impact the outcome/ POC Exam:HIGH Complexity : 4+ Standardized tests and measures addressing body structure, function, activity limitation and / or participation in recreation  Presentation: MEDIUM Complexity : Evolving with changing characteristics  Clinical Decision Making:MEDIUM Complexity : FOTO score of 26-74Overall Complexity:MEDIUM  Problem List: pain affecting function, decrease ROM, decrease strength, edema affecting function, impaired gait/ balance, decrease ADL/ functional abilitiies, decrease activity tolerance and decrease flexibility/ joint mobility   Treatment Plan may include any combination of the following: Therapeutic exercise, Therapeutic activities, Neuromuscular re-education, Physical agent/modality, Gait/balance training, Manual therapy, Patient education and Functional mobility training  Patient / Family readiness to learn indicated by: asking questions, trying to perform skills and interest  Persons(s) to be included in education: patient (P)  Barriers to Learning/Limitations: yes;  language  Measures taken: Daughter to be present for treatments to interpret. Patient Goal (s): \"Better my pain so I don't want surgery. \"   Patient self reported health status: good  Rehabilitation Potential: good   Short Term Goals: To be accomplished in  2  weeks:  1. Patient performing daily HEP. 2.  Pt will score +2 on GROC indicating symptoms a little better. 3.  Increase AROM in DF to -10 degrees to facilitate walking.    [address] Goals: To be accomplished in  4  weeks:  1. Pt independent in HEP. 2.  Increase score on FOTO to 38 indicating improved function and quality of life. 3.  Increase strength in left PF to 3/5 to facilitate push off during gait. 4.  Increase AROM in left ankle/foot so that it equals the right to facilitate walking. Frequency / Duration:   Patient to be seen  2  times per week for 4  weeks:  Patient / Caregiver education and instruction: activity modification and exercises  G-Codes (GP): mellisa    Therapist Signature: Olga Wheeler PT Date: 60/19/5261   Certification Period: na Time: 11:37 AM   ===========================================================================================  I certify that the above Physical Therapy Services are being furnished while the patient is under my care. I agree with the treatment plan and certify that this therapy is necessary. Physician Signature:        Date:       Time:     Please sign and return to [facility] or you may fax the signed copy to [phone]. Thank you. To ensure your patient receives the highest quality care and to avoid disruption in therapy please sign and return this plan of care within 21 days. Per Medicaid guidelines if the plan of care is not received within 21 days the patient's care must be put on hold until signed.

## 2019-12-17 NOTE — PROGRESS NOTES
PHYSICAL THERAPY - DAILY TREATMENT NOTE    Patient Name: Hamilton Sandifer        Date: 2019  : 1960   YES Patient  Verified  Visit #:      8  Insurance: Payor: Cristian Paget / Plan: [address] / Product Type: Managed Care Medicaid /      In time: 10:36 Out time: 11:36   Total Treatment Time: 60     Medicare Time /BCBS Tracking (below)   Total Timed Codes (min):  na 1:1 Treatment Time:  na     TREATMENT AREA =  Achilles tendinitis of left lower extremity [M76.62]    SUBJECTIVE  Pain Level (on 0 to 10 scale):  8  / 10   Medication Changes/New allergies or changes in medical history, any new surgeries or procedures?     NO    If yes, update Summary List   Subjective Functional Status/Changes:  []  No changes reported     See medical history          OBJECTIVE  Modalities Rationale:     decrease edema, decrease pain and increase tissue extensibility to improve patient's ability to walk   min [] Estim, type/location:                                      []  att     []  unatt     []  w/US     []  w/ice    []  w/heat    min []  Mechanical Traction: type/lbs                   []  pro   []  sup   []  int   []  cont    []  before manual    []  after manual    min []  Ultrasound, settings/location:      min []  Iontophoresis w/ dexamethasone, location:                                               []  take home patch       []  in clinic    min []  Ice     []  Heat    location/position:    10 min [x]  Vasopneumatic Device, press/temp: Left foot and ankle low pressure 36 degrees    min []  Other:    [x] Skin assessment post-treatment (if applicable):    [x]  intact    []  redness- no adverse reaction     []redness - adverse reaction:        na min Therapeutic Exercise:  [x]  See flow sheet   Rationale:      na to improve the patients ability to na     na min Manual Therapy:    Rationale:      na to improve patient's ability to na    10 min Patient Education:  YES  Reviewed HEP/POC/Goals []  Progressed/Changed HEP based on: Other Objective/Functional Measures:    See POC. Daughter interpreted. Post Treatment Pain Level (on 0 to 10) scale:   5  / 10     ASSESSMENT  Assessment/Changes in Function:     Justification for Eval Code Complexity:  Patient History : See POC  Examination see exam   Clinical Presentation: evolving  Clinical Decision Making : FOTO : 28 /100       []  See Progress Note/Recertification   Patient will continue to benefit from skilled PT services to modify and progress therapeutic interventions, address functional mobility deficits, address ROM deficits, address strength deficits, analyze and address soft tissue restrictions and instruct in home and community integration to attain remaining goals. Progress toward goals / Updated goals:    Initial evaluation completed with home exercise program and education initiated.        PLAN  [x]  Upgrade activities as tolerated YES Continue plan of care   []  Discharge due to :    []  Other:      Therapist: Newton Garcia PT    Date: 12/17/2019 Time: 11:36 AM     Future Appointments   Date Time Provider Casey Marroquin   12/19/2019 11:00 AM [address] [place], [place] [address]   12/20/2019  2:00 PM Jemal Began, [facility]   12/23/2019  2:30 PM Jemal Began, [facility]   12/26/2019 10:00 AM Jemal Began, [facility]   12/31/2019  2:00 PM Jemal Began, [facility]   1/2/2020  2:30 PM Jemal Began, [facility]   1/7/2020 10:00 AM Jemal Began, [facility]   1/7/2020  2:20 PM Nitin Mosher MD [address]   1/9/2020  1:00 PM Jemal Began, [facility]   2/24/2020 12:30 PM Terra Ruiz MD [address] [place] [id number]

## 2019-12-20 ENCOUNTER — HOSPITAL ENCOUNTER (OUTPATIENT)
Dept: PHYSICAL THERAPY | Age: 59
Discharge: HOME | End: 2019-12-20
Payer: MEDICAID

## 2019-12-20 PROCEDURE — 97140 MANUAL THERAPY 1/> REGIONS: CPT

## 2019-12-20 PROCEDURE — 97016 VASOPNEUMATIC DEVICE THERAPY: CPT

## 2019-12-20 PROCEDURE — 97110 THERAPEUTIC EXERCISES: CPT

## 2019-12-20 NOTE — PROGRESS NOTES
PHYSICAL THERAPY - DAILY TREATMENT NOTE    Patient Name: Jaki Harman        Date: 2019  : 1960   YES Patient  Verified  Visit #:   2   of   8  Insurance: Payor: Momo Saleh / Plan: [address] [place] [address] / Product Type: Managed Care Medicaid /      In time: 2:19 pm Out time: 3:06    Total Treatment Time: 47     Medicare Time /BCBS Tracking (below)   Total Timed Codes (min):  na 1:1 Treatment Time:  na     TREATMENT AREA =  Achilles tendinitis of left lower extremity [M76.62]    SUBJECTIVE  Pain Level (on 0 to 10 scale): 7/ 10   Medication Changes/New allergies or changes in medical history, any new surgeries or procedures? NO    If yes, update Summary List   Subjective Functional Status/Changes:  []  No changes reported     Pt reports a little bit of pain with home exercise.  With towel stretch         OBJECTIVE  Modalities Rationale:     decrease edema, decrease inflammation, decrease pain and increase tissue extensibility to improve patient's ability to improve tissue mobility in walking   min [] Estim, type/location:                                      []  att     []  unatt     []  w/US     []  w/ice    []  w/heat    min []  Mechanical Traction: type/lbs                   []  pro   []  sup   []  int   []  cont    []  before manual    []  after manual   8 min [x]  Ultrasound, settings/location:  1.0 3 mHz to distal Achilles in prone @ 20%    min []  Iontophoresis w/ dexamethasone, location:                                               []  take home patch       []  in clinic    min []  Ice     []  Heat    location/position:    10 min [x]  Vasopneumatic Device, press/temp: Low 36 deg    min []  Other:    [x] Skin assessment post-treatment (if applicable):    [x]  intact    []  redness- no adverse reaction     []redness - adverse reaction:        21 min Therapeutic Exercise:  [x]  See flow sheet   Rationale:      increase ROM and increase strength to improve the patients ability to perform prolonged walking and standing    8 min Manual Therapy: Prone STM with TrPt release to left lateral gastroc, STM to distal Achilles and PF; pt and daughter instructed in self massage techniques   Rationale:      decrease pain, increase ROM, increase tissue extensibility and decrease edema  to improve patient's ability to  improve tissue mobility in walking      throughout session min Patient Education:  YES  Reviewed HEP   []  Progressed/Changed HEP based on: Other Objective/Functional Measures: Add toe crunches , toe extension 10x each     Post Treatment Pain Level (on 0 to 10) scale:   4 / 10     ASSESSMENT  Assessment/Changes in Function:   Treatment perform with Pt's daughter serving as interpretor per pt request. Pt education in self massage techniques, supportive foot wear, reviewed initial HEP. Modified long sitting gastroc stretch to sitting with improved tolerance. Moderate tenderness to palpable distal Achilles and R PF. Add manual and PUS per POC and MD request. Fair-good tolerance to session. []  See Progress Note/Recertification   Patient will continue to benefit from skilled PT services to modify and progress therapeutic interventions, address functional mobility deficits, address ROM deficits, address strength deficits, analyze and address soft tissue restrictions and instruct in home and community integration to attain remaining goals. Progress toward goals / Updated goals:    First visit from initial evaluation.  Progressed treatment per plan of care     PLAN  []  Upgrade activities as tolerated YES Continue plan of care   []  Discharge due to :    []  Other:      Therapist: Barbi Stein PTA    Date: 12/20/2019 Time: 2:19 PM     Future Appointments   Date Time Provider Casey Marroquin   12/23/2019  2:00 PM [address] [place] [address] [id number]   12/23/2019  2:30 PM Pratik Barrios PTA [facility]   12/26/2019 10:00 AM Pratik Barrios PTA [facility]   12/31/2019  2:00 PM Carlos Adas, PTA [facility]   1/2/2020  2:30 PM Carlos Adas, PTA [facility]   1/7/2020 10:00 AM Carlos Adas, PTA [facility]   1/7/2020  2:20 PM Tariq Mosher MD [place] [address]   1/9/2020  1:00 PM Carlos Adas, PTA [facility]   2/24/2020 12:30 PM Anushka Hill MD [address] [facility]

## 2019-12-23 ENCOUNTER — HOSPITAL ENCOUNTER (OUTPATIENT)
Dept: PHYSICAL THERAPY | Age: 59
Discharge: HOME | End: 2019-12-23
Payer: MEDICAID

## 2019-12-23 PROCEDURE — 97016 VASOPNEUMATIC DEVICE THERAPY: CPT

## 2019-12-23 PROCEDURE — 97140 MANUAL THERAPY 1/> REGIONS: CPT

## 2019-12-23 PROCEDURE — 97110 THERAPEUTIC EXERCISES: CPT

## 2019-12-23 NOTE — PROGRESS NOTES
PHYSICAL THERAPY - DAILY TREATMENT NOTE    Patient Name: Anitra Page        Date: 2019  : 1960   YES Patient  Verified  Visit #:   3   of   8  Insurance: Payor: Ghulamarcadio Shaynapapi / Plan: [address] [place] [address] / Product Type: Managed Care Medicaid /      In time: 2:41 PM Out time: 3:29 PM   Total Treatment Time: 48     Medicare Time /BCBS Tracking (below)   Total Timed Codes (min):  na 1:1 Treatment Time:  na     TREATMENT AREA =  Achilles tendinitis of left lower extremity [M76.62]    SUBJECTIVE  Pain Level (on 0 to 10 scale):  7  / 10   Medication Changes/New allergies or changes in medical history, any new surgeries or procedures? NO    If yes, update Summary List   Subjective Functional Status/Changes:  []  No changes reported     Pt reports that US hasn't been helping. She always feels better after vaso. She is still performing her exercises as prescribed.        OBJECTIVE  Modalities Rationale:     decrease edema, decrease inflammation and decrease pain to improve patient's ability to perform LE functional tasks and ADL   min [] Estim, type/location:                                      []  att     []  unatt     []  w/US     []  w/ice    []  w/heat    min []  Mechanical Traction: type/lbs                   []  pro   []  sup   []  int   []  cont    []  before manual    []  after manual    min []  Ultrasound, settings/location:      min []  Iontophoresis w/ dexamethasone, location:                                               []  take home patch       []  in clinic    min []  Ice     []  Heat    location/position:    10 min [x]  Vasopneumatic Device, press/temp: To R foot, Med, 36 degs    min []  Other:    [x] Skin assessment post-treatment (if applicable):    [x]  intact    []  redness- no adverse reaction     []redness - adverse reaction:        28 min Therapeutic Exercise:  [x]  See flow sheet   Rationale:      increase ROM and increase strength to improve the patients ability to perform LE functional tasks and ADL    10 min Manual Therapy: STM to gastroc and distal Achilles in prone   Rationale:      decrease pain, increase ROM, increase tissue extensibility and decrease trigger points to improve patient's ability to perform LE functional tasks and ADL    With TE min Patient Education:  YES  Reviewed HEP   []  Progressed/Changed HEP based on: Other Objective/Functional Measures:    Inc reps for current exercises, added marble  and incline str this date. Post Treatment Pain Level (on 0 to 10) scale:   5  / 10     ASSESSMENT  Assessment/Changes in Function:     Pt unable to perform incline str this date, DC'd from program 2/2 symptom exacerbation in R heel. No adverse effects noted with other exercises. Pt would benefit from add'l hip strengthening to assist with R foot functional mobility (noted in chart). Pt and pt's daughter educated regarding proper fitting shoe and encouraged to receive assessment at local [employer] to determine best brand. Pt and pt's daughter verbalized understanding. TTP noted during manual intervention mid-substance Achilles. []  See Progress Note/Recertification   Patient will continue to benefit from skilled PT services to modify and progress therapeutic interventions, address functional mobility deficits, address ROM deficits, address strength deficits, analyze and address soft tissue restrictions, analyze and cue movement patterns, analyze and modify body mechanics/ergonomics, assess and modify postural abnormalities, address imbalance/dizziness and instruct in home and community integration to attain remaining goals. Progress toward goals / Updated goals:    · Goals: To be accomplished in  2  weeks per POC 12/17/19:  1. Patient performing daily HEP. 2.  Pt will score +2 on GROC indicating symptoms a little better. 3.  Increase AROM in DF to -10 degrees to facilitate walking.         PLAN  [x]  Upgrade activities as tolerated YES Continue plan of care   []  Discharge due to :    []  Other:      Therapist: Elias Lutz PT    Date: 12/23/2019 Time: 2:43 PM     Future Appointments   Date Time Provider Casey Marroquin   12/26/2019 10:00 AM Vianca Hanna [facility]   12/31/2019  2:00 PM Vianca Hanna [facility]   1/2/2020  2:30 PM Vianca Hanna [facility]   1/7/2020 10:00 AM Vianca Hanna [facility]   1/7/2020  2:20 PM Chata Mosher MD [place] [address]   1/9/2020  1:00 PM Vianca Hanna [facility]   2/24/2020 12:30 PM Lara Del Castillo MD [address] [facility] [address]

## 2019-12-26 ENCOUNTER — APPOINTMENT (OUTPATIENT)
Dept: PHYSICAL THERAPY | Age: 59
End: 2019-12-26
Payer: MEDICAID

## 2020-01-02 ENCOUNTER — HOSPITAL ENCOUNTER (OUTPATIENT)
Dept: PHYSICAL THERAPY | Age: 60
Discharge: HOME | End: 2020-01-02
Payer: MEDICAID

## 2020-01-02 ENCOUNTER — APPOINTMENT (OUTPATIENT)
Dept: PHYSICAL THERAPY | Age: 60
End: 2020-01-02
Payer: MEDICAID

## 2020-01-02 PROCEDURE — 97016 VASOPNEUMATIC DEVICE THERAPY: CPT

## 2020-01-02 PROCEDURE — 97110 THERAPEUTIC EXERCISES: CPT

## 2020-01-02 PROCEDURE — 97140 MANUAL THERAPY 1/> REGIONS: CPT

## 2020-01-02 NOTE — PROGRESS NOTES
PHYSICAL THERAPY - DAILY TREATMENT NOTE    Patient Name: Morena Hernandez        Date: 2020  : 1960   YES Patient  Verified  Visit #:   4   of   8  Insurance: Payor: Elvira Schaefer / Plan: [address] [place] [address] / Product Type: Managed Care Medicaid /      In time: 2:29 pm Out time: 3:28 pm   Total Treatment Time: 59     Medicare Time /BCBS Tracking (below)   Total Timed Codes (min):  na 1:1 Treatment Time:  na     TREATMENT AREA =  Achilles tendinitis of left lower extremity [M76.62]    SUBJECTIVE  Pain Level (on 0 to 10 scale):  8  / 10   Medication Changes/New allergies or changes in medical history, any new surgeries or procedures? NO    If yes, update Summary List   Subjective Functional Status/Changes:  []  No changes reported     Pt reports doing HEP 1x per day.    Per daughter pt has not been here secondary to daughter out of town          OBJECTIVE  Modalities Rationale:     decrease edema, decrease inflammation, decrease pain and increase tissue extensibility to improve patient's ability to perform prolonged walking and standing   min [] Estim, type/location:                                      []  att     []  unatt     []  w/US     []  w/ice    []  w/heat    min []  Mechanical Traction: type/lbs                   []  pro   []  sup   []  int   []  cont    []  before manual    []  after manual    min []  Ultrasound, settings/location:      min []  Iontophoresis w/ dexamethasone, location:                                               []  take home patch       []  in clinic    min []  Ice     []  Heat    location/position:    10 min [x]  Vasopneumatic Device, press/temp: Med 36 deg    min []  Other:    [x] Skin assessment post-treatment (if applicable):    [x]  intact    []  redness- no adverse reaction     []redness - adverse reaction:        24 min Therapeutic Exercise:  [x]  See flow sheet   Rationale:      increase ROM and increase strength to improve the patients ability to perform functional ADLs     14 min Manual Therapy: Supine STM/DTM to left plantar fascia, distal calcaneal distraction gr 1, prone DTM/STM to left gastroc/achilles and left plantar fascial   Rationale:      decrease pain, increase ROM, increase tissue extensibility, decrease edema  and decrease trigger points to improve patient's ability to improve tissue mobility in ADLs      11 min Patient Education:  YES  Reviewed HEP, self massage techniques, DTM techniques, importance of proper supportive foot wear, activity modification and use of ice for pain management. []  Progressed/Changed HEP based on: Other Objective/Functional Measures: Add 4 way ankle tband YTB 10x each     Post Treatment Pain Level (on 0 to 10) scale:  6  / 10     ASSESSMENT  Assessment/Changes in Function:   Pain range: 5 to 10/10. Pain increased to 10/10 ~10 min   Fair-good tolerance to exercise. Moderate trigger point tenderness noted in distal Achilles, medial arch , and lateral gastroc. Treatment performed with patient;s daughter acting as interpretor. []  See Progress Note/Recertification   Patient will continue to benefit from skilled PT services to modify and progress therapeutic interventions, address functional mobility deficits, address ROM deficits, address strength deficits and instruct in home and community integration to attain remaining goals. Progress toward goals / Updated goals:  1. Patient performing daily HEP. - goal in progress  2. Pt will score +2 on GROC indicating symptoms a little better. 3.  Increase AROM in DF to -10 degrees to facilitate walking.       PLAN  []  Upgrade activities as tolerated YES Continue plan of care   []  Discharge due to :    []  Other:      Therapist: Deshaun Marinelli PTA    Date: 1/2/2020 Time: 3:28  PM     Future Appointments   Date Time Provider Casey Marroquin   1/7/2020  2:20 PM MD [facility] [address]   2/24/2020 12:30 PM Anel Youngblood MD [address] [facility]

## 2020-01-07 ENCOUNTER — APPOINTMENT (OUTPATIENT)
Dept: PHYSICAL THERAPY | Age: 60
End: 2020-01-07
Payer: MEDICAID

## 2020-01-08 ENCOUNTER — HOSPITAL ENCOUNTER (OUTPATIENT)
Dept: PHYSICAL THERAPY | Age: 60
Discharge: HOME | End: 2020-01-08
Payer: MEDICAID

## 2020-01-08 PROCEDURE — 97035 APP MDLTY 1+ULTRASOUND EA 15: CPT

## 2020-01-08 PROCEDURE — 97110 THERAPEUTIC EXERCISES: CPT

## 2020-01-08 NOTE — PROGRESS NOTES
PHYSICAL THERAPY - DAILY TREATMENT NOTE    Patient Name: Adams Saavedra        Date: 2020  : 1960   YES Patient  Verified  Visit #:   5   of   8  Insurance: Payor: Bethanie Cordero / Plan: [address] [place] [address] / Product Type: Managed Care Medicaid /      In time: 11:35am Out time: 12:36 pm   Total Treatment Time: 61     Medicare Time /BCBS Tracking (below)   Total Timed Codes (min):  na 1:1 Treatment Time:  na     TREATMENT AREA =  Achilles tendinitis of left lower extremity [M76.62]    SUBJECTIVE  Pain Level (on 0 to 10 scale): 8 / 10   Medication Changes/New allergies or changes in medical history, any new surgeries or procedures? NO    If yes, update Summary List   Subjective Functional Status/Changes:  []  No changes reported     Pt reports too much pain in calf and back in heel , hurting from the ankle to knee. Stabbing pain every time. Every time after the massage it hurts afterward. Doing HEP it hurts a little but doing it 1 x per day and ice @ night. Follow up 20 .        OBJECTIVE  Modalities Rationale:     decrease edema, decrease inflammation, decrease pain and increase tissue extensibility to improve patient's ability to perform prolonged walking and standing   min [] Estim, type/location:                                      []  att     []  unatt     []  w/US     []  w/ice    []  w/heat    min []  Mechanical Traction: type/lbs                   []  pro   []  sup   []  int   []  cont    []  before manual    []  after manual   8 min [x]  Ultrasound, settings/location:  3 mHz @ 1.0 w/cm2 @ 20% to distal Achilles    min []  Iontophoresis w/ dexamethasone, location:                                               []  take home patch       []  in clinic    min []  Ice     []  Heat    location/position:    10 min [x]  Vasopneumatic Device, press/temp: low36 deg    min []  Other:    [x] Skin assessment post-treatment (if applicable):    [x]  intact    []  redness- no adverse reaction     []redness - adverse reaction:        33 min Therapeutic Exercise:  [x]  See flow sheet   Rationale:      increase ROM and increase strength to improve the patients ability to perform functional ADLs         10 min Patient Education:  YES  Modified  HEP, self massage techniques importance of proper supportive foot wear, activity modification and use of ice for pain management. []  Progressed/Changed HEP based on: Other Objective/Functional Measures:    Decrease reps to exercises per flow sheet to 10 reps. Hold gastroc stretch. Add long sitting hamstring stretch 3x 30 seconds     Post Treatment Pain Level (on 0 to 10) scale:   6 / 10     ASSESSMENT  Assessment/Changes in Function:   Hold manual this session secondary to pt reporting increased pain. Pt education . Modified HEP to decrease reps to 10 and hold gastroc stretch. Add long sitting hamstring stretch. Review self massage technique as tolerated, use of supportive foot wear. Pt tolerating treatment without exacerbation of symptoms. []  See Progress Note/Recertification   Patient will continue to benefit from skilled PT services to modify and progress therapeutic interventions, address functional mobility deficits, address ROM deficits, address strength deficits and instruct in home and community integration to attain remaining goals. Progress toward goals / Updated goals:  1. Patient performing daily HEP. - goal in progress  2. Pt will score +2 on GROC indicating symptoms a little better. 3.  Increase AROM in DF to -10 degrees to facilitate walking.       PLAN  []  Upgrade activities as tolerated YES Continue plan of care   []  Discharge due to :    []  Other:      Therapist: Regi Phillips PTA    Date: 1/8/2020 Time: 12:36 pm     Future Appointments   Date Time Provider Casey Marroquin   1/27/2020 10:10 AM Fransisco Urena MD [place] [address]   2/24/2020 12:30 PM Tony Del Toro MD [address] [facility] [address]

## 2020-01-09 ENCOUNTER — HOSPITAL ENCOUNTER (OUTPATIENT)
Dept: PHYSICAL THERAPY | Age: 60
Discharge: HOME | End: 2020-01-09
Payer: MEDICAID

## 2020-01-09 ENCOUNTER — APPOINTMENT (OUTPATIENT)
Dept: PHYSICAL THERAPY | Age: 60
End: 2020-01-09
Payer: MEDICAID

## 2020-01-09 PROCEDURE — 97110 THERAPEUTIC EXERCISES: CPT

## 2020-01-09 PROCEDURE — 97016 VASOPNEUMATIC DEVICE THERAPY: CPT

## 2020-01-09 NOTE — PROGRESS NOTES
PHYSICAL THERAPY - DAILY TREATMENT NOTE    Patient Name: Kelly Carbajal        Date: 2020  : 1960   YES Patient  Verified  Visit #:   6   of   8  Insurance: Payor: Keke Yang / Plan: [address] [place] [address] / Product Type: Managed Care Medicaid /      In time: 2:43 pm Out time: 3:40 pm   Total Treatment Time: 57     Medicare Time /BCBS Tracking (below)   Total Timed Codes (min):  47 1:1 Treatment Time:  47     TREATMENT AREA =  Achilles tendinitis of left lower extremity [M76.62]    SUBJECTIVE  Pain Level (on 0 to 10 scale):  8  / 10   Medication Changes/New allergies or changes in medical history, any new surgeries or procedures? NO    If yes, update Summary List   Subjective Functional Status/Changes:  []  No changes reported     Pt reports the same.          OBJECTIVE  Modalities Rationale:     decrease edema, decrease inflammation, decrease pain and increase tissue extensibility to improve patient's ability to perform prolonged walking   min [] Estim, type/location:                                      []  att     []  unatt     []  w/US     []  w/ice    []  w/heat    min []  Mechanical Traction: type/lbs                   []  pro   []  sup   []  int   []  cont    []  before manual    []  after manual   4 min [x]  Ultrasound, settings/location:  PUS 3mhz @ 20% to distal Achilles    min []  Iontophoresis w/ dexamethasone, location:                                               []  take home patch       []  in clinic    min []  Ice     []  Heat    location/position:    10 min [x]  Vasopneumatic Device, press/temp: Low 34 degrees     min []  Other:    [x] Skin assessment post-treatment (if applicable):    [x]  intact    []  redness- no adverse reaction     []redness - adverse reaction:        33 min Therapeutic Exercise:  [x]  See flow sheet   Rationale:      increase ROM and increase strength to improve the patients ability to perform prolonged walking and standing       With TE min Patient Education:  YES  Reviewed HEP   []  Progressed/Changed HEP based on: Other Objective/Functional Measures: Add LAQ, sit hip flexion with core, ankle alphabet A-H     Post Treatment Pain Level (on 0 to 10) scale:  0  / 10-standing; 5/10-walking     ASSESSMENT  Assessment/Changes in Function:     Pt demonstrating slow progress toward all goals. Advanced gentle ankle ROM and hip strengthening exercise with pt reporting mild challenge. PUS held after 4 min secondary to pt discomfort. Updated written HEP. Pt and daughter verbalized good understanding of written HEP. []  See Progress Note/Recertification   Patient will continue to benefit from skilled PT services to modify and progress therapeutic interventions, address functional mobility deficits, address ROM deficits and address strength deficits to attain remaining goals. Progress toward goals / Updated goals:    1.  Patient performing daily HEP. - goal in progress  2.  Pt will score +2 on GROC indicating symptoms a little better. 3.  Increase AROM in DF to -10 degrees to facilitate walking.       PLAN  []  Upgrade activities as tolerated YES Continue plan of care   []  Discharge due to :    []  Other:      Therapist: Ermelinda Paredes PTA    Date: 1/9/2020 Time: 3:40  PM     Future Appointments   Date Time Provider Casey Marroquin   1/9/2020  2:30 PM Beto Craig [facility]   1/14/2020 11:00 AM Paz Askew PTA [facility]   1/16/2020 11:00 AM Zaki Ugalde PT [facility]   1/27/2020 10:10 AM Bruno Copeland MD [place] [address]   2/24/2020 12:30 PM Christopher Briscoe MD [address] [facility] 3[address]

## 2020-01-14 ENCOUNTER — HOSPITAL ENCOUNTER (OUTPATIENT)
Dept: PHYSICAL THERAPY | Age: 60
Discharge: HOME | End: 2020-01-14
Payer: MEDICAID

## 2020-01-14 PROCEDURE — 97140 MANUAL THERAPY 1/> REGIONS: CPT

## 2020-01-14 PROCEDURE — 97016 VASOPNEUMATIC DEVICE THERAPY: CPT

## 2020-01-14 PROCEDURE — 97110 THERAPEUTIC EXERCISES: CPT

## 2020-01-14 NOTE — PROGRESS NOTES
PHYSICAL THERAPY - DAILY TREATMENT NOTE    Patient Name: Adalberto Ingram        Date: 2020  : 1960   YES Patient  Verified  Visit #:   7  of   8  Insurance: Payor: Maite Malloy / Plan: [address] [place] [address] / Product Type: Managed Care Medicaid /      In time: 11:08 am Out time: 12:02 pm   Total Treatment Time: 52     Medicare Time /BCBS Tracking (below)   Total Timed Codes (min):  na 1:1 Treatment Time:  na     TREATMENT AREA =  Achilles tendinitis of left lower extremity [M76.62]    SUBJECTIVE  Pain Level (on 0 to 10 scale): 7  / 10   Medication Changes/New allergies or changes in medical history, any new surgeries or procedures? NO    If yes, update Summary List   Subjective Functional Status/Changes:  []  No changes reported     Pt reports feeling a little better. She had a little pain and went through with the exercise and it went better.  She feels the massage helps more than US       OBJECTIVE  Modalities Rationale:     decrease edema, decrease inflammation, decrease pain and increase tissue extensibility to improve patient's ability to perform prolonged walking and standing   min [] Estim, type/location:                                      []  att     []  unatt     []  w/US     []  w/ice    []  w/heat    min []  Mechanical Traction: type/lbs                   []  pro   []  sup   []  int   []  cont    []  before manual    []  after manual    min []  Ultrasound, settings/location:      min []  Iontophoresis w/ dexamethasone, location:                                               []  take home patch       []  in clinic    min []  Ice     []  Heat    location/position:    10 min [x]  Vasopneumatic Device, press/temp: Med 34 degrees    min []  Other:    [x] Skin assessment post-treatment (if applicable):    [x]  intact    []  redness- no adverse reaction     []redness - adverse reaction:        32/25 min billed min Therapeutic Exercise:  [x]  See flow sheet   Rationale:      increase ROM and increase strength to improve the patients ability toperform prolonged walking and standing     10 min Manual Therapy: Prone STM to left gastroc , distal Achilles, left PF    Rationale:      decrease pain, increase ROM, increase tissue extensibility, decrease edema  and decrease trigger points to improve patient's ability to perform prolonged walking and standing          With TE min Patient Education:  YES  Reviewed HEP   []  Progressed/Changed HEP based on: Other Objective/Functional Measures: Add c;a,decreased reps supine SLR flexion x 8    Post Treatment Pain Level (on 0 to 10) scale:   5  / 10     ASSESSMENT  Assessment/Changes in Function:     Hold PUS-resume manual massage to left gastroc and PF with improving tolerance to light to medium pressure. Moderate TrPt tenderness noted in distal Achilles persists. []  See Progress Note/Recertification   Patient will continue to benefit from skilled PT services to modify and progress therapeutic interventions, address functional mobility deficits, address ROM deficits, address strength deficits, analyze and address soft tissue restrictions, analyze and cue movement patterns and instruct in home and community integration to attain remaining goals. Progress toward goals / Updated goals:  Reassess for progress note next visit.      PLAN  []  Upgrade activities as tolerated YES Continue plan of care   []  Discharge due to :    []  Other:      Therapist: Ermelinda Paredes PTA    Date: 1/14/2020 Time: 12:02 pm     Future Appointments   Date Time Provider Casey Marroquin   1/14/2020 11:00 AM Paz Askew PTA [facility]   1/16/2020 11:00 AM Zaki Ugalde PT [facility]   1/27/2020 10:10 AM Melinda Jarrett MD [place] [address]   2/24/2020 12:30 PM Christopher Briscoe MD [address] [facility] 47[address]

## 2020-01-15 ENCOUNTER — HOSPITAL ENCOUNTER (OUTPATIENT)
Dept: PHYSICAL THERAPY | Age: 60
Discharge: HOME | End: 2020-01-15
Payer: MEDICAID

## 2020-01-15 PROCEDURE — 97140 MANUAL THERAPY 1/> REGIONS: CPT

## 2020-01-15 PROCEDURE — 97110 THERAPEUTIC EXERCISES: CPT

## 2020-01-15 NOTE — PROGRESS NOTES
PHYSICAL THERAPY - DAILY TREATMENT NOTE    Patient Name: Tian Mendieta        Date: 1/15/2020  : 1960   YES Patient  Verified  Visit #:  8  of   8  Insurance: Payor: Julito Gilmore / Plan: [address] [place] [address] / Product Type: Managed Care Medicaid /      In time: 2;11 pm Out time: 3:13 pm   Total Treatment Time: 62     Medicare Time /BCBS Tracking (below)   Total Timed Codes (min): na 1:1 Treatment Time:  na     TREATMENT AREA =  Achilles tendinitis of left lower extremity [M76.62]    SUBJECTIVE  Pain Level (on 0 to 10 scale):  5  / 10   Medication Changes/New allergies or changes in medical history, any new surgeries or procedures? NO    If yes, update Summary List   Subjective Functional Status/Changes:  []  No changes reported     Pt reports she feels a little better today.    Pain range: 5 to 8/10         OBJECTIVE  Modalities Rationale:     decrease edema, decrease inflammation and decrease pain to improve patient's ability to perform prolonged walking, standing, stair navigation   min [] Estim, type/location:                                      []  att     []  unatt     []  w/US     []  w/ice    []  w/heat    min []  Mechanical Traction: type/lbs                   []  pro   []  sup   []  int   []  cont    []  before manual    []  after manual    min []  Ultrasound, settings/location:      min []  Iontophoresis w/ dexamethasone, location:                                               []  take home patch       []  in clinic    min []  Ice     []  Heat    location/position:    10 min [x]  Vasopneumatic Device, press/temp: Med Left foot    min []  Other:    [] Skin assessment post-treatment (if applicable):    []  intact    []  redness- no adverse reaction     []redness - adverse reaction:        40 min Therapeutic Exercise:  [x]  See flow sheet   Rationale:      increase ROM and increase strength to improve the patients ability to perform prolonged walking, standing, stair navigation    12 min Manual Therapy: Prone STM to left gastroc , distal Achilles, left PF    Rationale:      decrease pain, increase ROM and increase tissue extensibility to improve patient's ability to perform prolonged walking, standing, stair navigation       With TE, MT min Patient Education:  YES  Reviewed HEP   []  Progressed/Changed HEP based on: Other Objective/Functional Measures:    GROC: +4 moderately better     Post Treatment Pain Level (on 0 to 10) scale:   0  / 10     ASSESSMENT  Assessment/Changes in Function:     See progress note     []  See Progress Note/Recertification   Patient will continue to benefit from skilled PT services to modify and progress therapeutic interventions, address functional mobility deficits, address ROM deficits, address strength deficits, analyze and address soft tissue restrictions and instruct in home and community integration to attain remaining goals.    Progress toward goals / Updated goals:    See progress note     PLAN  []  Upgrade activities as tolerated YES Continue plan of care   []  Discharge due to :    [x]  Other: Pending insurance approval     Therapist: Carole Medina PTA    Date: 1/15/2020 Time: 3:13  PM     Future Appointments   Date Time Provider Casey Marroquin   1/27/2020 10:10 AM Anahi Goldman MD [facility] [address]   2/24/2020 12:30 PM Aroldo Garcia MD [address] [facility]

## 2020-01-15 NOTE — PROGRESS NOTES
[facility] PH[facility]ICAL [facility]AP[facility] AT [facility] [address], [address] [facility], [address] - Phone: [phone]  Fax: [phone]  PROGRESS NOTE  Patient Name: Torire Pichardo : 1960   Treatment/Medical Diagnosis: Achilles tendinitis of left lower extremity [M76.62]   Referral Source: Ayesha Vigil MD     Date of Initial Visit: 20 Attended Visits: 8 Missed Visits: 3     SUMMARY OF TREATMENT  Physical Therapy Treatment has consisted of Therapeutic exercise for left ankle/LE ROM and strengthening, pulsed ultrasound, Pt education in activity modification , use of proper supportive foot wear, activity modification, self massage, and HEP for pain management, Manual Therapy, and ice with compression. CURRENT STATUS  Patient has progressed well in Physical Therapy, consistently reporting improving ROM, decreasing pain, and increased functional ability. Functional Deficits:sleeping, standing, walking, stairs. Pt's current pain range is 5 to 8/10. Functional improvements are sleeping, standing, walking . Pt would benefit from continued PT intervention in order to improve left ankle strength, flexibility, Functional mobility, and address remaining impairments. Goal/Measure of Progress Goal Met? 1. Patient performing daily HEP. Status at last Eval: na Current Status: Pt performing HEP daily. yes   2. Pt will score +2 on GROC indicating symptoms a little better. Status at last Eval: na Current Status: +4 yes   3. Increase AROM in DF to -10 degrees to facilitate walking. Status at last Eval: AROM: DF:L: -20 degrees Current Status: AROM: DF: L/R: 3 degrees bilaterally yes     New Goals to be achieved in __4__  weeks:  1. Pt independent in HEP. 2.  Increase score on FOTO to 38 indicating improved function and quality of life. 3.  Increase strength in left PF to 3/5 to facilitate push off during gait.   4.  Increase AROM in left ankle/foot so that it equals the right to facilitate walking. RECOMMENDATIONS  Plan to continue 2x per week x 4 weeks  If you have any questions/comments please contact us directly at  [phone]. Thank you for allowing us to assist in the care of your patient. LPTA Signature: Mio [address] PTA  Date: 1/15/2020   PT Signature: Kay Toribio PT Time: 1:52 PM   NOTE TO PHYSICIAN:  PLEASE COMPLETE THE ORDERS BELOW AND FAX TO   [facility] [place] Physical Therapy: [phone]. If you are unable to process this request in 24 hours please contact our office:  [phone].    ___ I have read the above report and request that my patient continue as recommended.   ___ I have read the above report and request that my patient continue therapy with the following changes/special instructions:_________________________________________________________   ___ I have read the above report and request that my patient be discharged from therapy.      Physician Signature:        Date:       Time:

## 2020-01-16 ENCOUNTER — APPOINTMENT (OUTPATIENT)
Dept: PHYSICAL THERAPY | Age: 60
End: 2020-01-16
Payer: MEDICAID

## 2020-01-27 ENCOUNTER — OFFICE VISIT (OUTPATIENT)
Dept: ORTHOPEDIC SURGERY | Age: 60
End: 2020-01-27

## 2020-01-27 VITALS
HEART RATE: 73 BPM | TEMPERATURE: 98.5 F | OXYGEN SATURATION: 98 % | WEIGHT: 285 LBS | HEIGHT: 63 IN | RESPIRATION RATE: 10 BRPM | DIASTOLIC BLOOD PRESSURE: 83 MMHG | BODY MASS INDEX: 50.5 KG/M2 | SYSTOLIC BLOOD PRESSURE: 137 MMHG

## 2020-01-27 DIAGNOSIS — Z01.818 PREOP TESTING: ICD-10-CM

## 2020-01-27 DIAGNOSIS — M76.62 ACHILLES TENDINITIS OF LEFT LOWER EXTREMITY: ICD-10-CM

## 2020-01-27 DIAGNOSIS — M76.62 ACHILLES TENDINITIS OF LEFT LOWER EXTREMITY: Primary | ICD-10-CM

## 2020-01-27 RX ORDER — CREAM BASE NO.171
240 CREAM (GRAM) MISCELLANEOUS 4 TIMES DAILY
Qty: 240 G | Refills: 3 | Status: CANCELLED | OUTPATIENT
Start: 2020-01-27

## 2020-01-27 NOTE — PROGRESS NOTES
1. Have you been to the ER, urgent care clinic since your last visit? Hospitalized since your last visit? No    2. Have you seen or consulted any other health care providers outside of the [address] [place] [address] since your last visit? Include any pap smears or colon screening.  No

## 2020-01-27 NOTE — PROGRESS NOTES
AMBULATORY PROGRESS NOTE      Patient: Liliana Dukes             MRN: [id number]     SSN: [id number] Body mass index is 50.49 kg/m². YOB: 1960     AGE: 61 y.o. EX: female    PCP: Betty Corrales MD     IMPRESSION/DIAGNOSIS AND TREATMENT PLAN     DIAGNOSES  1. Achilles tendinitis of left lower extremity    2. Preop testing        Orders Placed This Encounter    CULTURE, URINE    XR CHEST PA LAT    METABOLIC PANEL, COMPREHENSIVE    CBC WITH AUTOMATED DIFF    PROTHROMBIN TIME + INR    URINALYSIS W/ RFLX MICROSCOPIC    EKG, 12 LEAD, INITIAL      Liliana Dukes understands her diagnoses and the proposed plan. In this individual, Ms. Liliana Dukes, who has debilitating hindfoot pain, who is not improved, and who has a diagnosis of right Achilles noninsertional and insertional tendinopathy, recommendation would be for excisional debridement of this Achilles tendon, primary repair of the Achilles tendon, retrocalcaneal bursectomy, and repairing of this Achilles tendon back to bone using the [employer] [place] Technique System. She remains symptomatic on this left Achilles tendon. We will get things scheduled for her at her convenience. For her preop, I will have Dr. Funmilayo Bates interpret, as she does speak Turkish. She is here with her daughter. Plan:    1) Compound cream was prescribed. 2) Patient wished to continue her current treatment. 3) I will talk to pcp Betty Corrales MD and GI doctor prior to surgery. 4) Pre-Op Labs: CBC w/ diff, CMP, PT INR, CXR, EKG, UA w/ reflex, Urine Culture. RTO -  On a Monday afternoon     HPI AND EXAMINATION     Liliana Dukes IS A 61 y.o. female who presents to my outpatient office for follow up of Achilles tendinitis of the LLE and a contusion of the left knee and lower leg.  At the last visit, I provided a referral to PT, prescribed Lodine 400mg, Pepcid 40 mg, and instructed the patient to continue activity modification as directed    Since we saw her last, Ms. Jeff Hyde daughter states that she is still experiencing pain and notes some temporary relief with physical therapy but the pain returns when she is at home. She admits to some numbness in her toes and denies any DM but admits to knee and back pain. On a typical day she experiences pain as she begins to walk. She states they did not to the Graston technique due to severe tenderness. She also states the Lodine 400 mg did not provide much relief. She is not a good candidate for NSAID's due to stomach ulcers. She notes that she is tired of feeling pain and would like to be able to walk pain free. I personally reviewed the x-ray images with the patient and it demonstrated small degeneration. The patient's first language is [facility] [place] Dr. Pamela Hernandez provided translation for her. The patient has h/o hepatitis C and RA. She denies h/o liver failure. She starts a new medication for her liver on June 1st, which she will take for two months. She will be unable to take any other medications during her liver treatment. Visit Vitals  /83   Pulse 73   Temp 98.5 °F (36.9 °C) (Oral)   Resp 10   Ht 5' 3\" (1.6 m)   Wt 285 lb (129.3 kg)   SpO2 98%   BMI 50.49 kg/m²     Appearance: Alert, well appearing and pleasant patient who is in no distress, oriented to person, place/time, and who follows commands. This patient is accompanied in the examination room by her daughter. Dementia: no dementia  Psychiatric: Affect and mood are appropriate. Patient arrives to office via: without assistive device  HEENT: Head normocephalic & atraumatic.  Both pupils are round, non icteric sclera              Eye: EOM are intact and sclera are clear               Neck: ROM WNL and JVD neck is not present              Hearings Intact, does not require hearing aid device  Respiratory: Breathing is unlabored without accessory chest muscle use  Cardiovascular/Peripheral Vascular: Normal Pulses to each foot     Left ACHILLES GASTROCNEMIUS COMPLEX,PLANTAR FASCIA     Gait: antalgic  Tenderness: Moderate Achilles tendon sheath Insertional point     Moderate  tenderness to Achilles junction              NO Noninsertional Achilles tendon tenderness              Calf tenderness: Absent for calf or gastrocnemius muscle regions              Soft, supple, non tender, non taut lower extremity compartments              NONE to Medial Malleolar, 4/5 Met base midfoot, achilles, tib post, or              NONE to syndesmosis. no to plantar fascia, or central calcaneal region  Deformity/Swelling:  YES  Insertional point of Distal Achilles Tendon:               NO Fusiform noninsertional focal tendinopathy              NO Mervin's Deformity Present  Cutaneous: No rashes, skin patches, wounds, or abrasions to the lower legs                      Warm and Normal color. No regions of expressible drainage. Medial Border of Tibia Region: absent                      Skin color, texture, turgor normal.                       Pre-tibial edema is present. Joint Motion: Not tested at this time. Neurologic Exam: Neuro: Motor: normal 5/5 strength in all tested muscle groups and Sensory : no sensory deficits noted. No abnormal hand/wrist, foot/ankle, or facial/neck tremors. Contractures: Gastrocnemius or Achilles Contractures absent. Joint / Tendon Stability:               No anterolateral or varus instability of the Ankle or Subtalar Joints              No peroneal tendon instability present with ankle circumduction  Alignment:  Normal Foot Alignment and  Flexible  Vascular: Normal Pulses/ NL Capillary refill, No evidence of DVT seen on physical exam.              No calf swelling, no tenderness to calf.               Varicosities Lower Limbs : few large varicose veins present  Lymphatic: pre-tibial edema is present    CHART REVIEW     Past Medical History:   Diagnosis Date  Arthritis     Asthma     Gastritis     Hepatitis C     starting treatment 6/1/2019    Hypertension     Diagnosed in [place] a few months ago    Psychiatric disorder     SOME DEPRESSION     Current Outpatient Medications   Medication Sig    amoxicillin 500 mg tab Take  by mouth.  etodolac (LODINE) 400 mg tablet Take 400 mg by mouth two (2) times daily (with meals).  famotidine (PEPCID) 40 mg tablet Take 1 Tab by mouth daily.  famotidine (PEPCID) 40 mg tablet TOME MAJOR TABLETA TODOS LOS ALICEA    predniSONE (DELTASONE) 5 mg tablet Take  by mouth daily.  sulfaSALAzine (AZULFIDINE) 500 mg tablet Take 500 mg by mouth two (2) times a day.  fluticasone (FLONASE) 50 mcg/actuation nasal spray 2 Sprays by Both Nostrils route daily. No current facility-administered medications for this visit. No Known Allergies  Past Surgical History:   Procedure Laterality Date    COLONOSCOPY N/A 5/29/2019    COLONOSCOPY performed by Michael Martinez MD at [facility] ENDOSCOPY    HX CHOLECYSTECTOMY  2013    HX COLONOSCOPY      Completed in [address] Not on file   Tobacco Use    Smoking status: Former Smoker     Last attempt to quit: 1/1/2017     Years since quitting: 3.0    Smokeless tobacco: Never Used   Substance and Sexual Activity    Alcohol use: No    Drug use: No    Sexual activity: Yes     Partners: Male     Birth control/protection: None     Family History   Problem Relation Age of Onset    Diabetes Mother     Hypertension Father     No Known Problems Sister     No Known Problems Brother     No Known Problems Sister     No Known Problems Sister     No Known Problems Brother     No Known Problems Brother     No Known Problems Brother         REVIEW OF SYSTEMS : 1/27/2020  ALL BELOW ARE Negative except : SEE HPI     Constitutional: Negative for fever, chills and weight loss.  Neg Weight Loss  Cardiovascular: Negative for chest pain, claudication and leg swelling. SOB, ELLIS   Gastrointestinal/Urological: Negative for  pain, N/V/D/C, Blood in stool or urine,dysuria                         Hematuria, Incontinence, pelvic pain  Musculoskeletal: see HPI. Neurological: Negative for dizziness and weakness, headaches,Visual Changes             Confusion,  Or Seizures,   Psychiatric/Behavioral: Negative for depression, memory loss and substance abuse. Extremities:  Negative for hair changes, rash or skin lesion changes. Hematologic: Negative for Bleeding problems, bruising, pallor or swollen lymph nodes. Peripheral Vascular: No calf pain, vascular vein tenderness to calf pain              No calf throbbing, posterior knee throbbing pain     DIAGNOSTIC IMAGING   No notes on file    Please see above section of this report. I have personally reviewed the results of the above study. The interpretation of this study is my professional opinion. Written by Sarahi Wan, as dictated by Dr. Nico Yanes. I, Dr. Nico Yanes, confirm that all documentation is accurate.

## 2020-02-03 DIAGNOSIS — M76.62 ACHILLES TENDINITIS OF LEFT LOWER EXTREMITY: ICD-10-CM

## 2020-02-03 DIAGNOSIS — Z01.818 PREOP TESTING: ICD-10-CM

## 2020-02-04 NOTE — PROGRESS NOTES
500 [facility], [address] [place] [address] [facility]  Phone: [phone]  Fax: [phone] SUMMARY  Patient Name: Tian Mendieta : 1960   Treatment/Medical Diagnosis: Achilles tendinitis of left lower extremity [M76.62]   Referral Source: Calvin Harry MD     Date of Initial Visit: 19 Attended Visits: 8 Missed Visits: 3     SUMMARY OF TREATMENT  Physical Therapy Treatment has consisted of Therapeutic exercise for left ankle/LE ROM and strengthening, pulsed ultrasound, Pt education in activity modification , use of proper supportive foot wear, activity modification, self massage, and HEP for pain management, Manual Therapy, and ice with compression  CURRENT STATUS  Patient was unable to be formally reassessed secondary to not returning to PT after 1-15-20 visit. Goal/Measure of Progress Goal Met? 1.    Pt independent in HEP. Status at last Eval: Pt performing HEP daily. Current Status: Unable to reassess no   2.      Increase score on FOTO to 38 indicating improved function and quality of life. Status at last Eval: 28 Current Status: Unable to reassess no   3.     Increase strength in left PF to 3/5 to facilitate push off during gait. Status at last Eval: impaired strength of left ankle/foot as follows:  PF 2/5, DF 5/5, Inv 4/5, Ev 4/5. Current Status: Unable to reassess no   4.    Increase AROM in left ankle/foot so that it equals the right to facilitate walking. Status at last Eval: AROM: DF: L/R: 3 degrees bilaterally Current Status: Unable to reassess no     RECOMMENDATIONS  Discontinue therapy due to lack of attendance or compliance. If you have any questions/comments please contact us directly at [phone]. Thank you for allowing us to assist in the care of your patient. LPTA Signature: Ca Pulido Date: 1-15-20   Therapist Signature:  Mehnaz Giles PT Time: 2:20 PM To ensure we are able to process the patients encounter and avoid risk of your patient receiving a bill for our services, please sign and return this discharge summary by 2-15-20. (30days following DC date)      Physician signature:__________________________   Date: _________                                                                                           Time:__________

## 2020-02-24 ENCOUNTER — OFFICE VISIT (OUTPATIENT)
Dept: FAMILY MEDICINE CLINIC | Age: 60
End: 2020-02-24

## 2020-02-24 VITALS
DIASTOLIC BLOOD PRESSURE: 73 MMHG | TEMPERATURE: 97.6 F | HEIGHT: 63 IN | OXYGEN SATURATION: 99 % | BODY MASS INDEX: 51.03 KG/M2 | SYSTOLIC BLOOD PRESSURE: 142 MMHG | RESPIRATION RATE: 16 BRPM | WEIGHT: 288 LBS | HEART RATE: 63 BPM

## 2020-02-24 DIAGNOSIS — J31.0 RHINITIS, UNSPECIFIED TYPE: Primary | ICD-10-CM

## 2020-02-24 DIAGNOSIS — M05.80 POLYARTHRITIS WITH POSITIVE RHEUMATOID FACTOR (HCC): ICD-10-CM

## 2020-02-24 DIAGNOSIS — K27.9 PEPTIC ULCER: ICD-10-CM

## 2020-02-24 DIAGNOSIS — K29.70 GASTRITIS, PRESENCE OF BLEEDING UNSPECIFIED, UNSPECIFIED CHRONICITY, UNSPECIFIED GASTRITIS TYPE: ICD-10-CM

## 2020-02-24 DIAGNOSIS — M76.62 ACHILLES TENDINITIS OF LEFT LOWER EXTREMITY: ICD-10-CM

## 2020-02-24 RX ORDER — MINERAL OIL
180 ENEMA (ML) RECTAL DAILY
Qty: 90 TAB | Refills: 3 | Status: SHIPPED | OUTPATIENT
Start: 2020-02-24 | End: 2022-02-02 | Stop reason: SDUPTHER

## 2020-02-24 RX ORDER — FAMOTIDINE 40 MG/1
TABLET, FILM COATED ORAL
Qty: 90 TAB | Refills: 1 | Status: SHIPPED | OUTPATIENT
Start: 2020-02-24 | End: 2020-09-24 | Stop reason: SDUPTHER

## 2020-02-24 NOTE — PROGRESS NOTES
[facility]    CC: Follow-up for Chronic Disease Management    HPI:     Polyarthritis:  -Saw Rheumatologist on 1/17/2020  -Lab work was done. VIANEY, citrulline antibody,  liver function panel, and BMP were negative.  CPK was slightly elevated at 211  -Started on trial of Plaquenil  -Next appointment is on 3/3/2020      Left Achilles Tendinitis:  -Last saw orthopedist on 1/27/2020  -Was recommended she have surgery  -Reports that she has not heard back from the office about the surgery      Peptic Ulcer/Gastritis:  -Seeing gastroenterology for issue  -Taking famotidine for issue  -Pathology report addended on 11/21/2019 and was positive for H. Pylori  -Patient was prescribed antibiotic regimen for issue  -Has follow up appointment in March      ROS: Positive items marked in RED  CON: fever, chills  Cardiovascular: palpitations, CP  Resp: SOB, cough  GI: nausea, vomiting, diarrhea  : dysuria, hematuria      Past Medical History:   Diagnosis Date    Arthritis     Asthma     Gastritis     Hepatitis C     starting treatment 6/1/2019    Hypertension     Diagnosed in [place] a few months ago    Psychiatric disorder     SOME DEPRESSION       Past Surgical History:   Procedure Laterality Date    COLONOSCOPY N/A 5/29/2019    COLONOSCOPY performed by Lorri Watson MD at [address] [place] [address] HX CHOLECYSTECTOMY  2013    HX COLONOSCOPY      Completed in [facility]       Family History   Problem Relation Age of Onset    Diabetes Mother     Hypertension Father     No Known Problems Sister     No Known Problems Brother     No Known Problems Sister     No Known Problems Sister     No Known Problems Brother     No Known Problems Brother     No Known Problems Brother        Social History     Socioeconomic History    Marital status: SINGLE     Spouse name: Not on file    Number of children: Not on file    Years of education: Not on file    Highest education level: Not on file   Tobacco Use    Smoking status: Former Smoker     Last attempt to quit: 1/1/2017     Years since quitting: 3.1    Smokeless tobacco: Never Used   Substance and Sexual Activity    Alcohol use: No    Drug use: No    Sexual activity: Yes     Partners: Male     Birth control/protection: None       No Known Allergies      Current Outpatient Medications:     etodolac (LODINE) 400 mg tablet, Take 400 mg by mouth two (2) times daily (with meals). , Disp: 60 Tab, Rfl: 0    famotidine (PEPCID) 40 mg tablet, TOME MAJOR TABLETA TODOS LOS ALICEA, Disp: 30 Tab, Rfl: 0    amoxicillin 500 mg tab, Take  by mouth., Disp: , Rfl:     famotidine (PEPCID) 40 mg tablet, Take 1 Tab by mouth daily. , Disp: 30 Tab, Rfl: 0    predniSONE (DELTASONE) 5 mg tablet, Take  by mouth daily. , Disp: , Rfl:     sulfaSALAzine (AZULFIDINE) 500 mg tablet, Take 500 mg by mouth two (2) times a day., Disp: , Rfl:     fluticasone (FLONASE) 50 mcg/actuation nasal spray, 2 Sprays by Both Nostrils route daily. , Disp: 1 Bottle, Rfl: 11    Physical Exam:      /73   Pulse 63   Temp 97.6 °F (36.4 °C) (Oral)   Resp 16   Ht 5' 3\" (1.6 m)   Wt 288 lb (130.6 kg)   SpO2 99%   BMI 51.02 kg/m²     General: obese habitus, NAD, conversant  Eyes: sclera clear bilaterally, no discharge noted, eyelids normal in appearance  HENT: NCAT, nasal turbinates enlarged bilaterally  Lungs: CTAB, normal respiratory effort and rate  CV: RRR, no MRGs  ABD: soft, non-tender, non-distended, normal bowel sounds  Skin: normal temperature, turgor, color, and texture  Psych: alert and oriented to person, place and situation, normal affect  Neuro: speech normal, moving all extremities      Assessment/Plan     Polyarthritis, unchanged:  -Will defer management to rheumatology  -Follow-up in 1 month      Left Achilles Tendinitis:  -Will defer management to orthopedist  -Patient advised to contact orthopedist office about next appointment  -Follow-up in 1 month      Peptic Ulcer/Gastritis:  -Will defer management to gastroenterology  -Follow-up in 1 month      Rhinitis:  -Likely secondary to allergic etiology  -Started on Allegra regimen  -Follow-up in 1 month        Odalis Gleason MD  2/24/2020, 12:36 PM

## 2020-02-24 NOTE — PROGRESS NOTES
1. Have you been to the ER, urgent care clinic since your last visit? Hospitalized since your last visit? No    2. Have you seen or consulted any other health care providers outside of the [address] [place] [address] since your last visit? Include any pap smears or colon screening.  No

## 2020-04-06 ENCOUNTER — VIRTUAL VISIT (OUTPATIENT)
Dept: FAMILY MEDICINE CLINIC | Age: 60
End: 2020-04-06

## 2020-04-06 ENCOUNTER — TELEPHONE (OUTPATIENT)
Dept: FAMILY MEDICINE CLINIC | Age: 60
End: 2020-04-06

## 2020-04-06 DIAGNOSIS — M05.80 POLYARTHRITIS WITH POSITIVE RHEUMATOID FACTOR (HCC): Primary | ICD-10-CM

## 2020-04-06 RX ORDER — DULOXETIN HYDROCHLORIDE 60 MG/1
60 CAPSULE, DELAYED RELEASE ORAL DAILY
Qty: 30 CAP | Refills: 3 | Status: SHIPPED | OUTPATIENT
Start: 2020-04-06 | End: 2020-08-03

## 2020-04-06 RX ORDER — HYDROXYCHLOROQUINE SULFATE 200 MG/1
TABLET, FILM COATED ORAL
COMMUNITY
Start: 2020-03-27 | End: 2022-02-02 | Stop reason: SDUPTHER

## 2020-04-06 NOTE — PROGRESS NOTES
Chief Complaint   Patient presents with    Back Pain     Radiates down right hip x 2 mos has Hx: Tendotinitis     Other     See's Ortho- Dr. Wayne Quintanilla Hip Pain     right hip          1. Have you been to the ER, urgent care clinic since your last visit? Hospitalized since your last visit? NO    2. Have you seen or consulted any other health care providers outside of the [address] [place] [address] since your last visit? Include any pap smears or colon screening.  Ortho Dr. Jagdish Hutson

## 2020-04-06 NOTE — PROGRESS NOTES
Leena Mortensen    CC: F/U of Polyarthritis    HPI:     Consent: Moses Peterson, who was seen by synchronous (real-time) audio-video technology, and/or her healthcare decision maker, is aware that this patient-initiated, Telehealth encounter on 4/6/2020 is a billable service, with coverage as determined by her insurance carrier. She is aware that she may receive a bill and has provided verbal consent to proceed: Yes.        Polyarthritis:  -Not taking Plaquenil as prescribed by rheumatologist  -States she stopped the medication after 2 weeks due to not noticing any difference  -Denies any side effects from the medication  -Reports that her pain has been worsening  -Has only been taking Tylenol for issue  -Has no follow-up appointment scheduled with rheumatologist      ROS: Positive items marked in RED  CON: fever, chills  Cardiovascular: palpitations, CP  Resp: SOB, cough  GI: nausea, vomiting, diarrhea  : dysuria, hematuria      Past Medical History:   Diagnosis Date    Arthritis     Asthma     Gastritis     Hepatitis C     starting treatment 6/1/2019    Hypertension     Diagnosed in [place] a few months ago    Psychiatric disorder     SOME DEPRESSION       Past Surgical History:   Procedure Laterality Date    COLONOSCOPY N/A 5/29/2019    [facility]    HX CHOLECYSTECTOMY  2013    [facility]X COLONOSCOPY      [facility] in [facility]       Family History   Problem Relation Age of Onset    Diabetes Mother     Hypertension Father     No Known Problems Sister     No Known Problems Brother     No Known Problems Sister     No Known Problems Sister     No Known Problems Brother     No Known Problems Brother     No Known Problems Brother        Social History     Socioeconomic History    Marital status: SINGLE     Spouse name: Not on file    Number of children: Not on file    Years of education: Not on file    Highest education level: Not on file   Tobacco Use    Smoking status: Former Smoker     Last attempt to quit: 1/1/2017     Years since quitting: 3.2    Smokeless tobacco: Never Used   Substance and Sexual Activity    Alcohol use: No    Drug use: No    Sexual activity: Yes     Partners: Male     Birth control/protection: None       No Known Allergies      Current Outpatient Medications:     hydroxychloroquine (PLAQUENIL) 200 mg tablet, TOME MAJOR TABLETA TODOS LOS D AS, Disp: , Rfl:     DULoxetine (CYMBALTA) 60 mg capsule, Take 1 Cap by mouth daily. Indications: chronic muscle or bone pain, Disp: 30 Cap, Rfl: 3    famotidine (PEPCID) 40 mg tablet, TOME MAJOR TABLETA TODOS LOS ALICEA, Disp: 90 Tab, Rfl: 1    fexofenadine (ALLEGRA) 180 mg tablet, Take 1 Tab by mouth daily. Indications: inflammation of the nose due to an allergy, Disp: 90 Tab, Rfl: 3    etodolac (LODINE) 400 mg tablet, Take 400 mg by mouth two (2) times daily (with meals). , Disp: 60 Tab, Rfl: 0    Physical Exam:      General: obese habitus, NAD, conversant, sitting in car (Passenger)  Eyes: sclera clear bilaterally, no discharge noted, eyelids normal in appearance, EOMI  HENT: NCAT  Neck: no masses visualized, trachea appears to be midline  Lungs: normal respiratory effort and rate, No visualized signs of difficulty breathing or respiratory distress  MS: Normal AROM of neck noted  Skin: no significant exanthematous lesions or discoloration noted on neck/facial skin    Psych: alert and oriented to person, place and situation, normal affect  Neuro: speech normal, moving all upper extremities, no gaze palsy, no facial asymmetry (Cranial nerve 7 motor function) (limited exam due to video visit)       Assessment/Plan     Polyarthritis, inadequately controlled:  -Etiology unclear  -Advised that Plaquenil can take 1 to 2 months before she will notice an effect  -Instructed to take Plaquenil as prescribed by rheumatologist  -Started on Cymbalta regimen  -Follow-up in 1 month      Pursuant to the emergency declaration under the [address] and [facility] waiver authority and the [employer] and [employer] Act, this Virtual Visit was conducted, with patient's consent, to reduce the patient's risk of exposure to COVID-19 and provide continuity of care for an established patient. Services were provided through a video synchronous discussion virtually to substitute for in-person appointment.       Wagner Gonzalez MD  4/6/2020, 9:24 AM

## 2020-04-06 NOTE — TELEPHONE ENCOUNTER
Patient called stating that she needs a refill for her heartburn medication but can't recall the name of it. Patient also states that she has severe lower back pain.  Patient requested a call back from the nurse

## 2020-04-06 NOTE — TELEPHONE ENCOUNTER
Have tried to call pt, she states she needs her pepcid but filled in feb for #90 with 1 refill. I spoke with Dr. Adelaida Disla- She was originally on schedule and will be put back on schedule. Per Dr. Adelaida Disla.  Thank you

## 2020-05-07 ENCOUNTER — VIRTUAL VISIT (OUTPATIENT)
Dept: FAMILY MEDICINE CLINIC | Age: 60
End: 2020-05-07

## 2020-05-07 DIAGNOSIS — M05.80 POLYARTHRITIS WITH POSITIVE RHEUMATOID FACTOR (HCC): Primary | ICD-10-CM

## 2020-05-07 NOTE — PROGRESS NOTES
Mary Mortensen    CC: F/U of Polyarthritis    HPI:     Consent: Monet Cowan, who was seen by synchronous (real-time) audio-video technology, and/or her healthcare decision maker, is aware that this patient-initiated, Telehealth encounter on 5/7/2020 is a billable service, with coverage as determined by her insurance carrier. She is aware that she may receive a bill and has provided verbal consent to proceed: Yes.        Polyarthritis:  -Taking Cymbalta as prescribed  -Reports that initially the medication caused nausea, headaches, and vomiting, but these side effects have resolved  -Has also been taking Plaquenil as prescribed by rheumatologist (Dr. Debbie Awan)  -States that she has had a significant improvement of her pain since her last visit  -Has no scheduled follow-up appointment with Dr. Debbie Awan      ROS: Positive items marked in RED  CON: fever, chills  Cardiovascular: palpitations, CP  Resp: SOB, cough  GI: nausea, vomiting, diarrhea  : dysuria, hematuria       Past Medical History:   Diagnosis Date    Arthritis     Asthma     Gastritis     Hepatitis C     starting treatment 6/1/2019    Hypertension     Diagnosed in [place] a few months ago    Psychiatric disorder     SOME DEPRESSION       Past Surgical History:   Procedure Laterality Date    COLONOSCOPY N/A 5/29/2019    COL[facility]ails [facility]COPY    HX CHOLECYSTECTOMY  2013    HX COLONOSCOPY      Completed in [place]       Family History   Problem Relation Age of Onset    Diabetes Mother     Hypertension Father     No Known Problems Sister     No Known Problems Brother     No Known Problems Sister     No Known Problems Sister     No Known Problems Brother     No Known Problems Brother     No Known Problems Brother        Social History     Socioeconomic History    Marital status: SINGLE     Spouse name: Not on file    Number of children: Not on file    Years of education: Not on file    Highest education level: Not on file   Tobacco Use    Smoking status: Former Smoker     Last attempt to quit: 1/1/2017     Years since quitting: 3.3    Smokeless tobacco: Never Used   Substance and Sexual Activity    Alcohol use: No    Drug use: No    Sexual activity: Yes     Partners: Male     Birth control/protection: None       No Known Allergies      Current Outpatient Medications:     hydroxychloroquine (PLAQUENIL) 200 mg tablet, TOME MAJOR TABLETA TODOS LOS D AS, Disp: , Rfl:     DULoxetine (CYMBALTA) 60 mg capsule, Take 1 Cap by mouth daily. Indications: chronic muscle or bone pain, Disp: 30 Cap, Rfl: 3    famotidine (PEPCID) 40 mg tablet, TOME MAJOR TABLETA TODOS LOS ALICEA, Disp: 90 Tab, Rfl: 1    fexofenadine (ALLEGRA) 180 mg tablet, Take 1 Tab by mouth daily. Indications: inflammation of the nose due to an allergy, Disp: 90 Tab, Rfl: 3    etodolac (LODINE) 400 mg tablet, Take 400 mg by mouth two (2) times daily (with meals). , Disp: 60 Tab, Rfl: 0    Physical Exam:      General: obese habitus, NAD, conversant  Eyes: sclera clear bilaterally, no discharge noted, eyelids normal in appearance, EOMI  HENT: NCAT  Neck: no masses visualized, trachea appears to be midline  Lungs: normal respiratory effort and rate, No visualized signs of difficulty breathing or respiratory distress  MS: normal AROM of neck noted  Skin: no significant exanthematous lesions or discoloration noted on neck/facial skin    Psych: alert and oriented to person, place and situation, normal affect  Neuro: speech normal, moving all upper extremities, no gaze palsy, no facial asymmetry (Cranial nerve 7 motor function) (limited exam due to video visit)      Assessment/Plan     Polyarthritis, well controlled:  -Etiology unclear  -Will continue current Cymbalta regimen  -Encouraged to continue taking Plaquenil as prescribed by rheumatologist  -Advised to contact rheumatologist office to schedule a follow-up appointment  -Follow-up in 3 month for chronic disease management      Ha Ricardo is a 61 y.o. female being evaluated by a Virtual Visit (video visit) encounter to address concerns as mentioned above. A caregiver was present when appropriate. Due to this being a TeleHealth encounter (During PDQYU-61 public health emergency), evaluation of the following organ systems was limited: Vitals/Constitutional/EENT/Resp/CV/GI//MS/Neuro/Skin/Heme-Lymph-Imm. Pursuant to the emergency declaration under the [address] [place] [address] authority and the [employer] and [employer] Act, this Virtual Visit was conducted with patient's (and/or legal guardian's) consent, to reduce the risk of exposure to COVID-19 and provide necessary medical care. Services were provided through a video synchronous discussion virtually to substitute for in-person encounter. --Greg Villanueva MD on 5/7/2020 at 1:18 PM    An electronic signature was used to authenticate this note.

## 2020-08-05 ENCOUNTER — OFFICE VISIT (OUTPATIENT)
Dept: ORTHOPEDIC SURGERY | Age: 60
End: 2020-08-05

## 2020-08-05 VITALS
WEIGHT: 286 LBS | SYSTOLIC BLOOD PRESSURE: 175 MMHG | RESPIRATION RATE: 20 BRPM | BODY MASS INDEX: 50.68 KG/M2 | HEART RATE: 85 BPM | DIASTOLIC BLOOD PRESSURE: 89 MMHG | HEIGHT: 63 IN | TEMPERATURE: 97.5 F

## 2020-08-05 DIAGNOSIS — M76.62 ACHILLES TENDINITIS OF LEFT LOWER EXTREMITY: ICD-10-CM

## 2020-08-05 DIAGNOSIS — M54.16 LUMBAR RADICULITIS: Primary | ICD-10-CM

## 2020-08-05 NOTE — PROGRESS NOTES
AMBULATORY PROGRESS NOTE      Patient: Amarilis Thomas             MRN: [id number]     SSN: [id number] Body mass index is 50.66 kg/m². YOB: 1960     AGE: 61 y.o. EX: female    PCP: Lety Nino MD       IMPRESSION //  DIAGNOSIS AND TREATMENT PLAN      DIAGNOSES  1. Lumbar radiculitis    2. Achilles tendinitis of left lower extremity        Orders Placed This Encounter    REFERRAL TO SPINE SURGERY     Referral Priority:   Routine     Referral Type:   Consultation     Referral Reason:   Specialty Services Required    EMG ONE EXTREMITY LOWER LT     Standing Status:   Future     Standing Expiration Date:   2/5/2021     Order Specific Question:   Reason for Exam:     Answer:   Left lower extremity radicular pain      She was seen here today, in the presence of Dr. Janel Olivera. Who interpreted for me. Since I saw her last, she is complaining of continued pain discomfort to her Achilles tendon for which she has known left interstitial degenerative tendinopathy on this left side. She is clear in stating to me, that however prior to her Achilles tendon issues, she has had shooting pain numbness along the plantar portion of her foot, and pain that shoots from her buttock left buttock down her leg. So she describing what sounds like sciatica. Because of this, and I have her get EMG nerve conduction studies, of her left lower extremity, then refer her respectfully to our spine center. As relates to her Achilles tendon, I would recommend any additional surgery on her at this current time until we sort out what the etiology of her left lower nerve type pain is. PLAN:    1. Referral to Spine Center for radiculopathy of left leg  2. EMG/Nerve Conduction Study    RTO- after EMG      HPI //  OBJECTIVE EXAMINATION      Amarilis Thomas IS A 61 y.o. female who presents to my outpatient office for follow up evaluation of: Achilles tendinitis of LLE.  At the last OV, I wrote a prescription for a compounded cream, ordered pre-op labs. My plan was to also consult the patient's PCP Shilpi Henson MD and her GI doctor prior to surgery. Since the last OV, Hamilton Sandifer     Visit Vitals  /89 (BP 1 Location: Right arm, BP Patient Position: Sitting)   Pulse 85   Temp 97.5 °F (36.4 °C)   Resp 20   Ht 5' 3\" (1.6 m)   Wt 286 lb (129.7 kg)   BMI 50.66 kg/m²       ANKLE/FOOT left    Psychiatry: Alert, oriented x 3 (name,place,time of day); speech normal in context and clarity, memory intact grossly, no involuntary movements - tremors, no dementia  Gait: slow  Tenderness: moderate non-insertional and insertional portion of this left Achilles tendon. Cutaneous: WNL  Joint Motion: WNL  Joint / Tendon Stability: No Ankle or Subtalar instability or joint laxity. No peroneal sublux ability or dislocation  Alignment: neutral Hindfoot, none Metatarsus Adductus Metatarsus. Neuro Motor/Sensory: NL/decreased light touch to the plantar portion of her left foot  Vascular: NL foot/ankle pulses,   Lymphatics: No extremity lymphedema, No calf swelling, no tenderness to calf muscles. CHART REVIEW     Patient Active Problem List   Diagnosis Code    Rheumatoid arthritis involving multiple sites with positive rheumatoid factor (HCC) M05.79    Obesity, morbid (HCC) E66.01    Chronic hepatitis C without hepatic coma (HCC) B18.2    Chronic pain syndrome G89.4    Left Achilles tendinitis M76.62        Hamilton Sandifer has been experiencing pain and discomfort confirmed as outlined in the pain assessment outlined below.       Pain Assessment  1/27/2020   Location of Pain Foot   Location Modifiers Left   Severity of Pain 8   Quality of Pain Aching   Quality of Pain Comment -   Duration of Pain A few hours   Frequency of Pain Intermittent   Aggravating Factors Standing;Walking   Limiting Behavior -   Relieving Factors Rest;Elevation   Result of Injury No        Hamilton Sandifer  has a past medical history of Arthritis, Asthma, Gastritis, Hepatitis C, Hypertension, and Psychiatric disorder. Patients is employed at:         Past Medical History:   Diagnosis Date    Arthritis     Asthma     Gastritis     Hepatitis C     starting treatment 6/1/2019    Hypertension     Diagnosed in [place] a few months ago    Psychiatric disorder     SOME DEPRESSION     Past Surgical History:   Procedure Laterality Date    COLONOSCOPY N/A 5/29/2019    COLONOSCOPY performed by Dangelo Booker MD at [facility] [address]  2013    HX COLONOSCOPY      Completed in [facility]     Current Outpatient Medications   Medication Sig    DULoxetine (CYMBALTA) 60 mg capsule TOME MAJOR CAPSULA TODOS LOS ALICEA CHRONIC MUSCLE OR BONE PAIN    hydroxychloroquine (PLAQUENIL) 200 mg tablet TOME MAJOR TABLETA TODOS LOS D AS    famotidine (PEPCID) 40 mg tablet TOME MAJOR TABLETA TODOS LOS ALICEA    fexofenadine (ALLEGRA) 180 mg tablet Take 1 Tab by mouth daily. Indications: inflammation of the nose due to an allergy    etodolac (LODINE) 400 mg tablet Take 400 mg by mouth two (2) times daily (with meals). No current facility-administered medications for this visit.       No Known Allergies  Social History     Occupational History    Not on file   Tobacco Use    Smoking status: Former Smoker     Last attempt to quit: 1/1/2017     Years since quitting: 3.5    Smokeless tobacco: Never Used   Substance and Sexual Activity    Alcohol use: No    Drug use: No    Sexual activity: Yes     Partners: Male     Birth control/protection: None     Family History   Problem Relation Age of Onset    Diabetes Mother     Hypertension Father     No Known Problems Sister     No Known Problems Brother     No Known Problems Sister     No Known Problems Sister     No Known Problems Brother     No Known Problems Brother     No Known Problems Brother        THE  FOR [place]  WAS REVIEWED BY Dee Singleton MD 8/5/2020 . DIAGNOSTIC IMAGING  LAB DATA      No results found for: HBA1C, HGBE8, HSC0PCOL, DXU2TWVA //   Lab Results   Component Value Date/Time    Glucose 72 (L) 06/15/2018 02:45 PM    Glucose, POC 86 01/23/2019 06:27 PM        No results found for: EDQ8FYYW, BFE0CCMP      Lab Results   Component Value Date/Time    Vitamin D 25-Hydroxy 21.9 (L) 01/11/2019 02:12 PM         REVIEW OF SYSTEMS : 8/5/2020  ALL BELOW ARE Negative except : SEE HPI     CONSTITUTIONAL: No weight loss  PSYCHOLOGICAL : No Feelings of anxiety, depression, agitation  EYES: No blurred vision and no eye discharge. NO eye pain, double vision  ENT: No nasal discharge. No ear pain. CARDIOVASCULAR: No chest pain and no diaphoresis. RESPIRATORY: No cough, no hemoptysis. GI: No vomiting, no diarrhea   : No urinary frequency and no dysuria. MUSCULOSKELETAL: see HPI  SKIN: No rashes. NEURO:  No dizziness,weakness, headaches// No visual changes or confusion, or seizures,   ENDOCRINE: No polyphagia and no polydipsia. HEMATOLOGY: No bleeding tendencies. DIAGNOSTIC IMAGING      Please see above section of this report. I have personally reviewed the results of the above study. The interpretation of this study is my professional opinion.       Savannah Bai MD  8/5/2020  1:41 PM

## 2020-08-06 ENCOUNTER — OFFICE VISIT (OUTPATIENT)
Dept: ORTHOPEDIC SURGERY | Age: 60
End: 2020-08-06

## 2020-08-06 VITALS
SYSTOLIC BLOOD PRESSURE: 155 MMHG | HEIGHT: 63 IN | WEIGHT: 292.4 LBS | OXYGEN SATURATION: 98 % | BODY MASS INDEX: 51.81 KG/M2 | HEART RATE: 92 BPM | DIASTOLIC BLOOD PRESSURE: 81 MMHG | TEMPERATURE: 97.4 F

## 2020-08-06 DIAGNOSIS — M54.16 LUMBAR RADICULOPATHY: Primary | ICD-10-CM

## 2020-08-06 RX ORDER — PREGABALIN 150 MG/1
150 CAPSULE ORAL 2 TIMES DAILY
Qty: 60 CAP | Refills: 2 | Status: SHIPPED | OUTPATIENT
Start: 2020-08-06 | End: 2021-01-26

## 2020-08-06 RX ORDER — PREDNISONE 10 MG/1
TABLET ORAL
Qty: 21 TAB | Refills: 0 | Status: SHIPPED | OUTPATIENT
Start: 2020-08-06 | End: 2020-08-24 | Stop reason: ALTCHOICE

## 2020-08-06 NOTE — PROGRESS NOTES
[address], [address]  [place], [address] [place] [address] Street  Phone: [phone]  Fax: [phone]        Ghada Roca  : 1960  PCP: Fannie Garcia MD  2020    HISTORY OF PRESENT ILLNESS  Leonardo Chen is a 61 y.o. female c/o low back pain with radiating lateral LLE paraesthesia in an L5 distribution. She reports that a few weeks ago, she experienced a pain so severe in her low back that she had difficulty walking. She has been under the care of Dr. Magda Farrell for left Achilles tendinitis. She has been taking Cymbalta without benefit. She previously tried Gabapentin with minimal benefit.      Pain Score: 8/10.     Treatments patient has tried:  Physical therapy:No  Doing HEP: Unknown  Non-opioid medications: Yes  Spinal injections: No  Spinal surgery- No.   Last Lumbar MRI: None.     PmHx: Arthritis, asthma, Gastritis, hepatitis C, HTN, depression     ASSESSMENT  This is a 61year-old female with history of low back pain radiating into the LLE in an L4/5 distribution. Her symptoms may be due to a left L4/5 radiculopathy. She had decreased sensation in an L4/5 distribution on the left.     PLAN  1. Lumbar MRI - evaluate left L4/5 lumbar radiculopathy given distribution of symptoms and decreased sensation in L4/5 distribution  2. 10 mg Prednisone dose pack. 3. Lyrica 150 mg BID.      Pt will f/u after MRI or sooner if needed. Diagnoses and all orders for this visit:    1. Lumbar radiculopathy  -     predniSONE (STERAPRED DS) 10 mg dose pack; See administration instruction per 10mg dose pack  -     MRI LUMB SPINE WO CONT; Future  -     pregabalin (Lyrica) 150 mg capsule; Take 1 Cap by mouth two (2) times a day. Max Daily Amount: 300 mg. CHIEF COMPLAINT  Leonardo Chen is seen today in consultation at the request of Fannie Garcia MD for complaints of low back pain radiating into LLE.       PAST MEDICAL HISTORY   Past Medical History: Diagnosis Date    Arthritis     Asthma     Gastritis     Hepatitis C     starting treatment 6/1/2019    Hypertension     Diagnosed in [place] a few months ago    Psychiatric disorder     SOME DEPRESSION       Past Surgical History:   Procedure Laterality Date    COLONOSCOPY N/A 5/29/2019    COLONOSCOPY performed by Teri Mays MD at [address]  2013    HX COLONOSCOPY      Completed in [facility]    Current Outpatient Medications   Medication Sig Dispense Refill    DULoxetine (CYMBALTA) 60 mg capsule TOME MAJOR CAPSULA TODOS LOS ALICEA CHRONIC MUSCLE OR BONE PAIN 90 Cap 1    hydroxychloroquine (PLAQUENIL) 200 mg tablet TOME MAJOR TABLETA TODOS LOS D AS      famotidine (PEPCID) 40 mg tablet TOME MAJOR TABLETA TODOS LOS ALICEA 90 Tab 1    fexofenadine (ALLEGRA) 180 mg tablet Take 1 Tab by mouth daily. Indications: inflammation of the nose due to an allergy 90 Tab 3    etodolac (LODINE) 400 mg tablet Take 400 mg by mouth two (2) times daily (with meals).  60 Tab 0       ALLERGIES  No Known Allergies       SOCIAL HISTORY    Social History     Socioeconomic History    Marital status: SINGLE     Spouse name: Not on file    Number of children: Not on file    Years of education: Not on file    Highest education level: Not on file   Tobacco Use    Smoking status: Former Smoker     Last attempt to quit: 1/1/2017     Years since quitting: 3.5    Smokeless tobacco: Never Used   Substance and Sexual Activity    Alcohol use: No    Drug use: No    Sexual activity: Yes     Partners: Male     Birth control/protection: None       FAMILY HISTORY  Family History   Problem Relation Age of Onset    Diabetes Mother     Hypertension Father     No Known Problems Sister     No Known Problems Brother     No Known Problems Sister     No Known Problems Sister     No Known Problems Brother     No Known Problems Brother     No Known Problems Brother          REVIEW OF SYSTEMS  Review of Systems   Musculoskeletal: Positive for back pain. LLE paraesthesia         PHYSICAL EXAMINATION  Visit Vitals  /81 (BP 1 Location: Right arm, BP Patient Position: Sitting)   Pulse 92   Temp 97.4 °F (36.3 °C)   Ht 5' 3\" (1.6 m)   Wt 292 lb 6.4 oz (132.6 kg)   SpO2 98%   BMI 51.80 kg/m²         Pain Assessment  8/6/2020   Location of Pain Leg;Back   Location Modifiers Left   Severity of Pain 8   Quality of Pain Aching; Other (Comment)   Quality of Pain Comment stabbing   Duration of Pain Persistent   Frequency of Pain -   Aggravating Factors Bending;Walking;Standing; Other (Comment)   Aggravating Factors Comment sitting, liftingt   Limiting Behavior -   Relieving Factors Rest   Result of Injury No         Constitutional:  Well developed, well nourished, in no acute distress. Psychiatric: Affect and mood are appropriate. HEENT: Normocephalic, atraumatic. Extraocular movements intact. Integumentary: No rashes or abrasions noted on exposed areas. Cardiovascular: Regular rate and rhythm. Pulmonary: Clear to auscultation bilaterally. SPINE/MUSCULOSKELETAL EXAM    Lumbar spine:  No rash, ecchymosis, or gross obliquity. No fasciculations. No focal atrophy is noted. No pain with hip ROM. Full range of motion. No tenderness to palpation. No tenderness to palpation at the sciatic notch. SI joints non-tender. Trochanters non tender. Decreased sensation in L4/5 distribution on the L.     Positive SLR on the L. MOTOR:      Biceps  Triceps Deltoids Wrist Ext Wrist Flex Hand Intrin   Right 5/5 5/5 5/5 5/5 5/5 5/5   Left 5/5 5/5 5/5 5/5 5/5 5/5             Hip Flex  Quads Hamstrings Ankle DF EHL Ankle PF   Right 5/5 5/5 5/5 5/5 5/5 5/5   Left 5/5 5/5 5/5 5/5 5/5 5/5     DTRs are 1+ biceps, triceps, brachioradialis, patella, and Achilles.     Squat not tested. No difficulty with tandem gait.      Ambulation without assistive device.  FWB.       RADIOGRAPHS/DATA  No images available for review as XR machine is experiencing technical difficulties      reviewed     Ms. Helio Garcia has a reminder for a \"due or due soon\" health maintenance. I have asked that she contact her primary care provider for follow-up on this health maintenance. Written by Ernesto Mckeon, as dictated by Dr. Roberta Odell. I, Dr. Roberta Odell, confirm that all documentation is accurate.

## 2020-08-20 ENCOUNTER — HOSPITAL ENCOUNTER (OUTPATIENT)
Dept: MRI IMAGING | Age: 60
Discharge: HOME | End: 2020-08-20
Attending: PHYSICAL MEDICINE & REHABILITATION
Payer: MEDICAID

## 2020-08-20 DIAGNOSIS — M05.80 POLYARTHRITIS WITH POSITIVE RHEUMATOID FACTOR (HCC): ICD-10-CM

## 2020-08-20 DIAGNOSIS — Z13.6 ENCOUNTER FOR LIPID SCREENING FOR CARDIOVASCULAR DISEASE: ICD-10-CM

## 2020-08-20 DIAGNOSIS — M54.16 LUMBAR RADICULOPATHY: ICD-10-CM

## 2020-08-20 DIAGNOSIS — B18.2 CHRONIC HEPATITIS C WITHOUT HEPATIC COMA (HCC): Primary | ICD-10-CM

## 2020-08-20 DIAGNOSIS — Z13.220 ENCOUNTER FOR LIPID SCREENING FOR CARDIOVASCULAR DISEASE: ICD-10-CM

## 2020-08-20 PROCEDURE — 72148 MRI LUMBAR SPINE W/O DYE: CPT

## 2020-08-24 ENCOUNTER — OFFICE VISIT (OUTPATIENT)
Dept: ORTHOPEDIC SURGERY | Age: 60
End: 2020-08-24

## 2020-08-24 VITALS
OXYGEN SATURATION: 99 % | SYSTOLIC BLOOD PRESSURE: 150 MMHG | HEART RATE: 72 BPM | BODY MASS INDEX: 51.91 KG/M2 | WEIGHT: 293 LBS | TEMPERATURE: 97.9 F | HEIGHT: 63 IN | RESPIRATION RATE: 20 BRPM | DIASTOLIC BLOOD PRESSURE: 88 MMHG

## 2020-08-24 DIAGNOSIS — M54.16 LUMBAR RADICULOPATHY: Primary | ICD-10-CM

## 2020-08-24 NOTE — PROGRESS NOTES
[address]  [address], [address]  [place], [address] [place] [address] Street  Phone: [phone]  Fax: [phone]        Suzette Jaeger  : 1960  PCP: Lakshmi Reed MD  2020    PROGRESS NOTE      HISTORY OF PRESENT ILLNESS  Cynthia Stiles is a 61 y.o. female who was seen as a new patient 2020 with c/o low back pain with radiating lateral LLE paraesthesia in an L5 distribution. She reported that a few weeks ago, she experienced a pain so severe in her low back that she had difficulty walking. She was under the care of Dr. Emeka Moore for left Achilles tendinitis. She took Cymbalta without benefit. She previously tried Gabapentin with minimal benefit. She was prescribed Prednisone and Lyrica. Cynthia Stiles comes in to the office today for f/u. Lumbar spine MRI dated 2020 reviewed. Per report,  Degenerative spondylosis with slight retrolisthesis L5/S1 with moderate bilateral lateral recess stenosis with some flattening of the proximal descending L5 nerve roots right greater than left. Mild to moderate subarticular foraminal stenosis, no significant canal stenosis. Additional multilevel degenerative spondylosis as above with no other stenosis or impingement. Mild degenerative endplate marrow edema at several levels as above with no other acute osseous findings. Pain Score: 7/10. Treatments patient has tried:  Physical therapy:No  Doing HEP: Unknown  Non-opioid medications: Yes - Cymbalta, Gabapentin, Prednisone, Lyrica  Spinal injections: No  Spinal surgery- No.   Last Lumbar MRI : L5/S1 with moderate bilateral lateral recess stenosis with some flattening of the proximal descending L5 nerve roots      PmHx: Arthritis, asthma, Gastritis, hepatitis C, HTN, depression    ASSESSMENT  This is a 61year-old female with history of low back pain radiating into the LLE in an L4/5 distribution.  Her symptoms may be due to a left L5 radiculopathy (moderate bilateral lateral recess stenosis with some flattening of the proximal descending L5 nerve roots). She had decreased sensation in an L4/5 distribution on the left. PLAN  1. Left L4 and left L5 TFESI      Pt will f/u in 2-4 weeks after injection or sooner as needed. Diagnoses and all orders for this visit:    1. Lumbar radiculopathy  -     SCHEDULE SURGERY       PAST MEDICAL HISTORY   Past Medical History:   Diagnosis Date    Arthritis     Asthma     Gastritis     Hepatitis C     starting treatment 6/1/2019    Hypertension     Diagnosed in [place] a few months ago    Psychiatric disorder     SOME DEPRESSION       Past Surgical History:   Procedure Laterality Date    COLONOSCOPY N/A 5/29/2019    COLONOSCOPY performed by Benji Sprague MD at [address] [facility] [facility]  2013    HX COLONOSCOPY      [facility] in [facility]   .      MEDICATIONS    Current Outpatient Medications   Medication Sig Dispense Refill    predniSONE (STERAPRED DS) 10 mg dose pack See administration instruction per 10mg dose pack 21 Tab 0    pregabalin (Lyrica) 150 mg capsule Take 1 Cap by mouth two (2) times a day. Max Daily Amount: 300 mg. 60 Cap 2    DULoxetine (CYMBALTA) 60 mg capsule TOME MAJOR CAPSULA TODOS LOS ALICEA CHRONIC MUSCLE OR BONE PAIN 90 Cap 1    hydroxychloroquine (PLAQUENIL) 200 mg tablet TOME MAJOR TABLETA TODOS LOS D AS      famotidine (PEPCID) 40 mg tablet TOME MAJOR TABLETA TODOS LOS ALICEA 90 Tab 1    fexofenadine (ALLEGRA) 180 mg tablet Take 1 Tab by mouth daily. Indications: inflammation of the nose due to an allergy 90 Tab 3    etodolac (LODINE) 400 mg tablet Take 400 mg by mouth two (2) times daily (with meals).  60 Tab 0        ALLERGIES  No Known Allergies       SOCIAL HISTORY    Social History     Socioeconomic History    Marital status: SINGLE     Spouse name: Not on file    Number of children: Not on file    Years of education: Not on file    Highest education level: Not on file Tobacco Use    Smoking status: Former Smoker     Last attempt to quit: 1/1/2017     Years since quitting: 3.6    Smokeless tobacco: Never Used   Substance and Sexual Activity    Alcohol use: No    Drug use: No    Sexual activity: Yes     Partners: Male     Birth control/protection: None       FAMILY HISTORY  Family History   Problem Relation Age of Onset    Diabetes Mother     Hypertension Father     No Known Problems Sister     No Known Problems Brother     No Known Problems Sister     No Known Problems Sister     No Known Problems Brother     No Known Problems Brother     No Known Problems Brother          REVIEW OF SYSTEMS  Review of Systems   Musculoskeletal: Positive for back pain. LLE paraesthesia          PHYSICAL EXAMINATION  There were no vitals taken for this visit. Pain Assessment  8/6/2020   Location of Pain Leg;Back   Location Modifiers Left   Severity of Pain 8   Quality of Pain Aching; Other (Comment)   Quality of Pain Comment stabbing   Duration of Pain Persistent   Frequency of Pain -   Aggravating Factors Bending;Walking;Standing; Other (Comment)   Aggravating Factors Comment sitting, liftingt   Limiting Behavior -   Relieving Factors Rest   Result of Injury No           Constitutional:  Well developed, well nourished, in no acute distress. Psychiatric: Affect and mood are appropriate. Integumentary: No rashes or abrasions noted on exposed areas. SPINE/MUSCULOSKELETAL EXAM    Lumbar spine:  No rash, ecchymosis, or gross obliquity. No fasciculations. No focal atrophy is noted. No pain with hip ROM. Full range of motion. No tenderness to palpation. No tenderness to palpation at the sciatic notch. SI joints non-tender. Trochanters non tender.     Decreased sensation in L4/5 distribution on the L.     Positive SLR on the L.     MOTOR:      Biceps  Triceps Deltoids Wrist Ext Wrist Flex Hand Intrin   Right 5/5 5/5 5/5 5/5 5/5 5/5   Left 5/5 5/5 5/5 5/5 5/5 5/5             Hip Flex  Quads Hamstrings Ankle DF EHL Ankle PF   Right 5/5 5/5 5/5 5/5 5/5 5/5   Left 5/5 5/5 5/5 5/5 5/5 5/5     DTRs are 1+ biceps, triceps, brachioradialis, patella, and Achilles.     Squat not tested. No difficulty with tandem gait.      Ambulation without assistive device. FWB.       RADIOGRAPHS/DATA  Lumbar MRI images taken on 8/20/2020 personally reviewed with patient:  Slight degenerative retrolisthesis L5/S1 with otherwise normal alignment. No  evidence of fracture or spondylolysis. Mild degenerative endplate marrow edema  O5/M2 and posteriorly L3/4 and minimally asymmetric to the left T12/L1. No other  marrow edema or neoplastic marrow signal. The conus medullaris is located at the  L1/2 level and has a normal appearance and signal.      Visualized retroperitoneum and upper sacrum unremarkable.     Moderate disc space narrowing and degenerative spondylosis with minimal disc  bulge T12/L1 without significant stenosis.     L1/2 level: Unremarkable.      L2/3 level: Mild facet arthropathy and minimal degenerative spondylosis without  significant stenosis.     L3/4 level: Mild facet arthropathy and minimal degenerative spondylosis without  significant disc bulge herniation or stenosis.     L4/5 level: Mild to moderate facet arthropathy with no significant disc bulge or  herniation with very mild left lateral recess stenosis with no other significant  stenosis and no evidence of impingement.     L5/S1 level: Moderate to severe disc space narrowing with mild to moderate facet  arthropathy with slight retrolisthesis. There is some bilateral lateral recess  stenosis with flattening of the bilateral proximal descending L5 nerve roots  right greater than left. No significant canal stenosis. Moderate right-sided and  mild-to-moderate left-sided degenerative subarticular foraminal stenosis.     ______________     IMPRESSION  IMPRESSION:     1.  Degenerative spondylosis with slight retrolisthesis L5/S1 with moderate  bilateral lateral recess stenosis with some flattening of the proximal  descending L5 nerve roots right greater than left. Mild to moderate subarticular  foraminal stenosis, no significant canal stenosis.     2. Additional multilevel degenerative spondylosis as above with no other  stenosis or impingement.     3. Mild degenerative endplate marrow edema at several levels as above with no  other acute osseous findings. 13 minutes of face-to-face contact were spent with the patient during today's visit extensively discussing symptoms and treatment plan. All questions were answered. More than half of this visit today was spent on counseling.      Written by Pita Gonzales as dictated by Hoa Roman MD

## 2020-08-24 NOTE — PROGRESS NOTES
Gena Goldman presents today for   Chief Complaint   Patient presents with    Back Pain    Leg Pain     left       Is someone accompanying this pt? Yes, female    Is the patient using any DME equipment during [address]? no    Depression Screening:  3 most recent PHQ Screens 8/5/2020   Little interest or pleasure in doing things Several days   Feeling down, depressed, irritable, or hopeless Several days   Total Score PHQ 2 2       Learning Assessment:  Learning Assessment 6/15/2018   PRIMARY LEARNER Patient   HIGHEST LEVEL OF EDUCATION - PRIMARY LEARNER  GRADUATED HIGH SCHOOL OR GED   BARRIERS PRIMARY LEARNER LANGUAGE   CO-LEARNER CAREGIVER Yes   CO-LEARNER NAME Myra   CO-LEARNER HIGHEST LEVEL OF EDUCATION GRADUATED HIGH SCHOOL OR GED   BARRIERS CO-LEARNER NONE   PRIMARY LANGUAGE Danish   LEARNER PREFERENCE PRIMARY READING   ANSWERED BY Daughter   RELATIONSHIP OTHER       Abuse Screening:  Abuse Screening Questionnaire 4/6/2020   Do you ever feel afraid of your partner? N   Are you in a relationship with someone who physically or mentally threatens you? N   Is it safe for you to go home? Y       Fall Risk  Fall Risk Assessment, last 12 mths 4/6/2020   Able to walk? Yes   Fall in past 12 months? No       Coordination of Care:  1. Have you been to the ER, urgent care clinic since your last visit? no  Hospitalized since your last visit? no    2. Have you seen or consulted any other health care providers outside of the [address] [place] [address] since your last visit? no Include any pap smears or colon screening.  no

## 2020-09-03 ENCOUNTER — HOSPITAL ENCOUNTER (OUTPATIENT)
Age: 60
Setting detail: OUTPATIENT SURGERY
Discharge: HOME | End: 2020-09-03
Attending: PHYSICAL MEDICINE & REHABILITATION | Admitting: PHYSICAL MEDICINE & REHABILITATION
Payer: MEDICAID

## 2020-09-03 ENCOUNTER — APPOINTMENT (OUTPATIENT)
Dept: GENERAL RADIOLOGY | Age: 60
End: 2020-09-03
Attending: PHYSICAL MEDICINE & REHABILITATION
Payer: MEDICAID

## 2020-09-03 VITALS
RESPIRATION RATE: 20 BRPM | HEART RATE: 74 BPM | DIASTOLIC BLOOD PRESSURE: 74 MMHG | SYSTOLIC BLOOD PRESSURE: 149 MMHG | OXYGEN SATURATION: 95 % | TEMPERATURE: 98.1 F

## 2020-09-03 PROCEDURE — 76010000009 HC PAIN MGT 0 TO 30 MIN PROC: Performed by: PHYSICAL MEDICINE & REHABILITATION

## 2020-09-03 PROCEDURE — 77030003669 HC NDL SPN COOK -B: Performed by: PHYSICAL MEDICINE & REHABILITATION

## 2020-09-03 PROCEDURE — 77030039433 HC TY MYLEOGRAM BD -B: Performed by: PHYSICAL MEDICINE & REHABILITATION

## 2020-09-03 PROCEDURE — 74011250637 HC RX REV CODE- 250/637: Performed by: PHYSICAL MEDICINE & REHABILITATION

## 2020-09-03 RX ORDER — DIAZEPAM 5 MG/1
5-20 TABLET ORAL ONCE
Status: COMPLETED | OUTPATIENT
Start: 2020-09-03 | End: 2020-09-03

## 2020-09-03 RX ADMIN — DIAZEPAM 10 MG: 5 TABLET ORAL at 12:16

## 2020-09-03 NOTE — DISCHARGE INSTRUCTIONS
[facility]   (NINA)  Dr. Oscar Hilliard, Dr. Bri Guzman, Dr. Dilshad Blank Spinal Procedure (Block) Instructions    * Do not drive a car, operate heavy machinery or dangerous equipment, or make important decisions for 12-24 hours. * Light activity as tolerated; may rest for the remainder of the day. * Resume pre-block medications including those from your other doctors. * Do not drink alcoholic beverages for 24 hours. Alcohol and the medications you have received may interact and cause an adverse reaction. * You may feel better this evening and worse tomorrow, as the numbing medications wears off and the steroid has yet to begin to work. After 48-72 hrs the steroid should begin to release bringing you relief. If you had a medial branch block, no steroids were used. The medial branch block is a test to see if you are a candidate for radiofrequency ablation (RFA). The anesthetic (numbing medicine)  will wear off by the next day. * You may shower this evening and remove any bandages. * Avoid hot tubs/pools/tub soaks and heating pads for 24 hours. You may use cold packs on the procedure site as tolerated for the first 24 hours. * If a headache develops, drink plenty of fluids and rest.  Take over the counter medications for headache if needed. If the headache continues longer than 24 hours, call MD at the [address] [place] [address] Avenue. [phone]    * Continue taking pain medications as needed. * You may resume your regular diet if tolerated. Otherwise, start with sips of water and advance slowly. * If Diabetic: check your blood sugar three times a day for the next 3 days. If your sugar is greater than 300 call your family doctor. If your sugar is greater than 400, have someone transport you to the nearest Emergency Room. * If you experience any of the following problems, Please Call the [address] [place] [address] Avenue at [phone].         * Excessive pain, swelling, redness or odor at or around the surgical area    * Fever of 101 or higher    * Nausea / Vomiting lasting longer than 4 hours or if unable to take medications. * Severe Headache    * Weakness or numbness in arms or legs that is not      resolving   * Any NEW signs of decreased circulation or nerve impairment in leg: change in color, swelling, persistent numbness, tingling                    * Prolonged increase in pain greater than 4 days      PATIENT INSTRUCTIONS:    After oral sedation, for 12-24 hours or while taking prescription Narcotics:  · Limit your activities  · Do not drive and operate hazardous machinery  · Do not make important personal or business decisions  · Do  not drink alcoholic beverages  · If you have not urinated within 8 hours after discharge, please contact your surgeon on call. *  Please give a list of your current medications to your Primary Care Provider. *  Please update this list whenever your medications are discontinued, doses are      changed, or new medications (including over-the-counter products) are added. *  Please carry medication information at all times in case of emergency situations. These are general instructions for a healthy lifestyle:    No smoking/ No tobacco products/ Avoid exposure to second hand smoke    Surgeon General's Warning:  Quitting smoking now greatly reduces serious risk to your health. Obesity, smoking, and sedentary lifestyle greatly increases your risk for illness    A healthy diet, regular physical exercise & weight monitoring are important for maintaining a healthy lifestyle    You may be retaining fluid if you have a history of heart failure or if you experience any of the following symptoms:  Weight gain of 3 pounds or more overnight or 5 pounds in a week, increased swelling in our hands or feet or shortness of breath while lying flat in bed.   Please call your doctor as soon as you notice any of these symptoms; do not wait until your next office visit. Recognize signs and symptoms of STROKE:    F-face looks uneven    A-arms unable to move or move unevenly    S-speech slurred or non-existent    T-time-call 911 as soon as signs and symptoms begin-DO NOT go       Back to bed or wait to see if you get better-TIME IS BRAIN.

## 2020-09-03 NOTE — PROCEDURES
PROCEDURE NOTE      Patient Name: Oval Arrow    Date of Procedure: September 3, 2020    Preoperative Diagnosis:  Lumbar radiculopathy [M54.16]    Post Operative Diagnosis:  Lumbar radiculopathy [M54.16]    Procedure :    left L4 Selective Nerve Root Block  left L5 Selective Nerve Root Block      Consent:  Informed consent was obtained prior to the procedure. The patient was given the opportunity to ask questions regarding the procedure and its associated risks. In addition to the potential risks associated with the procedure itself, the patient was informed both verbally and in writing of the potential side effects of the use of glucocorticoid. The patient appeared to comprehend the informed consent and desired to have the procedure performed. Procedure: The patient was placed in the prone position on the fluoroscopy table and the back was prepped and draped in the usual sterile manner. The exact spinal level was  identified using fluoroscopy, and Lidocaine 1 % was injected locally, a # 22 gauge spinal needle was passed to the transverse process. The depth was noted and the needle redirected to pass inferior and approximately one cm anterior to the transverse process. YES  1 cc of Isovue M-200 was used to verify positioning in the epidural and paravertebral space and outlined the course of the spinal nerve into the epidural space. The same procedure was repeated at each spinal level indicated above. A total of 10 mg of preservative free Dexamethasone and 2 cc of Lidocaine was slowly injected. The patient tolerated the procedure well. The injection area was cleaned and bandaids applied. Not excessive bleeding was noted. Patient dressed and discharged to home with instructions. Discussion: The patient tolerated the procedure well.                                               Johnny Heredia MD  September 3, 2020

## 2020-09-15 ENCOUNTER — OFFICE VISIT (OUTPATIENT)
Dept: ORTHOPEDIC SURGERY | Age: 60
End: 2020-09-15

## 2020-09-15 VITALS
WEIGHT: 293 LBS | HEART RATE: 76 BPM | TEMPERATURE: 96.4 F | BODY MASS INDEX: 51.91 KG/M2 | DIASTOLIC BLOOD PRESSURE: 84 MMHG | SYSTOLIC BLOOD PRESSURE: 131 MMHG | HEIGHT: 63 IN | OXYGEN SATURATION: 99 %

## 2020-09-15 DIAGNOSIS — M54.16 LUMBAR RADICULOPATHY: Primary | ICD-10-CM

## 2020-09-15 DIAGNOSIS — M76.62 ACHILLES TENDINITIS OF LEFT LOWER EXTREMITY: ICD-10-CM

## 2020-09-15 NOTE — PROGRESS NOTES
AMBULATORY PROGRESS NOTE      Patient: Keith Dorsey             MRN: [id number]     SSN: [id number] Body mass index is 53.04 kg/m². YOB: 1960     AGE: 61 y.o. EX: female    PCP: Princess Rosmery MD       IMPRESSION //  DIAGNOSIS AND TREATMENT PLAN      DIAGNOSES  1. Lumbar radiculopathy    2. Achilles tendinitis of left lower extremity        No orders of the defined types were placed in this encounter. She has left ankle insertional Achilles tendinopathy with intractable pain she is tried extensive measures: Physical therapy CAM Walker boot active modification 7 disabling pain:    She is in require left Achilles tendon surgery: Sharp excisional debridement of the left Achilles tendon: The tendon of pathic regions of the left Achilles tendon, retrocalcaneal bursectomy, possible flexor houses longus tendon augmentation with biointerference screw, possible cadaveric posterior tibial tendon allograft augmentation procedure ([employer]). She is currently getting treatment for her sciatica, it is been helping her: The 2 injection she is had her back by Dr. Denise Whatley. She is scheduled to see him, on 9/28/2020. If he feels that she is improved enough from her back, then we will get her scheduled for her ankle reconstructive surgery. I will make sure have Dr. Courtney Bernal, her formal , in the office, will be get her consented: This is given the long recovery. She will be nonweightbearing, for least 4 to 6 weeks then weightbearing thereafter gradually    From blood thinners, for DVT prophylaxis. She will need a walker or scooter crutches, as much as we can do to help her to recover. PLAN:    1. The patient will need to follow up with Dr. Juan Carlos Irizarry at the end of this month for third back injection; the patient would like to proceed with surgery for her L Achilles once she is cleared with Dr. Juan Carlos Irizarry for her back.    2. Contact Palma Mack for surgical scheduling, [phone] after appointment with Dr. Sara Knight      HPI //  Art Rocio IS A 61 y.o. female who presents to my outpatient office for follow up evaluation of: Achilles tendinitis of LLE. At the last OV, I provided a referral to Spine center for radiculopathy of leg and EMG/Nerve conduction study. Since the last OV, Anamaria Fernandez daughter reports that she has been doing better, pain-wise in her back. Overall, her back pain has been improving. She  recently received an injection in her back, which gave her some relief. She has an appointment with Dr. Sara Knight on September 28th, for which she will receive her third injection. Regarding her L Achilles, she's been resting this week, so she has had less pain in her foot. However, the pain is still persistent; She would like to have a surgical procedure done on her Achilles. Visit Vitals  /84 (BP 1 Location: Right arm, BP Patient Position: Sitting)   Pulse 76   Temp (!) 96.4 °F (35.8 °C)   Ht 5' 3\" (1.6 m)   Wt 299 lb 6.4 oz (135.8 kg)   SpO2 99%   BMI 53.04 kg/m²       ANKLE/FOOT left    Psychiatry: Alert, oriented x 3 (name,place,time of day); speech normal in context and clarity, memory intact grossly, no involuntary movements - tremors, no dementia  Gait: slow  Tenderness: moderate non-insertional and insertional portion of this left Achilles tendon. Cutaneous: some swelling medially  Joint Motion: WNL  Joint / Tendon Stability: No Ankle or Subtalar instability or joint laxity. No peroneal sublux ability or dislocation  Alignment: neutral Hindfoot, none Metatarsus Adductus Metatarsus. Neuro Motor/Sensory: NL/decreased light touch to the plantar portion of her left foot  Vascular: NL foot/ankle pulses,   Lymphatics: No extremity lymphedema, No calf swelling, no tenderness to calf muscles.       CHART REVIEW     Patient Active Problem List   Diagnosis Code    Rheumatoid arthritis involving multiple sites with positive rheumatoid factor (HCC) M05.79    Obesity, morbid ([facility] [address] E66.01    Chronic hepatitis C without hepatic coma (HCC) B18.2    Chronic pain syndrome G89.4    Left Achilles tendinitis M76.62        Rod Lamas has been experiencing pain and discomfort confirmed as outlined in the pain assessment outlined below. Pain Assessment  9/15/2020   Location of Pain Foot   Location Modifiers Left   Severity of Pain 3   Quality of Pain Burning   Quality of Pain Comment -   Duration of Pain -   Frequency of Pain -   Aggravating Factors -   Aggravating Factors Comment -   Limiting Behavior -   Relieving Factors -   Result of Injury -        Rod Lamas  has a past medical history of Arthritis, Asthma, Gastritis, Hepatitis C, Hypertension, and Psychiatric disorder. Patients is employed at:         Past Medical History:   Diagnosis Date    Arthritis     Asthma     Gastritis     Hepatitis C     starting treatment 6/1/2019    Hypertension     Diagnosed in [place] a few months ago    Psychiatric disorder     SOME DEPRESSION     Past Surgical History:   Procedure Laterality Date    COLONOSCOPY N/A 5/29/2019    COLONOSCOPY performed by Gerardo Lance MD at [facility]  2013     COLONOSCOPY      Completed in [facility]     Current Outpatient Medications   Medication Sig    pregabalin (Lyrica) 150 mg capsule Take 1 Cap by mouth two (2) times a day. Max Daily Amount: 300 mg.    DULoxetine (CYMBALTA) 60 mg capsule TOME MAJOR CAPSULA TODOS LOS ALICEA CHRONIC MUSCLE OR BONE PAIN    hydroxychloroquine (PLAQUENIL) 200 mg tablet TOME MAJOR TABLETA TODOS LOS D AS    famotidine (PEPCID) 40 mg tablet TOME MAJOR TABLETA TODOS LOS ALICEA    fexofenadine (ALLEGRA) 180 mg tablet Take 1 Tab by mouth daily. Indications: inflammation of the nose due to an allergy (Patient taking differently: Take 180 mg by mouth daily as needed.  Indications: inflammation of the nose due to an allergy)    etodolac (LODINE) 400 mg tablet Take 400 mg by mouth two (2) times daily (with meals). No current facility-administered medications for this visit. No Known Allergies  Social History     Occupational History    Not on file   Tobacco Use    Smoking status: Former Smoker     Last attempt to quit: 1/1/2017     Years since quitting: 3.7    Smokeless tobacco: Never Used   Substance and Sexual Activity    Alcohol use: No    Drug use: No    Sexual activity: Yes     Partners: Male     Birth control/protection: None     Family History   Problem Relation Age of Onset    Diabetes Mother     Hypertension Father     No Known Problems Sister     No Known Problems Brother     No Known Problems Sister     No Known Problems Sister     No Known Problems Brother     No Known Problems Brother     No Known Problems Brother        THE  FOR Vinay Lino MD 9/15/2020 . DIAGNOSTIC IMAGING  LAB DATA      No results found for: HBA1C, HGBE8, UFC7HZBY, ENY1RZTZ //   Lab Results   Component Value Date/Time    Glucose 92 08/20/2020 08:37 AM    Glucose, POC 86 01/23/2019 06:27 PM        No results found for: LRZ1LKSU, YMK4CEKW      Lab Results   Component Value Date/Time    Vitamin D 25-Hydroxy 21.9 (L) 01/11/2019 02:12 PM         REVIEW OF SYSTEMS : 9/15/2020  ALL BELOW ARE Negative except : SEE HPI     CONSTITUTIONAL: No weight loss  PSYCHOLOGICAL : No Feelings of anxiety, depression, agitation  EYES: No blurred vision and no eye discharge. NO eye pain, double vision  ENT: No nasal discharge. No ear pain. CARDIOVASCULAR: No chest pain and no diaphoresis. RESPIRATORY: No cough, no hemoptysis. GI: No vomiting, no diarrhea   : No urinary frequency and no dysuria. MUSCULOSKELETAL: see HPI  SKIN: No rashes. NEURO:  No dizziness,weakness, headaches// No visual changes or confusion, or seizures,   ENDOCRINE: No polyphagia and no polydipsia. HEMATOLOGY: No bleeding tendencies. DIAGNOSTIC IMAGING      Please see above section of this report. I have personally reviewed the results of the above study. The interpretation of this study is my professional opinion.       Jerica Mora MD  9/15/2020  1:41 PM

## 2020-09-24 ENCOUNTER — VIRTUAL VISIT (OUTPATIENT)
Dept: FAMILY MEDICINE CLINIC | Age: 60
End: 2020-09-24
Payer: MEDICAID

## 2020-09-24 DIAGNOSIS — I10 ESSENTIAL HYPERTENSION WITH GOAL BLOOD PRESSURE LESS THAN 140/90: ICD-10-CM

## 2020-09-24 DIAGNOSIS — M05.80 POLYARTHRITIS WITH POSITIVE RHEUMATOID FACTOR (HCC): Primary | ICD-10-CM

## 2020-09-24 DIAGNOSIS — M76.62 ACHILLES TENDINITIS OF LEFT LOWER EXTREMITY: ICD-10-CM

## 2020-09-24 DIAGNOSIS — K21.9 GASTROESOPHAGEAL REFLUX DISEASE, ESOPHAGITIS PRESENCE NOT SPECIFIED: ICD-10-CM

## 2020-09-24 PROCEDURE — 99214 OFFICE O/P EST MOD 30 MIN: CPT | Performed by: FAMILY MEDICINE

## 2020-09-24 RX ORDER — LISINOPRIL 10 MG/1
10 TABLET ORAL DAILY
Qty: 30 TAB | Refills: 2 | Status: SHIPPED | OUTPATIENT
Start: 2020-09-24 | End: 2020-10-08 | Stop reason: SDUPTHER

## 2020-09-24 RX ORDER — DULOXETIN HYDROCHLORIDE 60 MG/1
CAPSULE, DELAYED RELEASE ORAL
Qty: 180 CAP | Refills: 1 | Status: SHIPPED | OUTPATIENT
Start: 2020-09-24 | End: 2021-06-29 | Stop reason: DRUGHIGH

## 2020-09-24 RX ORDER — ACETAMINOPHEN 500 MG
TABLET ORAL
Qty: 1 KIT | Refills: 0 | Status: SHIPPED | OUTPATIENT
Start: 2020-09-24 | End: 2020-11-06

## 2020-09-24 RX ORDER — ETODOLAC 400 MG/1
400 TABLET, FILM COATED ORAL 2 TIMES DAILY WITH MEALS
Qty: 180 TAB | Refills: 1 | Status: SHIPPED | OUTPATIENT
Start: 2020-09-24 | End: 2020-11-06

## 2020-09-24 RX ORDER — FAMOTIDINE 40 MG/1
TABLET, FILM COATED ORAL
Qty: 90 TAB | Refills: 2 | Status: SHIPPED | OUTPATIENT
Start: 2020-09-24 | End: 2021-01-22 | Stop reason: SDUPTHER

## 2020-09-24 NOTE — PROGRESS NOTES
[facility]    CC: Follow-up for chronic disease management    HPI:     Preeti Laura, who was evaluated through a synchronous (real-time) audio-video encounter, and/or her healthcare decision maker, is aware that it is a billable service, with coverage as determined by her insurance carrier. She provided verbal consent to proceed: Yes, and patient identification was verified. It was conducted pursuant to the emergency declaration under the [address] [place] [address] and the [employer] and [employer] Act. A caregiver was present when appropriate. Ability to conduct physical exam was limited. I was at home. The patient was at home.       Polyarthritis:  -Has not made follow-up appointment with rheumatologist due to being evaluated for her back pain by Dr. Kaye Yang  -Has been taking Cymbalta as prescribed  -Denies any side effects or issues with the medication  -Reports that her pain has been inadequately controlled      GERD:  -Taking famotidine as prescribed  -Denies any side effects or issues with the medication  -Reports medication is effective at controlling her symptoms      HTN:  -Taking BP medications prescribed  -Denies any side effects or issues with BP medication  -Reports that her BP has been high  -Chart review shows that her BP is most often in stage II hypertension  -Not following any regular excise regimen  -Diet is unchanged since last visit      ROS: Positive items marked in RED  CON: fever, chills  Cardiovascular: palpitations, CP  Resp: SOB, cough  GI: nausea, vomiting, diarrhea  : dysuria, hematuria    Past Medical History:   Diagnosis Date    Arthritis     Asthma     Gastritis     Hepatitis C     starting treatment 6/1/2019    Hypertension     Diagnosed in [place] a few months ago    Psychiatric disorder     SOME DEPRESSION       Past Surgical History:   Procedure Laterality Date    COLONOSCOPY N/A 5/29/2019    COLONOSCOPY performed by Dayana Jimenez MD at [address] HX CHOLECYSTECTOMY  2013    HX COLONOSCOPY      Completed in [facility]       Family History   Problem Relation Age of Onset    Diabetes Mother     Hypertension Father     No Known Problems Sister     No Known Problems Brother     No Known Problems Sister     No Known Problems Sister     No Known Problems Brother     No Known Problems Brother     No Known Problems Brother        Social History     Tobacco Use    Smoking status: Former Smoker     Last attempt to quit: 1/1/2017     Years since quitting: 3.7    Smokeless tobacco: Never Used   Substance Use Topics    Alcohol use: No    Drug use: No       No Known Allergies      Current Outpatient Medications:     pregabalin (Lyrica) 150 mg capsule, Take 1 Cap by mouth two (2) times a day. Max Daily Amount: 300 mg., Disp: 60 Cap, Rfl: 2    DULoxetine (CYMBALTA) 60 mg capsule, TOME MAJOR CAPSULA TODOS LOS ALICEA CHRONIC MUSCLE OR BONE PAIN, Disp: 90 Cap, Rfl: 1    hydroxychloroquine (PLAQUENIL) 200 mg tablet, TOME MAJOR TABLETA TODOS LOS D AS, Disp: , Rfl:     famotidine (PEPCID) 40 mg tablet, TOME MAJOR TABLETA TODOS LOS ALICEA, Disp: 90 Tab, Rfl: 1    fexofenadine (ALLEGRA) 180 mg tablet, Take 1 Tab by mouth daily. Indications: inflammation of the nose due to an allergy (Patient taking differently: Take 180 mg by mouth daily as needed. Indications: inflammation of the nose due to an allergy), Disp: 90 Tab, Rfl: 3    etodolac (LODINE) 400 mg tablet, Take 400 mg by mouth two (2) times daily (with meals). , Disp: 60 Tab, Rfl: 0    Physical Exam:      General: obese habitus, NAD, conversant  Eyes: sclera clear bilaterally, no discharge noted, eyelids normal in appearance, EOMI  HENT: NCAT  Neck: no masses visualized, trachea appears to be midline  Lungs: normal respiratory effort and rate, No visualized signs of difficulty breathing or respiratory distress  MS: normal AROM of neck noted  Skin: no significant exanthematous lesions or discoloration noted on neck/facial skin    Psych: alert and oriented to person, place and situation, normal affect  Neuro: speech normal, moving all upper extremities, no gaze palsy, no facial asymmetry (Cranial nerve 7 motor function) (limited exam due to video visit)      Assessment/Plan     HTN, inadequately controlled:  -Average office BP reading has not been at goal of less than 140/90  -Started on 10 mg of lisinopril daily  -Follow-up in 1 month      Polyarthritis, inadequately controlled:  -Cymbalta dose increased to 120 mg  -Advised to make appointment to see rheumatologist  -Follow-up in 1 month      GERD, well controlled:  -Will continue current famotidine regimen  -Follow-up in 1 month        Kayy Champion is a 61 y.o. female being evaluated by a Virtual Visit (video visit) encounter to address concerns as mentioned above. A caregiver was present when appropriate. Due to this being a TeleHealth encounter (During KIA-91 public health emergency), evaluation of the following organ systems was limited: Vitals/Constitutional/EENT/Resp/CV/GI//MS/Neuro/Skin/Heme-Lymph-Imm. Pursuant to the emergency declaration under the [address] [place] [address] authority and the [employer] and [employer] Act, this Virtual Visit was conducted with patient's (and/or legal guardian's) consent, to reduce the risk of exposure to COVID-19 and provide necessary medical care. Services were provided through a video synchronous discussion virtually to substitute for in-person encounter. --Shey Kay MD on 9/24/2020 at 10:37 AM    An electronic signature was used to authenticate this note.

## 2020-09-24 NOTE — PROGRESS NOTES
[id number]-     1. Have you been to the ER, urgent care clinic since your last visit? Hospitalized since your last visit? No    2. Have you seen or consulted any other health care providers outside of the [address] [place] [address] since your last visit? Include any pap smears or colon screening.  Yes Dr. Linda Berger for foot pain Dr. Teresa Tomlinson for her back

## 2020-10-05 DIAGNOSIS — M76.62 ACHILLES TENDINITIS OF LEFT LOWER EXTREMITY: Primary | ICD-10-CM

## 2020-10-05 DIAGNOSIS — Z01.818 PRE-OPERATIVE EXAMINATION: ICD-10-CM

## 2020-10-05 DIAGNOSIS — M76.62 ACHILLES TENDINITIS OF LEFT LOWER EXTREMITY: ICD-10-CM

## 2020-10-05 NOTE — PROGRESS NOTES
The following pre op labs have been ordered during this encounter:    Orders Placed This Encounter    CULTURE, URINE     Standing Status:   Future     Standing Expiration Date:   10/5/2021    XR CHEST PA LAT     Standing Status:   Future     Standing Expiration Date:   4/9/2021     Scheduling Instructions:      Please recheck to confirm if:      1. Patient has Allergry to IV contrast dye      2. Patient is pregnant     Order Specific Question:   Reason for Exam     Answer:   Preop Risk stratificatiion    CBC WITH AUTOMATED DIFF     Standing Status:   Future     Standing Expiration Date:   88/0/1243    METABOLIC PANEL, COMPREHENSIVE     Standing Status:   Future     Standing Expiration Date:   10/5/2021    PTT     Standing Status:   Future     Standing Expiration Date:   10/5/2021    URINALYSIS W/ RFLX MICROSCOPIC     Standing Status:   Future     Standing Expiration Date:   10/5/2021    VITAMIN D, 25 HYDROXY     Standing Status:   Future     Standing Expiration Date:   10/5/2021    HEMOGLOBIN A1C WITH EAG     Standing Status:   Future     Standing Expiration Date:   10/5/2021    NOVEL CORONAVIRUS (COVID-19)     Standing Status:   Future     Standing Expiration Date:   10/5/2021     Scheduling Instructions:      1) Due to current limited availability of the COVID-19 PCR test, tests will be prioritized and may not be completed.              2) Order only if the test result will change clinical management or necessary for a return to mission-critical employment decision.              3) Print and instruct patient to adhere to CDC home isolation program. (Link Above)              4) Set up or refer patient for a monitoring program.              5) Have patient sign up for and leverage MyChart (if not previously done). Order Specific Question:   Is this test for diagnosis or screening? Answer:   Screening     Order Specific Question:   Symptomatic for COVID-19 as defined by CDC?      Answer: No     Order Specific Question:   Hospitalized for COVID-19? Answer:   Unknown     Order Specific Question:   Admitted to ICU for COVID-19? Answer:   Unknown     Order Specific Question:   Employed in healthcare setting? Answer:   Unknown     Order Specific Question:   Resident in a congregate (group) care setting? Answer:   Unknown     Order Specific Question:   Pregnant? Answer:   No     Order Specific Question:   Previously tested for COVID-19?      Answer:   Unknown    EKG, 12 LEAD, SUBSEQUENT     Standing Status:   Future     Standing Expiration Date:   4/5/2021     Order Specific Question:   Reason for Exam:     Answer:   pre op        Erinn Jarvis PA-C  10/5/2020  6:13 PM

## 2020-10-08 DIAGNOSIS — I10 ESSENTIAL HYPERTENSION WITH GOAL BLOOD PRESSURE LESS THAN 140/90: ICD-10-CM

## 2020-10-12 RX ORDER — LISINOPRIL 10 MG/1
10 TABLET ORAL DAILY
Qty: 30 TAB | Refills: 2 | Status: SHIPPED | OUTPATIENT
Start: 2020-10-12 | End: 2021-01-22 | Stop reason: SDUPTHER

## 2020-10-15 ENCOUNTER — OFFICE VISIT (OUTPATIENT)
Dept: ORTHOPEDIC SURGERY | Age: 60
End: 2020-10-15
Payer: MEDICAID

## 2020-10-15 VITALS
TEMPERATURE: 98 F | HEIGHT: 63 IN | DIASTOLIC BLOOD PRESSURE: 82 MMHG | OXYGEN SATURATION: 99 % | SYSTOLIC BLOOD PRESSURE: 137 MMHG | WEIGHT: 293 LBS | RESPIRATION RATE: 20 BRPM | HEART RATE: 80 BPM | BODY MASS INDEX: 51.91 KG/M2

## 2020-10-15 DIAGNOSIS — M54.16 LUMBAR RADICULOPATHY: Primary | ICD-10-CM

## 2020-10-15 PROCEDURE — 99213 OFFICE O/P EST LOW 20 MIN: CPT | Performed by: PHYSICAL MEDICINE & REHABILITATION

## 2020-10-15 RX ORDER — CLINDAMYCIN HYDROCHLORIDE 150 MG/1
CAPSULE ORAL
COMMUNITY
Start: 2020-10-06 | End: 2020-11-06

## 2020-10-15 RX ORDER — OLOPATADINE HYDROCHLORIDE 7 MG/ML
SOLUTION OPHTHALMIC
COMMUNITY
Start: 2020-09-24 | End: 2022-06-24

## 2020-10-15 NOTE — PROGRESS NOTES
Caresse Bosworth presents today for   Chief Complaint   Patient presents with    Back Pain       Is someone accompanying this pt? Yes, female    Is the patient using any DME equipment during [address]? no    Depression Screening:  3 most recent PHQ Screens 8/5/2020   Little interest or pleasure in doing things Several days   Feeling down, depressed, irritable, or hopeless Several days   Total Score PHQ 2 2       Learning Assessment:  Learning Assessment 6/15/2018   PRIMARY LEARNER Patient   HIGHEST LEVEL OF EDUCATION - PRIMARY LEARNER  GRADUATED HIGH SCHOOL OR GED   BARRIERS PRIMARY LEARNER LANGUAGE   CO-LEARNER CAREGIVER Yes   CO-LEARNER NAME Myra   CO-LEARNER HIGHEST LEVEL OF EDUCATION GRADUATED HIGH SCHOOL OR GED   BARRIERS CO-LEARNER NONE   PRIMARY LANGUAGE English   LEARNER PREFERENCE PRIMARY READING   ANSWERED BY Daughter   RELATIONSHIP OTHER       Abuse Screening:  Abuse Screening Questionnaire 4/6/2020   Do you ever feel afraid of your partner? N   Are you in a relationship with someone who physically or mentally threatens you? N   Is it safe for you to go home? Y       Fall Risk  Fall Risk Assessment, last 12 mths 4/6/2020   Able to walk? Yes   Fall in past 12 months? No         Coordination of Care:  1. Have you been to the ER, urgent care clinic since your last visit? no  Hospitalized since your last visit? no    2. Have you seen or consulted any other health care providers outside of the [address] [place] [address] since your last visit? no Include any pap smears or colon screening.  no

## 2020-10-15 NOTE — PROGRESS NOTES
[address]  [address], [address]  [place], [address] [place] [address] Street  Phone: [phone]  Fax: [phone]        Brett Mann  : 1960  PCP: Dusty Benjamin MD  10/15/2020    PROGRESS NOTE      HISTORY OF PRESENT ILLNESS  Tung Beckett is a 61 y.o. female who was seen as a new patient 2020 with c/o low back pain with radiating lateral LLE paraesthesia in an L5 distribution. She reported that a few weeks ago, she experienced a pain so severe in her low back that she had difficulty walking. She was under the care of Dr. Toby Jacobson for left Achilles tendinitis. She took Cymbalta without benefit. She previously tried Gabapentin with minimal benefit. She was prescribed Prednisone and Lyrica. Lumbar spine MRI dated 2020 reviewed. Per report,  Degenerative spondylosis with slight retrolisthesis L5/S1 with moderate bilateral lateral recess stenosis with some flattening of the proximal descending L5 nerve roots right greater than left. Mild to moderate subarticular foraminal stenosis, no significant canal stenosis. Additional multilevel degenerative spondylosis as above with no other stenosis or impingement. Mild degenerative endplate marrow edema at several levels as above with no other acute osseous findings.       Tung Beckett comes in to the office today for f/u. She underwent a left L4 & L5 TFESI (9/3/2020; Dr. Christal Mayers) with some benefit. She is going to have a left Achilles tendon surgery with Dr. Toby Jacobson. Pain Score: 8/10.     Treatments patient has tried:  Physical therapy:No  Doing HEP: Unknown  Non-opioid medications: Yes - Cymbalta, Gabapentin, Prednisone, Lyrica  Spinal injections: Yes - Left L4 & L5 TFESI (2020; benefit)  Spinal surgery- No.   Last Lumbar MRI : L5/S1 with moderate bilateral lateral recess stenosis with some flattening of the proximal descending L5 nerve roots      PmHx: Arthritis, asthma, Gastritis, hepatitis C, HTN, depression    ASSESSMENT  This is a 61year-old female with history of low back pain radiating into the LLE in an L4/5 distribution. Her symptoms may be due to a left L5 radiculopathy (moderate bilateral lateral recess stenosis with some flattening of the proximal descending L5 nerve roots). She had decreased sensation in an L4/5 distribution on the left. We discussed options of: 2 x left L4 & L5 TFESI, maintaining on own until after she is healed from her left Achilles surgery     PLAN  1. Proceed with left achilles surgery, then we can reconsider the left L4 & Left L5 TFESI  2. There is no spinal pathology that would prevent a successful Achilles surgery. She is cleared from a spine standpoint to proceed with the surgery with Dr. Mirella Ace. Pt will f/u VIRTUALLY after healing from left Achilles surgery      Diagnoses and all orders for this visit:    1. Lumbar radiculopathy         PAST MEDICAL HISTORY   Past Medical History:   Diagnosis Date    Arthritis     Asthma     Gastritis     Hepatitis C     starting treatment 6/1/2019    Hypertension     Diagnosed in [place] a few months ago    Psychiatric disorder     SOME DEPRESSION       Past Surgical History:   Procedure Laterality Date    COLONOSCOPY N/A 5/29/2019    COLONOSCOPY performed by Maddison Perales MD at [address] [facility]  2013    HX COLONOSCOPY      [facility] in [facility]   .      MEDICATIONS    Current Outpatient Medications   Medication Sig Dispense Refill    clindamycin (CLEOCIN) 150 mg capsule       Pazeo 0.7 % drop INSTILL 1 DROP INTO BOTH EYES EVERY DAY      lisinopriL (PRINIVIL, ZESTRIL) 10 mg tablet Take 1 Tab by mouth daily. 30 Tab 2    Blood Pressure Monitor kit Use to check Blood Pressure daily 1 Kit 0    DULoxetine (CYMBALTA) 60 mg capsule TOME DOS CAPSULAS TODOS LOS ALICEA CHRONIC MUSCLE OR BONE PAIN 180 Cap 1    etodolac (LODINE) 400 mg tablet Take 400 mg by mouth two (2) times daily (with meals).  [address] [id number] Tab 1    famotidine (PEPCID) 40 mg tablet TOME MAJOR TABLETA TODOS LOS ALICEA 90 Tab 2    pregabalin (Lyrica) 150 mg capsule Take 1 Cap by mouth two (2) times a day. Max Daily Amount: 300 mg. 60 Cap 2    hydroxychloroquine (PLAQUENIL) 200 mg tablet TOME MAJOR TABLETA TODOS LOS D AS      fexofenadine (ALLEGRA) 180 mg tablet Take 1 Tab by mouth daily. Indications: inflammation of the nose due to an allergy (Patient taking differently: Take 180 mg by mouth daily as needed. Indications: inflammation of the nose due to an allergy) 90 Tab 3        ALLERGIES  No Known Allergies       SOCIAL HISTORY    Social History     Socioeconomic History    Marital status: SINGLE     Spouse name: Not on file    Number of children: Not on file    Years of education: Not on file    Highest education level: Not on file   Tobacco Use    Smoking status: Former Smoker     Last attempt to quit: 1/1/2017     Years since quitting: 3.7    Smokeless tobacco: Never Used   Substance and Sexual Activity    Alcohol use: No    Drug use: No    Sexual activity: Yes     Partners: Male     Birth control/protection: None       FAMILY HISTORY  Family History   Problem Relation Age of Onset    Diabetes Mother     Hypertension Father     No Known Problems Sister     No Known Problems Brother     No Known Problems Sister     No Known Problems Sister     No Known Problems Brother     No Known Problems Brother     No Known Problems Brother          REVIEW OF SYSTEMS  Review of Systems   Constitutional: Negative for chills, fever and weight loss. Respiratory: Negative for shortness of breath. Cardiovascular: Negative for chest pain. Gastrointestinal: Negative for constipation. Negative for fecal incontinence    Genitourinary: Negative for dysuria. Negative for urinary incontinence   Musculoskeletal: Positive for back pain. Skin: Negative for rash. Neurological: Positive for tingling ( LLE paraesthesia ).  Negative for dizziness, tremors, focal weakness and headaches. Endo/Heme/Allergies: Does not bruise/bleed easily. Psychiatric/Behavioral: The patient does not have insomnia. PHYSICAL EXAMINATION  Visit Vitals  Ht 5' 3\" (1.6 m)   Wt 302 lb (137 kg)   BMI 53.50 kg/m²       Pain Assessment  10/15/2020   Location of Pain Back   Location Modifiers -   Severity of Pain 8   Quality of Pain Other (Comment)   Quality of Pain Comment pressure   Duration of Pain Persistent   Frequency of Pain Intermittent   Aggravating Factors Other (Comment)   Aggravating Factors Comment mops, sweeps, getting up after sitting for too long   Limiting Behavior Yes   Relieving Factors Rest   Result of Injury -           Constitutional:  Well developed, well nourished, in no acute distress. Psychiatric: Affect and mood are appropriate. Integumentary: No rashes or abrasions noted on exposed areas. SPINE/MUSCULOSKELETAL EXAM    Lumbar spine:  No rash, ecchymosis, or gross obliquity. No fasciculations. No focal atrophy is noted. No pain with hip ROM. Full range of motion. No tenderness to palpation. No tenderness to palpation at the sciatic notch. SI joints non-tender. Trochanters non tender.     Decreased sensation in L4/5 distribution on the L.     Positive SLR on the L. MOTOR:      Biceps  Triceps Deltoids Wrist Ext Wrist Flex Hand Intrin   Right 5/5 5/5 5/5 5/5 5/5 5/5   Left 5/5 5/5 5/5 5/5 5/5 5/5             Hip Flex  Quads Hamstrings Ankle DF EHL Ankle PF   Right 5/5 5/5 5/5 5/5 5/5 5/5   Left 5/5 5/5 5/5 5/5 5/5 5/5     DTRs are 1+ biceps, triceps, brachioradialis, patella, and Achilles.     Squat not tested. No difficulty with tandem gait.      Ambulation without assistive device. FWB.       RADIOGRAPHS/DATA  Lumbar MRI images taken on 8/20/2020 personally reviewed with patient:  Slight degenerative retrolisthesis L5/S1 with otherwise normal alignment. No  evidence of fracture or spondylolysis.  Mild degenerative endplate marrow edema  Y8/B1 and posteriorly L3/4 and minimally asymmetric to the left T12/L1. No other  marrow edema or neoplastic marrow signal. The conus medullaris is located at the  L1/2 level and has a normal appearance and signal.      Visualized retroperitoneum and upper sacrum unremarkable.     Moderate disc space narrowing and degenerative spondylosis with minimal disc  bulge T12/L1 without significant stenosis.     L1/2 level: Unremarkable.      L2/3 level: Mild facet arthropathy and minimal degenerative spondylosis without  significant stenosis.     L3/4 level: Mild facet arthropathy and minimal degenerative spondylosis without  significant disc bulge herniation or stenosis.     L4/5 level: Mild to moderate facet arthropathy with no significant disc bulge or  herniation with very mild left lateral recess stenosis with no other significant  stenosis and no evidence of impingement.     L5/S1 level: Moderate to severe disc space narrowing with mild to moderate facet  arthropathy with slight retrolisthesis. There is some bilateral lateral recess  stenosis with flattening of the bilateral proximal descending L5 nerve roots  right greater than left. No significant canal stenosis. Moderate right-sided and  mild-to-moderate left-sided degenerative subarticular foraminal stenosis.     ______________     IMPRESSION  IMPRESSION:     1. Degenerative spondylosis with slight retrolisthesis L5/S1 with moderate  bilateral lateral recess stenosis with some flattening of the proximal  descending L5 nerve roots right greater than left. Mild to moderate subarticular  foraminal stenosis, no significant canal stenosis.     2. Additional multilevel degenerative spondylosis as above with no other  stenosis or impingement.     3. Mild degenerative endplate marrow edema at several levels as above with no  other acute osseous findings.      15 minutes of face-to-face contact were spent with the patient during today's visit extensively discussing symptoms and treatment plan. All questions were answered. More than half of this visit today was spent on counseling.      Written by Tiara Bah as dictated by Arnol Shannon MD

## 2020-10-22 ENCOUNTER — OFFICE VISIT (OUTPATIENT)
Dept: FAMILY MEDICINE CLINIC | Age: 60
End: 2020-10-22

## 2020-10-22 VITALS
BODY MASS INDEX: 51.91 KG/M2 | DIASTOLIC BLOOD PRESSURE: 83 MMHG | HEIGHT: 63 IN | SYSTOLIC BLOOD PRESSURE: 151 MMHG | WEIGHT: 293 LBS | TEMPERATURE: 97.5 F | RESPIRATION RATE: 18 BRPM | HEART RATE: 75 BPM | OXYGEN SATURATION: 94 %

## 2020-10-22 NOTE — PROGRESS NOTES
1. Have you been to the ER, urgent care clinic since your last visit? Hospitalized since your last visit? No    2. Have you seen or consulted any other health care providers outside of the [address] [place] [address] since your last visit? Include any pap smears or colon screening. Yes follow up with Dr. Julianna Ferrera user error has taken place: encounter opened in error, closed for administrative reasons.

## 2020-11-06 ENCOUNTER — OFFICE VISIT (OUTPATIENT)
Dept: FAMILY MEDICINE CLINIC | Age: 60
End: 2020-11-06
Payer: MEDICAID

## 2020-11-06 VITALS
BODY MASS INDEX: 51.91 KG/M2 | OXYGEN SATURATION: 100 % | SYSTOLIC BLOOD PRESSURE: 136 MMHG | HEART RATE: 69 BPM | DIASTOLIC BLOOD PRESSURE: 76 MMHG | TEMPERATURE: 97.5 F | HEIGHT: 63 IN | RESPIRATION RATE: 18 BRPM | WEIGHT: 293 LBS

## 2020-11-06 DIAGNOSIS — J31.0 RHINITIS, UNSPECIFIED TYPE: Primary | ICD-10-CM

## 2020-11-06 DIAGNOSIS — R82.90 ABNORMAL URINE ODOR: ICD-10-CM

## 2020-11-06 DIAGNOSIS — I10 ESSENTIAL HYPERTENSION WITH GOAL BLOOD PRESSURE LESS THAN 140/90: ICD-10-CM

## 2020-11-06 DIAGNOSIS — M05.80 POLYARTHRITIS WITH POSITIVE RHEUMATOID FACTOR (HCC): ICD-10-CM

## 2020-11-06 DIAGNOSIS — R05.9 COUGH: ICD-10-CM

## 2020-11-06 LAB
BILIRUB UR QL STRIP: NEGATIVE
GLUCOSE UR-MCNC: NEGATIVE MG/DL
KETONES P FAST UR STRIP-MCNC: NEGATIVE MG/DL
PH UR STRIP: 7 [PH] (ref 4.6–8)
PROT UR QL STRIP: NEGATIVE
SP GR UR STRIP: 1.02 (ref 1–1.03)
UA UROBILINOGEN AMB POC: NORMAL (ref 0.2–1)
URINALYSIS CLARITY POC: CLEAR
URINALYSIS COLOR POC: YELLOW
URINE BLOOD POC: NEGATIVE
URINE LEUKOCYTES POC: NEGATIVE
URINE NITRITES POC: NEGATIVE

## 2020-11-06 PROCEDURE — 81003 URINALYSIS AUTO W/O SCOPE: CPT | Performed by: FAMILY MEDICINE

## 2020-11-06 PROCEDURE — 99214 OFFICE O/P EST MOD 30 MIN: CPT | Performed by: FAMILY MEDICINE

## 2020-11-06 RX ORDER — IBUPROFEN 800 MG/1
800 TABLET ORAL
Qty: 180 TAB | Refills: 3 | Status: SHIPPED | OUTPATIENT
Start: 2020-11-06 | End: 2022-02-02 | Stop reason: SDUPTHER

## 2020-11-06 RX ORDER — FLUTICASONE PROPIONATE 50 MCG
2 SPRAY, SUSPENSION (ML) NASAL DAILY
Qty: 1 BOTTLE | Refills: 3 | Status: SHIPPED | OUTPATIENT
Start: 2020-11-06 | End: 2022-02-02 | Stop reason: SDUPTHER

## 2020-11-06 RX ORDER — DEXTROMETHORPHAN POLISTIREX 30 MG/5ML
60 SUSPENSION ORAL 2 TIMES DAILY
Qty: 200 ML | Refills: 0 | Status: SHIPPED | OUTPATIENT
Start: 2020-11-06 | End: 2020-11-16

## 2020-11-06 NOTE — PROGRESS NOTES
[facility]    CC: F/U of HTN    HPI:       HTN:  -Taking BP medications prescribed  -Denies any side effects or issues with BP medication  -Has not been checking her BP at home  -Not following any regular excise regimen  -Has stopped eating rice and bread    Cough:  -Issue for 1 week  -Productive of white phlegm  -Denies any sick contacts  No sick cont       Urinary Odor:  -Issue for 1 week  -Odor described as strong  -Denies any associated symptoms      ROS: Positive items marked in RED  CON: fever, chills  Cardiovascular: palpitations, CP  Resp: SOB, cough  GI: nausea, vomiting, diarrhea  : dysuria, hematuria      Past Medical History:   Diagnosis Date    Arthritis     Asthma     Gastritis     Hepatitis C     starting treatment 6/1/2019    Hypertension     Diagnosed in [place] a few months ago    Psychiatric disorder     SOME DEPRESSION       Past Surgical History:   Procedure Laterality Date    COLONOSCOPY N/A 5/29/2019    [facility]hnaz [facility]OSCOPY    HX CHOLECYSTECTOMY  2013    HX COLONOSCOPY      [facility]       Family History   Problem Relation Age of Onset    Diabetes Mother     Hypertension Father     No Known Problems Sister     No Known Problems Brother     No Known Problems Sister     No Known Problems Sister     No Known Problems Brother     No Known Problems Brother     No Known Problems Brother        Social History     Tobacco Use    Smoking status: Former Smoker     Last attempt to quit: 1/1/2017     Years since quitting: 3.8    Smokeless tobacco: Never Used   Substance Use Topics    Alcohol use: No    Drug use: No       No Known Allergies      Current Outpatient Medications:     Pazeo 0.7 % drop, INSTILL 1 DROP INTO BOTH EYES EVERY DAY, Disp: , Rfl:     lisinopriL (PRINIVIL, ZESTRIL) 10 mg tablet, Take 1 Tab by mouth daily. , Disp: 30 Tab, Rfl: 2    DULoxetine (CYMBALTA) 60 mg capsule, TOME DOS CAPSULAS TODOS LOS ALICEA CHRONIC MUSCLE OR BONE PAIN, Disp: 180 Cap, Rfl: 1    famotidine (PEPCID) 40 mg tablet, TOME MAJOR TABLETA TODOS LOS ALICEA, Disp: 90 Tab, Rfl: 2    pregabalin (Lyrica) 150 mg capsule, Take 1 Cap by mouth two (2) times a day. Max Daily Amount: 300 mg., Disp: 60 Cap, Rfl: 2    hydroxychloroquine (PLAQUENIL) 200 mg tablet, TOME MAJOR TABLETA TODOS LOS D AS, Disp: , Rfl:     fexofenadine (ALLEGRA) 180 mg tablet, Take 1 Tab by mouth daily. Indications: inflammation of the nose due to an allergy (Patient taking differently: Take 180 mg by mouth daily as needed. Indications: inflammation of the nose due to an allergy), Disp: 90 Tab, Rfl: 3    clindamycin (CLEOCIN) 150 mg capsule, , Disp: , Rfl:     Blood Pressure Monitor kit, Use to check Blood Pressure daily, Disp: 1 Kit, Rfl: 0    etodolac (LODINE) 400 mg tablet, Take 400 mg by mouth two (2) times daily (with meals). , Disp: 180 Tab, Rfl: 1    Physical Exam:      /76   Pulse 69   Temp 97.5 °F (36.4 °C) (Temporal)   Resp 18   Ht 5' 3\" (1.6 m)   Wt 295 lb (133.8 kg)   SpO2 100%   BMI 52.26 kg/m²     General: obese habitus, NAD, conversant  Eyes: sclera clear bilaterally, no discharge noted, eyelids normal in appearance  HENT: NCAT, nasal turbinates enlarged bilaterally, oropharynx clear, MMM  Lungs: CTAB, normal respiratory effort and rate  CV: RRR, no MRGs  ABD: soft, non-tender, non-distended, normal bowel sounds  Ext: no peripheral edema or digital cyanosis noted  Skin: normal temperature, turgor, color, and texture  Psych: alert and oriented to person, place and situation, normal affect  Neuro: speech normal, moving all extremities    Results for Ortiz Beth (MRN [id number]):   Ref.  Range 11/6/2020 13:07   Color (UA POC) Unknown Yellow   Clarity (UA POC) Unknown Clear   Specific gravity (UA POC) Latest Ref Range: 1.001 - 1.035  1.025   pH (UA POC) Latest Ref Range: 4.6 - 8.0  7.0   Protein (UA POC) Latest Ref Range: Negative Negative   Glucose (UA POC) Latest Ref Range: Negative  Negative   Ketones (UA POC) Latest Ref Range: Negative  Negative   Blood (UA POC) Latest Ref Range: Negative  Negative   Bilirubin (UA POC) Latest Ref Range: Negative  Negative   Urobilinogen (UA POC) Latest Ref Range: 0.2 - 1  0.2 mg/dL   Nitrites (UA POC) Latest Ref Range: Negative  Negative   Leukocyte esterase (UA POC) Latest Ref Range: Negative  Negative       Assessment/Plan     Rhinitis:  -Likely cause of cough via post nasal drip  -Suspect allergic etiology  -Started on Flonase and Delsym regimen  -Handouts given on rhinitis care, sinus rinse, and nasal steroid spray  -Follow-up in 2 weeks      HTN, well controlled:  -BP at goal of less than 140/90  -Will continue current BP medication regimen  -Follow-up in 2 weeks      Abnormal Urine Odor:  -Advised that UA was clear  -Discussed odor might have been due to something she was eating or her urine was concentrated  -Currently no indication for further intervention  -Follow-up in 2 weeks        Carmen Portillo MD  11/6/2020, 12:49 PM

## 2020-11-06 NOTE — PROGRESS NOTES
1. Have you been to the ER, urgent care clinic since your last visit? Hospitalized since your last visit? No    2. Have you seen or consulted any other health care providers outside of the [address] [place] [address] since your last visit? Include any pap smears or colon screening.  No

## 2020-11-06 NOTE — PATIENT INSTRUCTIONS
Rinitis: Instrucciones de cuidado Rhinitis: Care Instructions Instrucciones de cuidado La rinitis es joelle hinchazón e irritación en la nariz. Con frecuencia, las alergias y las infecciones son la causa. Puede tener congestión o secreción nasal. Otros síntomas son la comezón y la irritación de ojos, oídos, garganta y boca. Si las alergias son la causa, es posible que el médico le yosvany pruebas para averiguar a qué es alérgico. Casimiro vez pueda detener los síntomas si maico las cosas que los causan. El médico puede sugerir o recetar medicamentos para [employer]. La atención de seguimiento es joelle parte clave de sebastian tratamiento y seguridad. Asegúrese de hacer y acudir a todas las citas, y llame a sebastian médico si está teniendo problemas. También es joelle buena idea saber los resultados de kaylyn exámenes y mantener joelle lista de los medicamentos que francisco. Cómo puede cuidarse en el hogar? · Si sebastian rinitis es causada por alergias, trate de averiguar qué es lo que [employer]. [place] pasos para evitar los desencadenantes. ? Evite los trabajos [facility]. Estos pueden remover el polen y el moho. ? No fume ni permita que otros fumen cerca de usted. Si necesita ayuda para dejar de fumar, hable con sebastian médico sobre programas y medicamentos para dejar de fumar. Estos pueden aumentar kaylyn probabilidades de dejar el hábito para siempre. ? No use aerosoles, productos de limpieza ni perfumes. ? [address] [facility] [place] [address] de sebastian casa y automóvil nawaf la época de floración si el polen es aleisha de los desencadenantes. ? Limpie sebastian casa con frecuencia para controlar el polvo. ? Mantenga las [place] fuera de sebastian casa. · Si sebastian médico le recomienda un medicamento de venta yan para [employer] síntomas, tómelo exactamente wander le fue recetado. Llame a sebastian médico si kristen estar teniendo problemas con sebastian medicamento.  
· Use lavados nasales con solución salina (agua salada) para ayudar a mantener las fosas nasales despejadas y [address] mucosidad y las bacterias. Puede comprar gotas nasales de solución salina en un supermercado o joelle farmacia. O puede prepararlas usted mismo en casa agregándole 1 cucharadita de sal y 1 cucharadita de bicarbonato de sodio a 2 tazas de agua destilada. Si las prepara usted mismo, llene joelle sandra de goma con la solución, introduzca la punta en la fosa nasal y apriete con suavidad. Suénese la nariz. Cuándo debe pedir ayuda? Llame a sebastian médico ahora mismo o busque atención médica inmediata si: 
  · Tiene problemas para respirar. Preste especial atención a los cambios en sebastian carloz y asegúrese de comunicarse con sebastian médico si: 
  · La mucosidad de la nariz se vuelve más espesa (wander pus) o contiene devonte.  
  · Tiene síntomas nuevos o que empeoran.  
  · No mejora wander se esperaba. Dónde puede encontrar más información en inglés? Veronica Dove a [email or web address].gray.com/ Escriba W894 en la búsqueda para aprender más acerca de \"Rinitis: Instrucciones de cuidado. \" Revisado: 15 abril, 2020               Versión del contenido: 12.6 © 2006-2020 Healthwise, Incorporated. Las instrucciones de cuidado fueron adaptadas bajo licencia por Good [employer] (which disclaims liability or warranty for this information). Si usted tiene [place] afección médica o sobre estas instrucciones, siempre pregunte a sbeastian profesional de carloz. Healthwise, Incorporated niega toda garantía o responsabilidad por sebastian uso de esta información. Uso de un aerosol nasal esteroideo: Instrucciones de cuidado Using a Nasal Steroid Spray: Care Instructions Instrucciones de cuidado Sebastian médico puede sugerirle que use un aerosol nasal con corticosteroides para los síntomas de alergia o para problemas de los senos paranasales.  
Estos aerosoles reducen la hinchazón en el interior de la nariz y [place] senos paranasales. A diferencia de los descongestionantes nasales en aerosol, los aerosoles esteroideos no inducen a joelle mayor hinchazón cuando se eloisa de usarlos. Estos aerosoles [place] a funcionar en unos pocos días, xuan pueden pasar varias semanas antes de que obtenga el efecto completo. Jannifer Curls de los efectos secundarios son menores. El problema más común es joelle sensación de ardor en la nariz constantin después de mel utilizado el aerosol. Algunas personas tienen hemorragias (sangrados) nasales. La atención de seguimiento es joelle parte clave de sebastian tratamiento y seguridad. Asegúrese de hacer y acudir a todas las citas, y llame a sebastian médico si está teniendo problemas. También es joelle buena idea saber los resultados de kaylyn exámenes y mantener joelle lista de los medicamentos que francisco. Cómo puede cuidarse en el hogar? Estos son algunos consejos para el uso de estos aerosoles: · Es posible que necesite preparar el atomizador antes de usarlo. [place] significa rociarlo en el aire un par de veces para asegurarse de que obtenga la cantidad correcta de [place]. Siga las instrucciones de la etiqueta. · Suénese la nariz antes de aplicárselo. [place] ayudará a limpiar las fosas nasales. · Aspire suavemente el medicamento por la nariz mientras lo aplica. No aspire con fuerza, o el medicamento se irá hacia la garganta en donde no le servirá de [place]. · Apunte la boquilla directamente hacia la pared externa de la fosa nasal. [place] ayudará a evitar que el medicamento irrite las buck internas de la nariz, en especial el tabique (la pared que separa kaylyn fosas nasales derecha e izquierda). · No se suene la nariz nawaf 10 minutos o más después de habérselo aplicado. Y trate de no estornudar. · Sea amelia con los medicamentos. Utilice teresa medicamento exactamente [employer]	[place] fue recetado. Llame a sebastian médico si kristen estar teniendo un problema con sebastian medicamento. · Limpie el atomizador joelle vez a la semana. Roberta la etiqueta para saber cómo limpiarlo. Cuándo debe pedir ayuda? Preste especial atención a los cambios en sebastian carloz y asegúrese de comunicarse con sebastian médico si tiene algún problema. Dónde puede encontrar más información en inglés? Robert Kaur a http://www.gray.com/ Jenaro G150 en la búsqueda para aprender más acerca de \"Uso de un aerosol nasal esteroideo: Instrucciones de cuidado. \" Revisado: 15 oskaril, 2020               Versión del contenido: 12.6 © 3947-9174 Healthwise, Incorporated. Las instrucciones de cuidado fueron adaptadas bajo licencia por Good [employer] Conne[employer] (which disclaims liability or warranty for this information). Si usted tiene [place] afección médica o sobre estas instrucciones, siempre pregunte a sebastian profesional de carloz. Healthwise, Incorporated niega toda garantía o responsabilidad por sebastian uso de esta información. Lavados nasales salinos: Instrucciones de cuidado Saline Nasal Washes: Care Instructions Instrucciones de cuidado Los lavados nasales salinos ayudan a mantener las fosas nasales abiertas al eliminar la mucosidad espesa o seca. Cyndy sencillo remedio puede ayudar a [employer] síntomas de [place], sinusitis y resfriados. También puede hacer que la nariz se sienta más cómoda al [employer] las membranas mucosas húmedas. Es posible que note joelle ligera sensación de ardor en la nariz las primeras veces que use la solución, xuan esto por lo general mejora en unos cuantos días. La atención de seguimiento es joelle parte clave de sebastian tratamiento y seguridad. Asegúrese de hacer y acudir a todas las citas, y llame a sebastian médico si está teniendo problemas. También es joelle buena idea saber los resultados de kaylyn exámenes y mantener joelle lista de los medicamentos que francisco. Cómo puede cuidarse en el hogar?  
· Puede comprar en la farmacia joelle solución salina lista para usar en Volodym[place] botella de apretar u otros productos de lavado nasal salino. Roberta y siga las instrucciones de la etiqueta. · También puede preparar sebastian propia solución salina agregándole 1 cucharadita de sal y 1 cucharadita de bicarbonato de sodio a 2 tazas de agua destilada. · Si [place] joelle solución hecha en casa, eche un poco en un tazón limpio. Utilizando joelle sandra de goma, comprima la sandra e introduzca la boquilla en el agua salada. Recoja joelle pequeña cantidad de agua salada en la sandra aflojando la mano. · Siéntese con la georgina inclinada un poco hacia atrás. No se recueste del todo. Introduzca un poco la boquilla de la sandra de goma o de la botella de apretar en joelle de las fosas nasales. Eche suavemente unas cuantas gotas o un pequeño chorro en joelle de las fosas nasales. Repita la operación en la otra fosa nasal. Es normal tener unos cuantos estornudos y arcadas al principio. · Suénese la nariz con cuidado. · Limpie la sandra de goma o la boquilla de la botella después de [address]. · Priya esto 2 o 3 veces al día. · Hágase el lavado nasal con cuidado si le sangra la nariz con frecuencia. Cuándo debe pedir ayuda? Preste especial atención a los cambios en sebastian carloz y asegúrese de comunicarse con sebastian médico si: 
  · Tiene hemorragias nasales frecuentes.  
  · Tiene problemas para hacerse los lavados nasales. Dónde puede encontrar más información en inglés? Robert Natasha a http://www.gray.com/ Escriba B784 en la búsqueda para aprender más acerca de \"Lavados nasales salinos: Instrucciones de cuidado. \" Revisado: 15 oskaril, 2020               Versión del contenido: 12.6 © 0577-4093 [facility] Hospitalwise, Incorporated. Las instrucciones de cuidado fueron adaptadas bajo licencia por Good Help Connections (which disclaims liability or warranty for this information). Si usted tiene [place] afección médica o sobre estas instrucciones, siempre pregunte a sebastian profesional de carloz.  [facility], Incorporated niega toda garantía o responsabilidad por sebastian uso de esta información.

## 2020-12-16 LAB — SARS-COV-2, NAA: NOT DETECTED

## 2021-01-22 ENCOUNTER — VIRTUAL VISIT (OUTPATIENT)
Dept: FAMILY MEDICINE CLINIC | Age: 61
End: 2021-01-22
Payer: MEDICAID

## 2021-01-22 DIAGNOSIS — R12 HEARTBURN: ICD-10-CM

## 2021-01-22 DIAGNOSIS — I10 ESSENTIAL HYPERTENSION WITH GOAL BLOOD PRESSURE LESS THAN 140/90: ICD-10-CM

## 2021-01-22 DIAGNOSIS — R05.9 COUGH: ICD-10-CM

## 2021-01-22 DIAGNOSIS — R05.9 COUGH: Primary | ICD-10-CM

## 2021-01-22 PROCEDURE — 99214 OFFICE O/P EST MOD 30 MIN: CPT | Performed by: STUDENT IN AN ORGANIZED HEALTH CARE EDUCATION/TRAINING PROGRAM

## 2021-01-22 RX ORDER — LISINOPRIL 10 MG/1
10 TABLET ORAL DAILY
Qty: 30 TAB | Refills: 2 | Status: SHIPPED | OUTPATIENT
Start: 2021-01-22 | End: 2021-06-29 | Stop reason: SINTOL

## 2021-01-22 RX ORDER — FAMOTIDINE 40 MG/1
TABLET, FILM COATED ORAL
Qty: 90 TAB | Refills: 2 | Status: SHIPPED | OUTPATIENT
Start: 2021-01-22 | End: 2021-02-09 | Stop reason: SDUPTHER

## 2021-01-22 RX ORDER — IPRATROPIUM BROMIDE AND ALBUTEROL SULFATE 2.5; .5 MG/3ML; MG/3ML
3 SOLUTION RESPIRATORY (INHALATION)
Qty: 30 NEBULE | Refills: 1 | Status: SHIPPED | OUTPATIENT
Start: 2021-01-22 | End: 2021-01-22

## 2021-01-22 NOTE — PROGRESS NOTES
Taylor Bolanos, who was evaluated through a synchronous (real-time) audio-video encounter, and/or her healthcare decision maker, is aware that it is a billable service, with coverage as determined by her insurance carrier. She provided verbal consent to proceed: Yes, and patient identification was verified. It was conducted pursuant to the emergency declaration under the [facility], [address] [place] [address] and the [employer] and [place] [employer] Act. A caregiver was present when appropriate. Ability to conduct physical exam was limited. I was in the office. The patient was at home. History of Present Illness  Taylor Bolanos is a 61 y.o. female who presents today for management of    Chief Complaint   Patient presents with    Cough    Medication Refill       Patient is here to establish care. Previous PCP: Dr. Kimberly Moore    Patient today has complaint of cough. Patient states that a month ago (12/13/2020) she was diagnosed at [facility] with pneumonia. She states she was given antibiotics which she finished course. At that point she began feeling better, she was supposed to return to see for resolution of cough however it was around [place] time so patient doing well. She does state that at that point she had had resolution from the cough that pneumonia had caused her. Recently a week ago she began coughing again, she states she is feeling like she did last time. She denies nausea, vomiting, or fevers. States that she has a productive cough with white phlegm. Patient states that she has been taking her albuterol almost every 4 hours, or when she remembers and has felt little improvement from this. Today patient would also like medication refill for her blood pressure and her heartburn. Patient states that she takes both medicines as prescribed.   Patient states that she did not have any side effects while taking this medication. Past Medical History  Past Medical History:   Diagnosis Date    Arthritis     Asthma     Gastritis     Hepatitis C     starting treatment 6/1/2019    Hypertension     Diagnosed in [place] a few months ago    Psychiatric disorder     SOME DEPRESSION        Surgical History  Past Surgical History:   Procedure Laterality Date    COLONOSCOPY N/A 5/29/2019    COLONOSCOPY performed by Tejinder Garrido MD at [address] [facility]  2013    HX COLONOSCOPY      Completed in [facility]        Current Medications  Current Outpatient Medications   Medication Sig    famotidine (PEPCID) 40 mg tablet TOME MAJOR TABLETA TODOS LOS ALICEA    albuterol-ipratropium (DUO-NEB) 2.5 mg-0.5 mg/3 ml nebu 3 mL by Nebulization route now for 1 dose.  lisinopriL (PRINIVIL, ZESTRIL) 10 mg tablet Take 1 Tab by mouth daily.  fluticasone propionate (FLONASE) 50 mcg/actuation nasal spray 2 Sprays by Both Nostrils route daily.  ibuprofen (MOTRIN) 800 mg tablet Take 1 Tab by mouth every six (6) hours as needed for Pain. Indications: pain    Pazeo 0.7 % drop INSTILL 1 DROP INTO BOTH EYES EVERY DAY    DULoxetine (CYMBALTA) 60 mg capsule TOME DOS CAPSULAS TODOS LOS ALICEA CHRONIC MUSCLE OR BONE PAIN    pregabalin (Lyrica) 150 mg capsule Take 1 Cap by mouth two (2) times a day. Max Daily Amount: 300 mg.  hydroxychloroquine (PLAQUENIL) 200 mg tablet TOME MAJOR TABLETA TODOS LOS D AS    fexofenadine (ALLEGRA) 180 mg tablet Take 1 Tab by mouth daily. Indications: inflammation of the nose due to an allergy (Patient taking differently: Take 180 mg by mouth daily as needed. Indications: inflammation of the nose due to an allergy)     No current facility-administered medications for this visit.         Allergies/Drug Reactions  No Known Allergies     Family History  Family History   Problem Relation Age of Onset    Diabetes Mother     Hypertension Father     No Known Problems Sister     No Known Problems Brother     No Known Problems Sister     No Known Problems Sister     No Known Problems Brother     No Known Problems Brother     No Known Problems Brother         Social History  Social History     Tobacco Use    Smoking status: Former Smoker     Quit date: 2017     Years since quittin.0    Smokeless tobacco: Never Used   Substance Use Topics    Alcohol use: No    Drug use: No        Health Maintenance   Topic Date Due    COVID-19 Vaccine (1 of 2) 1976    DTaP/Tdap/Td series (1 - Tdap) 1981    PAP AKA CERVICAL CYTOLOGY  1981    Shingrix Vaccine Age 50> (1 of 2) 2010    Flu Vaccine (1) 2020    Breast Cancer Screen Mammogram  2021    Lipid Screen  2025    Colorectal Cancer Screening Combo  2030    Pneumococcal 0-64 years  Completed     Immunization History   Administered Date(s) Administered    Influenza Vaccine [employer]) PF (>6 Mo Flulaval, Fluarix, and >3 Yrs Afluria, Fluzone 37759) 2019, 2019    Pneumococcal Polysaccharide (PPSV-23) 2019       Review of Systems  Review of Systems   Constitutional: Negative for chills, fever and malaise/fatigue. HENT: Negative for congestion, ear discharge and ear pain. Eyes: Negative for blurred vision, pain and discharge. Respiratory: Positive for cough. Negative for shortness of breath. Cardiovascular: Negative for chest pain and palpitations. Gastrointestinal: Negative for abdominal pain, nausea and vomiting. Genitourinary: Negative for dysuria, frequency and urgency. Skin: Negative for itching and rash. Neurological: Negative for dizziness, seizures, loss of consciousness and headaches. Psychiatric/Behavioral: Negative for substance abuse. Physical Exam  Vital signs: There were no vitals filed for this visit. Physical Exam  Constitutional:       Appearance: Normal appearance. Eyes:      General: No scleral icterus. Right eye: No discharge. Left eye: No discharge. Neck:      Musculoskeletal: Normal range of motion and neck supple. Pulmonary:      Effort: Pulmonary effort is normal. No respiratory distress. Comments: Patient is coughing. Exam limited d/t this being a virtual appt. Neurological:      Mental Status: She is alert and oriented to person, place, and time. Cranial Nerves: No cranial nerve deficit. Psychiatric:         Mood and Affect: Mood normal.         Behavior: Behavior normal.         Thought Content: Thought content normal.         Judgment: Judgment normal.         Laboratory/Tests:      Assessment/Plan:    1. Cough  Discussed with patient that first will need a chest x-ray in order to see if this is a new pneumonia. Did not want to prescribe antibiotics at this time in order to help avoid antibiotic resistance. - XR CHEST PA LAT; Future  - albuterol-ipratropium (DUO-NEB) 2.5 mg-0.5 mg/3 ml nebu; 3 mL by Nebulization route now for 1 dose. Dispense: 30 Nebule; Refill: 1    2. Heartburn  - famotidine (PEPCID) 40 mg tablet; TOME MAJOR TABLETA TODOS LOS ALICEA  Dispense: 90 Tab; Refill: 2    3. Essential hypertension with goal blood pressure less than 140/90  - lisinopriL (PRINIVIL, ZESTRIL) 10 mg tablet; Take 1 Tab by mouth daily. Dispense: 30 Tab; Refill: 2       Lab review: no lab studies available for review at time of visit      I have discussed the diagnosis with the patient and the intended plan as seen in the above orders. Questions were answered concerning future plans. I have discussed medication side effects and warnings with the patient as well. I have reviewed the plan of care with the patient, accepted their input and they are in agreement with the treatment goals.        Nohemi Loza MD  January 22, 2021

## 2021-01-25 ENCOUNTER — HOSPITAL ENCOUNTER (OUTPATIENT)
Dept: GENERAL RADIOLOGY | Age: 61
Discharge: HOME | End: 2021-01-25
Payer: MEDICAID

## 2021-01-25 PROCEDURE — 71046 X-RAY EXAM CHEST 2 VIEWS: CPT

## 2021-01-26 DIAGNOSIS — M54.16 LUMBAR RADICULOPATHY: ICD-10-CM

## 2021-01-26 RX ORDER — PREGABALIN 150 MG/1
CAPSULE ORAL
Qty: 60 CAP | Refills: 2 | Status: SHIPPED | OUTPATIENT
Start: 2021-01-26 | End: 2022-06-24

## 2021-01-27 NOTE — PROGRESS NOTES
Called patient to give results of xray. No pneumonia. Daughter answered phone. She verbalized understanding. Asked her to make new appt for pa in order to continue seeing why she is coughing. She will call to make appt.

## 2021-02-09 ENCOUNTER — VIRTUAL VISIT (OUTPATIENT)
Dept: FAMILY MEDICINE CLINIC | Age: 61
End: 2021-02-09
Payer: MEDICAID

## 2021-02-09 DIAGNOSIS — J02.9 SORE THROAT: ICD-10-CM

## 2021-02-09 DIAGNOSIS — R12 HEARTBURN: ICD-10-CM

## 2021-02-09 DIAGNOSIS — R05.9 COUGH: ICD-10-CM

## 2021-02-09 DIAGNOSIS — R53.83 FATIGUE, UNSPECIFIED TYPE: ICD-10-CM

## 2021-02-09 DIAGNOSIS — R53.83 FATIGUE, UNSPECIFIED TYPE: Primary | ICD-10-CM

## 2021-02-09 PROCEDURE — 99214 OFFICE O/P EST MOD 30 MIN: CPT | Performed by: STUDENT IN AN ORGANIZED HEALTH CARE EDUCATION/TRAINING PROGRAM

## 2021-02-09 RX ORDER — IPRATROPIUM BROMIDE AND ALBUTEROL SULFATE 2.5; .5 MG/3ML; MG/3ML
SOLUTION RESPIRATORY (INHALATION)
COMMUNITY
Start: 2021-02-08 | End: 2021-02-09 | Stop reason: SDUPTHER

## 2021-02-09 RX ORDER — BENZONATATE 100 MG/1
CAPSULE ORAL
COMMUNITY
Start: 2020-12-13 | End: 2021-02-23 | Stop reason: SDUPTHER

## 2021-02-09 RX ORDER — FAMOTIDINE 40 MG/1
TABLET, FILM COATED ORAL
Qty: 90 TAB | Refills: 2 | Status: SHIPPED | OUTPATIENT
Start: 2021-02-09 | End: 2022-02-02 | Stop reason: SDUPTHER

## 2021-02-09 RX ORDER — IPRATROPIUM BROMIDE AND ALBUTEROL SULFATE 2.5; .5 MG/3ML; MG/3ML
3 SOLUTION RESPIRATORY (INHALATION)
Qty: 30 NEBULE | Refills: 1 | Status: SHIPPED | OUTPATIENT
Start: 2021-02-09 | End: 2021-06-29 | Stop reason: SDUPTHER

## 2021-02-09 NOTE — PROGRESS NOTES
Kelby Roger, who was evaluated through a synchronous (real-time) audio only encounter, and/or her healthcare decision maker, is aware that it is a billable service, with coverage as determined by her insurance carrier. She provided verbal consent to proceed: Yes, and patient identification was verified. It was conducted pursuant to the emergency declaration under the [facility], [address] [place] [address] and the [employer] General Act. A caregiver was present when appropriate. Ability to conduct physical exam was limited. I was in the office. The patient was at home. Kelby Roger is a 61 y.o.  female and presents with    Chief Complaint   Patient presents with    Sore Throat    Cough    Fatigue       Subjective:  Patient states that she feels her cough is improved. However still says that when she does have cough she does spit a lot of phlegm. She feels like there is an itching in the posterior part of her throat. She denies any fever at this time. She does state that she feels very fatigued, like she does not feel good and has to force herself to get out of bed. States that she has been trying to use the albuterol but no improvement yet.       Patient Active Problem List   Diagnosis Code    Rheumatoid arthritis involving multiple sites with positive rheumatoid factor (HCC) M05.79    Obesity, morbid (HCC) E66.01    Chronic hepatitis C without hepatic coma (HCC) B18.2    Chronic pain syndrome G89.4    Left Achilles tendinitis M76.62      Past Medical History:   Diagnosis Date    Arthritis     Asthma     Gastritis     Hepatitis C     starting treatment 6/1/2019    Hypertension     Diagnosed in [place] a few months ago    Psychiatric disorder     SOME DEPRESSION      Past Surgical History:   Procedure Laterality Date    COLONOSCOPY N/A 5/29/2019    COLONOS[facility] performed by Tejinder G[facility]ido [facility] at [facility] ENDOSCOPY    HX CHOLECYSTECTOMY      HX COLONOSCOPY      [facility]      Family History   Problem Relation Age of Onset    Diabetes Mother     Hypertension Father     No Known Problems Sister     No Known Problems Brother     No Known Problems Sister     No Known Problems Sister     No Known Problems Brother     No Known Problems Brother     No Known Problems Brother      Social History     Socioeconomic History    Marital status: SINGLE     Spouse name: Not on file    Number of children: Not on file    Years of education: Not on file    Highest education level: Not on file   Occupational History    Not on file   Social Needs    Financial resource strain: Not on file    Food insecurity     Worry: Not on file     Inability: Not on file    Transportation needs     Medical: Not on file     Non-medical: Not on file   Tobacco Use    Smoking status: Former Smoker     Quit date: 2017     Years since quittin.1    Smokeless tobacco: Never Used   Substance and Sexual Activity    Alcohol use: No    Drug use: No    Sexual activity: Yes     Partners: Male     Birth control/protection: None   Lifestyle    Physical activity     Days per week: Not on file     Minutes per session: Not on file    Stress: Not on file   Relationships    Social connections     Talks on phone: Not on file     Gets together: Not on file     Attends Orthodox service: Not on file     Active member of club or organization: Not on file     Attends meetings of clubs or organizations: Not on file     Relationship status: Not on file    Intimate partner violence     Fear of current or ex partner: Not on file     Emotionally abused: Not on file     Physically abused: Not on file     Forced sexual activity: Not on file   Other Topics Concern    Not on file   Social History Narrative    Not on file        Current Outpatient Medications   Medication Sig Dispense Refill    albuterol-ipratropium (DUO-NEB) 2.5 mg-0.5 mg/3 ml nebu       benzonatate (TESSALON) 100 mg capsule       pregabalin (LYRICA) 150 mg capsule TOME MAJOR CAPSULA DOS VECES AL BRENDEN 60 Cap 2    famotidine (PEPCID) 40 mg tablet TOME MAJOR TABLETA TODOS LOS ALICEA 90 Tab 2    lisinopriL (PRINIVIL, ZESTRIL) 10 mg tablet Take 1 Tab by mouth daily. 30 Tab 2    fluticasone propionate (FLONASE) 50 mcg/actuation nasal spray 2 Sprays by Both Nostrils route daily. 1 Bottle 3    ibuprofen (MOTRIN) 800 mg tablet Take 1 Tab by mouth every six (6) hours as needed for Pain. Indications: pain 180 Tab 3    Pazeo 0.7 % drop INSTILL 1 DROP INTO BOTH EYES EVERY DAY      DULoxetine (CYMBALTA) 60 mg capsule TOME DOS CAPSULAS TODOS LOS ALICEA CHRONIC MUSCLE OR BONE PAIN 180 Cap 1    hydroxychloroquine (PLAQUENIL) 200 mg tablet TOME MAJOR TABLETA TODOS LOS D AS      fexofenadine (ALLEGRA) 180 mg tablet Take 1 Tab by mouth daily. Indications: inflammation of the nose due to an allergy (Patient taking differently: Take 180 mg by mouth daily as needed. Indications: inflammation of the nose due to an allergy) 90 Tab 3        ROS   Review of Systems   Constitutional: Positive for malaise/fatigue. Negative for chills and fever. HENT: Negative for congestion, ear discharge and ear pain. Eyes: Negative for blurred vision, pain and discharge. Respiratory: Positive for cough and sputum production. Negative for shortness of breath. Cardiovascular: Negative for chest pain and palpitations. Gastrointestinal: Negative for abdominal pain, nausea and vomiting. Genitourinary: Negative for dysuria, frequency and urgency. Skin: Negative for itching and rash. Neurological: Negative for dizziness, seizures, loss of consciousness and headaches. Psychiatric/Behavioral: Negative for substance abuse. Objective: There were no vitals filed for this visit. Physical Exam  Pulmonary:      Effort: Pulmonary effort is normal. No respiratory distress. Neurological:      Mental Status: She is alert and oriented to person, place, and time. Psychiatric:         Mood and Affect: Mood normal.         Behavior: Behavior normal.         Thought Content: Thought content normal.         Judgment: Judgment normal.           LABS     TESTS      Assessment/Plan:    1. Fatigue, unspecified type  - CBC WITH AUTOMATED DIFF; Future  - TSH 3RD GENERATION; Future  - METABOLIC PANEL, COMPREHENSIVE; Future  - VITAMIN D, 25 HYDROXY; Future  - VITAMIN B12; Future    2. Sore throat  - CULTURE, THROAT; Future     3. Heartburn  - famotidine (PEPCID) 40 mg tablet; TOME MAJOR TABLETA TODOS LOS ALICEA  Dispense: 90 Tab; Refill: 2    4. Cough  Discussed with patient that the cough less likely to be infectious. Patient recently had an x-ray that showed negative for pneumonia. Possibility of this being related to heartburn due to patient not having taken famotidine yet. - albuterol-ipratropium (DUO-NEB) 2.5 mg-0.5 mg/3 ml nebu; 3 mL by Nebulization route every four (4) hours as needed for Wheezing. Dispense: 30 Nebule; Refill: 1      Lab review: orders written for new lab studies as appropriate; see orders      I have discussed the diagnosis with the patient and the intended plan as seen in the above orders. Questions were answered concerning future plans. I have discussed medication side effects and warnings with the patient as well. I have reviewed the plan of care with the patient, accepted their input and they are in agreement with the treatment goals.          Carlos Bailey MD

## 2021-02-17 ENCOUNTER — HOSPITAL ENCOUNTER (OUTPATIENT)
Dept: LAB | Age: 61
Discharge: HOME | End: 2021-02-17

## 2021-02-17 LAB — SENTARA SPECIMEN COL,SENBCF: NORMAL

## 2021-02-17 PROCEDURE — 99001 SPECIMEN HANDLING PT-LAB: CPT

## 2021-02-18 LAB
25(OH)D3 SERPL-MCNC: 25.8 NG/ML (ref 32–100)
A-G RATIO,AGRAT: 1.3 RATIO (ref 1.1–2.6)
ABSOLUTE LYMPHOCYTE COUNT, 10803: 3.4 K/UL (ref 1–4.8)
ALBUMIN SERPL-MCNC: 4.2 G/DL (ref 3.5–5)
ALP SERPL-CCNC: 106 U/L (ref 40–120)
ALT SERPL-CCNC: 28 U/L (ref 5–40)
ANION GAP SERPL CALC-SCNC: 12 MMOL/L (ref 3–15)
AST SERPL W P-5'-P-CCNC: 34 U/L (ref 10–37)
BASOPHILS # BLD: 0.1 K/UL (ref 0–0.2)
BASOPHILS NFR BLD: 1 % (ref 0–2)
BILIRUB SERPL-MCNC: 0.4 MG/DL (ref 0.2–1.2)
BUN SERPL-MCNC: 11 MG/DL (ref 6–22)
CALCIUM SERPL-MCNC: 9.1 MG/DL (ref 8.4–10.5)
CHLORIDE SERPL-SCNC: 104 MMOL/L (ref 98–110)
CO2 SERPL-SCNC: 31 MMOL/L (ref 20–32)
CREAT SERPL-MCNC: 0.6 MG/DL (ref 0.8–1.4)
EOSINOPHIL # BLD: 0.1 K/UL (ref 0–0.5)
EOSINOPHIL NFR BLD: 1 % (ref 0–6)
ERYTHROCYTE [DISTWIDTH] IN BLOOD BY AUTOMATED COUNT: 14 % (ref 10–15.5)
GFRAA, 66117: >60
GFRNA, 66118: >60
GLOBULIN,GLOB: 3.2 G/DL (ref 2–4)
GLUCOSE SERPL-MCNC: 83 MG/DL (ref 70–99)
GRANULOCYTES,GRANS: 56 % (ref 40–75)
HCT VFR BLD AUTO: 45.2 % (ref 35.1–48)
HGB BLD-MCNC: 13.6 G/DL (ref 11.7–16)
IMMATURE PLATELET FRACTION: 8.2 % (ref 1.1–7.1)
LYMPHOCYTES, LYMLT: 36 % (ref 20–45)
MCH RBC QN AUTO: 30 PG (ref 26–34)
MCHC RBC AUTO-ENTMCNC: 30 G/DL (ref 31–36)
MCV RBC AUTO: 99 FL (ref 81–99)
MONOCYTES # BLD: 0.7 K/UL (ref 0.1–1)
MONOCYTES NFR BLD: 7 % (ref 3–12)
NEUTROPHILS # BLD AUTO: 5.2 K/UL (ref 1.8–7.7)
NORMACHROMIC RBC, 868: NORMAL
NORMACYTIC RBC, 869: NORMAL
PLATELET # BLD AUTO: 182 K/UL (ref 140–440)
PMV BLD AUTO: 14.5 FL (ref 9–13)
POTASSIUM SERPL-SCNC: 3.5 MMOL/L (ref 3.5–5.5)
PROT SERPL-MCNC: 7.4 G/DL (ref 6.2–8.1)
RBC # BLD AUTO: 4.58 M/UL (ref 3.8–5.2)
SMEAR EVAL, 1131: NORMAL
SODIUM SERPL-SCNC: 147 MMOL/L (ref 133–145)
TSH SERPL DL<=0.005 MIU/L-ACNC: 4.8 MCU/ML (ref 0.27–4.2)
WBC # BLD AUTO: 9.4 K/UL (ref 4–11)

## 2021-02-19 NOTE — PROGRESS NOTES
Call patient and discussed lab results with her. Noted vitamin D to be slightly deficient. TSH slightly elevated could be secondary to the fact that patient has been not feeling well. Will repeat exam after couple weeks.   CMP and CBC grossly normal.

## 2021-02-23 ENCOUNTER — OFFICE VISIT (OUTPATIENT)
Dept: FAMILY MEDICINE CLINIC | Age: 61
End: 2021-02-23
Payer: MEDICAID

## 2021-02-23 VITALS
BODY MASS INDEX: 51.52 KG/M2 | TEMPERATURE: 97.8 F | HEIGHT: 63 IN | DIASTOLIC BLOOD PRESSURE: 87 MMHG | WEIGHT: 290.8 LBS | SYSTOLIC BLOOD PRESSURE: 146 MMHG | RESPIRATION RATE: 18 BRPM | OXYGEN SATURATION: 95 % | HEART RATE: 95 BPM

## 2021-02-23 DIAGNOSIS — R79.89 ABNORMAL TSH: ICD-10-CM

## 2021-02-23 DIAGNOSIS — E04.9 ENLARGED THYROID: ICD-10-CM

## 2021-02-23 DIAGNOSIS — R05.9 COUGH: ICD-10-CM

## 2021-02-23 DIAGNOSIS — I10 ESSENTIAL HYPERTENSION WITH GOAL BLOOD PRESSURE LESS THAN 140/90: ICD-10-CM

## 2021-02-23 DIAGNOSIS — E04.9 ENLARGED THYROID: Primary | ICD-10-CM

## 2021-02-23 PROCEDURE — 99214 OFFICE O/P EST MOD 30 MIN: CPT | Performed by: STUDENT IN AN ORGANIZED HEALTH CARE EDUCATION/TRAINING PROGRAM

## 2021-02-23 RX ORDER — BENZONATATE 100 MG/1
100 CAPSULE ORAL
Qty: 60 CAP | Refills: 0 | Status: SHIPPED | OUTPATIENT
Start: 2021-02-23 | End: 2022-02-02 | Stop reason: SDUPTHER

## 2021-02-23 NOTE — PATIENT INSTRUCTIONS
Tos: Instrucciones de cuidado Cough: Care Instructions Instrucciones de cuidado La tos es joelle respuesta de sebastian cuerpo a algo que molesta en la garganta o las vías respiratorias. La tos puede ser provocada por muchas cosas. Usted podría toser debido a un resfriado o joelle gripe, joelle bronquitis o el asma. Fumar, el goteo posnasal, las alergias y el ácido estomacal que regresa a sebastian garganta también pueden causar tos. La tos es un síntoma, no joelle enfermedad. En la [employer], la tos cesa cuando desaparece la causa, wander un resfriado. Puede montez algunas medidas en sebastian hogar para toser menos y sentirse mejor. La atención de seguimiento es joelle parte clave de sebastian tratamiento y seguridad. Asegúrese de hacer y acudir a todas las citas, y llame a sebastian médico si está teniendo problemas. También es joelle buena idea saber los resultados de kaylyn exámenes y mantener joelle lista de los medicamentos que francisco. Cómo puede cuidarse en el hogar? · Clara abundante agua y otros líquidos. [place] ayuda a [place] mucosidad sea menos espesa y [place] la garganta seca o adolorida. La miel o el jugo de wendy en [place] o té podrían aliviar joelle tos seca. · [employer] medicamentos para la tos según las indicaciones de sebastian médico. 
· Eleve la georgina sobre almohadas para ayudarle a respirar y aliviar la tos seca. · Pruebe pastillas para la tos para aliviar la garganta seca o adolorida. Las pastillas para la tos no detienen la tos. Las pastillas para la tos medicinales saborizadas no son mejores que las pastillas con sabor a patric o los caramelos duros. · No fume. Evite el humo de tabaco ambiental. Si necesita ayuda para dejar de fumar, hable con sebastian médico sobre programas y medicamentos para dejar de fumar. Estos pueden aumentar kaylyn probabilidades de dejar el hábito para siempre. Cuándo debe pedir ayuda? Llame al 911 en cualquier momento que considere que necesita atención de [place].  Por ejemplo, llame si: 
  · Tiene graves dificultades para respirar. Llame a sebastian médico ahora mismo o busque atención médica inmediata si: 
  · Tose devonte.  
  · Tiene nuevas dificultades para respirar o estas empeoran.  
  · Tiene fiebre nueva o más radha.  
  · Tiene un salpullido nuevo. Preste especial atención a los cambios en sebastian carloz y asegúrese de comunicarse con sebastian médico si: 
  · Sebastian tos es más profunda o más frecuente que antes, especialmente si nota más mucosidad o un cambio en el color de la mucosidad.  
  · Tiene nuevos síntomas, wander dolor de garganta, dolor de oídos o dolor en los senos paranasales.  
  · No mejora wander se esperaba. Dónde puede encontrar más información en inglés? [place] a http://www.gray.com/ Escriba D279 en la búsqueda para aprender más acerca de \"Tos: Instrucciones de cuidado. \" Revisado: 24 febrero, 2020               Versión del contenido: 12.6 © 8289-0493 Healthwise, Incorporated. Las instrucciones de cuidado fueron adaptadas bajo licencia por [employer] (which disclaims liability or warranty for this information). Si usted tiene [place] afección médica o sobre estas instrucciones, siempre pregunte a sebastian profesional de carloz. Healthwise, Incorporated niega toda garantía o responsabilidad por sebastian uso de esta información.

## 2021-02-23 NOTE — PROGRESS NOTES
Enrrique Villatoro is a 61 y.o.  female and presents with    Subjective:  Patient has been complaining of cough for some months already. Started taking \"Rompepecho\" that is a medicine brought for her from [place].  This seems to have guaifenesin on it. She feels that since she started taking the medicine thick phlegm that she was coughing up thin out. She does says the cough has improved. However she still has a little tickle in her throat, that when it starts it makes her have cough fits. Patient had also blood work done as requested. Will discuss lab results with her today. Pt states that she has not taken BP meds today.         Patient Active Problem List   Diagnosis Code    Rheumatoid arthritis involving multiple sites with positive rheumatoid factor (HCC) M05.79    Obesity, morbid (HCC) E66.01    Chronic hepatitis C without hepatic coma (HCC) B18.2    Chronic pain syndrome G89.4    Left Achilles tendinitis M76.62      Past Medical History:   Diagnosis Date    Arthritis     Asthma     Gastritis     Hepatitis C     starting treatment 6/1/2019    Hypertension     Diagnosed in [place] a few months ago    Psychiatric disorder     SOME DEPRESSION      Past Surgical History:   Procedure Laterality Date    COLONOSCOPY N/A 5/29/2019    COL[facility] Dallas [facility] at [facility] ENDOSCOPY    HX CHOLECYSTECTOMY  2013    HX COLONOSCOPY      Completed in [facility]      Family History   Problem Relation Age of Onset    Diabetes Mother     Hypertension Father     No Known Problems Sister     No Known Problems Brother     No Known Problems Sister     No Known Problems Sister     No Known Problems Brother     No Known Problems Brother     No Known Problems Brother      Social History     Socioeconomic History    Marital status: SINGLE     Spouse name: Not on file    Number of children: Not on file    Years of education: Not on file    Highest education level: Not on file   Occupational History    Not on file   Social Needs    Financial resource strain: Not on file    Food insecurity     Worry: Not on file     Inability: Not on file    Transportation needs     Medical: Not on file     Non-medical: Not on file   Tobacco Use    Smoking status: Former Smoker     Quit date: 2017     Years since quittin.1    Smokeless tobacco: Never Used   Substance and Sexual Activity    Alcohol use: No    Drug use: No    Sexual activity: Yes     Partners: Male     Birth control/protection: None   Lifestyle    Physical activity     Days per week: Not on file     Minutes per session: Not on file    Stress: Not on file   Relationships    Social connections     Talks on phone: Not on file     Gets together: Not on file     Attends Druze service: Not on file     Active member of club or organization: Not on file     Attends meetings of clubs or organizations: Not on file     Relationship status: Not on file    Intimate partner violence     Fear of current or ex partner: Not on file     Emotionally abused: Not on file     Physically abused: Not on file     Forced sexual activity: Not on file   Other Topics Concern    Not on file   Social History Narrative    Not on file        Current Outpatient Medications   Medication Sig Dispense Refill    benzonatate (TESSALON) 100 mg capsule Take 1 Cap by mouth three (3) times daily as needed for Cough. 60 Cap 0    albuterol-ipratropium (DUO-NEB) 2.5 mg-0.5 mg/3 ml nebu 3 mL by Nebulization route every four (4) hours as needed for Wheezing. 30 Nebule 1    famotidine (PEPCID) 40 mg tablet TOME MAJOR TABLETA TODOS LOS ALICEA 90 Tab 2    pregabalin (LYRICA) 150 mg capsule TOME MAJOR CAPSULA DOS VECES AL BRENDEN 60 Cap 2    lisinopriL (PRINIVIL, ZESTRIL) 10 mg tablet Take 1 Tab by mouth daily. 30 Tab 2    fluticasone propionate (FLONASE) 50 mcg/actuation nasal spray 2 Sprays by Both Nostrils route daily.  1 Bottle 3    ibuprofen (MOTRIN) 800 mg tablet Take 1 Tab by mouth every six (6) hours as needed for Pain. Indications: pain 180 Tab 3    Pazeo 0.7 % drop INSTILL 1 DROP INTO BOTH EYES EVERY DAY      DULoxetine (CYMBALTA) 60 mg capsule TOME DOS CAPSULAS TODOS LOS ALICEA CHRONIC MUSCLE OR BONE PAIN 180 Cap 1    hydroxychloroquine (PLAQUENIL) 200 mg tablet TOME MAJOR TABLETA TODOS LOS D AS      fexofenadine (ALLEGRA) 180 mg tablet Take 1 Tab by mouth daily. Indications: inflammation of the nose due to an allergy (Patient taking differently: Take 180 mg by mouth daily as needed. Indications: inflammation of the nose due to an allergy) 90 Tab 3        ROS   Review of Systems   Constitutional: Negative for chills, fever and malaise/fatigue. HENT: Negative for congestion, ear discharge and ear pain. Eyes: Negative for blurred vision, pain and discharge. Respiratory: Positive for cough (improving) and sputum production (improved). Negative for shortness of breath. Cardiovascular: Negative for chest pain and palpitations. Gastrointestinal: Negative for abdominal pain, nausea and vomiting. Genitourinary: Negative for dysuria, frequency and urgency. Skin: Negative for itching and rash. Neurological: Negative for dizziness, seizures, loss of consciousness and headaches. Psychiatric/Behavioral: Negative for substance abuse. Objective:  Vitals:    02/23/21 1140   BP: (!) 146/87   Pulse: 95   Resp: 18   Temp: 97.8 °F (36.6 °C)   TempSrc: Temporal   SpO2: 95%   Weight: 290 lb 12.8 oz (131.9 kg)   Height: 5' 3\" (1.6 m)   PainSc:   0 - No pain       Physical Exam  Constitutional:       Appearance: Normal appearance. Eyes:      General: No scleral icterus. Right eye: No discharge. Left eye: No discharge. Neck:      Musculoskeletal: Normal range of motion and neck supple. No neck rigidity, crepitus or muscular tenderness. Thyroid: Thyromegaly (possible, ill defined border on L side) present.       Trachea: Trachea and phonation normal.   Cardiovascular:      Rate and Rhythm: Normal rate and regular rhythm. Pulmonary:      Effort: Pulmonary effort is normal. No respiratory distress. Breath sounds: Normal breath sounds. Lymphadenopathy:      Cervical: Cervical adenopathy present. Left cervical: Superficial cervical adenopathy present. Neurological:      Mental Status: She is alert and oriented to person, place, and time. Cranial Nerves: No cranial nerve deficit. Psychiatric:         Mood and Affect: Mood normal.         Behavior: Behavior normal.         Thought Content: Thought content normal.         Judgment: Judgment normal.           LABS   2/18/2021  4:40 PM - Jose Manuel, [facility]    Component Value Flag Ref Range Units Status   TSH 4.80  High   0.27 - 4.20 mcU/mL Final   2/19/2021  8:39 AM - Jose Manuel, [facility]    Component Value Flag Ref Range Units Status   WBC 9.4   4.0 - 11.0 K/uL Final   RBC 4.58   3.80 - 5.20 M/uL Final   HGB 13.6   11.7 - 16.0 g/dL Final   HCT 45.2   35.1 - 48.0 % Final   MCV 99   81 - 99 fL Final   MCH 30   26 - 34 pg Final   MCHC 30  Low   31 - 36 g/dL Final   RDW 14.0   10.0 - 15.5 % Final   PLATELET 524   955 - 440 K/uL Final   Comment:   Platelet count verified by smear review. MPV 14.5  High   9.0 - 13.0 fL Final   NEUTROPHILS 56   40 - 75 % Final   Lymphocytes 36   20 - 45 % Final   MONOCYTES 7   3 - 12 % Final   EOSINOPHILS 1   0 - 6 % Final   BASOPHILS 1   0 - 2 % Final   ABS. NEUTROPHILS 5.2   1.8 - 7.7 K/uL Final   ABSOLUTE LYMPHOCYTE COUNT 3.4   1.0 - 4.8 K/uL Final   ABS. MONOCYTES 0.7   0.1 - 1.0 K/uL Final   ABS. EOSINOPHILS 0.1   0.0 - 0.5 K/uL Final   ABS.  BASOPHILS 0.1   0.0 - 0.2 K/uL Final   IMMATURE PLATELET FRACTION 8.2  High   1.1 - 7.1 % Final   2/18/2021  4:40 PM - Jose Manuel, [facility]    Component Value Flag Ref Range Units Status   Glucose 83   70 - 99 mg/dL Final   BUN 11   6 - 22 mg/dL Final   Creatinine 0.6  Low   0.8 - 1.4 mg/dL Final   Sodium 147  High   133 - 145 mmol/L Final   Potassium 3.5   3.5 - 5.5 mmol/L Final   Chloride 104   98 - 110 mmol/L Final   CO2 31   20 - 32 mmol/L Final   AST (SGOT) 34   10 - 37 U/L Final   ALT (SGPT) 28   5 - 40 U/L Final   Alk. phosphatase 106   40 - 120 U/L Final   Bilirubin, total 0.4   0.2 - 1.2 mg/dL Final   Calcium 9.1   8.4 - 10.5 mg/dL Final   Protein, total 7.4   6.2 - 8.1 g/dL Final   Albumin 4.2   3.5 - 5.0 g/dL Final   A-G Ratio 1.3   1.1 - 2.6 ratio Final   Globulin 3.2   2.0 - 4.0 g/dL Final   Anion gap 12.0   3.0 - 15.0 mmol/L Final     GFRAA >60.0   >60.0  Final   GFRNA >60.0   >60.0  Final             TESTS      Assessment/Plan:    1. Enlarged thyroid  - US THYROID/PARATHYROID/SOFT TISS; Future    2. Abnormal TSH  Last TSH of 4.8  - TSH 3RD GENERATION; Future  - T4, FREE; Future    3. Cough  To alleviate when cough flare up  - benzonatate (TESSALON) 100 mg capsule; Take 1 Cap by mouth three (3) times daily as needed for Cough. Dispense: 60 Cap; Refill: 0     4. Essential hypertension with goal blood pressure less than 140/90  Slightly elevated blood pressure likely due to patient not having take blood pressure medicine today. Lab review: orders written for new lab studies as appropriate; see orders      I have discussed the diagnosis with the patient and the intended plan as seen in the above orders. The patient has received an after-visit summary and questions were answered concerning future plans. I have discussed medication side effects and warnings with the patient as well. I have reviewed the plan of care with the patient, accepted their input and they are in agreement with the treatment goals.          Zuri Larson MD

## 2021-02-24 LAB
T4 FREE SERPL-MCNC: 1.2 NG/DL (ref 0.9–1.8)
TSH SERPL DL<=0.005 MIU/L-ACNC: 1.71 MCU/ML (ref 0.27–4.2)

## 2021-02-26 ENCOUNTER — HOSPITAL ENCOUNTER (OUTPATIENT)
Dept: ULTRASOUND IMAGING | Age: 61
Discharge: HOME | End: 2021-02-26
Attending: STUDENT IN AN ORGANIZED HEALTH CARE EDUCATION/TRAINING PROGRAM
Payer: MEDICAID

## 2021-02-26 PROCEDURE — 76536 US EXAM OF HEAD AND NECK: CPT

## 2021-03-04 NOTE — PROGRESS NOTES
Discussed with patient and found multinodular thyroid. Will have to follow-up with an ultrasound in 1 year. Patient verbalized understanding.

## 2021-06-29 ENCOUNTER — OFFICE VISIT (OUTPATIENT)
Dept: FAMILY MEDICINE CLINIC | Age: 61
End: 2021-06-29
Payer: MEDICAID

## 2021-06-29 VITALS
OXYGEN SATURATION: 97 % | SYSTOLIC BLOOD PRESSURE: 165 MMHG | TEMPERATURE: 96.3 F | WEIGHT: 293 LBS | RESPIRATION RATE: 20 BRPM | BODY MASS INDEX: 51.9 KG/M2 | HEART RATE: 79 BPM | DIASTOLIC BLOOD PRESSURE: 87 MMHG

## 2021-06-29 DIAGNOSIS — I10 ESSENTIAL HYPERTENSION WITH GOAL BLOOD PRESSURE LESS THAN 140/90: Primary | ICD-10-CM

## 2021-06-29 DIAGNOSIS — R05.9 COUGH: ICD-10-CM

## 2021-06-29 DIAGNOSIS — M05.80 POLYARTHRITIS WITH POSITIVE RHEUMATOID FACTOR (HCC): ICD-10-CM

## 2021-06-29 DIAGNOSIS — R53.83 FATIGUE, UNSPECIFIED TYPE: ICD-10-CM

## 2021-06-29 PROCEDURE — 96372 THER/PROPH/DIAG INJ SC/IM: CPT | Performed by: STUDENT IN AN ORGANIZED HEALTH CARE EDUCATION/TRAINING PROGRAM

## 2021-06-29 PROCEDURE — 99214 OFFICE O/P EST MOD 30 MIN: CPT | Performed by: STUDENT IN AN ORGANIZED HEALTH CARE EDUCATION/TRAINING PROGRAM

## 2021-06-29 RX ORDER — IPRATROPIUM BROMIDE AND ALBUTEROL SULFATE 2.5; .5 MG/3ML; MG/3ML
3 SOLUTION RESPIRATORY (INHALATION)
Qty: 30 NEBULE | Refills: 1 | Status: SHIPPED | OUTPATIENT
Start: 2021-06-29 | End: 2021-07-29 | Stop reason: SDUPTHER

## 2021-06-29 RX ORDER — HYDROCHLOROTHIAZIDE 25 MG/1
25 TABLET ORAL DAILY
Qty: 30 TABLET | Refills: 0 | Status: SHIPPED | OUTPATIENT
Start: 2021-06-29 | End: 2021-07-13 | Stop reason: SDUPTHER

## 2021-06-29 RX ORDER — DULOXETIN HYDROCHLORIDE 30 MG/1
30 CAPSULE, DELAYED RELEASE ORAL 2 TIMES DAILY
Qty: 60 CAPSULE | Refills: 0 | Status: SHIPPED | OUTPATIENT
Start: 2021-06-29 | End: 2021-07-29 | Stop reason: SDUPTHER

## 2021-06-29 RX ORDER — KETOROLAC TROMETHAMINE 30 MG/ML
60 INJECTION, SOLUTION INTRAMUSCULAR; INTRAVENOUS ONCE
Qty: 1 VIAL | Refills: 0
Start: 2021-06-29 | End: 2021-06-29

## 2021-06-29 NOTE — PROGRESS NOTES
Juliana Parson presents today for   Chief Complaint   Patient presents with    Follow-up       Is someone accompanying this pt? yes    Is the patient using any DME equipment during OV? no    Depression Screening:  3 most recent PHQ Screens 6/29/2021   Little interest or pleasure in doing things Not at all   Feeling down, depressed, irritable, or hopeless Not at all   Total Score PHQ 2 0       Learning Assessment:  Learning Assessment 6/15/2018   PRIMARY LEARNER Patient   HIGHEST LEVEL OF EDUCATION - PRIMARY LEARNER  GRADUATED HIGH SCHOOL OR GED   BARRIERS PRIMARY LEARNER LANGUAGE   CO-LEARNER CAREGIVER Yes   CO-LEARNER NAME Myra   CO-LEARNER HIGHEST LEVEL OF EDUCATION GRADUATED HIGH SCHOOL OR GED   BARRIERS CO-LEARNER NONE   PRIMARY LANGUAGE Maltese   LEARNER PREFERENCE PRIMARY READING   ANSWERED BY Daughter   RELATIONSHIP OTHER       Abuse Screening:  Abuse Screening Questionnaire 4/6/2020   Do you ever feel afraid of your partner? N   Are you in a relationship with someone who physically or mentally threatens you? N   Is it safe for you to go home? Y       Fall Risk  Fall Risk Assessment, last 12 mths 4/6/2020   Able to walk? Yes   Fall in past 12 months? No       Health Maintenance reviewed and discussed and ordered per Provider. Health Maintenance Due   Topic Date Due    COVID-19 Vaccine (1) Never done    DTaP/Tdap/Td series (1 - Tdap) Never done    PAP AKA CERVICAL CYTOLOGY  Never done    Shingrix Vaccine Age 50> (1 of 2) Never done   . Coordination of Care:  1. Have you been to the ER, urgent care clinic since your last visit? Hospitalized since your last visit? no    2. Have you seen or consulted any other health care providers outside of the [address] [place] [address] since your last visit? Include any pap smears or colon screening.  no      Last  Checked na  Last UDS Checked na  Last Pain contract signed: na denies drug use/caffeine

## 2021-06-30 NOTE — PROGRESS NOTES
Kelsie Irvin is a 61 y.o.  female and presents with    Chief Complaint   Patient presents with    Follow-up           Subjective:  Pt states she has been to allergist and to pulmonary regarding her cough. She had treatments from both specialties. He cough would momentarily improve but then it would return. She was then told that Lisinopril could be the reason for her cough. She stopped then taking her BP pill and then her cough improved. Since she does have respiratory issues every now and then she might use her duo nebs to improve her cough. She would like a refill on this. Has not been taking BP meds. Does not check BP at home. Not complaint with low salt diet. Pt also complains of overall body pain. States she found some prednisone that had been previously prescribed to help with her myalgias. She took the 3 prednisone she had left and it made her feel better, but now the pain is back. Has not seen her rheumatologist in about 1 yr.  She also has been more fatigued lately    Patient Active Problem List   Diagnosis Code    Rheumatoid arthritis involving multiple sites with positive rheumatoid factor (HCC) M05.79    Obesity, morbid (HCC) E66.01    Chronic hepatitis C without hepatic coma (HCC) B18.2    Chronic pain syndrome G89.4    Left Achilles tendinitis M76.62      Past Medical History:   Diagnosis Date    Arthritis     Asthma     Gastritis     Hepatitis C     starting treatment 6/1/2019    Hypertension     Diagnosed in [place] a few months ago    Psychiatric disorder     SOME DEPRESSION      Past Surgical History:   Procedure Laterality Date    COLONOSCOPY N/A 5/29/2019    COLONOSCOPY performed by Tara Rosario MD at [address] [place] [address] ENDOSCOPY    HX CHOLECYSTECTOMY  2013    HX COLONOSCOPY      Completed in [facility]      Family History   Problem Relation Age of Onset    Diabetes Mother     Hypertension Father     No Known Problems Sister     No Known Problems Brother     No Known Problems Sister     No Known Problems Sister     No Known Problems Brother     No Known Problems Brother     No Known Problems Brother      Social History     Socioeconomic History    Marital status: SINGLE     Spouse name: Not on file    Number of children: Not on file    Years of education: Not on file    Highest education level: Not on file   Occupational History    Not on file   Tobacco Use    Smoking status: Former Smoker     Quit date: 2017     Years since quittin.4    Smokeless tobacco: Never Used   Substance and Sexual Activity    Alcohol use: No    Drug use: No    Sexual activity: Yes     Partners: Male     Birth control/protection: None   Other Topics Concern    Not on file   Social History Narrative    Not on file     Social Determinants of Health     Financial Resource Strain:     Difficulty of Paying Living Expenses:    Food Insecurity:     Worried About Running Out of Food in the Last Year:     Ran Out of Food in the Last Year:    Transportation Needs:     Lack of Transportation (Medical):  Lack of Transportation (Non-Medical):    Physical Activity:     Days of Exercise per Week:     Minutes of Exercise per Session:    Stress:     Feeling of Stress :    Social Connections:     Frequency of Communication with Friends and Family:     Frequency of Social Gatherings with Friends and Family:     Attends Nondenominational Services:     Active Member of Clubs or Organizations:     Attends Club or Organization Meetings:     Marital Status:    Intimate Partner Violence:     Fear of Current or Ex-Partner:     Emotionally Abused:     Physically Abused:     Sexually Abused:         Current Outpatient Medications   Medication Sig Dispense Refill    DULoxetine (CYMBALTA) 30 mg capsule Take 1 Capsule by mouth two (2) times a day for 30 days.  60 Capsule 0    albuterol-ipratropium (DUO-NEB) 2.5 mg-0.5 mg/3 ml nebu 3 mL by Nebulization route every four (4) hours as needed for Wheezing. 30 Nebule 1    hydroCHLOROthiazide (HYDRODIURIL) 25 mg tablet Take 1 Tablet by mouth daily for 30 days. 30 Tablet 0    ketorolac tromethamine (TORADOL) 60 mg/2 mL soln 2 mL by IntraMUSCular route once for 1 dose. 1 Vial 0    benzonatate (TESSALON) 100 mg capsule Take 1 Cap by mouth three (3) times daily as needed for Cough. 60 Cap 0    famotidine (PEPCID) 40 mg tablet TOME MAJOR TABLETA TODOS LOS ALICEA 90 Tab 2    pregabalin (LYRICA) 150 mg capsule TOME MAJOR CAPSULA DOS VECES AL BRENDEN 60 Cap 2    fluticasone propionate (FLONASE) 50 mcg/actuation nasal spray 2 Sprays by Both Nostrils route daily. 1 Bottle 3    ibuprofen (MOTRIN) 800 mg tablet Take 1 Tab by mouth every six (6) hours as needed for Pain. Indications: pain 180 Tab 3    Pazeo 0.7 % drop INSTILL 1 DROP INTO BOTH EYES EVERY DAY      hydroxychloroquine (PLAQUENIL) 200 mg tablet TOME MAJOR TABLETA TODOS LOS D AS      fexofenadine (ALLEGRA) 180 mg tablet Take 1 Tab by mouth daily. Indications: inflammation of the nose due to an allergy (Patient taking differently: Take 180 mg by mouth daily as needed. Indications: inflammation of the nose due to an allergy) 90 Tab 3        ROS   Review of Systems   Constitutional: Positive for malaise/fatigue. Negative for chills and fever. HENT: Negative for congestion, ear discharge and ear pain. Eyes: Negative for blurred vision, pain and discharge. Respiratory: Positive for cough. Negative for shortness of breath. Cardiovascular: Negative for chest pain and palpitations. Gastrointestinal: Negative for abdominal pain, nausea and vomiting. Genitourinary: Negative for dysuria, frequency and urgency. Musculoskeletal: Positive for joint pain and myalgias. Skin: Negative for itching and rash. Neurological: Negative for dizziness, seizures, loss of consciousness and headaches. Psychiatric/Behavioral: Negative for substance abuse.            Objective:  Vitals:    06/29/21 1357   BP: (!) 165/87 Pulse: 79   Resp: 20   Temp: (!) 96.3 °F (35.7 °C)   SpO2: 97%   Weight: 293 lb (132.9 kg)       Physical Exam  Vitals reviewed. Constitutional:       Appearance: Normal appearance. She is obese. Eyes:      General: No scleral icterus. Right eye: No discharge. Left eye: No discharge. Cardiovascular:      Rate and Rhythm: Normal rate and regular rhythm. Pulses: Normal pulses. Pulmonary:      Effort: Pulmonary effort is normal. No respiratory distress. Breath sounds: Normal breath sounds. Musculoskeletal:      Cervical back: Normal range of motion and neck supple. Neurological:      Mental Status: She is alert and oriented to person, place, and time. Cranial Nerves: No cranial nerve deficit. Psychiatric:         Mood and Affect: Mood normal.         Behavior: Behavior normal.         Thought Content: Thought content normal.         Judgment: Judgment normal.           LABS     TESTS      Assessment/Plan:    1. Cough  - albuterol-ipratropium (DUO-NEB) 2.5 mg-0.5 mg/3 ml nebu; 3 mL by Nebulization route every four (4) hours as needed for Wheezing. Dispense: 30 Nebule; Refill: 1    2. Essential hypertension with goal blood pressure less than 140/90  Will change from Lisinopril to HCTZ d/t Lisinopril causing cough on pt. Will see pt in a month to see how she is doing with the new meds. - hydroCHLOROthiazide (HYDRODIURIL) 25 mg tablet; Take 1 Tablet by mouth daily for 30 days. Dispense: 30 Tablet; Refill: 0  - BLOOD PRESSURE MONITOR W ANALYSIS; Future  - METABOLIC PANEL, BASIC; Future  - CBC WITH AUTOMATED DIFF; Future    3. Polyarthritis with positive rheumatoid factor (HCC)  Pt is to make appt to f/u w/ her rheumatology. - DULoxetine (CYMBALTA) 30 mg capsule; Take 1 Capsule by mouth two (2) times a day for 30 days. Dispense: 60 Capsule; Refill: 0  - ketorolac tromethamine (TORADOL) 60 mg/2 mL soln; 2 mL by IntraMUSCular route once for 1 dose.   Dispense: 1 Vial; Refill: 0  - KETOROLAC TROMETHAMINE INJ  - ME THER/PROPH/DIAG INJECTION, SUBCUT/IM  - C REACTIVE PROTEIN, QT; Future  - SED RATE (ESR); Future      4. Fatigue, unspecified type  - VITAMIN D, 25 HYDROXY; Future  - VITAMIN B12; Future  - METABOLIC PANEL, BASIC; Future  - CBC WITH AUTOMATED DIFF; Future         Lab review: orders written for new lab studies as appropriate; see orders      I have discussed the diagnosis with the patient and the intended plan as seen in the above orders. The patient has received an after-visit summary and questions were answered concerning future plans. I have discussed medication side effects and warnings with the patient as well. I have reviewed the plan of care with the patient, accepted their input and they are in agreement with the treatment goals.          Kimberly Sotelo MD

## 2021-07-02 DIAGNOSIS — I10 ESSENTIAL HYPERTENSION WITH GOAL BLOOD PRESSURE LESS THAN 140/90: ICD-10-CM

## 2021-07-02 DIAGNOSIS — M05.80 POLYARTHRITIS WITH POSITIVE RHEUMATOID FACTOR (HCC): ICD-10-CM

## 2021-07-02 RX ORDER — DULOXETIN HYDROCHLORIDE 30 MG/1
CAPSULE, DELAYED RELEASE ORAL
Qty: 60 CAPSULE | Refills: 5 | OUTPATIENT
Start: 2021-07-02

## 2021-07-02 RX ORDER — HYDROCHLOROTHIAZIDE 25 MG/1
TABLET ORAL
Qty: 30 TABLET | Refills: 5 | OUTPATIENT
Start: 2021-07-02

## 2021-07-13 NOTE — TELEPHONE ENCOUNTER
Requested Prescriptions     Pending Prescriptions Disp Refills    hydroCHLOROthiazide (HYDRODIURIL) 25 mg tablet 30 Tablet 0     Sig: Take 1 Tablet by mouth daily for 30 days.      Refused Prescriptions Disp Refills    hydroCHLOROthiazide (HYDRODIURIL) 25 mg tablet [Pharmacy Med Name: HYDROCHLOROTHIAZIDE 25MG TA 25 Tablet] 30 Tablet 5     Sig: JONES MAJOR (1) TABLETA POR VIA ORAL MAJOR VEZ AL BRENDEN     Refused By: Trinh Cruz     Reason for Refusal: Request already responded to by other means (e.g. phone or fax)    DULoxetine (CYMBALTA) 30 mg capsule [Pharmacy Med Name: DULOXETINE DR 30MG CAP 30 Capsule] 60 Capsule 5     Sig: JONES MAJOR (1) CAPSULA POR VIA ORAL DOS VECES AL BRENDEN     Refused By: Trinh Cruz     Reason for Refusal: Request already responded to by other means (e.g. phone or fax)

## 2021-07-14 RX ORDER — HYDROCHLOROTHIAZIDE 25 MG/1
25 TABLET ORAL DAILY
Qty: 30 TABLET | Refills: 0 | Status: SHIPPED | OUTPATIENT
Start: 2021-07-14 | End: 2021-09-06

## 2021-07-28 DIAGNOSIS — M05.80 POLYARTHRITIS WITH POSITIVE RHEUMATOID FACTOR (HCC): ICD-10-CM

## 2021-07-28 DIAGNOSIS — R05.9 COUGH: ICD-10-CM

## 2021-07-29 ENCOUNTER — VIRTUAL VISIT (OUTPATIENT)
Dept: FAMILY MEDICINE CLINIC | Age: 61
End: 2021-07-29
Payer: MEDICAID

## 2021-07-29 DIAGNOSIS — U07.1 COVID-19: ICD-10-CM

## 2021-07-29 DIAGNOSIS — R51.9 ACUTE NONINTRACTABLE HEADACHE, UNSPECIFIED HEADACHE TYPE: Primary | ICD-10-CM

## 2021-07-29 PROCEDURE — 99214 OFFICE O/P EST MOD 30 MIN: CPT | Performed by: STUDENT IN AN ORGANIZED HEALTH CARE EDUCATION/TRAINING PROGRAM

## 2021-07-29 RX ORDER — DULOXETIN HYDROCHLORIDE 30 MG/1
CAPSULE, DELAYED RELEASE ORAL
Qty: 60 CAPSULE | Refills: 0 | Status: SHIPPED | OUTPATIENT
Start: 2021-07-29 | End: 2021-09-06

## 2021-07-29 RX ORDER — ACETAMINOPHEN AND CODEINE PHOSPHATE 300; 30 MG/1; MG/1
1 TABLET ORAL
Qty: 15 TABLET | Refills: 0 | Status: SHIPPED | OUTPATIENT
Start: 2021-07-29 | End: 2021-08-03

## 2021-07-29 RX ORDER — IPRATROPIUM BROMIDE AND ALBUTEROL SULFATE 2.5; .5 MG/3ML; MG/3ML
SOLUTION RESPIRATORY (INHALATION)
Qty: 90 ML | Refills: 1 | Status: SHIPPED | OUTPATIENT
Start: 2021-07-29 | End: 2021-10-01

## 2021-07-29 RX ORDER — ACETAMINOPHEN AND CODEINE PHOSPHATE 300; 30 MG/1; MG/1
1 TABLET ORAL
Qty: 15 TABLET | Refills: 0 | Status: SHIPPED | OUTPATIENT
Start: 2021-07-29 | End: 2021-07-29

## 2021-07-29 NOTE — TELEPHONE ENCOUNTER
Requested Prescriptions     Pending Prescriptions Disp Refills    albuterol-ipratropium (DUO-NEB) 2.5 mg-0.5 mg/3 ml nebu [Pharmacy Med Name: IPRAT-ALBUT 0.5MG 30CT 0.5-2.5 (3) Solution] 90 mL 1     Sig: INHALE 1 AMPOLLETA A TRAVES DEL NEBULIZADOR CADA 4 HORAS JUANA SEA NECESARIO PARA SILBIDOS    DULoxetine (CYMBALTA) 30 mg capsule [Pharmacy Med Name: DULOXETINE DR 30MG CAP 30 Capsule] 60 Capsule 0     Sig: JONES MAJOR (1) CAPSULA POR VIA ORAL DOS VECES AL BRENDEN *EMERGENCY REFILL*

## 2021-07-29 NOTE — PROGRESS NOTES
Tomasz Ruiz is a 61 y.o.  female and presents with    Chief Complaint   Patient presents with   Paola Busby, who was evaluated through a synchronous (real-time) audio-video encounter, and/or her healthcare decision maker, is aware that it is a billable service, with coverage as determined by her insurance carrier. She provided verbal consent to proceed: Yes, and patient identification was verified. This visit was conducted pursuant to the emergency declaration under the [address] [place] [address] and the [employer] and [employer] General Act. A caregiver was present when appropriate. Ability to conduct physical exam was limited. The patient was located in a state where the provider was credentialed to provide care. --Richie Quesada MD on 7/29/2021 at 12:08 PM      Subjective:    Diagnosed with COVID 7/23/2021, went to [facility]. . Was having fever, and HA. States she has been home since then. The whole family has been positive with COVID w/ exception of . Taking Tylenol for HA. Has been using albuterol twice a day, Taking prednisone. However she feels her headache is really bad, and would like something stronger for this. Feels like she is unable to rest due to the headache. Feels like she has not had a moment where she has not been in pain from the headache.     Patient Active Problem List   Diagnosis Code    Rheumatoid arthritis involving multiple sites with positive rheumatoid factor (HCC) M05.79    Obesity, morbid (HCC) E66.01    Chronic hepatitis C without hepatic coma (HCC) B18.2    Chronic pain syndrome G89.4    Left Achilles tendinitis M76.62      Past Medical History:   Diagnosis Date    Arthritis     Asthma     Gastritis     Hepatitis C     starting treatment 6/1/2019    Hypertension     Diagnosed in [place] a few months ago   [facility] Psychiatric disorder     SOME DEPRESSION      Past Surgical History:   Procedure Laterality Date    COLONOSCOPY N/A 2019    COL[facility] D[facility]ares [facility] at [facility] ENDOSCOPY    HX CHOLECYSTECTOMY  2013    HX COLONOSCOPY      Completed in [facility]      Family History   Problem Relation Age of Onset    Diabetes Mother     Hypertension Father     No Known Problems Sister     No Known Problems Brother     No Known Problems Sister     No Known Problems Sister     No Known Problems Brother     No Known Problems Brother     No Known Problems Brother      Social History     Socioeconomic History    Marital status: SINGLE     Spouse name: Not on file    Number of children: Not on file    Years of education: Not on file    Highest education level: Not on file   Occupational History    Not on file   Tobacco Use    Smoking status: Former Smoker     Quit date: 2017     Years since quittin.5    Smokeless tobacco: Never Used   Substance and Sexual Activity    Alcohol use: No    Drug use: No    Sexual activity: Yes     Partners: Male     Birth control/protection: None   Other Topics Concern    Not on file   Social History Narrative    Not on file     Social Determinants of Health     Financial Resource Strain:     Difficulty of Paying Living Expenses:    Food Insecurity:     Worried About Running Out of Food in the Last Year:     Ran Out of Food in the Last Year:    Transportation Needs:     Lack of Transportation (Medical):      Lack of Transportation (Non-Medical):    Physical Activity:     Days of Exercise per Week:     Minutes of Exercise per Session:    Stress:     Feeling of Stress :    Social Connections:     Frequency of Communication with Friends and Family:     Frequency of Social Gatherings with Friends and Family:     Attends Ca[employer] Services:     Active Member of Clubs or Organizations:     Attends Club or Organization Meetings:     Marital Status: Intimate Partner Violence:     Fear of Current or Ex-Partner:     Emotionally Abused:     Physically Abused:     Sexually Abused:         Current Outpatient Medications   Medication Sig Dispense Refill    albuterol-ipratropium (DUO-NEB) 2.5 mg-0.5 mg/3 ml nebu INHALE 1 AMPOLLETA A TRAVES DEL NEBULIZADOR CADA 4 HORAS JUANA SEA NECESARIO PARA SILBIDOS 90 mL 1    DULoxetine (CYMBALTA) 30 mg capsule JONES MAJOR (1) CAPSULA POR VIA ORAL DOS VECES AL BRENDEN *EMERGENCY REFILL* 60 Capsule 0    hydroCHLOROthiazide (HYDRODIURIL) 25 mg tablet Take 1 Tablet by mouth daily for 30 days. 30 Tablet 0    benzonatate (TESSALON) 100 mg capsule Take 1 Cap by mouth three (3) times daily as needed for Cough. 60 Cap 0    famotidine (PEPCID) 40 mg tablet TOME MAJOR TABLETA TODOS LOS ALICEA 90 Tab 2    pregabalin (LYRICA) 150 mg capsule TOME MAJOR CAPSULA DOS VECES AL BRENDEN 60 Cap 2    fluticasone propionate (FLONASE) 50 mcg/actuation nasal spray 2 Sprays by Both Nostrils route daily. 1 Bottle 3    ibuprofen (MOTRIN) 800 mg tablet Take 1 Tab by mouth every six (6) hours as needed for Pain. Indications: pain 180 Tab 3    Pazeo 0.7 % drop INSTILL 1 DROP INTO BOTH EYES EVERY DAY      hydroxychloroquine (PLAQUENIL) 200 mg tablet TOME MAJOR TABLETA TODOS LOS D AS      fexofenadine (ALLEGRA) 180 mg tablet Take 1 Tab by mouth daily. Indications: inflammation of the nose due to an allergy (Patient taking differently: Take 180 mg by mouth daily as needed. Indications: inflammation of the nose due to an allergy) 90 Tab 3        ROS   Review of Systems   Constitutional: Positive for chills, fever and malaise/fatigue. HENT: Negative for congestion, ear discharge and ear pain. Eyes: Negative for blurred vision, pain and discharge. Respiratory: Negative for cough and shortness of breath. Cardiovascular: Negative for chest pain and palpitations. Gastrointestinal: Negative for abdominal pain, nausea and vomiting.    Genitourinary: Negative for dysuria, frequency and urgency. Skin: Negative for itching and rash. Neurological: Positive for headaches. Negative for dizziness, seizures and loss of consciousness. Psychiatric/Behavioral: Negative for substance abuse. Objective: There were no vitals filed for this visit. Physical Exam  Constitutional:       Comments: Patient looks tired, has handed over eyes squeezing her  Temples. The face mask in place. Eyes:      General: No scleral icterus. Right eye: No discharge. Left eye: No discharge. Pulmonary:      Effort: Pulmonary effort is normal. No respiratory distress. Musculoskeletal:      Cervical back: Normal range of motion and neck supple. Neurological:      Mental Status: She is alert and oriented to person, place, and time. Cranial Nerves: No cranial nerve deficit. Psychiatric:         Mood and Affect: Mood normal.         Behavior: Behavior normal.         Thought Content: Thought content normal.         Judgment: Judgment normal.           LABS     TESTS      Assessment/Plan:    1. Acute nonintractable headache, unspecified headache type  Likely secondary to Covid. - acetaminophen-codeine (Tylenol-Codeine #3) 300-30 mg per tablet; Take 1 Tablet by mouth every six (6) hours as needed for Pain for up to 5 days. Max Daily Amount: 4 Tablets. Dispense: 15 Tablet; Refill: 0    2. COVID-19  Discussed with patient that she is to make sure she stays well-hydrated, maintain quarantine for time indicated. If there is any issues with her respiratory system she is to go to the ED.  - acetaminophen-codeine (Tylenol-Codeine #3) 300-30 mg per tablet; Take 1 Tablet by mouth every six (6) hours as needed for Pain for up to 5 days. Max Daily Amount: 4 Tablets. Dispense: 15 Tablet; Refill: 0      Lab review: no lab studies available for review at time of visit      I have discussed the diagnosis with the patient and the intended plan as seen in the above orders.   I have discussed medication side effects and warnings with the patient as well. I have reviewed the plan of care with the patient, accepted their input and they are in agreement with the treatment goals.          Poppy Cano MD

## 2021-09-02 DIAGNOSIS — I10 ESSENTIAL HYPERTENSION WITH GOAL BLOOD PRESSURE LESS THAN 140/90: ICD-10-CM

## 2021-09-02 DIAGNOSIS — M05.80 POLYARTHRITIS WITH POSITIVE RHEUMATOID FACTOR (HCC): ICD-10-CM

## 2021-09-06 RX ORDER — DULOXETIN HYDROCHLORIDE 30 MG/1
CAPSULE, DELAYED RELEASE ORAL
Qty: 60 CAPSULE | Refills: 0 | Status: SHIPPED | OUTPATIENT
Start: 2021-09-06 | End: 2021-09-30

## 2021-09-06 RX ORDER — HYDROCHLOROTHIAZIDE 25 MG/1
TABLET ORAL
Qty: 30 TABLET | Refills: 4 | Status: SHIPPED | OUTPATIENT
Start: 2021-09-06 | End: 2022-02-02

## 2021-09-29 DIAGNOSIS — M05.80 POLYARTHRITIS WITH POSITIVE RHEUMATOID FACTOR (HCC): ICD-10-CM

## 2021-09-30 DIAGNOSIS — R05.9 COUGH: ICD-10-CM

## 2021-09-30 RX ORDER — DULOXETIN HYDROCHLORIDE 30 MG/1
CAPSULE, DELAYED RELEASE ORAL
Qty: 60 CAPSULE | Refills: 0 | Status: SHIPPED | OUTPATIENT
Start: 2021-09-30 | End: 2021-11-05

## 2021-10-01 RX ORDER — IPRATROPIUM BROMIDE AND ALBUTEROL SULFATE 2.5; .5 MG/3ML; MG/3ML
SOLUTION RESPIRATORY (INHALATION)
Qty: 90 ML | Refills: 1 | Status: SHIPPED | OUTPATIENT
Start: 2021-10-01 | End: 2022-02-02 | Stop reason: SDUPTHER

## 2021-11-05 DIAGNOSIS — M05.80 POLYARTHRITIS WITH POSITIVE RHEUMATOID FACTOR (HCC): ICD-10-CM

## 2021-11-05 RX ORDER — DULOXETIN HYDROCHLORIDE 30 MG/1
CAPSULE, DELAYED RELEASE ORAL
Qty: 60 CAPSULE | Refills: 0 | Status: SHIPPED | OUTPATIENT
Start: 2021-11-05 | End: 2022-03-29

## 2022-02-02 ENCOUNTER — OFFICE VISIT (OUTPATIENT)
Dept: FAMILY MEDICINE CLINIC | Age: 62
End: 2022-02-02
Payer: MEDICAID

## 2022-02-02 VITALS
RESPIRATION RATE: 16 BRPM | HEART RATE: 86 BPM | TEMPERATURE: 97.9 F | OXYGEN SATURATION: 98 % | DIASTOLIC BLOOD PRESSURE: 78 MMHG | SYSTOLIC BLOOD PRESSURE: 159 MMHG | WEIGHT: 293 LBS | HEIGHT: 63 IN | BODY MASS INDEX: 51.91 KG/M2

## 2022-02-02 DIAGNOSIS — R73.01 FASTING HYPERGLYCEMIA: ICD-10-CM

## 2022-02-02 DIAGNOSIS — R12 HEARTBURN: ICD-10-CM

## 2022-02-02 DIAGNOSIS — M06.9 RHEUMATOID ARTHRITIS INVOLVING MULTIPLE SITES, UNSPECIFIED WHETHER RHEUMATOID FACTOR PRESENT (HCC): Primary | ICD-10-CM

## 2022-02-02 DIAGNOSIS — M05.80 POLYARTHRITIS WITH POSITIVE RHEUMATOID FACTOR (HCC): ICD-10-CM

## 2022-02-02 DIAGNOSIS — E66.01 CLASS 3 SEVERE OBESITY WITH SERIOUS COMORBIDITY AND BODY MASS INDEX (BMI) OF 50.0 TO 59.9 IN ADULT, UNSPECIFIED OBESITY TYPE (HCC): ICD-10-CM

## 2022-02-02 DIAGNOSIS — Z12.31 ENCOUNTER FOR SCREENING MAMMOGRAM FOR MALIGNANT NEOPLASM OF BREAST: ICD-10-CM

## 2022-02-02 DIAGNOSIS — J31.0 RHINITIS, UNSPECIFIED TYPE: ICD-10-CM

## 2022-02-02 DIAGNOSIS — R05.9 COUGH: ICD-10-CM

## 2022-02-02 DIAGNOSIS — I10 ESSENTIAL HYPERTENSION WITH GOAL BLOOD PRESSURE LESS THAN 140/90: ICD-10-CM

## 2022-02-02 PROCEDURE — 99214 OFFICE O/P EST MOD 30 MIN: CPT | Performed by: STUDENT IN AN ORGANIZED HEALTH CARE EDUCATION/TRAINING PROGRAM

## 2022-02-02 RX ORDER — FLUTICASONE PROPIONATE 50 MCG
2 SPRAY, SUSPENSION (ML) NASAL DAILY
Qty: 1 EACH | Refills: 2 | Status: SHIPPED | OUTPATIENT
Start: 2022-02-02 | End: 2022-04-29

## 2022-02-02 RX ORDER — HYDROXYCHLOROQUINE SULFATE 200 MG/1
TABLET, FILM COATED ORAL
Qty: 30 TABLET | Refills: 1 | Status: SHIPPED | OUTPATIENT
Start: 2022-02-02 | End: 2022-03-29

## 2022-02-02 RX ORDER — FAMOTIDINE 40 MG/1
TABLET, FILM COATED ORAL
Qty: 90 TABLET | Refills: 2 | Status: SHIPPED | OUTPATIENT
Start: 2022-02-02 | End: 2022-10-26 | Stop reason: SDUPTHER

## 2022-02-02 RX ORDER — HYDROCHLOROTHIAZIDE 25 MG/1
TABLET ORAL
Qty: 30 TABLET | Refills: 4 | Status: CANCELLED | OUTPATIENT
Start: 2022-02-02

## 2022-02-02 RX ORDER — BENZONATATE 100 MG/1
100 CAPSULE ORAL
Qty: 60 CAPSULE | Refills: 0 | Status: SHIPPED | OUTPATIENT
Start: 2022-02-02 | End: 2022-03-30

## 2022-02-02 RX ORDER — IPRATROPIUM BROMIDE AND ALBUTEROL SULFATE 2.5; .5 MG/3ML; MG/3ML
SOLUTION RESPIRATORY (INHALATION)
Qty: 90 ML | Refills: 1 | Status: SHIPPED | OUTPATIENT
Start: 2022-02-02 | End: 2022-04-29

## 2022-02-02 RX ORDER — SEMAGLUTIDE 1.34 MG/ML
0.25 INJECTION, SOLUTION SUBCUTANEOUS
Qty: 1 BOX | Refills: 0 | Status: SHIPPED | OUTPATIENT
Start: 2022-02-02 | End: 2022-06-24

## 2022-02-02 RX ORDER — HYDROCHLOROTHIAZIDE 50 MG/1
50 TABLET ORAL DAILY
Qty: 90 TABLET | Refills: 1 | Status: SHIPPED | OUTPATIENT
Start: 2022-02-02 | End: 2022-07-18

## 2022-02-02 RX ORDER — MINERAL OIL
180 ENEMA (ML) RECTAL DAILY
Qty: 90 TABLET | Refills: 3 | Status: SHIPPED | OUTPATIENT
Start: 2022-02-02 | End: 2022-06-24

## 2022-02-02 RX ORDER — IBUPROFEN 800 MG/1
800 TABLET ORAL
Qty: 20 TABLET | Refills: 3 | Status: SHIPPED | OUTPATIENT
Start: 2022-02-02 | End: 2022-06-24

## 2022-02-02 NOTE — PROGRESS NOTES
Darlene Quinonez is a 64 y.o.  female and presents with    Chief Complaint   Patient presents with    Follow-up    Joint Pain     neck, knees and hands    Hypertension           Subjective:    Hypertension follow up:  Taking medications as prescribed: YES  Checking BP at home: NO  Symptoms: no symptoms  Low sodium diet: YES  Exercise: NO, trying    Has not seen rheumatology for about 2 yrs. continues to have significant joint pain in her neck knees and hands. States that she tries taking over-the-counter medication to deal with pain. Was previously taking duloxetine, however patient stopped once she ran out of medication.            Patient Active Problem List   Diagnosis Code    Rheumatoid arthritis involving multiple sites with positive rheumatoid factor (HCC) M05.79    Obesity, morbid (HCC) E66.01    Chronic hepatitis C without hepatic coma (HCC) B18.2    Chronic pain syndrome G89.4    Left Achilles tendinitis M76.62      Past Medical History:   Diagnosis Date    Arthritis     Asthma     Gastritis     Hepatitis C     starting treatment 6/1/2019    Hypertension     Diagnosed in [place] a few months ago    Psychiatric disorder     SOME DEPRESSION      Past Surgical History:   Procedure Laterality Date    COLONOSCOPY N/A 5/29/2019    COL[facility] ENDOSCOPY    HX CHOLECYSTECTOMY  2013    HX COLONOSCOPY      Completed in [facility]      Family History   Problem Relation Age of Onset    Diabetes Mother     Hypertension Father     No Known Problems Sister     No Known Problems Brother     No Known Problems Sister     No Known Problems Sister     No Known Problems Brother     No Known Problems Brother     No Known Problems Brother      Social History     Socioeconomic History    Marital status: SINGLE     Spouse name: Not on file    Number of children: Not on file    Years of education: Not on file    Highest education level: Not on file   Occupational History    Not on file   Tobacco Use    Smoking status: Former Smoker     Quit date: 2017     Years since quittin.0    Smokeless tobacco: Never Used   Vaping Use    Vaping Use: Never used   Substance and Sexual Activity    Alcohol use: No    Drug use: No    Sexual activity: Yes     Partners: Male     Birth control/protection: None   Other Topics Concern    Not on file   Social History Narrative    Not on file     Social Determinants of Health     Financial Resource Strain:     Difficulty of Paying Living Expenses: Not on file   Food Insecurity:     Worried About Running Out of Food in the Last Year: Not on file    Ashlie of Food in the Last Year: Not on file   Transportation Needs:     Lack of Transportation (Medical): Not on file    Lack of Transportation (Non-Medical): Not on file   Physical Activity:     Days of Exercise per Week: Not on file    Minutes of Exercise per Session: Not on file   Stress:     Feeling of Stress : Not on file   Social Connections:     Frequency of Communication with Friends and Family: Not on file    Frequency of Social Gatherings with Friends and Family: Not on file    Attends Tenriism Services: Not on file    Active Member of [address] [place] [address] or Organizations: Not on file    Attends Club or Organization Meetings: Not on file    Marital Status: Not on file   Intimate Partner Violence:     Fear of Current or Ex-Partner: Not on file    Emotionally Abused: Not on file    Physically Abused: Not on file    Sexually Abused: Not on file   Housing Stability:     Unable to Pay for Housing in the Last Year: Not on file    Number of [place] in the Last Year: Not on file    Unstable Housing in the Last Year: Not on file        Current Outpatient Medications   Medication Sig Dispense Refill    ibuprofen (MOTRIN) 800 mg tablet Take 1 Tablet by mouth every six (6) hours as needed for Pain.  Indications: pain 20 Tablet 3    hydrOXYchloroQUINE (PLAQUENIL) 200 mg tablet TOME MAJOR TABLETA TODOS LOS D AS 30 Tablet 1    fluticasone propionate (FLONASE) 50 mcg/actuation nasal spray 2 Sprays by Both Nostrils route daily. 1 Each 2    fexofenadine (ALLEGRA) 180 mg tablet Take 1 Tablet by mouth daily. Indications: inflammation of the nose due to an allergy 90 Tablet 3    famotidine (PEPCID) 40 mg tablet TOME MAJOR TABLETA TODOS LOS ALICEA 90 Tablet 2    benzonatate (TESSALON) 100 mg capsule Take 1 Capsule by mouth three (3) times daily as needed for Cough. 60 Capsule 0    albuterol-ipratropium (DUO-NEB) 2.5 mg-0.5 mg/3 ml nebu INHALE 1 AMPOLLETA A TRAVES DEL NEBULIZADOR CADA 4 HORAS JUANA SEA NECESARIO PARA SILBIDOS 90 mL 1    hydroCHLOROthiazide (HYDRODIURIL) 50 mg tablet Take 1 Tablet by mouth daily. 90 Tablet 1    semaglutide (Ozempic) 0.25 mg or 0.5 mg/dose (2 mg/1.5 ml) subq pen 0.25 mg by SubCUTAneous route every seven (7) days. 1 Box 0    Pazeo 0.7 % drop INSTILL 1 DROP INTO BOTH EYES EVERY DAY      DULoxetine (CYMBALTA) 30 mg capsule JONES MAJOR(1) CAPSULA POR VIA ORAL DOS VECES AL BRENDEN (Patient not taking: Reported on 2/2/2022) 60 Capsule 0    pregabalin (LYRICA) 150 mg capsule TOME MAJOR CAPSULA DOS VECES AL BRENDEN (Patient not taking: Reported on 2/2/2022) 60 Cap 2        ROS   Review of Systems   Constitutional: Negative for chills, fever and malaise/fatigue. HENT: Negative for congestion, ear discharge and ear pain. Eyes: Negative for blurred vision, pain and discharge. Respiratory: Negative for cough and shortness of breath. Cardiovascular: Negative for chest pain and palpitations. Gastrointestinal: Negative for abdominal pain, nausea and vomiting. Genitourinary: Negative for dysuria, frequency and urgency. Musculoskeletal: Positive for joint pain. Skin: Negative for itching and rash. Neurological: Negative for dizziness, seizures, loss of consciousness and headaches. Psychiatric/Behavioral: Negative for substance abuse. Objective:  Vitals:    02/02/22 1537 02/02/22 1542   BP: (!) 167/77 (!) 159/78   Pulse: 86    Resp: 16    Temp: 97.9 °F (36.6 °C)    TempSrc: Temporal    SpO2: 98%    Weight: 297 lb (134.7 kg)    Height: 5' 3\" (1.6 m)    PainSc:   0 - No pain        Physical Exam  Vitals reviewed. Constitutional:       Appearance: Normal appearance. She is obese. Eyes:      General: No scleral icterus. Right eye: No discharge. Left eye: No discharge. Cardiovascular:      Rate and Rhythm: Normal rate and regular rhythm. Pulses: Normal pulses. Pulmonary:      Effort: Pulmonary effort is normal. No respiratory distress. Breath sounds: Normal breath sounds. Musculoskeletal:      Cervical back: Normal range of motion and neck supple. Neurological:      Mental Status: She is alert and oriented to person, place, and time. Cranial Nerves: No cranial nerve deficit. Psychiatric:         Mood and Affect: Mood normal.         Behavior: Behavior normal.         Thought Content: Thought content normal.         Judgment: Judgment normal.           LABS     TESTS      Assessment/Plan:    1. Polyarthritis with positive rheumatoid factor (HCC)  Discussed with patient she should try to meet with her rheumatologist again to see if there is anything that she can get done regarding her pain. - ibuprofen (MOTRIN) 800 mg tablet; Take 1 Tablet by mouth every six (6) hours as needed for Pain. Indications: pain  Dispense: 20 Tablet; Refill: 3    2. Essential hypertension with goal blood pressure less than 140/90  Not controlled. Discussed with patient how her weight gain has also damaged her blood pressure. We will try Ozempic to see if patient loses weight then she can have improved blood pressure management. - hydroCHLOROthiazide (HYDRODIURIL) 50 mg tablet; Take 1 Tablet by mouth daily. Dispense: 90 Tablet; Refill: 1  - METABOLIC PANEL, COMPREHENSIVE; Future  - CBC WITH AUTOMATED DIFF;  Future  - LIPID PANEL; Future  - semaglutide (Ozempic) 0.25 mg or 0.5 mg/dose (2 mg/1.5 ml) subq pen; 0.25 mg by SubCUTAneous route every seven (7) days. Dispense: 1 Box; Refill: 0    3. Rhinitis, unspecified type  - fluticasone propionate (FLONASE) 50 mcg/actuation nasal spray; 2 Sprays by Both Nostrils route daily. Dispense: 1 Each; Refill: 2  - fexofenadine (ALLEGRA) 180 mg tablet; Take 1 Tablet by mouth daily. Indications: inflammation of the nose due to an allergy  Dispense: 90 Tablet; Refill: 3    4. Heartburn  - famotidine (PEPCID) 40 mg tablet; TOME MAJOR TABLETA TODOS LOS ALICEA  Dispense: 90 Tablet; Refill: 2    5. Cough  - benzonatate (TESSALON) 100 mg capsule; Take 1 Capsule by mouth three (3) times daily as needed for Cough. Dispense: 60 Capsule; Refill: 0  - albuterol-ipratropium (DUO-NEB) 2.5 mg-0.5 mg/3 ml nebu; INHALE 1 AMPOLLETA A TRAVES DEL NEBULIZADOR CADA 4 HORAS JUANA SEA NECESARIO PARA SILBIDOS  Dispense: 90 mL; Refill: 1    6. Rheumatoid arthritis involving multiple sites, unspecified whether rheumatoid factor present [facility])  Advised patient to return to her rheumatologist.  We will only give a short course of Plaquenil to patient. - hydrOXYchloroQUINE (PLAQUENIL) 200 mg tablet; TOME MAJOR TABLETA TODOS LOS D AS  Dispense: 30 Tablet; Refill: 1    7. Fasting hyperglycemia  - HEMOGLOBIN A1C WITH EAG; Future  - semaglutide (Ozempic) 0.25 mg or 0.5 mg/dose (2 mg/1.5 ml) subq pen; 0.25 mg by SubCUTAneous route every seven (7) days. Dispense: 1 Box; Refill: 0    8. Encounter for screening mammogram for malignant neoplasm of breast  - DAVID MAMMO BI SCREENING INCL CAD; Future    9. Class 3 severe obesity with serious comorbidity and body mass index (BMI) of 50.0 to 59.9 in adult, unspecified obesity type (HCC)  - semaglutide (Ozempic) 0.25 mg or 0.5 mg/dose (2 mg/1.5 ml) subq pen; 0.25 mg by SubCUTAneous route every seven (7) days. Dispense: 1 Box;  Refill: 0       Lab review: orders written for new lab studies as appropriate; see orders      I have discussed the diagnosis with the patient and the intended plan as seen in the above orders. The patient has received an after-visit summary and questions were answered concerning future plans. I have discussed medication side effects and warnings with the patient as well. I have reviewed the plan of care with the patient, accepted their input and they are in agreement with the treatment goals.          Vincenzo Carrel, MD

## 2022-02-02 NOTE — PROGRESS NOTES
Chief Complaint   Patient presents with    Follow-up    Joint Pain     neck, knees and hands    Hypertension     1. Have you been to the ER, urgent care clinic since your last visit? Hospitalized since your last visit? No    2. Have you seen or consulted any other health care providers outside of the [address] [place] [address] since your last visit? Include any pap smears or colon screening. Yes, pulmonology.     Health Maintenance Due   Topic Date Due    COVID-19 Vaccine (1) Never done    DTaP/Tdap/Td series (1 - Tdap) Never done    Shingrix Vaccine Age 50> (1 of 2) Never done    Flu Vaccine (1) 09/01/2021    Breast Cancer Screen Mammogram  12/12/2021

## 2022-02-03 ENCOUNTER — TRANSCRIBE ORDER (OUTPATIENT)
Dept: SCHEDULING | Age: 62
End: 2022-02-03

## 2022-02-03 DIAGNOSIS — Z12.31 VISIT FOR SCREENING MAMMOGRAM: Primary | ICD-10-CM

## 2022-02-07 ENCOUNTER — HOSPITAL ENCOUNTER (OUTPATIENT)
Dept: LAB | Age: 62
Discharge: HOME | End: 2022-02-07

## 2022-02-07 LAB — SENTARA SPECIMEN COL,SENBCF: NORMAL

## 2022-02-07 PROCEDURE — 99001 SPECIMEN HANDLING PT-LAB: CPT

## 2022-02-08 LAB
A-G RATIO,AGRAT: 1.5 RATIO (ref 1.1–2.6)
ABSOLUTE LYMPHOCYTE COUNT, 10803: 1.7 K/UL (ref 1–4.8)
ALBUMIN SERPL-MCNC: 4.3 G/DL (ref 3.5–5)
ALP SERPL-CCNC: 114 U/L (ref 40–120)
ALT SERPL-CCNC: 26 U/L (ref 5–40)
ANION GAP SERPL CALC-SCNC: 17 MMOL/L (ref 3–15)
AST SERPL W P-5'-P-CCNC: 30 U/L (ref 10–37)
BASOPHILS # BLD: 0.1 K/UL (ref 0–0.2)
BASOPHILS NFR BLD: 1 % (ref 0–2)
BILIRUB SERPL-MCNC: 0.8 MG/DL (ref 0.2–1.2)
BUN SERPL-MCNC: 14 MG/DL (ref 6–22)
CALCIUM SERPL-MCNC: 9.6 MG/DL (ref 8.4–10.5)
CHLORIDE SERPL-SCNC: 102 MMOL/L (ref 98–110)
CHOLEST SERPL-MCNC: 151 MG/DL (ref 110–200)
CO2 SERPL-SCNC: 25 MMOL/L (ref 20–32)
CREAT SERPL-MCNC: 0.7 MG/DL (ref 0.8–1.4)
EOSINOPHIL # BLD: 0.2 K/UL (ref 0–0.5)
EOSINOPHIL NFR BLD: 4 % (ref 0–6)
ERYTHROCYTE [DISTWIDTH] IN BLOOD BY AUTOMATED COUNT: 14 % (ref 10–15.5)
GFRAA, 66117: >60
GFRNA, 66118: >60
GLOBULIN,GLOB: 2.8 G/DL (ref 2–4)
GLUCOSE SERPL-MCNC: 120 MG/DL (ref 70–99)
GRANULOCYTES,GRANS: 59 % (ref 40–75)
HCT VFR BLD AUTO: 48.7 % (ref 35.1–48)
HDLC SERPL-MCNC: 2.4 MG/DL (ref 0–5)
HDLC SERPL-MCNC: 62 MG/DL
HGB BLD-MCNC: 14.8 G/DL (ref 11.7–16)
IMMATURE PLATELET FRACTION: 10.1 % (ref 1.1–7.1)
LDL/HDL RATIO,LDHD: 1.2
LDLC SERPL CALC-MCNC: 72 MG/DL (ref 50–99)
LYMPHOCYTES, LYMLT: 28 % (ref 20–45)
MCH RBC QN AUTO: 29 PG (ref 26–34)
MCHC RBC AUTO-ENTMCNC: 30 G/DL (ref 31–36)
MCV RBC AUTO: 95 FL (ref 80–99)
MONOCYTES # BLD: 0.5 K/UL (ref 0.1–1)
MONOCYTES NFR BLD: 8 % (ref 3–12)
NEUTROPHILS # BLD AUTO: 3.7 K/UL (ref 1.8–7.7)
NON-HDL CHOLESTEROL, 011976: 89 MG/DL
PLATELET # BLD AUTO: 199 K/UL (ref 140–440)
PMV BLD AUTO: 14.7 FL (ref 9–13)
POTASSIUM SERPL-SCNC: 4.3 MMOL/L (ref 3.5–5.5)
PROT SERPL-MCNC: 7.1 G/DL (ref 6.2–8.1)
RBC # BLD AUTO: 5.15 M/UL (ref 3.8–5.2)
SODIUM SERPL-SCNC: 144 MMOL/L (ref 133–145)
TRIGL SERPL-MCNC: 86 MG/DL (ref 40–149)
VLDLC SERPL CALC-MCNC: 17 MG/DL (ref 8–30)
WBC # BLD AUTO: 6.2 K/UL (ref 4–11)

## 2022-02-16 ENCOUNTER — CLINICAL SUPPORT (OUTPATIENT)
Dept: FAMILY MEDICINE CLINIC | Age: 62
End: 2022-02-16

## 2022-02-16 VITALS — HEART RATE: 88 BPM | DIASTOLIC BLOOD PRESSURE: 68 MMHG | SYSTOLIC BLOOD PRESSURE: 141 MMHG

## 2022-02-16 DIAGNOSIS — I10 ESSENTIAL HYPERTENSION WITH GOAL BLOOD PRESSURE LESS THAN 140/90: Primary | ICD-10-CM

## 2022-03-18 PROBLEM — B18.2 CHRONIC HEPATITIS C WITHOUT HEPATIC COMA (HCC): Status: ACTIVE | Noted: 2018-12-04

## 2022-03-18 PROBLEM — M05.79 RHEUMATOID ARTHRITIS INVOLVING MULTIPLE SITES WITH POSITIVE RHEUMATOID FACTOR (HCC): Status: ACTIVE | Noted: 2018-06-20

## 2022-03-19 PROBLEM — M76.62 LEFT ACHILLES TENDINITIS: Status: ACTIVE | Noted: 2019-05-30

## 2022-03-19 PROBLEM — E66.01 OBESITY, MORBID (HCC): Status: ACTIVE | Noted: 2018-12-03

## 2022-03-20 PROBLEM — G89.4 CHRONIC PAIN SYNDROME: Status: ACTIVE | Noted: 2018-12-04

## 2022-03-25 DIAGNOSIS — M06.9 RHEUMATOID ARTHRITIS INVOLVING MULTIPLE SITES, UNSPECIFIED WHETHER RHEUMATOID FACTOR PRESENT (HCC): ICD-10-CM

## 2022-03-25 DIAGNOSIS — M05.80 POLYARTHRITIS WITH POSITIVE RHEUMATOID FACTOR (HCC): ICD-10-CM

## 2022-03-29 RX ORDER — HYDROXYCHLOROQUINE SULFATE 200 MG/1
TABLET, FILM COATED ORAL
Qty: 30 TABLET | Refills: 5 | Status: SHIPPED | OUTPATIENT
Start: 2022-03-29 | End: 2022-09-16

## 2022-03-29 RX ORDER — DULOXETIN HYDROCHLORIDE 30 MG/1
CAPSULE, DELAYED RELEASE ORAL
Qty: 60 CAPSULE | Refills: 10 | Status: SHIPPED | OUTPATIENT
Start: 2022-03-29 | End: 2022-06-24

## 2022-03-30 DIAGNOSIS — R05.9 COUGH: ICD-10-CM

## 2022-03-30 RX ORDER — BENZONATATE 100 MG/1
CAPSULE ORAL
Qty: 60 CAPSULE | Refills: 10 | Status: SHIPPED | OUTPATIENT
Start: 2022-03-30 | End: 2022-06-24

## 2022-04-27 DIAGNOSIS — J31.0 RHINITIS, UNSPECIFIED TYPE: ICD-10-CM

## 2022-04-27 DIAGNOSIS — R05.9 COUGH: ICD-10-CM

## 2022-04-29 RX ORDER — FLUTICASONE PROPIONATE 50 MCG
SPRAY, SUSPENSION (ML) NASAL
Qty: 16 G | Refills: 10 | Status: SHIPPED | OUTPATIENT
Start: 2022-04-29 | End: 2022-05-03 | Stop reason: SDUPTHER

## 2022-04-29 RX ORDER — IPRATROPIUM BROMIDE AND ALBUTEROL SULFATE 2.5; .5 MG/3ML; MG/3ML
SOLUTION RESPIRATORY (INHALATION)
Qty: 90 ML | Refills: 10 | Status: SHIPPED | OUTPATIENT
Start: 2022-04-29 | End: 2022-05-03 | Stop reason: SDUPTHER

## 2022-05-03 DIAGNOSIS — J31.0 RHINITIS, UNSPECIFIED TYPE: ICD-10-CM

## 2022-05-03 DIAGNOSIS — R05.9 COUGH: ICD-10-CM

## 2022-05-03 RX ORDER — IPRATROPIUM BROMIDE AND ALBUTEROL SULFATE 2.5; .5 MG/3ML; MG/3ML
SOLUTION RESPIRATORY (INHALATION)
Qty: 90 ML | Refills: 10 | Status: SHIPPED | OUTPATIENT
Start: 2022-05-03

## 2022-05-03 RX ORDER — FLUTICASONE PROPIONATE 50 MCG
SPRAY, SUSPENSION (ML) NASAL
Qty: 16 G | Refills: 10 | Status: SHIPPED | OUTPATIENT
Start: 2022-05-03 | End: 2022-06-24

## 2022-05-03 NOTE — TELEPHONE ENCOUNTER
[facility] called requesting medication refill.    Requested Prescriptions     Pending Prescriptions Disp Refills    fluticasone propionate (FLONASE) 50 mcg/actuation nasal spray 16 g 10    albuterol-ipratropium (DUO-NEB) 2.5 mg-0.5 mg/3 ml nebu 90 mL 10     Future Appointments   Date Time Provider Casey Marroquin   7/5/2022  2:00 PM Rebecca Napoles MD DMA BS AMB     Last seen 02/02/22

## 2022-06-14 ENCOUNTER — PATIENT MESSAGE (OUTPATIENT)
Dept: FAMILY MEDICINE CLINIC | Age: 62
End: 2022-06-14

## 2022-06-17 ENCOUNTER — OFFICE VISIT (OUTPATIENT)
Dept: FAMILY MEDICINE CLINIC | Age: 62
End: 2022-06-17
Payer: MEDICAID

## 2022-06-17 VITALS
BODY MASS INDEX: 51.91 KG/M2 | RESPIRATION RATE: 16 BRPM | HEART RATE: 73 BPM | OXYGEN SATURATION: 99 % | DIASTOLIC BLOOD PRESSURE: 75 MMHG | TEMPERATURE: 97.8 F | SYSTOLIC BLOOD PRESSURE: 154 MMHG | WEIGHT: 293 LBS | HEIGHT: 63 IN

## 2022-06-17 DIAGNOSIS — I10 ESSENTIAL HYPERTENSION WITH GOAL BLOOD PRESSURE LESS THAN 140/90: Primary | ICD-10-CM

## 2022-06-17 DIAGNOSIS — R73.01 FASTING HYPERGLYCEMIA: ICD-10-CM

## 2022-06-17 DIAGNOSIS — E66.01 CLASS 3 SEVERE OBESITY DUE TO EXCESS CALORIES WITH SERIOUS COMORBIDITY AND BODY MASS INDEX (BMI) OF 40.0 TO 44.9 IN ADULT (HCC): ICD-10-CM

## 2022-06-17 PROCEDURE — 99214 OFFICE O/P EST MOD 30 MIN: CPT | Performed by: STUDENT IN AN ORGANIZED HEALTH CARE EDUCATION/TRAINING PROGRAM

## 2022-06-17 RX ORDER — NABUMETONE 750 MG/1
TABLET, FILM COATED ORAL
COMMUNITY
Start: 2022-05-10 | End: 2022-08-29

## 2022-06-17 RX ORDER — AMLODIPINE BESYLATE 5 MG/1
5 TABLET ORAL DAILY
Qty: 90 TABLET | Refills: 1 | Status: SHIPPED | OUTPATIENT
Start: 2022-06-17 | End: 2022-06-24

## 2022-06-17 RX ORDER — LIRAGLUTIDE 6 MG/ML
0.6 INJECTION, SOLUTION SUBCUTANEOUS DAILY
Qty: 1 ADJUSTABLE DOSE PRE-FILLED PEN SYRINGE | Refills: 2 | Status: SHIPPED | OUTPATIENT
Start: 2022-06-17 | End: 2022-06-24

## 2022-06-17 NOTE — PROGRESS NOTES
Keaton Fernandez is a 64 y.o.  female and presents with    Chief Complaint   Patient presents with    Follow-up    Joint Pain     7/10 back, hips, legs and knees           Subjective:    Hypertension follow up:  Taking medications as prescribed: YES  Checking BP at home: NO  Symptoms: no symptoms  Low sodium diet: YES  Exercise: NO    Patient has significant knee pain. She states that she has been had injections for this. Has been following with rheumatology. Recently rheumatology refer patient for bariatric surgery. She states that she is to have her first appointment for this on June 24. She states that the pain is chronic, and this is also causing her to not be able to exercise. Patient states that she does not eat as much as ome would think due to her weight. She is unsure what is keeping her weight going up.     Patient Active Problem List   Diagnosis Code    Rheumatoid arthritis involving multiple sites with positive rheumatoid factor (HCC) M05.79    Obesity, morbid (HCC) E66.01    Chronic hepatitis C without hepatic coma (HCC) B18.2    Chronic pain syndrome G89.4    Left Achilles tendinitis M76.62      Past Medical History:   Diagnosis Date    Arthritis     Asthma     Gastritis     Hepatitis C     starting treatment 6/1/2019    Hypertension     Diagnosed in [place] a few months ago    Psychiatric disorder     SOME DEPRESSION      Past Surgical History:   Procedure Laterality Date    COLONOSCOPY N/A 5/29/2019    COLONOSCOPY performed by Sanchez Wiley MD at [facility] HX CHOLECYSTECTOMY  2013    HX COLONOSCOPY      Completed in [facility]      Family History   Problem Relation Age of Onset    Diabetes Mother     Hypertension Father     No Known Problems Sister     No Known Problems Brother     No Known Problems Sister     No Known Problems Sister     No Known Problems Brother     No Known Problems Brother     No Known Problems Brother      Social History     Socioeconomic History    Marital status: SINGLE     Spouse name: Not on file    Number of children: Not on file    Years of education: Not on file    Highest education level: Not on file   Occupational History    Not on file   Tobacco Use    Smoking status: Former Smoker     Quit date: 2017     Years since quittin.4    Smokeless tobacco: Never Used   Vaping Use    Vaping Use: Never used   Substance and Sexual Activity    Alcohol use: No    Drug use: No    Sexual activity: Yes     Partners: Male     Birth control/protection: None   Other Topics Concern    Not on file   Social History Narrative    Not on file     Social Determinants of Health     Financial Resource Strain:     Difficulty of Paying Living Expenses: Not on file   Food Insecurity:     Worried About Running Out of Food in the Last Year: Not on file    Ashlie of Food in the Last Year: Not on file   Transportation Needs:     Lack of Transportation (Medical): Not on file    Lack of Transportation (Non-Medical):  Not on file   Physical Activity:     Days of Exercise per Week: Not on file    Minutes of Exercise per Session: Not on file   Stress:     Feeling of Stress : Not on file   Social Connections:     Frequency of Communication with Friends and Family: Not on file    Frequency of Social Gatherings with Friends and Family: Not on file    Attends Mandaeism Services: Not on file    Active Member of [address] [place] [address] or Organizations: Not on file    Attends Club or Organization Meetings: Not on file    Marital Status: Not on file   Intimate Partner Violence:     Fear of Current or Ex-Partner: Not on file    Emotionally Abused: Not on file    Physically Abused: Not on file    Sexually Abused: Not on file   Housing Stability:     Unable to Pay for Housing in the Last Year: Not on file    Number of [place] in the Last Year: Not on file    Unstable Housing in the Last Year: Not on file        Current Outpatient Medications   Medication Sig Dispense Refill    nabumetone (RELAFEN) 750 mg tablet JONES MAJOR (1) TABLETA POR VIA ORAL DOS VECES AL BRENDEN      fluticasone propionate (FLONASE) 50 mcg/actuation nasal spray ROCIE DOS(2) PULVERIZACIONES EN CADA FOSA NASAL DIARIAMENTE 16 g 10    albuterol-ipratropium (DUO-NEB) 2.5 mg-0.5 mg/3 ml nebu INHALE 1 AMPOLLETA A TRAVES DEL NEBULIZADOR CADA 4 HORAS JUANA SEA NECESARIO PARA SILBIDOS 90 mL 10    benzonatate (TESSALON) 100 mg capsule JONES MAJOR(1) CAPSULA POR VIA ORAL JAZZ VECES AL BRENDEN JUANA SEA NECESARIO PARA LA TOS 60 Capsule 10    hydrOXYchloroQUINE (PLAQUENIL) 200 mg tablet JONES MAJOR(1) TABLETA POR VIA ORAL TODO LOS ALICEA 30 Tablet 5    ibuprofen (MOTRIN) 800 mg tablet Take 1 Tablet by mouth every six (6) hours as needed for Pain. Indications: pain 20 Tablet 3    fexofenadine (ALLEGRA) 180 mg tablet Take 1 Tablet by mouth daily. Indications: inflammation of the nose due to an allergy 90 Tablet 3    famotidine (PEPCID) 40 mg tablet TOME MAJOR TABLETA TODOS LOS ALICEA 90 Tablet 2    hydroCHLOROthiazide (HYDRODIURIL) 50 mg tablet Take 1 Tablet by mouth daily. 90 Tablet 1    Pazeo 0.7 % drop INSTILL 1 DROP INTO BOTH EYES EVERY DAY      DULoxetine (CYMBALTA) 30 mg capsule JONES MAJOR CAPSULA POR VIA ORAL DOS VECES AL BRENDEN (Patient not taking: Reported on 6/17/2022) 60 Capsule 10    semaglutide (Ozempic) 0.25 mg or 0.5 mg/dose (2 mg/1.5 ml) subq pen 0.25 mg by SubCUTAneous route every seven (7) days. (Patient not taking: Reported on 6/17/2022) 1 Box 0    pregabalin (LYRICA) 150 mg capsule TOME MAJOR CAPSULA DOS VECES AL BRENDEN (Patient not taking: Reported on 2/2/2022) 60 Cap 2        ROS   Review of Systems   Constitutional: Negative for chills, fever and malaise/fatigue. HENT: Negative for congestion, ear discharge and ear pain. Eyes: Negative for blurred vision, pain and discharge. Respiratory: Negative for cough and shortness of breath.     Cardiovascular: Negative for chest pain and palpitations. Gastrointestinal: Negative for abdominal pain, nausea and vomiting. Genitourinary: Negative for dysuria, frequency and urgency. Musculoskeletal: Positive for joint pain. Skin: Negative for itching and rash. Neurological: Negative for dizziness, seizures, loss of consciousness and headaches. Psychiatric/Behavioral: Negative for substance abuse. Objective:  Vitals:    06/17/22 1057 06/17/22 1106   BP: (!) 165/78 (!) 154/75   Pulse: 73    Resp: 16    Temp: 97.8 °F (36.6 °C)    TempSrc: Temporal    SpO2: 99%    Weight: 304 lb 9.6 oz (138.2 kg)    Height: 5' 3\" (1.6 m)    PainSc:   7    PainLoc: Back        Physical Exam  Vitals reviewed. Constitutional:       Appearance: Normal appearance. She is obese. Eyes:      General: No scleral icterus. Right eye: No discharge. Left eye: No discharge. Cardiovascular:      Rate and Rhythm: Normal rate and regular rhythm. Pulses: Normal pulses. Pulmonary:      Effort: Pulmonary effort is normal. No respiratory distress. Breath sounds: Normal breath sounds. Musculoskeletal:      Cervical back: Normal range of motion and neck supple. Neurological:      Mental Status: She is alert and oriented to person, place, and time. Cranial Nerves: No cranial nerve deficit. Psychiatric:         Mood and Affect: Mood normal.         Behavior: Behavior normal.         Thought Content: Thought content normal.         Judgment: Judgment normal.           LABS     TESTS      Assessment/Plan:    1. Essential hypertension with goal blood pressure less than 140/90  Start patient on amlodipine. She is to return to clinic in 2 weeks for nurse visit for BP check. - amLODIPine (NORVASC) 5 mg tablet; Take 1 Tablet by mouth daily. Dispense: 90 Tablet; Refill: 1    2.  Class 3 severe obesity due to excess calories with serious comorbidity and body mass index (BMI) of 40.0 to 44.9 in [facility])  Discussed with patient the importance of losing weight, and the effects that he could have on her future. Patient was teary-eyed and states that she is going to try to be more proactive about losing weight. She will be meeting with bariatric surgeon in 7 days. - liraglutide, weight loss, (Saxenda) 3 mg/0.5 mL (18 mg/3 mL) pen; 0.6 mg by SubCUTAneous route daily. Indications: weight loss management for an obese person, obesity  Dispense: 1 Adjustable Dose Pre-filled Pen Syringe; Refill: 2    3. Fasting hyperglycemia  - liraglutide, weight loss, (Saxenda) 3 mg/0.5 mL (18 mg/3 mL) pen; 0.6 mg by SubCUTAneous route daily. Indications: weight loss management for an obese person, obesity  Dispense: 1 Adjustable Dose Pre-filled Pen Syringe; Refill: 2    Lab review: no lab studies available for review at time of visit      I have discussed the diagnosis with the patient and the intended plan as seen in the above orders. The patient has received an after-visit summary and questions were answered concerning future plans. I have discussed medication side effects and warnings with the patient as well. I have reviewed the plan of care with the patient, accepted their input and they are in agreement with the treatment goals.          Vicki Kessler MD

## 2022-06-17 NOTE — PROGRESS NOTES
Jennifer Orozco is a 64 y.o. presents today for   Chief Complaint   Patient presents with    Follow-up    Joint Pain     7/10 back, hips, legs and knees     Is someone accompanying this pt? Yes, Maribel Harris. Is the patient using any DME equipment during OV? No    Visit Vitals  BP (!) 165/78 (BP 1 Location: Left upper arm, BP Patient Position: Sitting, BP Cuff Size: Large adult)   Pulse 73   Temp 97.8 °F (36.6 °C) (Temporal)   Resp 16   Ht 5' 3\" (1.6 m)   Wt 304 lb 9.6 oz (138.2 kg)   SpO2 99%   BMI 53.96 kg/m²       Depression Screening:   3 most recent PHQ Screens 6/17/2022   Little interest or pleasure in doing things Several days   Feeling down, depressed, irritable, or hopeless Several days   Total Score PHQ 2 2       Health Maintenance: reviewed and discussed and ordered per Provider. Health Maintenance Due   Topic Date Due    DTaP/Tdap/Td series (1 - Tdap) Never done    Shingrix Vaccine Age 50> (1 of 2) Never done    Pneumococcal 0-64 years (2 - PCV) 11/20/2020    Breast Cancer Screen Mammogram  12/12/2021    COVID-19 Vaccine (3 - Booster for Pfizer series) 03/19/2022         Coordination of Care:   1. \"Have you been to the ER, urgent care clinic since your last visit? Hospitalized since your last visit? \" No    2. \"Have you seen or consulted any other health care providers outside of the [address] [place] [address] since your last visit? \" Yes rheumatology      3. For patients aged 39-70: Has the patient had a colonoscopy / FIT/ Cologuard? Yes - no Care Gap present    If the patient is female:    4. For patients aged 41-77: Has the patient had a mammogram within the past 2 years? No    5. For patients aged 21-65: Has the patient had a pap smear? No 2 years ago     Advanced Directive:  1. Do you have an Advanced Directive? No     2. Would you like information on Advanced Directives?  No    Mykel Seaamn CMA

## 2022-06-24 ENCOUNTER — OFFICE VISIT (OUTPATIENT)
Dept: SURGERY | Age: 62
End: 2022-06-24
Payer: MEDICAID

## 2022-06-24 VITALS
BODY MASS INDEX: 51.91 KG/M2 | OXYGEN SATURATION: 97 % | RESPIRATION RATE: 16 BRPM | HEIGHT: 63 IN | SYSTOLIC BLOOD PRESSURE: 142 MMHG | DIASTOLIC BLOOD PRESSURE: 83 MMHG | TEMPERATURE: 97.7 F | WEIGHT: 293 LBS

## 2022-06-24 DIAGNOSIS — Z01.818 PREOP EXAMINATION: ICD-10-CM

## 2022-06-24 DIAGNOSIS — K21.9 GASTROESOPHAGEAL REFLUX DISEASE, UNSPECIFIED WHETHER ESOPHAGITIS PRESENT: ICD-10-CM

## 2022-06-24 DIAGNOSIS — E66.01 OBESITY, MORBID (HCC): ICD-10-CM

## 2022-06-24 DIAGNOSIS — R74.8 ELEVATED LIVER ENZYMES: ICD-10-CM

## 2022-06-24 DIAGNOSIS — M17.0 BILATERAL PRIMARY OSTEOARTHRITIS OF KNEE: ICD-10-CM

## 2022-06-24 DIAGNOSIS — I10 HYPERTENSION, UNSPECIFIED TYPE: ICD-10-CM

## 2022-06-24 DIAGNOSIS — E66.01 OBESITY, MORBID (HCC): Primary | ICD-10-CM

## 2022-06-24 DIAGNOSIS — G47.33 OSA (OBSTRUCTIVE SLEEP APNEA): ICD-10-CM

## 2022-06-24 DIAGNOSIS — K29.50 CHRONIC GASTRITIS, PRESENCE OF BLEEDING UNSPECIFIED, UNSPECIFIED GASTRITIS TYPE: ICD-10-CM

## 2022-06-24 PROBLEM — B19.20 VIRAL HEPATITIS C: Status: ACTIVE | Noted: 2022-06-24

## 2022-06-24 PROBLEM — K21.00 REFLUX ESOPHAGITIS: Status: ACTIVE | Noted: 2022-06-24

## 2022-06-24 PROBLEM — M06.9 RHEUMATOID ARTHRITIS (HCC): Status: ACTIVE | Noted: 2022-06-24

## 2022-06-24 PROBLEM — K29.70 GASTRITIS: Status: ACTIVE | Noted: 2022-06-24

## 2022-06-24 PROCEDURE — 99205 OFFICE O/P NEW HI 60 MIN: CPT | Performed by: SURGERY

## 2022-06-24 RX ORDER — DULOXETIN HYDROCHLORIDE 30 MG/1
30 CAPSULE, DELAYED RELEASE ORAL DAILY
COMMUNITY
End: 2022-08-29

## 2022-06-24 NOTE — PROGRESS NOTES
Hilda Delgado is a 64 y.o. female (: 1960) presenting to address:    Chief Complaint   Patient presents with    New Patient     bariatric consult       Medication list and allergies have been reviewed with Hilda Delgado and updated as of today's date. I have gone over all Medical, Surgical and Social History with Mariiapineda Rubioens and updated/added the information accordingly.

## 2022-06-24 NOTE — PROGRESS NOTES
Bariatric Surgery Consultation    CC: Consultation regarding surgical treatment options for morbid obesity and related comorbidities  Subjective: The patient is a 64 y.o. obese  female with a Body mass index is 52.68 kg/m². . They have been considering surgery for some time now. The patient presents to the clinic today to discuss surgical weight loss options. They have made multiple attempts at weight loss over the years without success. They have tried low-carb, low calorie, high-protein diets, liraglutide, semaglutide. Unfortunately, none of these have lead to meaningful, sustained weight loss. They have attended the bariatric seminar before the visit. Denies nausea, vomiting  Occasional dysphagia, variable frequency, not daily, occurs once weekly at most.   Reports reflux, treated with H2 blocker with moderate improvement, but ongoing regurgitative symptoms/mild reflux. Chronic NSAID use due to RA. Occasional constipation, 3BMs per week on average    Sleep Apnea assessment:    STOPBANG questionnaire     Do you Snore loudly? yes  Do you often feel Tired, fatigued, or sleepy during the daytime? yes  Has anyone Observed you stop breathing during your sleep? yes  Are you being treated for high blood Pressure? yes  BMI more than 35 kg/m2? yes  Age over 48years old?  yes  Neck Circumference >16 inches? no  Gender male? no  ______________________________________     SCORE: 6     If YES to 0 - 2, low risk of sleep apnea  If YES to 3 - 4 intermediate risk of having sleep apnea  If YES to 5 - 8 high risk of having sleep apnea (or 2 + BMI 35 or Neck > 17\" or Male)     Pre op weight: 297.4  EBW: 169.4  Goal Weight Calc Women: 161.88  Wt loss to date: 0     Patient Active Problem List    Diagnosis Date Noted    Elevated liver enzymes 06/24/2022    Gastritis 06/24/2022    Gastroesophageal reflux disease 06/24/2022    Hypertension 06/24/2022    Reflux esophagitis 06/24/2022    Viral hepatitis C 2022    Rheumatoid arthritis ([facility] 2022    Left Achilles tendinitis 2019    Chronic hepatitis C without hepatic coma ([facility] 2018    Chronic pain syndrome 2018    Obesity, morbid ([facility] 2018    Bilateral primary osteoarthritis of knee 2018    Rheumatoid arthritis involving multiple sites with positive rheumatoid factor ([facility] 2018      Past Medical History:   Diagnosis Date    Arthritis     Asthma     Gastritis     Hepatitis C     starting treatment 2019    Hypertension     Diagnosed in [place] a few months ago    Psychiatric disorder     SOME DEPRESSION     Past Surgical History:   Procedure Laterality Date    COLONOSCOPY N/A 2019    COLONOSCOPY performed by Alisa Hendricks MD at [address] [facility]      HX COLONOSCOPY      Completed in [facility] History     Tobacco Use    Smoking status: Former Smoker     Quit date: 2017     Years since quittin.4    Smokeless tobacco: Never Used   Substance Use Topics    Alcohol use: No      Family History   Problem Relation Age of Onset    Diabetes Mother     Hypertension Father     No Known Problems Sister     No Known Problems Brother     No Known Problems Sister     No Known Problems Sister     No Known Problems Brother     No Known Problems Brother     No Known Problems Brother       Prior to Admission medications    Medication Sig Start Date End Date Taking? Authorizing Provider   DULoxetine (CYMBALTA) 30 mg capsule Take 30 mg by mouth daily.    Yes Provider, Historical   nabumetone (RELAFEN) 750 mg tablet JONES MAJOR (1) TABLETA POR VIA ORAL DOS VECES AL BRENDEN 5/10/22  Yes Provider, Historical   albuterol-ipratropium (DUO-NEB) 2.5 mg-0.5 mg/3 ml nebu INHALE 1 AMPOLLETA A TRAVES DEL NEBULIZADOR CADA 4 HORAS JUANA SEA NECESARIO PARA SILBIDOS 5/3/22  Yes Modesto Alves MD   hydrOXYchloroQUINE (PLAQUENIL) 200 mg tablet JONES MAJOR(1) TABLETA POR VIA ORAL TODO LOS ALICEA 3/29/22  Yes Juan Cotto MD   famotidine (PEPCID) 40 mg tablet TOME MAJOR TABLETA TODOS LOS ALICEA 2/2/22  Yes Juan Cotto MD   hydroCHLOROthiazide (HYDRODIURIL) 50 mg tablet Take 1 Tablet by mouth daily. 2/2/22  Yes Juan Cotto MD     Allergies   Allergen Reactions    Lisinopril Cough        Review of Systems:    Constitutional: No fever or chills  Neurologic: No headache  Eyes: No scleral icterus or irritated eyes  Nose: No nasal pain or drainage  Mouth: No oral lesions or sore throat  Cardiac: No palpations or chest pain  Pulmonary: No cough or shortness of breath  Gastrointestinal: No nausea, emesis, diarrhea, or constipation  Genitourinary: No dysuria  Musculoskeletal: No muscle or joint tenderness  Skin: No rashes or lesions  Psychiatric: No anxiety or depressed mood    Pertinent positives: see HPI      Objective:     Physical Exam:  Visit Vitals  BP (!) 142/83 (BP 1 Location: Left arm)   Temp 97.7 °F (36.5 °C)   Resp 16   Ht 5' 3\" (1.6 m)   Wt 134.9 kg (297 lb 6.4 oz)   SpO2 97%   BMI 52.68 kg/m²     General: No acute distress, conversant  Eyes: PERRLA, no scleral icterus  HENT: Normocephalic without oral lesions  Neck: Trachea midline  Cardiac: Normal pulse rate and rhythm, no edema, capillary refill normal 1 second  Pulmonary: Symmetric chest rise with normal effort, clear to auscultation though mildly obscured by body habitus  GI: Soft, NT, ND, no hernia, no splenomegaly  Skin: Warm without rash  Extremities: No edema or joint stiffness, moves all extremities symmetrically, steady gait  Psych: Appropriate mood and affect    Assessment:     Morbid obesity with comorbidities  Plan:   The patient presents today with severe obesity and obesity related risks/comorbidities as detailed above. The patient has made multiple unsuccessful attempts at weight loss as detailed above.  The patient desires surgical weight loss for a better chance of lifelong weight control and control of co-morbidities. They have attended the bariatric seminar and meet the qualifications for surgery based on the NIH criteria. We have discussed the various surgical options including the sleeve gastrectomy, gastric bypass, duodenal switch/modified duodenal switch. We also discussed non operative alternatives. The patient is currently interested in the sleeve gastrectomy vs gastric bypass. I believe they are a good candidate for this procedure. They will need to a/an UGI and EGD with me to evaluate their anatomy pre operatively. H pylori antigen/breath test ordered as well. We have discussed the possible complications of bariatric surgery which include but are not limited to failed weight loss, weight regain, malnutrition, leak, bleed, stricture, gastric ulcer, gastric fistula, gastric bleed, gallstones, new or worsening gastric reflux, nausea, emesis, internal hernia, abdominal wall hernia, gastric perforation, need for revision / conversion / or reversal, pregnancy complications and loss, intestinal ischemia, post operative skin complications, possible thinning of their hair, bowel obstruction, dumping syndrome, wound infection, blood clots (DVT, mesenteric thrombus, pulmonary embolism), increased addictive tendency, risk of anesthesia, and death. The patient understands this is a life altering decision and will require compliance to the program for the remainder of their life in order to be monitored to avoid complication and ensure successful, sustained weight control. They will be placed on a lifelong low carbohydrate and low sugar diet, exercise, and vitamin regimen and will require frequent blood draws and office visits to ensure adequate nutrition and program compliance. Visits and follow up will be in compliance with the guidelines set forth by [facility].  I have specifically mentioned the need to avoid all personal and second-hand tobacco exposure, systemic steroids, and NSAIDS after any bariatric surgery to help avoid the above listed complications. The patient has expressed understanding of the above and would like to enroll in the program. The patient will be submitted for medical and psychological clearance along with establishing with out dietician and joining the pre / post operative support group. They will be screened for depression and sleep apnea and treated pre operatively if needed. After successful completion of the preoperative regimen the patient will be submitted for insurance approval and pending this will be scheduled for surgery. Tests/clearances ordered:  CBC, CMP, A1c, H pylori, Vitamin D, CXR, EKG, UGI, EGD with me, sleep study  Nutrition, support group, PCP clearance, psychological clearance, cardiac clearance, sleep study      All questions from the patient have been answered and they have demonstrated appropriate understanding of the process.     RESULTS:   CXR unremarkale  UGI, PHAN  Vitamin D insuficiency, script sent to pharmacy  Elevated TSH, need PCP follow up and thyroid optimization    Signed By: Felton Morales MD [facility]  Bariatric and General Surgeon  [facility]    June 24, 2022

## 2022-06-24 NOTE — LETTER
6/24/2022    Patient: Phoebe Avilez   YOB: 1960   Date of Visit: 6/24/2022     Lianne Wilkes MD  [address] [id number]  Via In [address] [facility], [address] [address]  Via Fax: [phone]    Dear MD Sunitha Lackey MD,      Thank you for referring Ms. Phoebe Avilez to [place] [address] for evaluation. My notes for this consultation are attached. If you have questions, please do not hesitate to call me. I look forward to following your patient along with you.       Sincerely,    Demond Kaba MD

## 2022-07-06 ENCOUNTER — HOSPITAL ENCOUNTER (OUTPATIENT)
Dept: BARIATRICS/WEIGHT MGMT | Age: 62
Discharge: HOME | End: 2022-07-06

## 2022-07-07 ENCOUNTER — DOCUMENTATION ONLY (OUTPATIENT)
Dept: BARIATRICS/WEIGHT MGMT | Age: 62
End: 2022-07-07

## 2022-07-07 ENCOUNTER — HOSPITAL ENCOUNTER (OUTPATIENT)
Dept: WOMENS IMAGING | Age: 62
Discharge: HOME | End: 2022-07-07
Attending: STUDENT IN AN ORGANIZED HEALTH CARE EDUCATION/TRAINING PROGRAM
Payer: MEDICAID

## 2022-07-07 ENCOUNTER — TELEPHONE (OUTPATIENT)
Dept: MAMMOGRAPHY | Age: 62
End: 2022-07-07

## 2022-07-07 ENCOUNTER — CLINICAL SUPPORT (OUTPATIENT)
Dept: FAMILY MEDICINE CLINIC | Age: 62
End: 2022-07-07

## 2022-07-07 VITALS
HEIGHT: 63 IN | SYSTOLIC BLOOD PRESSURE: 134 MMHG | HEART RATE: 86 BPM | RESPIRATION RATE: 16 BRPM | OXYGEN SATURATION: 98 % | WEIGHT: 293 LBS | TEMPERATURE: 97.2 F | BODY MASS INDEX: 51.91 KG/M2 | DIASTOLIC BLOOD PRESSURE: 84 MMHG

## 2022-07-07 DIAGNOSIS — Z12.31 ENCOUNTER FOR SCREENING MAMMOGRAM FOR MALIGNANT NEOPLASM OF BREAST: ICD-10-CM

## 2022-07-07 DIAGNOSIS — I10 ESSENTIAL HYPERTENSION WITH GOAL BLOOD PRESSURE LESS THAN 140/90: Primary | ICD-10-CM

## 2022-07-07 PROCEDURE — 77063 BREAST TOMOSYNTHESIS BI: CPT

## 2022-07-07 RX ORDER — AMLODIPINE BESYLATE 5 MG/1
5 TABLET ORAL DAILY
COMMUNITY
End: 2022-09-19 | Stop reason: SDUPTHER

## 2022-07-07 NOTE — PROGRESS NOTES
[address] [facility] [address]    Patient's Name: Ceferino Saavedra   Age: 64 y.o. YOB: 1960   Sex: female    Date:   7/6/2022    Insurance:  [place] medicaid           Session: 1 of 6  Revision:     Surgeon:  Marsha Siddiqi    Height: 63 inches   Weight:    303   Lbs. BMI: 53.7   Pounds Lost since last month: 0                 Pounds Gained since last month: 6    Starting Weight: 297     Previous Months Weight: 297  Overall Pounds Lost: 0   Overall Pounds Gained: 6      Do you smoke?  none  No alchol    Class Guidelines    Guidelines are reviewed with patient at the start of every class. 1. Patient understands that weight loss trial classes must be consecutive. Patient understands if they miss a class, it is their responsibility to contact me to reschedule class. I will reach out to patient after their first no show. 2.  Patient understands the expectations that weight maintenance/weight loss is expected during the classes. Failure to demonstrate changes may result in one extra month of weight loss trial, followed by going back to see the surgeon. Patient understands that they CAN NOT gain any weight during the weight loss trial.  Gaining weight will result in extra classes. 3. Patient is also instructed to be doing their labs, blood work, psych visit, support group and any other test that the surgeon has used while they are working on their weight loss trial.  4.  Patient was instructed to bring their blue binder to every class and appointment. Changes Made Since Last Class: Eat less    Eating Habits and Behaviors    Today in class, we started talking about the key diet principles. We first focused on stopping liquid calories. Patient was also educated on carbohydrates. Patient was instructed to start cutting out bread, rice, and pasta from the diet and start focusing more on meat and vegetables. I then gave a power point, which focused on Label Reading. In class, I gave patients a labels and we worked through a series of questions to help patients have a better understanding of label reading. Patient was instructed to review the serving size. Patient was encouraged to focus on protein and carbohydrates. We also did a few label reading activities to help the patient become more familiar with label reading. Patient's current diet habits include: Patient's current diet habits include: Patient is eating 3 meals a day. Patient states their portion sizes are small plate. I have encouraged patient to use a smaller plate and fill up on water before meals to help cut down on portions. Patient's is eating bread, rice, pasta, cereal, and other carbohydrates. I have talked about the importance of getting into the habit now of cutting the sweets out. Fluid intake:  32 ounces of water, crystal light, unsweetened tea. 8 ounces of fruit juice,  1 cup of caffeine. Patient is encouraged not to drink calories and stick to non-carbonated, non-caffeine, sugar free drinks. The goal is 64 ounces of fluid per day. Physical Activity/Exercise    Comments: We talked about exercise. Patient was given reasons of why exercise is so important and how that can help with their long-term success. I have encouraged patient to get a support system to help with the activity. Currently for activity, patient is doing walking in neighborhood. Behavior Modification       Comments:  Behavior modifications were reinforced. This included not eating in front of the TV, which could lead to bigger portions and eating when one is not hungry. We also talked about the importance of eating 3 meals per day.   Patient was encouraged to food journal to keep their daily carbohydrates less than 30 grams per meal.      Goals that patient wants to work on includes:  1.learn portion control      Yola Chandler RDN  7/7/2022

## 2022-07-11 ENCOUNTER — TRANSCRIBE ORDER (OUTPATIENT)
Dept: REGISTRATION | Age: 62
End: 2022-07-11

## 2022-07-11 ENCOUNTER — HOSPITAL ENCOUNTER (OUTPATIENT)
Dept: PREADMISSION TESTING | Age: 62
Discharge: HOME | End: 2022-07-11
Payer: MEDICAID

## 2022-07-11 ENCOUNTER — HOSPITAL ENCOUNTER (OUTPATIENT)
Dept: LAB | Age: 62
Discharge: HOME | End: 2022-07-11

## 2022-07-11 ENCOUNTER — HOSPITAL ENCOUNTER (OUTPATIENT)
Dept: GENERAL RADIOLOGY | Age: 62
Discharge: HOME | End: 2022-07-11
Attending: SURGERY
Payer: MEDICAID

## 2022-07-11 DIAGNOSIS — I10 HYPERTENSION, UNSPECIFIED TYPE: ICD-10-CM

## 2022-07-11 DIAGNOSIS — I10 ESSENTIAL HYPERTENSION, MALIGNANT: Primary | ICD-10-CM

## 2022-07-11 DIAGNOSIS — Z01.818 PREOP EXAMINATION: ICD-10-CM

## 2022-07-11 DIAGNOSIS — K21.9 GASTROESOPHAGEAL REFLUX DISEASE, UNSPECIFIED WHETHER ESOPHAGITIS PRESENT: ICD-10-CM

## 2022-07-11 DIAGNOSIS — Z01.818 OTHER SPECIFIED PRE-OPERATIVE EXAMINATION: ICD-10-CM

## 2022-07-11 DIAGNOSIS — R74.8 ELEVATED LIVER ENZYMES: ICD-10-CM

## 2022-07-11 DIAGNOSIS — E66.01 OBESITY, MORBID (HCC): ICD-10-CM

## 2022-07-11 LAB
25(OH)D3 SERPL-MCNC: 27.4 NG/ML (ref 32–100)
A-G RATIO,AGRAT: 1.5 RATIO (ref 1.1–2.6)
ABSOLUTE LYMPHOCYTE COUNT, 10803: 2.1 K/UL (ref 1–4.8)
ALBUMIN SERPL-MCNC: 4.3 G/DL (ref 3.5–5)
ALP SERPL-CCNC: 110 U/L (ref 40–120)
ALT SERPL-CCNC: 33 U/L (ref 5–40)
ANION GAP SERPL CALC-SCNC: 14 MMOL/L (ref 3–15)
AST SERPL W P-5'-P-CCNC: 33 U/L (ref 10–37)
ATRIAL RATE: 81 BPM
AVG GLU, 10930: 115 MG/DL (ref 91–123)
BASOPHILS # BLD: 0.1 K/UL (ref 0–0.2)
BASOPHILS NFR BLD: 1 % (ref 0–2)
BILIRUB SERPL-MCNC: 0.5 MG/DL (ref 0.2–1.2)
BUN SERPL-MCNC: 17 MG/DL (ref 6–22)
CALCIUM SERPL-MCNC: 9.8 MG/DL (ref 8.4–10.5)
CALCULATED P AXIS, ECG09: 52 DEGREES
CALCULATED R AXIS, ECG10: 46 DEGREES
CALCULATED T AXIS, ECG11: 38 DEGREES
CHLORIDE SERPL-SCNC: 101 MMOL/L (ref 98–110)
CO2 SERPL-SCNC: 27 MMOL/L (ref 20–32)
CREAT SERPL-MCNC: 0.7 MG/DL (ref 0.8–1.4)
DIAGNOSIS, 93000: NORMAL
EOSINOPHIL # BLD: 0.3 K/UL (ref 0–0.5)
EOSINOPHIL NFR BLD: 4 % (ref 0–6)
ERYTHROCYTE [DISTWIDTH] IN BLOOD BY AUTOMATED COUNT: 13.3 % (ref 10–15.5)
GLOBULIN,GLOB: 2.9 G/DL (ref 2–4)
GLOMERULAR FILTRATION RATE: >60 ML/MIN/1.73 SQ.M.
GLUCOSE SERPL-MCNC: 101 MG/DL (ref 70–99)
GRANULOCYTES,GRANS: 58 % (ref 40–75)
HBA1C MFR BLD HPLC: 5.6 % (ref 4.8–5.6)
HCT VFR BLD AUTO: 45.4 % (ref 35.1–48)
HGB BLD-MCNC: 14.6 G/DL (ref 11.7–16)
IMMATURE PLATELET FRACTION: 10.8 % (ref 1.1–7.1)
LYMPHOCYTES, LYMLT: 28 % (ref 20–45)
MCH RBC QN AUTO: 30 PG (ref 26–34)
MCHC RBC AUTO-ENTMCNC: 32 G/DL (ref 31–36)
MCV RBC AUTO: 94 FL (ref 80–99)
MONOCYTES # BLD: 0.7 K/UL (ref 0.1–1)
MONOCYTES NFR BLD: 9 % (ref 3–12)
NEUTROPHILS # BLD AUTO: 4.4 K/UL (ref 1.8–7.7)
P-R INTERVAL, ECG05: 134 MS
PLATELET # BLD AUTO: 213 K/UL (ref 140–440)
PMV BLD AUTO: 14.2 FL (ref 9–13)
POTASSIUM SERPL-SCNC: 4.1 MMOL/L (ref 3.5–5.5)
PROT SERPL-MCNC: 7.2 G/DL (ref 6.2–8.1)
Q-T INTERVAL, ECG07: 388 MS
QRS DURATION, ECG06: 82 MS
QTC CALCULATION (BEZET), ECG08: 450 MS
RBC # BLD AUTO: 4.83 M/UL (ref 3.8–5.2)
SENTARA SPECIMEN COL,SENBCF: NORMAL
SODIUM SERPL-SCNC: 142 MMOL/L (ref 133–145)
TSH SERPL DL<=0.005 MIU/L-ACNC: 5.32 MCU/ML (ref 0.27–4.2)
VENTRICULAR RATE, ECG03: 81 BPM
WBC # BLD AUTO: 7.4 K/UL (ref 4–11)

## 2022-07-11 PROCEDURE — 93005 ELECTROCARDIOGRAM TRACING: CPT

## 2022-07-11 PROCEDURE — 74246 X-RAY XM UPR GI TRC 2CNTRST: CPT

## 2022-07-11 PROCEDURE — 71046 X-RAY EXAM CHEST 2 VIEWS: CPT

## 2022-07-11 PROCEDURE — 99001 SPECIMEN HANDLING PT-LAB: CPT

## 2022-07-14 LAB — H. PYLORI STOOL AG,EIA, 180764: NEGATIVE

## 2022-07-15 RX ORDER — ASPIRIN 325 MG
50000 TABLET, DELAYED RELEASE (ENTERIC COATED) ORAL
Qty: 6 CAPSULE | Refills: 0 | Status: SHIPPED | OUTPATIENT
Start: 2022-07-15 | End: 2022-08-29

## 2022-07-18 DIAGNOSIS — I10 ESSENTIAL HYPERTENSION WITH GOAL BLOOD PRESSURE LESS THAN 140/90: ICD-10-CM

## 2022-07-18 RX ORDER — HYDROCHLOROTHIAZIDE 50 MG/1
TABLET ORAL
Qty: 30 TABLET | Refills: 10 | Status: SHIPPED | OUTPATIENT
Start: 2022-07-18

## 2022-07-29 ENCOUNTER — TELEPHONE (OUTPATIENT)
Dept: SURGERY | Age: 62
End: 2022-07-29

## 2022-07-29 NOTE — TELEPHONE ENCOUNTER
----- Message from Natalie Stroud MD sent at 7/15/2022  8:54 AM EDT -----  Please notify patient of vitamin D insufficiency and that I sent a script to her pharmacy. In addition, please notify her that her TSH (thyroid stimulating hormone) is elevated, and so she needs to check in with her PCP with that result and discuss any necessary thyroid management options.  Thank you!   ----- Message -----  From: Kwaku Richard In  Sent: 7/12/2022   8:06 AM EDT  To: Natalie Stroud MD

## 2022-08-26 ENCOUNTER — HOSPITAL ENCOUNTER (OUTPATIENT)
Dept: BARIATRICS/WEIGHT MGMT | Age: 62
Discharge: HOME | End: 2022-08-26

## 2022-08-29 ENCOUNTER — OFFICE VISIT (OUTPATIENT)
Dept: SURGERY | Age: 62
End: 2022-08-29
Payer: MEDICAID

## 2022-08-29 VITALS
WEIGHT: 293 LBS | BODY MASS INDEX: 51.91 KG/M2 | TEMPERATURE: 98 F | DIASTOLIC BLOOD PRESSURE: 84 MMHG | HEIGHT: 63 IN | HEART RATE: 73 BPM | SYSTOLIC BLOOD PRESSURE: 140 MMHG | OXYGEN SATURATION: 98 %

## 2022-08-29 DIAGNOSIS — Z71.89 ENCOUNTER FOR PRE-BARIATRIC SURGERY COUNSELING AND EDUCATION: Primary | ICD-10-CM

## 2022-08-29 DIAGNOSIS — E55.9 VITAMIN D DEFICIENCY: ICD-10-CM

## 2022-08-29 DIAGNOSIS — E66.01 OBESITY, MORBID (HCC): ICD-10-CM

## 2022-08-29 DIAGNOSIS — K21.9 GASTROESOPHAGEAL REFLUX DISEASE, UNSPECIFIED WHETHER ESOPHAGITIS PRESENT: ICD-10-CM

## 2022-08-29 PROCEDURE — 99215 OFFICE O/P EST HI 40 MIN: CPT | Performed by: REGISTERED NURSE

## 2022-08-29 RX ORDER — ETODOLAC 400 MG/1
400 TABLET, FILM COATED ORAL 2 TIMES DAILY
COMMUNITY
Start: 2022-08-04

## 2022-08-29 RX ORDER — ASPIRIN 325 MG
50000 TABLET, DELAYED RELEASE (ENTERIC COATED) ORAL
Qty: 8 CAPSULE | Refills: 0 | Status: SHIPPED | OUTPATIENT
Start: 2022-08-29

## 2022-08-29 NOTE — Clinical Note
Hello. Pt would like to move forward with bypass. She will email her PCP clearance form to [place]. Also, noted elevated TSH on recent lab work. [place], may you remind her to mention it to PCP when she sees her next month. Thank you!

## 2022-08-29 NOTE — PROGRESS NOTES
Bariatric Preoperative Progress Note    Chief Complaint   Patient presents with    Follow-up     Mid trial         Subjective:     Vianey Davis is a 64 y.o. female who presents today for follow up of her candidacy for bariatric surgery. Since last seen, Vianey Davis has been working through the bariatric program towards gastric bypass with Dr. Jenna Nolan. Consult weight: 297 lbs  Today's weight: 293 lbs   Accompanied by daughter to assist with interpreting. Would like to discuss results, both surgical procedures, and recovery time. Past Medical History:   Diagnosis Date    Arthritis     Asthma     Gastritis     Hepatitis C     starting treatment 6/1/2019    Hypertension     Diagnosed in [place] a few months ago    Psychiatric disorder     SOME DEPRESSION       Past Surgical History:   Procedure Laterality Date    COLONOSCOPY N/A 5/29/2019    COLONOSCOPY performed by Altagracia Wahl MD at [facility]  2013    [facility]    HX COLONOSCOPY      Completed in [facility]       Current Outpatient Medications   Medication Sig Dispense Refill    etodolac (LODINE) 400 mg tablet Take 400 mg by mouth two (2) times a day.  cholecalciferol (VITAMIN D3) (50,000 UNITS /1250 MCG) capsule Take 1 Capsule by mouth every seven (7) days. For 8 weeks. Indications: low vitamin D levels 8 Capsule 0    hydroCHLOROthiazide (HYDRODIURIL) 50 mg tablet JONES MAJOR(1) TABLETA POR VIA ORAL DIARIAMENTE 30 Tablet 10    amLODIPine (NORVASC) 5 mg tablet Take 5 mg by mouth daily.       albuterol-ipratropium (DUO-NEB) 2.5 mg-0.5 mg/3 ml nebu INHALE 1 AMPOLLETA A TRAVES DEL NEBULIZADOR CADA 4 HORAS JUANA SEA NECESARIO PARA SILBIDOS 90 mL 10    hydrOXYchloroQUINE (PLAQUENIL) 200 mg tablet JONES MAJOR(1) TABLETA POR VIA ORAL TODO LOS ALICEA 30 Tablet 5    famotidine (PEPCID) 40 mg tablet TOME MAJOR TABLETA TODOS LOS ALICEA 90 Tablet 2       Allergies   Allergen Reactions    Lisinopril Cough ROS:  Review of Systems   Constitutional:  Positive for weight loss. Negative for malaise/fatigue. Eyes:  Negative for blurred vision. Respiratory:  Negative for cough and shortness of breath. Cardiovascular:  Negative for chest pain and palpitations. Gastrointestinal:  Positive for heartburn. Negative for abdominal pain, nausea and vomiting. Musculoskeletal:  Positive for joint pain (bilateral knee pain). Neurological:  Negative for dizziness and headaches. Objective:     Physical Exam:  Visit Vitals  BP (!) 140/84 (BP 1 Location: Left upper arm, BP Patient Position: Sitting)   Pulse 73   Temp 98 °F (36.7 °C)   Ht 5' 3\" (1.6 m)   Wt 132.9 kg (293 lb)   SpO2 98%   BMI 51.90 kg/m²       Physical Exam  Constitutional:       Appearance: She is obese. Eyes:      Pupils: Pupils are equal, round, and reactive to light. Cardiovascular:      Rate and Rhythm: Normal rate. Pulmonary:      Effort: Pulmonary effort is normal.   Musculoskeletal:         General: Normal range of motion. Cervical back: Normal range of motion. Skin:     General: Skin is warm and dry. Neurological:      Mental Status: She is alert and oriented to person, place, and time. Psychiatric:         Mood and Affect: Mood normal.         Behavior: Behavior normal.         Thought Content: Thought content normal.       Studies to date and results:    Labs: 7/11/2022  H. Pylori: Negative  Vit D 27.4: Prescribed cholecalciferol 50,000 unit cap, take 1 cap q 7 days for 8 weeks. TSH: 5.32- Elevation in past, has upcoming appt with PCP     EKG 7/11/2022: Normal sinus rhythm, Normal ECG    EGD: Needs to schedule with Dr. Vj Oneil    CXR 7/11/2022: No acute cardiopulmonary process    UGI 7/11/2022: Gastroesophageal reflux; otherwise, unremarkable double contrast upper GI.      Nutritional evaluation: Completed 2 of 6 WLT classes with John Ross RD    Psychiatric evaluation: Cleared by Dr. Susanna Carr on 8/8/2022    Support Group: September 2022    Additional evaluations:  PCP clearance- Signed, will e-mail to [facility]  Cardiac clearance     Assessment:   Kassy Winter is a 64 y.o. female who is progressing through the bariatric preoperative evaluation. At this time, she is an appropriate candidate for weight loss surgery pending completion of requirements. Plan:   -Complete remainder of preop evaluation including PCP form, cardiac clearance, WLT classes, EGD, support group.  -Patient communicates understanding that the expectation is to lose or maintain weight during WLT. Weight gain may result in delay or cancellation of surgery.   -Follow up once she has completed entirety of weight loss workup to determine next steps. On this day 8/29/2022, I have spent 40 minutes reviewing previous notes, test results and face to face with the patient discussing the treatment plan as well as documenting on the day of the visit.     Jazmín Montana NP

## 2022-08-29 NOTE — Clinical Note
Ramirez Noyola. Pt needs to be scheduled for EGD with Dr. Amarilis Tariq. Also, she would like to pursue gastric bypass. Thank you!

## 2022-09-02 ENCOUNTER — DOCUMENTATION ONLY (OUTPATIENT)
Dept: BARIATRICS/WEIGHT MGMT | Age: 62
End: 2022-09-02

## 2022-09-02 NOTE — PROGRESS NOTES
[address] [facility] [address]    Patient's Name: Lissette Carlton   Age: 64 y.o. YOB: 1960   Sex: female  Date 8/26/2022        Insurance:  [employer]  medicare            Session: 2 of  6  Surgeon:  Brandy Bagley    Height: 62 inches   Current Weight:    294      Lbs. BMI: 53.8   Pounds Lost since last month: 9                 Pounds Gained since last month: 0      Starting Weight: 297   303  Overall Pounds Lost: 3   Overall Pounds Gained: 0    Do you smoke? No    Alcohol intake:  Number of drinks at a time:  none      Class Guidelines    Guidelines are reviewed with patient at the start of every class. 1. Patient understands that weight loss trial classes must be consecutive. Patient understands if they miss a class, it is their responsibility to contact me to reschedule class. I will reach out to patient after their first no show. 2.  Patient understands the expectations that weight maintenance/weight loss is expected during the classes. Failure to demonstrate changes may result in one extra month of weight loss trial, followed by going back to see the surgeon. Patient understands that they CAN NOT gain any weight during the weight loss trial.  Gaining weight will result in extra classes. 3. Patient is also instructed to be doing their labs, blood work, psych visit, support group and any other test that the surgeon has used while they are working on their weight loss trial.  4.  Patient was instructed to bring their blue binder to every class and appointment. Changes Made Since Last Class: measuring portions out    Eating Habits and Behaviors    Today in class, we reviewed the key diet principles. I have talked to patient about pushing the fluid and working towards 64 ounces per day. We focused on following a low-calorie diet.   Patient was instructed to count their carbohydrates and try to keep their daily intake under 75 grams per day and try to keep their daily protein at 80 grams per day. Patient was given examples of carbohydrates in starches. Patient was encouraged to focus on meat and vegetables and begin cutting carbohydrates out. We talked about foods that are protein-based and how to incorporate those into their meals. I also reviewed with patient the importance of eating 3 meals per day and suggestions were made for breakfast items. Patient's current diet habits include: Patient is eating 3 meals per day. Patient is encouraged to fill up on protein first, then vegetables, and work on cutting back on the carbohydrates. Portions are: smaller. Patient is encouraged to use a smaller plate to help cut down on portion sizes. Patient is drinking 32 ounces of water 4 ounces of sweet tea, 4 ounces of caffeine. Patient is encouraged to continue to cut out liquid calories and aim for achieving 64 ounces of fluid per day. Physical Activity/Exercise    Comments: We talked about exercise. Patient was given reasons of why exercise is so important and how that can help with their long-term success. I have encouraged patient to get a support system to help with the activity. For activity, patient is exercing. We have talked about goals, which include starting off walking and building upon that. Patient is also encouraged to add in some light weights to get some strength training. Behavior Modification       Comments:  During today's lesson, I gave a presentation called The 100-200 Calorie \"Mindless Margin. \"  The goal is to make modest daily 100-200 calorie reductions in certain things that the body won't notice. One, 100-200 calorie change and would will look 10-20 pounds in one year. An example could be cutting soda. Patient was given a check off list and was encouraged to come up with 1-3 100 calories changes they could make.   The check off list is a daily tracker to see if these goals are being met. Goals that patient wants to work on includes:  1.  Learn more about calorie        Justo Bustillo RD  9/2/2022

## 2022-09-15 DIAGNOSIS — M06.9 RHEUMATOID ARTHRITIS INVOLVING MULTIPLE SITES, UNSPECIFIED WHETHER RHEUMATOID FACTOR PRESENT (HCC): ICD-10-CM

## 2022-09-16 RX ORDER — HYDROXYCHLOROQUINE SULFATE 200 MG/1
TABLET, FILM COATED ORAL
Qty: 30 TABLET | Refills: 10 | Status: SHIPPED | OUTPATIENT
Start: 2022-09-16

## 2022-09-19 ENCOUNTER — OFFICE VISIT (OUTPATIENT)
Dept: FAMILY MEDICINE CLINIC | Age: 62
End: 2022-09-19
Payer: MEDICAID

## 2022-09-19 VITALS
WEIGHT: 292.8 LBS | SYSTOLIC BLOOD PRESSURE: 139 MMHG | HEART RATE: 74 BPM | HEIGHT: 63 IN | TEMPERATURE: 98.1 F | BODY MASS INDEX: 51.88 KG/M2 | RESPIRATION RATE: 16 BRPM | DIASTOLIC BLOOD PRESSURE: 85 MMHG | OXYGEN SATURATION: 98 %

## 2022-09-19 DIAGNOSIS — I10 ESSENTIAL HYPERTENSION WITH GOAL BLOOD PRESSURE LESS THAN 140/90: Primary | ICD-10-CM

## 2022-09-19 DIAGNOSIS — E66.01 CLASS 3 SEVERE OBESITY DUE TO EXCESS CALORIES WITH SERIOUS COMORBIDITY AND BODY MASS INDEX (BMI) OF 40.0 TO 44.9 IN ADULT (HCC): ICD-10-CM

## 2022-09-19 PROCEDURE — 99214 OFFICE O/P EST MOD 30 MIN: CPT | Performed by: STUDENT IN AN ORGANIZED HEALTH CARE EDUCATION/TRAINING PROGRAM

## 2022-09-19 RX ORDER — AMLODIPINE BESYLATE 5 MG/1
5 TABLET ORAL DAILY
Qty: 90 TABLET | Refills: 1 | Status: SHIPPED | OUTPATIENT
Start: 2022-09-19

## 2022-09-19 NOTE — PROGRESS NOTES
Alireza Griggs is a 64 y.o.  female and presents with    Chief Complaint   Patient presents with    Hypertension    Headache     Worse last week, related to sinus pressure             Subjective:    Hypertension follow up:  Taking medications as prescribed: No - has not taken amlodipine since last month  Checking BP at home: NO  Symptoms: headahce - seems to be sinus pressure  Low sodium diet: NO  Exercise: YES - has been doing weights, and sitting bicycle     Pt started going to bariatric surgery consultation. She has been working hard and has lost weight already.         Patient Active Problem List   Diagnosis Code    Rheumatoid arthritis involving multiple sites with positive rheumatoid factor ([facility] [address] M05.79    Obesity, morbid (HCC) E66.01    Chronic hepatitis C without hepatic coma (HCC) B18.2    Chronic pain syndrome G89.4    Left Achilles tendinitis M76.62    Bilateral primary osteoarthritis of knee M17.0    Elevated liver enzymes R74.8    Gastritis K29.70    Gastroesophageal reflux disease K21.9    Hypertension I10    Reflux esophagitis K21.00    Viral hepatitis C B19.20    Rheumatoid arthritis ([facility] M06.9      Past Medical History:   Diagnosis Date    Arthritis     Asthma     Gastritis     Hepatitis C     starting treatment 6/1/2019    Hypertension     Diagnosed in [address] a few months ago    Psychiatric disorder     SOME DEPRESSION      Past Surgical History:   Procedure Laterality Date    COLONOSCOPY N/A 5/29/2019    COLONOSCOPY performed by Briana Cote MD at [facility] ENDOSCOPY    HX CHOLECYSTECTOMY  2013    lap    HX COLONOSCOPY      [email or web address]      Family History   Problem Relation Age of Onset    Diabetes Mother     Hypertension Father     No Known Problems Sister     No Known Problems Brother     No Known Problems Sister     No Known Problems Sister     No Known Problems Brother     No Known Problems Brother     No Known Problems Brother      Social History Socioeconomic History    Marital status: SINGLE     Spouse name: Not on file    Number of children: Not on file    Years of education: Not on file    Highest education level: Not on file   Occupational History    Not on file   Tobacco Use    Smoking status: Former     Types: Cigarettes     Quit date: 2017     Years since quittin.7    Smokeless tobacco: Never   Vaping Use    Vaping Use: Never used   Substance and Sexual Activity    Alcohol use: No    Drug use: No    Sexual activity: Yes     Partners: Male     Birth control/protection: None   Other Topics Concern    Not on file   Social History Narrative    Not on file     Social Determinants of Health     Financial Resource Strain: Not on file   Food Insecurity: Not on file   Transportation Needs: Not on file   Physical Activity: Not on file   Stress: Not on file   Social Connections: Not on file   Intimate Partner Violence: Not on file   Housing Stability: Not on file        Current Outpatient Medications   Medication Sig Dispense Refill    hydrOXYchloroQUINE (PLAQUENIL) 200 mg tablet JONES MAJOR(1) TABLETA POR VIA ORAL TODO LOS ALICEA 30 Tablet 10    etodolac (LODINE) 400 mg tablet Take 400 mg by mouth two (2) times a day. cholecalciferol (VITAMIN D3) (50,000 UNITS /1250 MCG) capsule Take 1 Capsule by mouth every seven (7) days. For 8 weeks. Indications: low vitamin D levels 8 Capsule 0    hydroCHLOROthiazide (HYDRODIURIL) 50 mg tablet JONES MAJOR(1) TABLETA POR VIA ORAL DIARIAMENTE 30 Tablet 10    albuterol-ipratropium (DUO-NEB) 2.5 mg-0.5 mg/3 ml nebu INHALE 1 AMPOLLETA A TRAVES DEL NEBULIZADOR CADA 4 HORAS JUANA SEA NECESARIO PARA SILBIDOS 90 mL 10    famotidine (PEPCID) 40 mg tablet TOME MAJOR TABLETA TODOS LOS ALICEA 90 Tablet 2    amLODIPine (NORVASC) 5 mg tablet Take 5 mg by mouth daily. (Patient not taking: Reported on 2022)          ROS   Review of Systems   Constitutional:  Negative for chills, fever and malaise/fatigue.    HENT:  Negative for congestion, ear discharge and ear pain. Eyes:  Negative for blurred vision, pain and discharge. Respiratory:  Negative for cough and shortness of breath. Cardiovascular:  Negative for chest pain and palpitations. Gastrointestinal:  Negative for abdominal pain, nausea and vomiting. Genitourinary:  Negative for dysuria, frequency and urgency. Skin:  Negative for itching and rash. Neurological:  Negative for dizziness, seizures, loss of consciousness and headaches. Psychiatric/Behavioral:  Negative for substance abuse. Objective:  Vitals:    09/19/22 0957 09/19/22 1007   BP: (!) 149/81 139/85   Pulse: 74    Resp: 16    Temp: 98.1 °F (36.7 °C)    TempSrc: Temporal    SpO2: 98%    Weight: 292 lb 12.8 oz (132.8 kg)    Height: 5' 3\" (1.6 m)    PainSc:   0 - No pain        Physical Exam  Vitals reviewed. Constitutional:       Appearance: Normal appearance. She is obese. Eyes:      General: No scleral icterus. Right eye: No discharge. Left eye: No discharge. Cardiovascular:      Rate and Rhythm: Normal rate and regular rhythm. Pulmonary:      Effort: Pulmonary effort is normal. No respiratory distress. Musculoskeletal:      Cervical back: Normal range of motion and neck supple. Neurological:      Mental Status: She is alert and oriented to person, place, and time. Cranial Nerves: No cranial nerve deficit. Psychiatric:         Mood and Affect: Mood normal.         Behavior: Behavior normal.         Thought Content: Thought content normal.         Judgment: Judgment normal.         LABS     TESTS      Assessment/Plan:    1. Essential hypertension with goal blood pressure less than 140/90  stable  - amLODIPine (NORVASC) 5 mg tablet; Take 1 Tablet by mouth daily. Dispense: 90 Tablet; Refill: 1    2.  Class 3 severe obesity due to excess calories with serious comorbidity and body mass index (BMI) of 40.0 to 44.9 in adult [facility])  Continue diet and exercise and bariatric classes. Lab review: no lab studies available for review at time of visit      I have discussed the diagnosis with the patient and the intended plan as seen in the above orders. The patient has received an after-visit summary and questions were answered concerning future plans. I have discussed medication side effects and warnings with the patient as well. I have reviewed the plan of care with the patient, accepted their input and they are in agreement with the treatment goals.          Chaparrita Aquino MD

## 2022-09-19 NOTE — PROGRESS NOTES
Shannon Burgos is a 64 y.o. presents today for   Chief Complaint   Patient presents with    Hypertension    Headache     Worse last week       Is someone accompanying this pt? No    Is the patient using any DME equipment during OV? No    Visit Vitals  BP (!) 149/81 (BP 1 Location: Left upper arm, BP Patient Position: Sitting, BP Cuff Size: Large adult)   Pulse 74   Temp 98.1 °F (36.7 °C) (Temporal)   Resp 16   Ht 5' 3\" (1.6 m)   Wt 292 lb 12.8 oz (132.8 kg)   SpO2 98%   BMI 51.87 kg/m²       Depression Screening:   3 most recent PHQ Screens 9/19/2022   Little interest or pleasure in doing things Not at all   Feeling down, depressed, irritable, or hopeless Several days   Total Score PHQ 2 1       Health Maintenance: reviewed and discussed and ordered per Provider. Health Maintenance Due   Topic Date Due    DTaP/Tdap/Td series (1 - Tdap) Never done    Shingrix Vaccine Age 50> (1 of 2) Never done    Pneumococcal 0-64 years (2 - PCV) 11/20/2020    COVID-19 Vaccine (3 - Booster for Pfizer series) 03/19/2022    Flu Vaccine (1) 09/01/2022    Cervical cancer screen  09/11/2022         Coordination of Care:   1. \"Have you been to the ER, urgent care clinic since your last visit? Hospitalized since your last visit? \" No    2. \"Have you seen or consulted any other health care providers outside of the [address] [place] [address] since your last visit? \" Yes general surgery and rheumatology       3. For patients aged 39-70: Has the patient had a colonoscopy / FIT/ Cologuard? Yes - no Care Gap present    If the patient is female:    4. For patients aged 41-77: Has the patient had a mammogram within the past 2 years? Yes - no Care Gap present    5. For patients aged 21-65: Has the patient had a pap smear?  Yes - no Care Gap present     [address] [place] [address]

## 2022-09-26 ENCOUNTER — HOSPITAL ENCOUNTER (OUTPATIENT)
Dept: BARIATRICS/WEIGHT MGMT | Age: 62
Discharge: HOME | End: 2022-09-26

## 2022-09-28 ENCOUNTER — DOCUMENTATION ONLY (OUTPATIENT)
Dept: BARIATRICS/WEIGHT MGMT | Age: 62
End: 2022-09-28

## 2022-09-28 NOTE — PROGRESS NOTES
[address] Loss Telly RUTH Tory [facility], [address]    Patient's Name: Wayne Jacobs   Age: 64 y.o. YOB: 1960   Sex: female    Date:   9/26/2022    Insurance:  [employer] medicaid            Session: 3/6  Surgeon:  Lorrie Araujo    Height: 63 inches   Weight:    294      Lbs. BMI: 52.1   Pounds Lost since last month: 0                Pounds Gained since last month: 0    Starting Weight: 297     Previous Months Weight: 294  Overall Pounds Lost: 3   Overall Pounds Gained: 0    Smoking:  no    Alcohol intake:  Number of drinks at a time:  none    Patient has not  attended the required bariatric support group. Class Guidelines    Guidelines are reviewed with patient at the start of every class. 1. Patient understands that weight loss trial classes must be consecutive. Patient understands if they miss a class, it is their responsibility to contact me to reschedule class. I will reach out to patient after their first no show. 2.  Patient understands the expectations that weight maintenance/weight loss is expected during the classes. Failure to demonstrate changes may result in one extra month of weight loss trial, followed by going back to see the surgeon. 3. Patient is also instructed to be doing their labs, blood work, psych visit, support group and any other test that the surgeon has used while they are working on their weight loss trial.        Changes Made Since Last Class: eliminated sugars, flours    Eating Habits and Behaviors      During today's class, we continued to focus on the key diet principles. Patient was instructed to follow a low carbohydrate diet, focusing on meat and vegetables. Patient was instructed to stop liquid calories and aim for 64 ounces of water per day.  We focused on focusing in on bigger problem areas to start making changes to, such as reducing fast food intake, reducing carbonated beverages/soda intake, decreasing carbohydrates intake daily, etc. We reviewed protein shakes and high protein yogurts to chose, as well. During today's class, we also talked about how to read a label. Patient was given information on: The benefits of reading a label, which allowed one to compare the nutritional value of similar products and make healthy food decisions. The ingredient list, which can help to determine if the food is heathy or something that fits into the diet. The importance of reading the serving size and making sure to apply that to the portion size that they are consuming. Patient was also educated on carbohydrates. I talked to patient about: The function of carbohydrates. Foods that carbohydrate-heavy. Patient was given the guidelines to keep their carbohydrates less than 75 grams per day in the pre-op phase. Patient was also given ideas of low carb swaps, which include zucchini noodles, spaghetti squash, or cauliflower rice. Lower carbohydrate fruit options were discussed. Discussed lower carb swaps to use instead of potato chips. Patient's dietary habits include: Patient is eating 3 meals per day. I have talked to patient about some lower carbohydrate snack choices that focused more protein. Patient is drinking 46  ounces of fluid per day. Fluid intake is make up of: water and 4 oz coffee. Patient is encouraged to focus on non-caloric, non-caffeine, non-carbonated liquids. Physical Activity/Exercise     Comments:     Currently for exercise, patient stationary bike and walking. I have talked to patient about some suggestions to start an exercise routine. Patient is encouraged to purchase a pedometer and use this to track her steps. I have made some suggestions to patient of ways to incorporate exercise in with a busy lifestyle. We also talked about You Tube videos that can be used for an exercise routine.          Behavior Modification  Comments:  Behavior modifications were discussed with the patient. Some of those behavior modifications include:  Emphasized the importance of eating slowly, not eating and drinking meals at the same time. I have encouraged patient to follow journal, which may be done by paper or tracking it an [place], such as [employer] or [employer] #5 Rosy Pace. Patient understands the importance of following through with these behaviors following surgery to aid in long term weight loss. Tips and advice were given on how to start implementing these into the patient's life.           Goal patient has set for next month:  No goals set  [employer] RDN  9/28/2022

## 2022-10-25 ENCOUNTER — OFFICE VISIT (OUTPATIENT)
Dept: CARDIOLOGY CLINIC | Age: 62
End: 2022-10-25
Payer: MEDICAID

## 2022-10-25 VITALS
RESPIRATION RATE: 18 BRPM | BODY MASS INDEX: 51.73 KG/M2 | WEIGHT: 292 LBS | TEMPERATURE: 98.1 F | OXYGEN SATURATION: 98 % | HEART RATE: 93 BPM | DIASTOLIC BLOOD PRESSURE: 73 MMHG | SYSTOLIC BLOOD PRESSURE: 133 MMHG

## 2022-10-25 DIAGNOSIS — R06.00 DYSPNEA, UNSPECIFIED TYPE: ICD-10-CM

## 2022-10-25 DIAGNOSIS — I10 PRIMARY HYPERTENSION: Primary | ICD-10-CM

## 2022-10-25 PROCEDURE — 99204 OFFICE O/P NEW MOD 45 MIN: CPT | Performed by: INTERNAL MEDICINE

## 2022-10-25 PROCEDURE — 3074F SYST BP LT 130 MM HG: CPT | Performed by: INTERNAL MEDICINE

## 2022-10-25 PROCEDURE — 3078F DIAST BP <80 MM HG: CPT | Performed by: INTERNAL MEDICINE

## 2022-10-25 NOTE — PATIENT INSTRUCTIONS
Testing   Echo- At Dr. Gwendolyn Hyman office    **call office 3-5 days after testing is completed for results**     Clearance will be considered after reviewing echo results

## 2022-10-25 NOTE — PROGRESS NOTES
[facility]    Ms. Ingris Moreno is 60-year-old female with history of hypertension, morbid obesity, GERD    Patient is here today for cardiac evaluation   denies any prior history of MI or CHF  Patient is considering gastric bypass surgery for morbid obesity and complication. Patient was asked to come see me for cardiac evaluation prior to considering elective surgery. Based on conversation with patient and the daughter, patient does not appear to be having any symptoms concerning for angina. No chest pain or chest tightness however she does have dyspnea with mild to moderate exertion which has remained stable over the years. She believes that this is likely because of her morbid obesity. She is limited because of her significant joint discomfort as well. She is using 2 pillows at night. She denies any palpitation, presyncope or syncope  Denies any nausea, vomiting, abdominal pain, fever, chills, sputum production. No hematuria or other bleeding complaints    Past Medical History:   Diagnosis Date    Arthritis     Asthma     Gastritis     Hepatitis C     starting treatment 6/1/2019    Hypertension     Diagnosed in [place] a few months ago    Psychiatric disorder     SOME DEPRESSION       Review of Systems:  Cardiac symptoms as noted above in HPI. All others negative. Denies fatigue, malaise, skin rash, , blurring vision, photophobia, neck pain, hemoptysis, chronic cough, nausea, vomiting, hematuria, burning micturition, BRBPR, chronic headaches. Current Outpatient Medications   Medication Sig    amLODIPine (NORVASC) 5 mg tablet Take 1 Tablet by mouth daily.     hydroCHLOROthiazide (HYDRODIURIL) 50 mg tablet JONES MAJOR(1) TABLETA POR VIA ORAL DIARIAMENTE    famotidine (PEPCID) 40 mg tablet TOME MAJOR TABLETA TODOS LOS ALICEA    hydrOXYchloroQUINE (PLAQUENIL) 200 mg tablet JONES MAJOR(1) TABLETA POR VIA ORAL TODO LOS ALICEA    etodolac (LODINE) 400 mg tablet Take 400 mg by mouth two (2) times a day. cholecalciferol (VITAMIN D3) (50,000 UNITS /1250 MCG) capsule Take 1 Capsule by mouth every seven (7) days. For 8 weeks. Indications: low vitamin D levels    albuterol-ipratropium (DUO-NEB) 2.5 mg-0.5 mg/3 ml nebu INHALE 1 AMPOLLETA A TRAVES DEL NEBULIZADOR CADA 4 HORAS JUANA SEA NECESARIO PARA SILBIDOS     No current facility-administered medications for this visit. Past Surgical History:   Procedure Laterality Date    COLONOSCOPY N/A 2019    COLONOSCOPY performed by Cora Carrion MD at [facility] ENDOSCOPY    [address]      lap    HX COLONOSCOPY      Completed in [address] and Sensitivities:  Allergies   Allergen Reactions    Lisinopril Cough       Family History:  Family History   Problem Relation Age of Onset    Diabetes Mother     Hypertension Father     No Known Problems Sister     No Known Problems Brother     No Known Problems Sister     No Known Problems Sister     No Known Problems Brother     No Known Problems Brother     No Known Problems Brother        Social History:  Social History     Tobacco Use    Smoking status: Former     Types: Cigarettes     Quit date: 2017     Years since quittin.8    Smokeless tobacco: Never   Vaping Use    Vaping Use: Never used   Substance Use Topics    Alcohol use: No    Drug use: No     She  reports that she quit smoking about 5 years ago. Her smoking use included cigarettes. She has never used smokeless tobacco.  She  reports no history of alcohol use. Physical Exam:  BP Readings from Last 3 Encounters:   10/25/22 133/73   22 139/85   22 (!) 140/84         Pulse Readings from Last 3 Encounters:   10/25/22 93   22 74   22 73          Wt Readings from Last 3 Encounters:   10/25/22 132.5 kg (292 lb)   22 132.8 kg (292 lb 12.8 oz)   22 132.9 kg (293 lb)       Constitutional: Oriented to person, place, and time.    HENT: Head: Normocephalic and atraumatic. Eyes: Conjunctivae and extraocular motions are normal.   Neck: No JVD present. Carotid bruit is not appreciated. Cardiovascular: Regular rhythm. No murmur, gallop or rubs appreciated  Lung: Breath sounds normal. No respiratory distress. No ronchi or rales appreciated  Abdominal: No tenderness. No rebound and no guarding. Musculoskeletal: There is no lower extremity edema. No cynosis  Lymphadenopathy:  No cervical or supraclavicular adenopathy appriciated. Neurological: No gross motor deficit noted. Skin: No visible skin rash noted. No Ear discharge noted  Psychiatric: Normal mood and affect. LABS:   @  Lab Results   Component Value Date/Time    WBC 7.4 2022 11:15 AM    Hemoglobin, POC 15.3 2019 06:27 PM    HGB 14.6 2022 11:15 AM    Hematocrit, POC 45 2019 06:27 PM    HCT 45.4 2022 11:15 AM    PLATELET 593  11:15 AM    MCV 94 2022 11:15 AM     Lab Results   Component Value Date/Time    Sodium 142 2022 11:15 AM    Potassium 4.1 2022 11:15 AM    Chloride 101 2022 11:15 AM    CO2 27 2022 11:15 AM    Glucose 101 (H) 2022 11:15 AM    BUN 17 2022 11:15 AM    Creatinine 0.7 (L) 2022 11:15 AM     Lipids Latest Ref Rng & Units 2022   Chol, Total 110 - 200 mg/dL 151 195   HDL >=40 mg/dL 62 76   LDL 50 - 99 mg/dL 72 93   Trig 40 - 149 mg/dL 86 131   Some recent data might be hidden     Lab Results   Component Value Date/Time    ALT (SGPT) 33 2022 11:15 AM     Lab Results   Component Value Date/Time    Hemoglobin A1c 5.6 2022 11:15 AM     Lab Results   Component Value Date/Time    TSH 5.32 (H) 2022 11:15 AM     EK2022: Sinus rhythm at 81 bpm.  Normal IN and QRS interval.  No ST changes of ischemia.     STRESS TEST (EST, PHARM, NUC, ECHO etc)    CATHETERIZATION    IMPRESSION & PLAN:  Ghada Foy is a 80-year-old female    Dyspnea:  I suspect this is most likely because of her morbid obesity with BMI of 52. Does not appear to be having any fluid overload on exam.  No anginal symptoms. Echocardiogram is ordered to rule out any hypertensive cardiovascular heart disease    Hypertension: /73. Currently on amlodipine and hydrochlorothiazide. Echo ordered    Morbid obesity:  BMI of 52. Awaiting gastric bypass surgery    Patient is scheduled to be considered for gastric bypass surgery for morbid obesity and complication  Currently does not appear to be having any decompensated heart failure or unstable coronary symptoms  However because of dyspnea which I suspect is likely because of morbid obesity, I am going to proceed with echocardiogram to rule out any hypertensive heart vascular disease. If echocardiogram is unremarkable, she would likely will be acceptable candidate to proceed with planned surgery. We will make final further recommendation based on echo finding    Importance of diet and exercise was discussed with patient. This plan was discussed with patient who is in agreement. Thank you for allowing me to participate in patient care. Please feel free to call me if you have any question or concern. Rangel Abrams MD  Please note: This document has been produced using voice recognition software. Unrecognized errors in transcription may be present.

## 2022-10-25 NOTE — PROGRESS NOTES
Identified pt with two pt identifiers(name and ). Reviewed record in preparation for visit and have obtained necessary documentation. Allison Rodriguez presents today for   Chief Complaint   Patient presents with    New Patient    Surgical Clearance     Gastric sx_ Dr Pretty Feliciano. Allison Rodriguez preferred language for health care discussion is english/other. Personal Protective Equipment:   Personal Protective Equipment was used including: mask-surgical and hands-gloves. Patient was placed on no precaution(s). Patient was masked. Precautions:   Patient currently on None  Patient currently roomed with door closed. Is someone accompanying this pt? daughter    Is the patient using any DME equipment during OV? no    Depression Screening:  3 most recent PHQ Screens 10/25/2022   Little interest or pleasure in doing things Not at all   Feeling down, depressed, irritable, or hopeless Not at all   Total Score PHQ 2 0       Learning Assessment:  Learning Assessment 6/15/2018   PRIMARY LEARNER Patient   HIGHEST LEVEL OF EDUCATION - PRIMARY LEARNER  GRADUATED HIGH SCHOOL OR GED   BARRIERS PRIMARY LEARNER LANGUAGE   CO-LEARNER CAREGIVER Yes   CO-LEARNER NAME Myra   CO-LEARNER HIGHEST LEVEL OF EDUCATION GRADUATED HIGH SCHOOL OR GED   BARRIERS CO-LEARNER NONE   PRIMARY LANGUAGE Kazakh   LEARNER PREFERENCE PRIMARY READING   ANSWERED BY Daughter   RELATIONSHIP OTHER       Abuse Screening:  Abuse Screening Questionnaire 2020   Do you ever feel afraid of your partner? N   Are you in a relationship with someone who physically or mentally threatens you? N   Is it safe for you to go home? Y       Fall Risk  Fall Risk Assessment, last 12 mths 2020   Able to walk? Yes   Fall in past 12 months? No       Pt currently taking Anticoagulant therapy? no  Pt currently taking Antiplatelet therapy? no    Coordination of Care:  1.  Have you been to the ER, urgent care clinic since your last visit? Hospitalized since your last visit? no    2. Have you seen or consulted any other health care providers outside of the [address] [place] [address] since your last visit? Include any pap smears or colon screening. no      Please see Red banners under Allergies and Med Rec to remove outside inquires. All correct information has been verified with patient and added to chart.      Medication's patient's would liked removed has been marked not taking to be removed per Verbal order and read back per Andie Garcia MD

## 2022-10-25 NOTE — LETTER
10/25/2022    Patient: Osiel Tenorio   YOB: 1960   Date of Visit: 10/25/2022     Calvin Guillory MD  [address] [id number]  Via In [address], [address] [facility]  [address]  [address] [facility]  Via In Basket    Dear Tobin Bowen, MD Rush Castleman, MD,      Thank you for referring Ms. Osiel Tenorio to [facility] SPECIALIST AT [facility] for evaluation. My notes for this consultation are attached. If you have questions, please do not hesitate to call me. I look forward to following your patient along with you.       Sincerely,    Raúl Jarrell MD

## 2022-10-26 ENCOUNTER — HOSPITAL ENCOUNTER (OUTPATIENT)
Dept: BARIATRICS/WEIGHT MGMT | Age: 62
Discharge: HOME | End: 2022-10-26

## 2022-10-26 DIAGNOSIS — R12 HEARTBURN: ICD-10-CM

## 2022-10-26 RX ORDER — FAMOTIDINE 40 MG/1
TABLET, FILM COATED ORAL
Qty: 90 TABLET | Refills: 2 | Status: SHIPPED | OUTPATIENT
Start: 2022-10-26

## 2022-10-26 NOTE — TELEPHONE ENCOUNTER
Katia Florentino with [place] contacted the office for the following prescription to be refilled:    Medication requested :   Requested Prescriptions     Pending Prescriptions Disp Refills    famotidine (PEPCID) 40 mg tablet 90 Tablet 2     Sig: TOME MAJOR TABLETCRISTY ALICEA      PCP: Clementina Morales MD  LOV: 9/19/2022  NOV DMA: 12/19/2022    FUTURE APPT:   Future Appointments   Date Time Provider Casey Marroquin   10/26/2022  3:00 PM [facility] DIETICIAN [address] [facility]   11/21/2022  1:00 PM [facility] ECHO 1 [facility] BS AMB   12/19/2022 11:00 AM Clementina Morales MD [facility] BS AMB   10/24/2023  3:30 PM Gulshan Rosario MD [facility] BS AMB     Thank you.

## 2022-10-27 ENCOUNTER — DOCUMENTATION ONLY (OUTPATIENT)
Dept: BARIATRICS/WEIGHT MGMT | Age: 62
End: 2022-10-27

## 2022-10-27 NOTE — PROGRESS NOTES
[address] [place] [address] [facility], [address]    Patient's Name: Ashish Cid     YOB: 1960       Insurance:  optima   medicaid        Session: 4 of  6  Surgeon:  Catrina Mcdowell  Date:10/26/2022    Height: 63 inches Weight:    292      Lbs. BMI: 51.8   Pounds Lost since last month: 2               Pounds Gained since last month: 0    Starting Weight: 297   Previous Months Weight: 294  Overall Pounds Lost: 5 Overall Pounds Gained: 0      Do you smoke? no    Alcohol intake:  Number of drinks at a time:  none      Class Guidelines    Guidelines are reviewed with patient at the start of every class. 1. Patient understands that weight loss trial classes must be consecutive. Patient understands if they miss a class, it is their responsibility to contact me to reschedule class. I will reach out to patient after their first no show. 2.  Patient understands the expectations that weight maintenance/weight loss is expected during the classes. Failure to demonstrate changes may result in one extra month of weight loss trial, followed by going back to see the surgeon. 3. Patient is also instructed to be doing their labs, blood work, psych visit, support group and any other test that the surgeon has used while they are working on their weight loss trial.  4. Patient is instructed to bring their education binder to all classes. Changes Made Since Last Class: eliminated sweets, bread    Eating Habits and Behaviors      Today we reviewed key diet principles. We talked about patient following a low calorie/low carbohydrate diet while they are in weight loss trials. To achieve this, patient is encouraged to avoid liquid calories, including alcohol, soda, sweet tea, and fruit juices. Patient can cut carbohydrates by trying to stick to meat and vegetables.   Patient can also eat eggs, cheese, and good fat, while trying to eliminate starches, such as pasta, rice, crackers, chips, popcorn. I also gave a power point that included 19 Ways to Stay on Track with a Healthy Lifestyle. Some of the food-related suggestions included drinking plenty of water or calorie-free beverages prior to their meal.  Patient is encouraged to to fill up on protein first, which is the ultimate fill-me up food. We talked about the importance of eating breakfast and the effects that can happen if one skips meals, which includes eating larger portions, snacking more, and decreasing their metabolism. With the suggestions in the power point, patient will be able to decrease their calories and carbohydrate intake. Patient's dietary habits include: Patient is eating 2 meals per day. I have talked to patient about some lower carbohydrate snack choices that focused more protein. Patient is drinking 64 ounces of fluid per day. Fluid intake is make up of: water caffeinated drinks. Physical Activity/Exercise    Comments: We talked about the importance of establishing a work out routine. Patient is currentlywalking for activity. Goals have been set. Behavior Modification       Comments:   Some of the behavior tips that were included in the power point, include being choosy about night time snacking. Patient was encouraged to make the TV a no eating zone and not eat after 7 pm.  Patient is also encouraged to keep a food journal.      One of the other things we talked about during class is whether or not patient has a support system. Patient has  been to a support group.       Goals set by Patient    Improve my exercise      Ender Lorenz RDN  10/27/2022

## 2022-11-08 NOTE — PERIOP NOTES
PRE-SURGICAL INSTRUCTIONS        Patient's Name:  Humble Segal      [id number] Date:  11/8/2022              Surgery Date:  11/18/2022                Do NOT eat or drink anything, including candy, gum, or ice chips after midnight on 11/18/22, unless you have specific instructions from your surgeon or anesthesia provider to do so. You may brush your teeth before coming to the hospital.  No smoking 24 hours prior to the day of surgery. No alcohol 24 hours prior to the day of surgery. No recreational drugs for one week prior to the day of surgery. Leave all valuables, including money/purse, at home. Remove all jewelry, nail polish, acrylic nails, and makeup (including mascara); no lotions powders, deodorant, or perfume/cologne/after shave on the skin. Follow instruction for Hibiclens washes and CHG wipes from surgeon's office. Glasses/contact lenses and dentures may be worn to the hospital.  They will be removed prior to surgery. Call your doctor if symptoms of a cold or illness develop within 24-48 hours prior to your surgery. 11.  If you are having an outpatient procedure, please make arrangements for a responsible ADULT TO [address] [place] [address] and stay with you for 24 hours after your surgery. 12. ONE VISITOR in the hospital at this time for outpatient procedures. Exceptions may be made for surgical admissions, per nursing unit guidelines      Special Instructions:      Bring list of CURRENT medications. Bring any pertinent legal medical records. Take these medications the morning of surgery with a sip of water:  per surgeon  Follow physician instructions about stopping anticoagulants. On the day of surgery, come in the main entrance of [facility]. Let the  at the desk know you are there for surgery. A staff member will come escort you to the surgical area on the second floor.     If you have any questions or concerns, please do not hesitate to call:     (Prior to the day of surgery) PAT department:  [phone]   (Day of surgery) Pre-Op department:  [phone]    These surgical instructions were reviewed with [facility](DAUGHTER) during the PAT phone call.

## 2022-11-17 ENCOUNTER — ANESTHESIA EVENT (OUTPATIENT)
Dept: ENDOSCOPY | Age: 62
End: 2022-11-17
Payer: MEDICAID

## 2022-11-17 ENCOUNTER — TELEPHONE (OUTPATIENT)
Dept: CARDIOLOGY CLINIC | Age: 62
End: 2022-11-17

## 2022-11-18 ENCOUNTER — ANESTHESIA (OUTPATIENT)
Dept: ENDOSCOPY | Age: 62
End: 2022-11-18
Payer: MEDICAID

## 2022-11-18 ENCOUNTER — HOSPITAL ENCOUNTER (OUTPATIENT)
Age: 62
Setting detail: OUTPATIENT SURGERY
Discharge: HOME | End: 2022-11-18
Attending: SURGERY | Admitting: SURGERY
Payer: MEDICAID

## 2022-11-18 VITALS
SYSTOLIC BLOOD PRESSURE: 160 MMHG | WEIGHT: 285.9 LBS | RESPIRATION RATE: 16 BRPM | HEART RATE: 77 BPM | TEMPERATURE: 97.9 F | HEIGHT: 63 IN | DIASTOLIC BLOOD PRESSURE: 80 MMHG | BODY MASS INDEX: 50.66 KG/M2 | OXYGEN SATURATION: 99 %

## 2022-11-18 PROCEDURE — 77030008565 HC TBNG SUC IRR ERBE -B: Performed by: SURGERY

## 2022-11-18 PROCEDURE — 2709999900 HC NON-CHARGEABLE SUPPLY: Performed by: SURGERY

## 2022-11-18 PROCEDURE — 74011250636 HC RX REV CODE- 250/636: Performed by: NURSE ANESTHETIST, CERTIFIED REGISTERED

## 2022-11-18 PROCEDURE — 77030019988 HC FCPS ENDOSC DISP BSC -B: Performed by: SURGERY

## 2022-11-18 PROCEDURE — 00731 ANES UPR GI NDSC PX NOS: CPT | Performed by: ANESTHESIOLOGY

## 2022-11-18 PROCEDURE — 76040000019: Performed by: SURGERY

## 2022-11-18 PROCEDURE — 76060000031 HC ANESTHESIA FIRST 0.5 HR: Performed by: SURGERY

## 2022-11-18 PROCEDURE — 74011000250 HC RX REV CODE- 250: Performed by: NURSE ANESTHETIST, CERTIFIED REGISTERED

## 2022-11-18 PROCEDURE — 43235 EGD DIAGNOSTIC BRUSH WASH: CPT | Performed by: SURGERY

## 2022-11-18 PROCEDURE — 00731 ANES UPR GI NDSC PX NOS: CPT | Performed by: NURSE ANESTHETIST, CERTIFIED REGISTERED

## 2022-11-18 RX ORDER — LIDOCAINE HYDROCHLORIDE 20 MG/ML
INJECTION, SOLUTION EPIDURAL; INFILTRATION; INTRACAUDAL; PERINEURAL AS NEEDED
Status: DISCONTINUED | OUTPATIENT
Start: 2022-11-18 | End: 2022-11-18 | Stop reason: HOSPADM

## 2022-11-18 RX ORDER — ONDANSETRON 2 MG/ML
4 INJECTION INTRAMUSCULAR; INTRAVENOUS ONCE
Status: CANCELLED | OUTPATIENT
Start: 2022-11-18 | End: 2022-11-18

## 2022-11-18 RX ORDER — INSULIN LISPRO 100 [IU]/ML
INJECTION, SOLUTION INTRAVENOUS; SUBCUTANEOUS ONCE
Status: DISCONTINUED | OUTPATIENT
Start: 2022-11-18 | End: 2022-11-18 | Stop reason: HOSPADM

## 2022-11-18 RX ORDER — SODIUM CHLORIDE 0.9 % (FLUSH) 0.9 %
5-40 SYRINGE (ML) INJECTION EVERY 8 HOURS
Status: CANCELLED | OUTPATIENT
Start: 2022-11-18

## 2022-11-18 RX ORDER — DEXTROSE MONOHYDRATE 100 MG/ML
0-250 INJECTION, SOLUTION INTRAVENOUS AS NEEDED
Status: CANCELLED | OUTPATIENT
Start: 2022-11-18

## 2022-11-18 RX ORDER — SODIUM CHLORIDE 0.9 % (FLUSH) 0.9 %
5-40 SYRINGE (ML) INJECTION AS NEEDED
Status: CANCELLED | OUTPATIENT
Start: 2022-11-18

## 2022-11-18 RX ORDER — SODIUM CHLORIDE, SODIUM LACTATE, POTASSIUM CHLORIDE, CALCIUM CHLORIDE 600; 310; 30; 20 MG/100ML; MG/100ML; MG/100ML; MG/100ML
50 INJECTION, SOLUTION INTRAVENOUS CONTINUOUS
Status: DISCONTINUED | OUTPATIENT
Start: 2022-11-18 | End: 2022-11-18 | Stop reason: HOSPADM

## 2022-11-18 RX ORDER — SODIUM CHLORIDE, SODIUM LACTATE, POTASSIUM CHLORIDE, CALCIUM CHLORIDE 600; 310; 30; 20 MG/100ML; MG/100ML; MG/100ML; MG/100ML
75 INJECTION, SOLUTION INTRAVENOUS CONTINUOUS
Status: CANCELLED | OUTPATIENT
Start: 2022-11-18 | End: 2022-11-18

## 2022-11-18 RX ORDER — PROPOFOL 10 MG/ML
INJECTION, EMULSION INTRAVENOUS AS NEEDED
Status: DISCONTINUED | OUTPATIENT
Start: 2022-11-18 | End: 2022-11-18 | Stop reason: HOSPADM

## 2022-11-18 RX ORDER — HYDROMORPHONE HYDROCHLORIDE 2 MG/ML
0.5 INJECTION, SOLUTION INTRAMUSCULAR; INTRAVENOUS; SUBCUTANEOUS AS NEEDED
Status: CANCELLED | OUTPATIENT
Start: 2022-11-18

## 2022-11-18 RX ORDER — MAGNESIUM SULFATE 100 %
4 CRYSTALS MISCELLANEOUS AS NEEDED
Status: CANCELLED | OUTPATIENT
Start: 2022-11-18

## 2022-11-18 RX ADMIN — FAMOTIDINE 20 MG: 10 INJECTION, SOLUTION INTRAVENOUS at 13:10

## 2022-11-18 RX ADMIN — LIDOCAINE HYDROCHLORIDE 50 MG: 20 INJECTION, SOLUTION EPIDURAL; INFILTRATION; INTRACAUDAL; PERINEURAL at 13:36

## 2022-11-18 RX ADMIN — PROPOFOL 70 MG: 10 INJECTION, EMULSION INTRAVENOUS at 13:40

## 2022-11-18 RX ADMIN — SODIUM CHLORIDE, SODIUM LACTATE, POTASSIUM CHLORIDE, AND CALCIUM CHLORIDE 50 ML/HR: 600; 310; 30; 20 INJECTION, SOLUTION INTRAVENOUS at 13:10

## 2022-11-18 RX ADMIN — PROPOFOL 100 MG: 10 INJECTION, EMULSION INTRAVENOUS at 13:36

## 2022-11-18 NOTE — OP NOTES
Operative Report    Patient: Denisse Sneed MRN: [id number]  SSN: [id number]    YOB: 1960  Age: 58 y.o. Sex: female       Date of Surgery: 11/18/2022     Preoperative Diagnosis: Morbid obesity ([address] [E66.01]  Elevated liver enzymes [R74.8]  Essential hypertension [I10]   GERD  Rheumatoid arthritis    Postoperative Diagnosis:   Morbid obesity ([address] [E66.01]  Elevated liver enzymes [R74.8]  Essential hypertension [I10]  GERD  Rheumatoid arthritis  Esophagitis  Gastritis    Surgeon(s) and Role:     * Thomas Paz MD - Primary    Anesthesia: MAC     Procedure: Procedure(s) with comments:  ESOPHAGOGASTRODUODENOSCOPY (EGD) w bx - LANGUAGE BARRIER; [place] ONLY     Procedure in Detail: Denisse Sneed was identified in the pre-operative holding area. Informed consent was obtained after a complete discussion of risks, benefits and alternatives to surgery were had with the patient. The patient was brought back to the endoscopy room and placed under MAC in the supine position on the operating room table. A timeout was performed verifying patient identity, planned procedure, medications, and all other pertinent aspects of the case. The patient was appropriately padded and secured to the table. A bite block was applied. Once adequate sedation was achieved, the gastroscope was introduced transorally into the esophagus. The esophagus was traversed. LA grade A esophagitis    A moderate hiatal/paraesophageal hernia apparent. Hill grade 4 hiatal anatomy with hiatal hernia including cardia and proximal fundus. The gastric lumen was insufflated. Diffuse gastritis noted. Aborted before biopsy due to sedation difficulty    There was no bile reflux. No polyps identified. The pylorus was traversed and the duodenal bulb and first portion were inspected and appeared normal.    The scope was utilized to suction flat the stomach and was removed.      Findings:   LA grade A esophagitis  Hiatal hernia, Hill grade 4 hiatal anatomy  Gastritis  Normal duodenum  No polyps  Difficulty with sedation, aborted prior to biopsy of gastritis    The patient tolerated the procedure without incident and was awakened and transferred to PACU. Estimated Blood Loss:  none    Tourniquet Time: * No tourniquets in log *      Implants: * No implants in log *            Specimens: * No specimens in log *        Drains: None                Complications: None    Counts: Sponge and needle counts were correct times two.     Signed By:  Arelis Johansen MD     November 18, 2022

## 2022-11-18 NOTE — ANESTHESIA POSTPROCEDURE EVALUATION
Procedure(s):  ESOPHAGOGASTRODUODENOSCOPY (EGD) w bx. MAC    Anesthesia Post Evaluation      Multimodal analgesia: multimodal analgesia used between 6 hours prior to anesthesia start to PACU discharge  Patient location during evaluation: bedside  Patient participation: complete - patient participated  Level of consciousness: awake  Pain management: adequate  Airway patency: patent  Anesthetic complications: no  Cardiovascular status: stable  Respiratory status: acceptable  Hydration status: acceptable  Post anesthesia nausea and vomiting:  controlled      INITIAL Post-op Vital signs:   Vitals Value Taken Time   /71 11/18/22 1410   Temp 36.6 °C (97.9 °F) 11/18/22 1410   Pulse 75 11/18/22 1416   Resp 17 11/18/22 1416   SpO2 100 % 11/18/22 1417   Vitals shown include unvalidated device data.

## 2022-11-18 NOTE — H&P
The patient is a 64 y.o. obese  female with a Body mass index is 52.68 kg/m². . They have been considering surgery for some time now. The patient presents to the clinic today to discuss surgical weight loss options. They have made multiple attempts at weight loss over the years without success. They have tried low-carb, low calorie, high-protein diets, liraglutide, semaglutide. Unfortunately, none of these have lead to meaningful, sustained weight loss. They have attended the bariatric seminar before the visit. Denies nausea, vomiting  Occasional dysphagia, variable frequency, not daily, occurs once weekly at most.   Reports reflux, treated with H2 blocker with moderate improvement, but ongoing regurgitative symptoms/mild reflux. Chronic NSAID use due to RA. Occasional constipation, 3BMs per week on average     Sleep Apnea assessment:     STOPBANG questionnaire     Do you Snore loudly? yes  Do you often feel Tired, fatigued, or sleepy during the daytime? yes  Has anyone Observed you stop breathing during your sleep? yes  Are you being treated for high blood Pressure? yes  BMI more than 35 kg/m2? yes  Age over 48years old?  yes  Neck Circumference >16 inches? no  Gender male? no  ______________________________________     SCORE: 6     If YES to 0 - 2, low risk of sleep apnea  If YES to 3 - 4 intermediate risk of having sleep apnea  If YES to 5 - 8 high risk of having sleep apnea (or 2 + BMI 35 or Neck > 17\" or Male)      Pre op weight: 297.4  EBW: 169.4  Goal Weight Calc Women: 161.88  Wt loss to date: 0           Patient Active Problem List     Diagnosis Date Noted    Elevated liver enzymes 06/24/2022    Gastritis 06/24/2022    Gastroesophageal reflux disease 06/24/2022    Hypertension 06/24/2022    Reflux esophagitis 06/24/2022    Viral hepatitis C 06/24/2022    Rheumatoid arthritis ([address] 06/24/2022    Left Achilles tendinitis 05/30/2019    Chronic hepatitis C without hepatic coma ([address] 2018    Chronic pain syndrome 2018    Obesity, morbid ([facility] 2018    Bilateral primary osteoarthritis of knee 2018    Rheumatoid arthritis involving multiple sites with positive rheumatoid factor ([facility] 2018           Past Medical History:   Diagnosis Date    Arthritis      Asthma      Gastritis      Hepatitis C       starting treatment 2019    Hypertension       Diagnosed in [place] a few months ago    Psychiatric disorder       SOME DEPRESSION            Past Surgical History:   Procedure Laterality Date    COLONOSCOPY N/A 2019     COLONOSCOPY performed by Rei Corona MD at [facility] ENDOSCOPY    HX CHOLECYSTECTOMY       HX COLONOSCOPY         Completed in [address] [facility] History            Tobacco Use    Smoking status: Former Smoker       Quit date: 2017       Years since quittin.4    Smokeless tobacco: Never Used   Substance Use Topics    Alcohol use: No            Family History   Problem Relation Age of Onset    Diabetes Mother      Hypertension Father      No Known Problems Sister      No Known Problems Brother      No Known Problems Sister      No Known Problems Sister      No Known Problems Brother      No Known Problems Brother      No Known Problems Brother                Prior to Admission medications    Medication Sig Start Date End Date Taking? Authorizing Provider   DULoxetine (CYMBALTA) 30 mg capsule Take 30 mg by mouth daily.      Yes Provider, Historical   nabumetone (RELAFEN) 750 mg tablet JONES MAJOR (1) TABLETA POR VIA ORAL DOS VECES AL BRENDEN 5/10/22   Yes Provider, Historical   albuterol-ipratropium (DUO-NEB) 2.5 mg-0.5 mg/3 ml nebu INHALE 1 AMPOLLETA A TRAVES DEL NEBULIZADOR CADA 4 HORAS JUANA SEA NECESARIO PARA SILBIDOS 5/3/22   Yes Diego Londono MD   hydrOXYchloroQUINE (PLAQUENIL) 200 mg tablet JONES MAJOR(1) TABLETA POR VIA ORAL TODO LOS ALICEA 3/29/22   Yes Nickolas Sauceda MD   famotidine (PEPCID) 40 mg tablet TOME MAJOR MORRO CINTIA ALICEA 2/2/22   Yes Aubrie Walker MD   hydroCHLOROthiazide (HYDRODIURIL) 50 mg tablet Take 1 Tablet by mouth daily.  2/2/22   Yes Aubrie Walker MD           Allergies   Allergen Reactions    Lisinopril Cough         Review of Systems:    Constitutional: No fever or chills  Neurologic: No headache  Eyes: No scleral icterus or irritated eyes  Nose: No nasal pain or drainage  Mouth: No oral lesions or sore throat  Cardiac: No palpations or chest pain  Pulmonary: No cough or shortness of breath  Gastrointestinal: No nausea, emesis, diarrhea, or constipation  Genitourinary: No dysuria  Musculoskeletal: No muscle or joint tenderness  Skin: No rashes or lesions  Psychiatric: No anxiety or depressed mood     Pertinent positives: see HPI        Objective:      Physical Exam:  Visit Vitals  BP (!) 142/83 (BP 1 Location: Left arm)   Temp 97.7 °F (36.5 °C)   Resp 16   Ht 5' 3\" (1.6 m)   Wt 134.9 kg (297 lb 6.4 oz)   SpO2 97%   BMI 52.68 kg/m²      General: No acute distress, conversant  Eyes: PERRLA, no scleral icterus  HENT: Normocephalic without oral lesions  Neck: Trachea midline  Cardiac: Normal pulse rate and rhythm, no edema, capillary refill normal 1 second  Pulmonary: Symmetric chest rise with normal effort, clear to auscultation though mildly obscured by body habitus  GI: Soft, NT, ND, no hernia, no splenomegaly  Skin: Warm without rash  Extremities: No edema or joint stiffness, moves all extremities symmetrically, steady gait  Psych: Appropriate mood and affect     Assessment:      Morbid obesity with comorbidities  Plan:     EGD today

## 2022-11-18 NOTE — BRIEF OP NOTE
Brief Postoperative Note    Patient: Lindsey Cao  YOB: 1960  MRN: [id number]    Date of Procedure: 11/18/2022     Pre-Op Diagnosis: Morbid obesity ([address] [E66.01]  Elevated liver enzymes [R74.8]  Essential hypertension [I10]  GERD  Rheumatoid arthritis    Post-Op Diagnosis:   Morbid obesity ([address] [E66.01]  Elevated liver enzymes [R74.8]  Essential hypertension [I10]  GERD  Rheumatoid arthritis  Esophagitis  Gastritis    Procedure(s):  ESOPHAGOGASTRODUODENOSCOPY (EGD)     Surgeon(s):  Margarita Perez MD    Surgical Assistant: None    Anesthesia: MAC     Estimated Blood Loss (mL): Minimal    Complications: None    Specimens: * No specimens in log *     Implants: * No implants in log *    Drains: * No LDAs found *    Findings:   LA grade A esophagitis  Hiatal hernia, Hill grade 4 hiatal anatomy  Gastritis  Normal duodenum  No polyps  Difficulty with sedation, aborted prior to biopsy of gastritis    Electronically Signed by Raffy Babcock MD on 11/18/2022 at 1:43 PM

## 2022-11-18 NOTE — DISCHARGE INSTRUCTIONS
Upper GI Endoscopy: What to Expect at [address] had an upper GI endoscopy. Your doctor used a thin, lighted tube that bends to look at the inside of your esophagus, your stomach, and the first part of the small intestine, called the duodenum. After you have an endoscopy, you will stay at the hospital or clinic for 1 to 2 hours. This will allow the medicine to wear off. You will be able to go home after your doctor or nurse checks to make sure that you're not having any problems. You may have to stay overnight if you had treatment during the test. You may have a sore throat for a day or two after the test.  This care sheet gives you a general idea about what to expect after the test.  How can you care for yourself at home? Activity   Rest as much as you need to after you go home. You should be able to go back to your usual activities the day after the test.  Diet   Follow your doctor's directions for eating after the test.  Drink plenty of fluids (unless your doctor has told you not to). Medications   If you have a sore throat the day after the test, use an over-the-counter spray to numb your throat. Follow-up care is a key part of your treatment and safety. Be sure to make and go to all appointments, and call your doctor if you are having problems. It's also a good idea to know your test results and keep a list of the medicines you take. When should you call for help? Call 911 anytime you think you may need emergency care. For example, call if:    You passed out (lost consciousness). You have trouble breathing. You pass maroon or bloody stools. Call your doctor now or seek immediate medical care if:    You have pain that does not get better after your take pain medicine. You have new or worse belly pain. You have blood in your stools. You are sick to your stomach and cannot keep fluids down. You have a fever. You cannot pass stools or gas.    Watch closely for changes in your health, and be sure to contact your doctor if:    Your throat still hurts after a day or two. You do not get better as expected. Where can you learn more? Go to http://www.jessica.com/  Enter J454 in the search box to learn more about \"Upper GI Endoscopy: What to Expect at Home. \"  Current as of: September 8, 2021               Content Version: 13.2  © 2006-2022 [employer]. Care instructions adapted under license by [employer] (which disclaims liability or warranty for this information). If you have questions about a medical condition or this instruction, always ask your healthcare professional. [place] [address] any warranty or liability for your use of this information. Hiatal Hernia: Care Instructions  Your Care Instructions  A hiatal hernia occurs when part of the stomach bulges into the chest cavity. A hiatal hernia may allow stomach acid and juices to back up into the esophagus (acid reflux). This can cause a feeling of burning, warmth, heat, or pain behind the breastbone. This feeling may often occur after you eat, soon after you lie down, or when you bend forward, and it may come and go. You also may have a sour taste in your mouth. These symptoms are commonly known as heartburn or reflux. But not all hiatal hernias cause symptoms. Follow-up care is a key part of your treatment and safety. Be sure to make and go to all appointments, and call your doctor if you are having problems. It's also a good idea to know your test results and keep a list of the medicines you take. How can you care for yourself at home? Take your medicines exactly as prescribed. Call your doctor if you think you are having a problem with your medicine. Do not take aspirin or other nonsteroidal anti-inflammatory drugs (NSAIDs), such as ibuprofen (Advil, Motrin) or naproxen (Aleve), unless your doctor says it is okay.  Ask your doctor what you can take for pain. Your doctor may recommend over-the-counter medicine. For mild or occasional indigestion, antacids such as Tums, Gaviscon, Maalox, or Mylanta may help. Your doctor also may recommend over-the-counter acid reducers, such as famotidine (Pepcid AC), cimetidine (Tagamet HB), or omeprazole (Prilosec). Read and follow all instructions on the label. If you use these medicines often, talk with your doctor. Change your eating habits. It's best to eat several small meals instead of two or three large meals. After you eat, wait 2 to 3 hours before you lie down. Late-night snacks aren't a good idea. Avoid foods that make your symptoms worse. These may include chocolate, mint, alcohol, pepper, spicy foods, high-fat foods, or drinks with caffeine in them, such as tea, coffee, rebeca, or energy drinks. If your symptoms are worse after you eat a certain food, you may want to stop eating it to see if your symptoms get better. Do not smoke or chew tobacco.  If you get heartburn at night, raise the head of your bed 6 to 8 inches by putting the frame on blocks or placing a foam wedge under the head of your mattress. (Adding extra pillows does not work.)  Do not wear tight clothing around your middle. Lose weight if you need to. Losing just 5 to 10 pounds can help. When should you call for help? Call your doctor now or seek immediate medical care if:    You have new or worse belly pain. You are vomiting. Watch closely for changes in your health, and be sure to contact your doctor if:    You have new or worse symptoms of indigestion. You have trouble or pain swallowing. You are losing weight. You do not get better as expected. Where can you learn more? Go to http://www.Benchling.com/  Enter T074 in the search box to learn more about \"Hiatal Hernia: Care Instructions. \"  Current as of: June 6, 2022               Content Version: 13.4  © 0277-2773 [employer]. Care instructions adapted under license by [employer] (which disclaims liability or warranty for this information). If you have questions about a medical condition or this instruction, always ask your healthcare professional. [address] any warranty or liability for your use of this information. Gastritis: Care Instructions  Your Care Instructions     Gastritis is a sore and upset stomach. It happens when something irritates the stomach lining. Many things can cause it. These include an infection such as the flu or something you ate or drank. Medicines or a sore on the lining of the stomach (ulcer) also can cause it. Your belly may bloat and ache. You may belch, vomit, and feel sick to your stomach. You should be able to relieve the problem by taking medicine. And it may help to change your diet. If gastritis lasts, your doctor may prescribe medicine. Follow-up care is a key part of your treatment and safety. Be sure to make and go to all appointments, and call your doctor if you are having problems. It's also a good idea to know your test results and keep a list of the medicines you take. How can you care for yourself at home? If your doctor prescribed antibiotics, take them as directed. Do not stop taking them just because you feel better. You need to take the full course of antibiotics. Be safe with medicines. If your doctor prescribed medicine to decrease stomach acid, take it as directed. Call your doctor if you think you are having a problem with your medicine. Do not take any other medicine, including over-the-counter pain relievers, without talking to your doctor first.  If your doctor recommends over-the-counter medicine to reduce stomach acid, such as Pepcid AC (famotidine), Prilosec (omeprazole), or Tagamet HB (cimetidine) follow the directions on the label. Drink plenty of fluids to prevent dehydration. Choose water and other clear liquids.  If you have kidney, heart, or liver disease and have to limit fluids, talk with your doctor before you increase the amount of fluids you drink. Avoid foods that make your symptoms worse. These may include chocolate, mint, alcohol, pepper, spicy foods, high-fat foods, or drinks with caffeine in them, such as tea, coffee, rebeca, or energy drinks. If your symptoms are worse after you eat a certain food, you may want to stop eating it to see if your symptoms get better. When should you call for help? Call 911 anytime you think you may need emergency care. For example, call if:    You vomit blood or what looks like coffee grounds. You pass maroon or very bloody stools. Call your doctor now or seek immediate medical care if:    You start breathing fast and have not produced urine in the last 8 hours. You cannot keep fluids down. Watch closely for changes in your health, and be sure to contact your doctor if:    You do not get better as expected. Where can you learn more? Go to http://www.jessica.com/  Enter Z536 in the search box to learn more about \"Gastritis: Care Instructions. \"  Current as of: June 6, 2022               Content Version: 13.4  © 0026-1914 [employer]. Care instructions adapted under license by [employer] (which disclaims liability or warranty for this information). If you have questions about a medical condition or this instruction, always ask your healthcare professional. [place] [address] any warranty or liability for your use of this information. Esophagitis: Care Instructions  Overview     Esophagitis (say \"ih-sof-uh-JY-tus\") is irritation of the esophagus, the tube that carries food from your throat to your stomach. Acid reflux is the most common cause of this condition. When you have reflux, stomach acid and juices flow upward. This can cause pain or a burning feeling in your chest. You may have a sore throat.  It may be hard to swallow. Other causes of this condition include some medicines and supplements. Allergies or an infection can also cause it. Your doctor will ask about your symptoms and past health. The doctor might do tests to find the cause of your symptoms. Treatment depends on what is causing the problem. Treatment might include changing your diet or taking medicine to relieve your symptoms. It might also include changing a medicine that is causing your symptoms. If you have reflux, medicine that reduces the stomach acid helps your body heal. It might take 1 to 3 weeks to heal.  Follow-up care is a key part of your treatment and safety. Be sure to make and go to all appointments, and call your doctor if you are having problems. It's also a good idea to know your test results and keep a list of the medicines you take. How can you care for yourself at home? If you have acid reflux, your doctor may recommend that you:  Eat several small meals instead of two or three large meals. After you eat, wait 2 to 3 hours before you lie down. Avoid chocolate, mint, alcohol, pepper, spicy foods, high-fat foods, or drinks with caffeine in them, such as tea, coffee, rebeca, or energy drinks. Don't smoke or use smokeless tobacco. Smoking can make this condition worse. If you need help quitting, talk to your doctor about stop-smoking programs and medicines. These can increase your chances of quitting for good. Raise the head of your bed if you have symptoms at night. You can raise it 6 in. (15 cm) to 8 in. (20 cm) by putting the frame on blocks or placing a foam wedge under the head of your mattress. Adding extra pillows does not work. Lose weight if you are overweight or obese. Losing just 5 to 10 pounds can help. Take an over-the-counter antacid, such as Maalox, Mylanta, or Tums. Be careful when you take over-the-counter antacid medicines. Many of these medicines have aspirin in them.  Read the label to make sure that you are not taking more than the recommended dose. Too much aspirin can be harmful. Take stronger acid reducers. Examples are famotidine (such as Pepcid) and omeprazole (such as Prilosec). Your doctor may also prescribe acid reducers for you. If esophagitis is caused by an infection, you may need to take antibiotics or other medicines to treat the infection. If you have esophagitis caused by a food allergy, your doctor may prescribe corticosteroids. Be safe with medicines. Take your medicines exactly as prescribed. Call your doctor if you think you are having a problem with your medicine. When should you call for help? Call 911  anytime you think you may need emergency care. For example, call if:    Food or something sharp is stuck in your esophagus and you can't swallow at all. Call your doctor now or seek immediate medical care if:    You have new or worse belly pain. You are vomiting. Watch closely for changes in your health, and be sure to contact your doctor if:    You have new or worse symptoms of reflux. You have any pain or trouble swallowing. You are losing weight. You do not get better as expected. Where can you learn more? Go to http://www.gray.com/  Enter N977 in the search box to learn more about \"Esophagitis: Care Instructions. \"  Current as of: June 6, 2022               Content Version: 13.4  © 2006-2022 [employer]. Care instructions adapted under license by [employer] (which disclaims liability or warranty for this information). If you have questions about a medical condition or this instruction, always ask your healthcare professional. [place] [address] any warranty or liability for your use of this information. DISCHARGE SUMMARY from Nurse     POST-PROCEDURE INSTRUCTIONS:    Call your Physician if you:  Observe any excess bleeding. Develop a temperature over 100.5o F.   Experience abdominal, shoulder or chest pain. Notice any signs of decreased circulation or nerve impairment to an extremity such as a change in color, persistent numbness, tingling, coldness or increase in pain. Vomit blood or you have nausea and vomiting lasting longer than 4 hours. Are unable to take medications. Are unable to urinate within 8 hours after discharge following general anesthesia or intravenous sedation. For the next 24 hours after receiving general anesthesia or intravenous sedation, or while taking prescription Narcotics, limit your activities:  Do NOT drive a motor vehicle, operate hazard machinery or power tools, or perform tasks that require coordination. The medication you received during your procedure may have some effect on your mental awareness. Do NOT make important personal or business decisions. The medication you received during your procedure may have some effect on your mental awareness. Do NOT drink alcoholic beverages. These drinks do not mix well with the medications that have been given to you. Upon discharge from the hospital, you must be accompanied by a responsible adult. Resume your diet as directed by your physician. Resume medications as your physician has prescribed. Please give a list of your current medications to your Primary Care Provider. Please update this list whenever your medications are discontinued, doses are changed, or new medications (including over-the-counter products) are added. Please carry medication information at all times in case of emergency situations. These are general instructions for a healthy lifestyle:    No smoking/ No tobacco products/ Avoid exposure to second hand smoke. Surgeon General's Warning:  Quitting smoking now greatly reduces serious risk to your health. Obesity, smoking, and a sedentary lifestyle greatly increase your risk for illness.   A healthy diet, regular physical exercise & weight monitoring are important for maintaining a healthy lifestyle  You may be retaining fluid if you have a history of heart failure or if you experience any of the following symptoms:  Weight gain of 3 pounds or more overnight or 5 pounds in a week, increased swelling in our hands or feet or shortness of breath while lying flat in bed. Please call your doctor as soon as you notice any of these symptoms; do not wait until your next office visit. Recognize signs and symptoms of STROKE:  F  -  Face looks uneven  A  -  Arms unable to move or move unevenly  S  -  Speech slurred or non-existent  T  -  Time to call 911 - as soon as signs and symptoms begin - DO NOT go back to bed or wait to see If you get better - TIME IS BRAIN. Colorectal Screening  Colorectal cancer almost always develops from precancerous polyps (abnormal growths) in the colon or rectum. Screening tests can find precancerous polyps, so that they can be removed before they turn into cancer. Screening tests can also find colorectal cancer early, when treatment works best.  Speak with your physician about when you should begin screening and how often you should be tested. [facility] [employer] Activation    Thank you for requesting access to [facility]. Please follow the instructions below to securely access and download your online medical record. [facility] [employer] allows you to send messages to your doctor, view your test results, renew your prescriptions, schedule appointments, and more. How Do I Sign Up? In your internet browser, go to [email or web address]. Click on the First Time User? Click Here link in the Sign In box. You will see the New Member Sign Up page. Enter your [facility] Access Code exactly as it appears below. You will not need to use this code after youve completed the sign-up process. If you do not sign up before the expiration date, you must request a new code. [employer] Access Code:  Activation code not generated  Current [facility] [employer] Status: Active (This is the date your [employer] access code will )    Enter the last four digits of your Social Security Number (xxxx) and Date of Birth (mm/dd/yyyy) as indicated and click Submit. You will be taken to the next sign-up page. Create a [employer] ID. This will be your [employer] login ID and cannot be changed, so think of one that is secure and easy to remember. Create a [employer] [facility] password. You can change your password at any time. Enter your Password Reset Question and Answer. This can be used at a later time if you forget your password. Enter your e-mail address. You will receive e-mail notification when new information is available in [address] Click Sign Up. You can now view and download portions of your medical record. Click the Jamn link to download a portable copy of your medical information. Additional Information    If you have questions, please call [phone]. Remember, [employer] is NOT to be used for urgent needs. For medical emergencies, dial 911. Educational references and/or instructions provided during this visit included:    See Attached      APPOINTMENTS:    Per MD Instruction    Discharge information has been reviewed with the patient. The patient verbalized understanding.

## 2022-11-18 NOTE — ANESTHESIA PREPROCEDURE EVALUATION
Relevant Problems   CARDIOVASCULAR   (+) Hypertension      GASTROINTESTINAL   (+) Chronic hepatitis C without hepatic coma (HCC)   (+) Gastroesophageal reflux disease   (+) Viral hepatitis C      ENDOCRINE   (+) Obesity, morbid (HCC)   (+) Rheumatoid arthritis (HCC)   (+) Rheumatoid arthritis involving multiple sites with positive rheumatoid factor (HCC)       Anesthetic History   No history of anesthetic complications            Review of Systems / Medical History  Patient summary reviewed and pertinent labs reviewed    Pulmonary            Asthma        Neuro/Psych         Psychiatric history     Cardiovascular    Hypertension                   GI/Hepatic/Renal     GERD      Liver disease     Endo/Other        Morbid obesity and arthritis     Other Findings              Physical Exam    Airway  Mallampati: II  TM Distance: 4 - 6 cm  Neck ROM: normal range of motion   Mouth opening: Normal     Cardiovascular    Rhythm: regular  Rate: normal         Dental    Dentition: Poor dentition     Pulmonary      Decreased breath sounds: bilateral           Abdominal  GI exam deferred       Other Findings            Anesthetic Plan    ASA: 3  Anesthesia type: MAC            Anesthetic plan and risks discussed with: Patient

## 2022-11-23 ENCOUNTER — DOCUMENTATION ONLY (OUTPATIENT)
Dept: SURGERY | Age: 62
End: 2022-11-23

## 2022-11-23 ENCOUNTER — HOSPITAL ENCOUNTER (OUTPATIENT)
Dept: BARIATRICS/WEIGHT MGMT | Age: 62
Discharge: HOME | End: 2022-11-23

## 2022-11-25 NOTE — PROGRESS NOTES
[address] [place] Loss Telly RUTH Tory [address] [facility], [address]    Patient's Name: Keith Robles   Age: 58 y.o. YOB: 1960   Sex: female      Insurance:  [facility] Medicaid            Session: 5 of 6  Revision: No    Surgeon:  Dr Rogelio Huston    Height: 5' 3\"   Weight:    288      Lbs. BMI: 51   Pounds Lost since last month: 4                 Pounds Gained since last month: 0    Starting Weight: 297     Previous Months Weight: 292  Overall Pounds Lost: 9   Overall Pounds Gained: 0      Do you smoke? No    Alcohol intake:  Number of drinks at a time:  No  Number of times a week: N/A    Class Guidelines    Guidelines are reviewed with patient at the start of every class. 1. Patient understands that weight loss trial classes must be consecutive. Patient understands if they miss a class, it is their responsibility to contact me to reschedule class. I will reach out to patient after their first no show. 2.  Patient understands the expectations that weight maintenance/weight loss is expected during the classes. Failure to demonstrate changes may result in one extra month of weight loss trial, followed by going back to see the surgeon. 3. Patient is also instructed to be doing their labs, blood work, psych visit, support group and any other test that the surgeon has used while they are working on their weight loss trial.    Other Pertinent Information: Next month's class will be scheduled with Preston Bird RD at [facility]     Patient has been to a support group meeting. Changes Made Since Last Class: No      Dietary Instruction    During today's class we continued to focus on the key diet principles. Patient was instructed to follow a low carbohydrate diet, focusing on meat and vegetables. Patient was instructed to stop liquid calories and aim for 64 ounces of water per day.       In class, I also gave patient a power point on surviving the holidays. Some of the tips included survival tips for parties, including bringing their own low carbohydrate dish to a potluck dinner and surveying the buffet line before they start filling up their plate. Patient was given cooking alternatives, including using Splenda for sugar, substituting applesauce for oil in recipes, and using low fat plain yogurt instead of sour cream in dips. Patient was also encouraged to be mindful of calories in alcohol. Patient's dietary habits include:    - Patient is eating 3 meals per day. I have emphasized the importance of trying to eat within 1 hour of waking and having a cut off time in the evening. Addressed to patient that the focus should be on protein and vegetables. Protein will help to burn more calories and keep them ruffin throughout the day. Portions are: Smaller plates. I have encouraged patient to use a smaller plate to measure their portions. Fast food: 1x a month  Carry out: No  Sit down restaurant: 1x a month  We talked in class about the importance of planning ahead and trying to do more meal prep at home, so intake of eating out can be decreased. Patient is eating carbohydrates: No  Patient was instructed to cut out starches and keep under 75 grams of carbohydrates per day. Reviewed what portions of carbohydrates are  Patient is snacking on fruits, nuts. This is being done: 2x a day. I have talked to patient about some lower carbohydrate snack choices that focused more protein. Patient's sweet intake is: None. We talked about label reading and cutting out simple sugar. Fluid intake is make up of: Water, 4 oz coffee. Physical Activity/Exercise    Patient is currently not exercising due to knee and feet pain. Today's power point on surviving the holidays also included tips on exercising. This included being creative during the holiday, walking stairs, mall walking, getting resistance bands.   Patient was encouraged not to be afraid to excuse themselves from the table to go for a walk after they eat. Behavior Modification    Reinforced behavior changes to make. Patient was encouraged to keep their emotions in check. Try to HALT and focus on whether they are eating out of hunger or if they are eating out of emotions. Other eating behaviors included surveying the buffet line before starting to fill up their plate. Patient was given a check off list and encouraged to monitor some of their eating behaviors, such as eating slowly, chewing their food thoroughly, and taking 20-30 minutes to eat a meal.        Goals that patient set for next month include:  Continue losing weight  Keep learning about portion control       Ely Speaker LAUREANO Mcmahon, WILLAMP-BC  11/25/2022    Class date: 11/23/2022

## 2022-12-08 RX ORDER — ERGOCALCIFEROL 1.25 MG/1
CAPSULE ORAL
Qty: 4 CAPSULE | Refills: 1 | Status: SHIPPED | OUTPATIENT
Start: 2022-12-08

## 2022-12-19 ENCOUNTER — OFFICE VISIT (OUTPATIENT)
Dept: FAMILY MEDICINE CLINIC | Age: 62
End: 2022-12-19
Payer: MEDICAID

## 2022-12-19 VITALS
HEART RATE: 80 BPM | SYSTOLIC BLOOD PRESSURE: 142 MMHG | BODY MASS INDEX: 50.36 KG/M2 | WEIGHT: 284.2 LBS | TEMPERATURE: 98.4 F | HEIGHT: 63 IN | DIASTOLIC BLOOD PRESSURE: 90 MMHG | RESPIRATION RATE: 16 BRPM | OXYGEN SATURATION: 100 %

## 2022-12-19 DIAGNOSIS — Z23 ENCOUNTER FOR IMMUNIZATION: ICD-10-CM

## 2022-12-19 DIAGNOSIS — G44.89 OTHER HEADACHE SYNDROME: Primary | ICD-10-CM

## 2022-12-19 DIAGNOSIS — I10 ESSENTIAL HYPERTENSION WITH GOAL BLOOD PRESSURE LESS THAN 140/90: ICD-10-CM

## 2022-12-19 PROCEDURE — 90686 IIV4 VACC NO PRSV 0.5 ML IM: CPT | Performed by: STUDENT IN AN ORGANIZED HEALTH CARE EDUCATION/TRAINING PROGRAM

## 2022-12-19 PROCEDURE — 3078F DIAST BP <80 MM HG: CPT | Performed by: STUDENT IN AN ORGANIZED HEALTH CARE EDUCATION/TRAINING PROGRAM

## 2022-12-19 PROCEDURE — 99214 OFFICE O/P EST MOD 30 MIN: CPT | Performed by: STUDENT IN AN ORGANIZED HEALTH CARE EDUCATION/TRAINING PROGRAM

## 2022-12-19 PROCEDURE — 3074F SYST BP LT 130 MM HG: CPT | Performed by: STUDENT IN AN ORGANIZED HEALTH CARE EDUCATION/TRAINING PROGRAM

## 2022-12-19 RX ORDER — ACETAMINOPHEN 500 MG
1000 TABLET ORAL
Qty: 90 TABLET | Refills: 0 | Status: SHIPPED | OUTPATIENT
Start: 2022-12-19

## 2022-12-19 RX ORDER — AMLODIPINE BESYLATE 10 MG/1
10 TABLET ORAL DAILY
Qty: 30 TABLET | Refills: 2 | Status: SHIPPED | OUTPATIENT
Start: 2022-12-19

## 2022-12-19 NOTE — PROGRESS NOTES
Alesia Puga is a 58 y.o. presents today for   Chief Complaint   Patient presents with    Hypertension    Headache     2-3 times a week, worse lately      Is someone accompanying this pt? No    Is the patient using any DME equipment during OV? No    Visit Vitals  BP (!) 152/94 (BP 1 Location: Left upper arm, BP Patient Position: Sitting, BP Cuff Size: Adult)   Pulse 80   Temp 98.4 °F (36.9 °C) (Temporal)   Resp 16   Ht 5' 3\" (1.6 m)   Wt 284 lb 3.2 oz (128.9 kg)   SpO2 100%   BMI 50.34 kg/m²       Depression Screening:   3 most recent PHQ Screens 12/19/2022   Little interest or pleasure in doing things Not at all   Feeling down, depressed, irritable, or hopeless Several days   Total Score PHQ 2 1       Health Maintenance: reviewed and discussed and ordered per Provider. Health Maintenance Due   Topic Date Due    Hepatitis B Vaccine (1 of 3 - Risk 3-dose series) Never done    DTaP/Tdap/Td series (1 - Tdap) Never done    Shingrix Vaccine Age 50> (1 of 2) Never done    COVID-19 Vaccine (3 - Booster for Pfizer series) 12/14/2021    Flu Vaccine (1) 08/01/2022    Cervical cancer screen  09/11/2022         Coordination of Care:   1. \"Have you been to the ER, urgent care clinic since your last visit? Hospitalized since your last visit? \" No    2. \"Have you seen or consulted any other health care providers outside of the [address] [place] [address] since your last visit? \" Yes weight loss      3. For patients aged 39-70: Has the patient had a colonoscopy / FIT/ Cologuard? yes    If the patient is female:    4. For patients aged 41-77: Has the patient had a mammogram within the past 2 years? Yes - no Care Gap present    5. For patients aged 21-65: Has the patient had a pap smear? No     After obtaining consent, and per orders of Dr. Liam Fernández, injection of Flu vaccine given by Dorothy Nichols. Patient instructed to remain in clinic for 20 minutes afterwards, and to report any adverse reaction to me immediately. [address] [facility]

## 2022-12-19 NOTE — PROGRESS NOTES
Allison Rodriguez is a 58 y.o.  female and presents with    Chief Complaint   Patient presents with    Hypertension    Headache     2-3 times a week, worse lately            Subjective:    Hypertension follow up:  Taking medications as prescribed: No - has not taken amlodipine since last month  Checking BP at home: NO  Symptoms: headache  Low sodium diet: NO  Exercise: YES - has been doing weights, and sitting bicycle     HA - having HA  about 2-3 times this week. States that headaches have not been other than this past week, she had been headache free for the last few months. Has been drinking enough water. Has been feeling more stressed. Has taken tylenol for the HA, but does not seem, like it helps. She took a Nurtec yesterday from her , and it helped w her HA. She feels is a soft HA, bc it bothers her. Yesterday she felt like she was having her hair pulled. Patient is also doing the last few things needed prior to getting her bariatric surgery. She has appointment with bariatric surgeon at the end of this week.  Was dx w/ hiatal hernia    Patient Active Problem List   Diagnosis Code    Rheumatoid arthritis involving multiple sites with positive rheumatoid factor ([facility] M05.79    Obesity, morbid (HCC) E66.01    Chronic hepatitis C without hepatic coma (HCC) B18.2    Chronic pain syndrome G89.4    Left Achilles tendinitis M76.62    Bilateral primary osteoarthritis of knee M17.0    Elevated liver enzymes R74.8    Gastritis K29.70    Gastroesophageal reflux disease K21.9    Hypertension I10    Reflux esophagitis K21.00    Viral hepatitis C B19.20    Rheumatoid arthritis ([facility] M06.9      Past Medical History:   Diagnosis Date    Arthritis     Asthma     Gastritis     Hepatitis C     starting treatment 6/1/2019    Hypertension     Diagnosed in [address] a few months ago    Psychiatric disorder     SOME DEPRESSION      Past Surgical History:   Procedure Laterality Date    COLONOSCOPY N/A 2019    COLONOSCOPY performed by Melissa Farfan MD at [facility] ENDOSCOPY    HX CHOLECYSTECTOMY  2013    lap    HX COLONOSCOPY      Completed in [facility]      Family History   Problem Relation Age of Onset    Diabetes Mother     Hypertension Father     No Known Problems Sister     No Known Problems Brother     No Known Problems Sister     No Known Problems Sister     No Known Problems Brother     No Known Problems Brother     No Known Problems Brother      Social History     Socioeconomic History    Marital status: SINGLE     Spouse name: Not on file    Number of children: Not on file    Years of education: Not on file    Highest education level: Not on file   Occupational History    Not on file   Tobacco Use    Smoking status: Former     Types: Cigarettes     Quit date: 2017     Years since quittin.9    Smokeless tobacco: Never   Vaping Use    Vaping Use: Never used   Substance and Sexual Activity    Alcohol use: No    Drug use: No    Sexual activity: Yes     Partners: Male     Birth control/protection: None   Other Topics Concern    Not on file   Social History Narrative    Not on file     Social Determinants of Health     Financial Resource Strain: Not on file   Food Insecurity: Not on file   Transportation Needs: Not on file   Physical Activity: Not on file   Stress: Not on file   Social Connections: Not on file   Intimate Partner Violence: Not on file   Housing Stability: Not on file        Current Outpatient Medications   Medication Sig Dispense Refill    ergocalciferol (ERGOCALCIFEROL) 1,250 mcg (50,000 unit) capsule TAKE 1 CAPSULE BY MOUTH EVERY SEVEN (7) DAYS. FOR 8 WEEKS. INDICATIONS: LOW VITAMIN D LEVELS 4 Capsule 1    famotidine (PEPCID) 40 mg tablet TOME MAJOR TABLETA TODOS LOS ALICEA 90 Tablet 2    amLODIPine (NORVASC) 5 mg tablet Take 1 Tablet by mouth daily.  90 Tablet 1    hydroCHLOROthiazide (HYDRODIURIL) 50 mg tablet JONES MAJOR(1) TABLETA POR VIA ORAL DIARIAMENTE 30 Tablet 10 albuterol-ipratropium (DUO-NEB) 2.5 mg-0.5 mg/3 ml nebu INHALE 1 AMPOLLETA A TRAVES DEL NEBULIZADOR CADA 4 HORAS JUANA SEA NECESARIO PARA SILBIDOS 90 mL 10        ROS   Review of Systems   Constitutional:  Negative for chills, fever and malaise/fatigue. HENT:  Negative for congestion, ear discharge and ear pain. Eyes:  Negative for blurred vision, pain and discharge. Respiratory:  Negative for cough and shortness of breath. Cardiovascular:  Negative for chest pain and palpitations. Gastrointestinal:  Negative for abdominal pain, nausea and vomiting. Genitourinary:  Negative for dysuria, frequency and urgency. Skin:  Negative for itching and rash. Neurological:  Negative for dizziness, seizures, loss of consciousness and headaches. Psychiatric/Behavioral:  Negative for substance abuse. Objective:  Vitals:    12/19/22 1114 12/19/22 1144   BP: (!) 152/94 (!) 142/90   Pulse: 80    Resp: 16    Temp: 98.4 °F (36.9 °C)    TempSrc: Temporal    SpO2: 100%    Weight: 284 lb 3.2 oz (128.9 kg)    Height: 5' 3\" (1.6 m)    PainSc:   0 - No pain        Physical Exam  Vitals reviewed. Constitutional:       Appearance: Normal appearance. She is obese. Eyes:      General: No scleral icterus. Right eye: No discharge. Left eye: No discharge. Cardiovascular:      Rate and Rhythm: Normal rate and regular rhythm. Pulmonary:      Effort: Pulmonary effort is normal. No respiratory distress. Breath sounds: Normal breath sounds. Musculoskeletal:      Cervical back: Normal range of motion and neck supple. Neurological:      Mental Status: She is alert and oriented to person, place, and time. Cranial Nerves: No cranial nerve deficit. Psychiatric:         Mood and Affect: Mood normal.         Behavior: Behavior normal.         Thought Content: Thought content normal.         Judgment: Judgment normal.         LABS     TESTS      Assessment/Plan:    1.  Other headache syndrome  Likely tension HA vs HTN  - acetaminophen (TYLENOL) 500 mg tablet; Take 2 Tablets by mouth every six (6) hours as needed for Pain. Dispense: 90 Tablet; Refill: 0    2. Essential hypertension with goal blood pressure less than 140/90  Increased to 10mg  - amLODIPine (NORVASC) 10 mg tablet; Take 1 Tablet by mouth daily. Dispense: 30 Tablet; Refill: 2    3. Encounter for immunization  - INFLUENZA, FLUARIX, FLULAVAL, FLUZONE (AGE 6 MO+), AFLURIA(AGE 3Y+) IM, PF, 0.5 ML      Lab review: no lab studies available for review at time of visit      I have discussed the diagnosis with the patient and the intended plan as seen in the above orders. The patient has received an after-visit summary and questions were answered concerning future plans. I have discussed medication side effects and warnings with the patient as well. I have reviewed the plan of care with the patient, accepted their input and they are in agreement with the treatment goals.          Irena Haines MD

## 2022-12-20 ENCOUNTER — DOCUMENTATION ONLY (OUTPATIENT)
Dept: BARIATRICS/WEIGHT MGMT | Age: 62
End: 2022-12-20

## 2022-12-20 NOTE — PROGRESS NOTES
[address] Loss Telly RUTH Tory [address] [facility], [address]    Patient's Name: Mike Saxena   Age: 58 y.o. YOB: 1960   Sex: female        Session: 10 of  6  Surgeon:  Dr. Anayeli Collins    Height: 5 f3   Weight:    282      Lbs. BMI:    Pounds Lost since last month: 6                Pounds Gained since last month: 0    Starting Weight: 297     Previous Months Weight: 288  Overall Pounds Lost: 15   Overall Pounds Gained: 0      Do you smoke? None    Alcohol intake:  Number of drinks at a time:  None  Number of times a week: None    Class Guidelines    Guidelines are reviewed with patient at the start of every class. 1. Patient understands that weight loss trial classes must be consecutive. Patient understands if they miss a class, it is their responsibility to contact me to reschedule class. I will reach out to patient after their first no show. 2.  Patient understands the expectations that weight maintenance/weight loss is expected during the classes. Failure to demonstrate changes may result in one extra month of weight loss trial, followed by going back to see the surgeon. 3. Patient is also instructed to be doing their labs, blood work, psych visit, support group and any other test that the surgeon has used while they are working on their weight loss trial.    Other Pertinent Information:     Patient has attended support group. Changes Made Since Last Class: No bread or rice. Eating salad. Drinking more water. Eating Habits and Behaviors      Today we reviewed key diet principles. We talked about protein drinks and ones that would be okay. Patient was encouraged to start getting into a routine now and drinking a shake. Patient may use for a meal replacement or a snack. Patient was also encouraged to stop liquid calories. We talked about fluid choices that would be okay.   We also spent a lot of time talking about carbohydrates. Patient was encouraged to start cutting their carbohydrates out and keep them less than 75 grams per day. Patient was given examples of carbohydrates that are in food. We also talked about the power of protein and the importance of getting more protein in per day than carbohydrates. I have reviewed with patient meal and snack ideas that focus on protein first.    I also reviewed with patient the vitamins that they will need to take post op. Patient will hear this information again at pre op class prior to surgery, but I felt it was important to prepare them now. Patient will be taking 2 multivitamin complete per day, 100 mg of Vitamin B1, 5000 IU of Vitamin D3, 1000 mcg Vitamin B12, 1500 mg of calcium citrate. We also spent some time talking about the post op diet protocol. Patient is aware they will be on a liquid diet before surgery and then a week of liquids after surgery followed by 5 weeks of soft protein. Patient's diet habits are: 3 meals per day. More protein, less carbohydrates. Patient is drinking 50 ounces of water per day. Physical Activity/Exercise    Comments:     Currently for exercise, patient is walking for 10 minutes. We talked about activities for patient to do, including walking, swimming, or chair exercises. Goals have been set. Behavior Modification       Comments:  Emphasized the importance of eating slowly, not eating and drinking meals at the same time. I have also encouraged patient to work on food journaling  We talked about ways they can track their daily intake. Goals that patient set for next month include:  1 Continue with key diet principles.      Verónica eGe, MS RD  12/23/22

## 2022-12-20 NOTE — PROGRESS NOTES
Nutrition Evaluation    Patient's Name: Adam Argueta   Age: 58 y.o. YOB: 1960   Sex: female    Height: 5 f 3 Weight: 284 BMI:    Starting Weight:  297        Smoking Status:  None  Alcohol Intake:  Number of Drinks at a Time: None  Number of Times a Week: None    Changes made during classes include:  No bread or rice  More water  Walking more      Summary:  I feel that Adam Argueta has demonstrated appropriate diet changes and is ready to move forward with surgery. Patient has been briefed on the importance of the protein drinks, vitamins, and the transition of the diet stages. Patient understands that the long-term diet will focus on protein and vegetables. Patient understand the effects of carbohydrates after surgery and what reactive hypoglycemia is. Patient is aware that they will be attending pre-op class 2 weeks before surgery and will get more detailed information on the post-op diet guidelines. Patient will see me again at 6 weeks post-op. At this 6 week visit, RD will assess how patient is tolerating soft protein and advance to vegetables, if tolerating soft protein without difficulty. Patient will also see RD again at 9 months post-op. This visit will assess patient's compliance with current protocol, including diet, vitamins, protein shakes, and exercise. Post-op diet guidelines will be reinforced. RD is available for questions and to meet with patient outside of the 6 week and 9 month post-op visit. We spent a lot of time talking about the vitamins. Patient understands the importance of being compliant with the diet protocol and the complications and risks that can occur if they are non-compliant with the nutritional protocol. Patient has attended at least one support group.     Candidate for surgery: Yes  Re-evaluation Date:     Procedure:  David Yañez 87 RD  12/20/2022

## 2022-12-21 ENCOUNTER — HOSPITAL ENCOUNTER (OUTPATIENT)
Dept: BARIATRICS/WEIGHT MGMT | Age: 62
Discharge: HOME | End: 2022-12-21

## 2023-01-05 DIAGNOSIS — R05.9 COUGH: ICD-10-CM

## 2023-01-05 NOTE — TELEPHONE ENCOUNTER
This patient contacted the office for the following prescriptions to be refilled:    Medication requested :   Requested Prescriptions     Pending Prescriptions Disp Refills    albuterol-ipratropium (DUO-NEB) 2.5 mg-0.5 mg/3 ml nebu 90 mL 10     Sig: INHALE 1 AMPOLLETA A TRAVES DEL NEBULIZADOR CADA [address]      PCP: Aubrie Walker MD  LOV: 12/19/2022  NOV DMA: 3/20/2023  FUTURE APPT:   Future Appointments   Date Time Provider Casey Cara   3/20/2023  1:00 PM Aubrie Walker MD [facility] BS AMB   10/24/2023  3:30 PM Marlen Mabry MD [facility] BS AMB         Thank you.

## 2023-01-10 RX ORDER — IPRATROPIUM BROMIDE AND ALBUTEROL SULFATE 2.5; .5 MG/3ML; MG/3ML
SOLUTION RESPIRATORY (INHALATION)
Qty: 90 ML | Refills: 10 | Status: SHIPPED | OUTPATIENT
Start: 2023-01-10

## 2023-01-17 ENCOUNTER — TELEPHONE (OUTPATIENT)
Dept: SURGERY | Age: 63
End: 2023-01-17

## 2023-01-17 NOTE — TELEPHONE ENCOUNTER
LM for pt regarding setting up pre-op appt.     Asked pt to call back the [facility] main number

## 2023-01-31 DIAGNOSIS — I10 ESSENTIAL HYPERTENSION, MALIGNANT: Primary | ICD-10-CM

## 2023-02-03 ENCOUNTER — OFFICE VISIT (OUTPATIENT)
Dept: SURGERY | Age: 63
End: 2023-02-03
Payer: MEDICAID

## 2023-02-03 VITALS
TEMPERATURE: 98.6 F | BODY MASS INDEX: 50.5 KG/M2 | WEIGHT: 285 LBS | HEIGHT: 63 IN | HEART RATE: 86 BPM | OXYGEN SATURATION: 97 % | DIASTOLIC BLOOD PRESSURE: 84 MMHG | RESPIRATION RATE: 16 BRPM | SYSTOLIC BLOOD PRESSURE: 149 MMHG

## 2023-02-03 DIAGNOSIS — I10 PRIMARY HYPERTENSION: ICD-10-CM

## 2023-02-03 DIAGNOSIS — I10 ESSENTIAL HYPERTENSION, MALIGNANT: Primary | ICD-10-CM

## 2023-02-03 DIAGNOSIS — K21.9 GASTROESOPHAGEAL REFLUX DISEASE, UNSPECIFIED WHETHER ESOPHAGITIS PRESENT: ICD-10-CM

## 2023-02-03 DIAGNOSIS — E66.01 OBESITY, MORBID (HCC): Primary | ICD-10-CM

## 2023-02-03 DIAGNOSIS — R74.8 ELEVATED LIVER ENZYMES: ICD-10-CM

## 2023-02-03 DIAGNOSIS — Z01.818 PREOP EXAMINATION: ICD-10-CM

## 2023-02-03 NOTE — PROGRESS NOTES
Bariatric Surgery Progress Note    CC: Preop appointment for bariatric surgery  Subjective:     Patient presents for a preop appointment for bariatric surgery having completed the insurance-mandated and clinically-relevant clearances/counseling. REVIEW:  Labs: 7/11/2022  H. Pylori: Negative  Vit D 27.4: Prescribed cholecalciferol 50,000 unit cap, take 1 cap q 7 days for 8 weeks.    TSH: 5.32- needs to review with PCP, Dr. Nilsa Hernández    EKG 7/11/2022: Normal sinus rhythm, Normal ECG    Finished dietary education, cleared by [facility]  Psychiatric evaluation: Cleared by Dr. Anahi Curtis on 8/8/2022  Cardiac risk stratification in chart, echo results unconcerning     Support Group: September 2022    Chronic NSAID use due to RA    EGD / UGI reviewed / issues:   Findings:   LA grade A esophagitis  Hiatal hernia, Hill grade 4 hiatal anatomy  Gastritis  Normal duodenum  No polyps  Difficulty with sedation, aborted prior to biopsy of gastritis    Previous relevant surgeries: lap cholecystectomy    Patient Active Problem List    Diagnosis Date Noted    Elevated liver enzymes 06/24/2022    Gastritis 06/24/2022    Gastroesophageal reflux disease 06/24/2022    Hypertension 06/24/2022    Reflux esophagitis 06/24/2022    Viral hepatitis C 06/24/2022    Rheumatoid arthritis ([address] 06/24/2022    Left Achilles tendinitis 05/30/2019    Chronic hepatitis C without hepatic coma ([address] 12/04/2018    Chronic pain syndrome 12/04/2018    Obesity, morbid ([address] 12/03/2018    Bilateral primary osteoarthritis of knee 08/16/2018    Rheumatoid arthritis involving multiple sites with positive rheumatoid factor ([address] 06/20/2018      Past Medical History:   Diagnosis Date    Arthritis     Asthma     Gastritis     Hepatitis C     starting treatment 6/1/2019    Hypertension     [address] [address] a few months ago    Psychiatric disorder     SOME DEPRESSION     Past Surgical History:   Procedure Laterality Date    COLONOSCOPY N/A 5/29/2019    COLONOSCOPY performed by Gee Orozco MD at [facility] ENDOSCOPY    HX CHOLECYSTECTOMY      lap    HX COLONOSCOPY      Completed in [address] History     Tobacco Use    Smoking status: Former     Types: Cigarettes     Quit date: 2017     Years since quittin.0    Smokeless tobacco: Never   Substance Use Topics    Alcohol use: No      Family History   Problem Relation Age of Onset    Diabetes Mother     Hypertension Father     No Known Problems Sister     No Known Problems Brother     No Known Problems Sister     No Known Problems Sister     No Known Problems Brother     No Known Problems Brother     No Known Problems Brother       Prior to Admission medications    Medication Sig Start Date End Date Taking? Authorizing Provider   albuterol-ipratropium (DUO-NEB) 2.5 mg-0.5 mg/3 ml nebu INHALE 1 AMPOLLETA A TRAVES DEL NEBULIZADOR CADA [address] SILBIDOS 1/10/23  Yes Beverley Haas MD   acetaminophen (TYLENOL) 500 mg tablet Take 2 Tablets by mouth every six (6) hours as needed for Pain. 22  Yes Beverley Haas MD   amLODIPine (NORVASC) 10 mg tablet Take 1 Tablet by mouth daily. 22  Yes Beverley Haas MD   ergocalciferol (ERGOCALCIFEROL) 1,250 mcg (50,000 unit) capsule TAKE 1 CAPSULE BY MOUTH EVERY SEVEN (7) DAYS. FOR 8 WEEKS.  INDICATIONS: LOW VITAMIN D LEVELS 22  Yes Wilfred Mcmahon, NP   famotidine (PEPCID) 40 mg tablet TOME MAJOR TABLETA TODOS LOS ALICEA 10/26/22  Yes Beverley Haas MD   hydroCHLOROthiazide (HYDRODIURIL) 50 mg tablet JONES MAJOR(1) TABLETA POR VIA ORAL DIARIAMENTE 22  Yes Beverley Haas MD     Allergies   Allergen Reactions    Lisinopril Cough        Review of Systems:    Constitutional: No fever or chills  Neurologic: No headache  Eyes: No scleral icterus or irritated eyes  Nose: No nasal pain or drainage  Mouth: No oral lesions or sore throat  Cardiac: No palpations or chest pain  Pulmonary: No cough or shortness of breath  Gastrointestinal: No nausea, emesis, diarrhea, or constipation  Genitourinary: No dysuria  Musculoskeletal: No muscle or joint tenderness  Skin: No rashes or lesions  Psychiatric: No anxiety or depressed mood      Objective:     Physical Exam:  Visit Vitals  BP (!) 149/84 (BP 1 Location: Left lower arm)   Pulse 86   Temp 98.6 °F (37 °C)   Resp 16   Ht 5' 3\" (1.6 m)   Wt 129.3 kg (285 lb)   SpO2 97%   BMI 50.49 kg/m²     General: No acute distress, conversant  Eyes: PERRLA, no scleral icterus  HENT: Normocephalic without oral lesions  Neck: Trachea midline without LAD  Cardiac: Normal pulse rate and rhythm  Pulmonary: Symmetric chest rise with normal effort  GI: Soft, NT, ND, no hernia, no splenomegaly  Skin: Warm without rash  Extremities: No edema or joint stiffness  Psych: Appropriate mood and affect    Assessment:     Morbid obesity with comorbidities  Plan:   The patient and I had further discussion after their successful supervised weight loss, medical, dietary, and psychiatric clearance. Preoperative upper GI/EGD reviewed. The patient elects to proceed with gastric bypass with hiatal hernia repair. We have reviewed the bariatric risk calculator and they understand the risks of surgery for their individual risk factors.     We have discussed the possible complications of bariatric surgery which include but are not limited to failed weight loss, weight regain, malnutrition, leak, bleed, stricture, gastric ulcer, gastric fistula, gastric bleed, esophageal injury, gallstones, new or worsening gastric reflux, nausea, emesis, internal hernia, abdominal wall hernia, gastric perforation, need for revision / conversion / or reversal, pregnancy complications and loss, intestinal ischemia, post operative skin complications, possible thinning of their hair, bowel obstruction, dumping syndrome, wound infection, blood clots (DVT, mesenteric thrombus, pulmonary embolism), increased addictive tendency, risk of anesthesia, MI, stroke, and death. They expressed complete understanding and had no further questions. The patient understands this is a life altering decision and will require compliance to the program for the remainder of their life in order to be monitored to avoid complication and ensure successful, sustained weight control. They will be placed on a lifelong low carbohydrate and low sugar diet, exercise, and vitamin regimen and will require frequent blood draws and office visits to ensure adequate nutrition and program compliance. Visits and follow up will be in compliance with the guidelines set forth by [facility]. I have specifically mentioned the need to avoid all personal and second-hand tobacco exposure, systemic steroids, and NSAIDS after any bariatric surgery to help avoid the above listed complications. The patient has expressed understanding of the above and would like to proceed with surgery.     Follow up with PCP regarding thyroid function, then schedule for surgery      Signed By: Bernadine Samuels MD [facility]  Bariatric and General Surgeon  [facility]    February 3, 2023

## 2023-02-03 NOTE — PROGRESS NOTES
Jesus Conte is a 58 y.o. female (: 1960) presenting to address:    Chief Complaint   Patient presents with    Pre-op Exam     bariatric       Medication list and allergies have been reviewed with Jesus Conte and updated as of today's date. I have gone over all Medical, Surgical and Social History with Jesus Conte and updated/added the information accordingly. 1. Have you been to the ER, urgent care clinic since your last visit? Hospitalized since your last visit? No    2. Have you seen or consulted any other health care providers outside of the [address] [place] [address] since your last visit? Include any pap smears or colon screening.  No

## 2023-02-06 DIAGNOSIS — M05.80 POLYARTHRITIS WITH POSITIVE RHEUMATOID FACTOR (HCC): ICD-10-CM

## 2023-02-06 DIAGNOSIS — R05.9 COUGH: ICD-10-CM

## 2023-02-08 ENCOUNTER — OFFICE VISIT (OUTPATIENT)
Dept: FAMILY MEDICINE CLINIC | Age: 63
End: 2023-02-08
Payer: MEDICAID

## 2023-02-08 VITALS
DIASTOLIC BLOOD PRESSURE: 87 MMHG | HEIGHT: 63 IN | SYSTOLIC BLOOD PRESSURE: 135 MMHG | WEIGHT: 287.6 LBS | TEMPERATURE: 98.4 F | RESPIRATION RATE: 16 BRPM | BODY MASS INDEX: 50.96 KG/M2 | OXYGEN SATURATION: 100 % | HEART RATE: 70 BPM

## 2023-02-08 DIAGNOSIS — E55.9 VITAMIN D DEFICIENCY: ICD-10-CM

## 2023-02-08 DIAGNOSIS — E04.1 THYROID NODULE: ICD-10-CM

## 2023-02-08 DIAGNOSIS — I10 ESSENTIAL HYPERTENSION WITH GOAL BLOOD PRESSURE LESS THAN 140/90: ICD-10-CM

## 2023-02-08 DIAGNOSIS — R79.89 ELEVATED TSH: Primary | ICD-10-CM

## 2023-02-08 PROCEDURE — 3078F DIAST BP <80 MM HG: CPT | Performed by: STUDENT IN AN ORGANIZED HEALTH CARE EDUCATION/TRAINING PROGRAM

## 2023-02-08 PROCEDURE — 99214 OFFICE O/P EST MOD 30 MIN: CPT | Performed by: STUDENT IN AN ORGANIZED HEALTH CARE EDUCATION/TRAINING PROGRAM

## 2023-02-08 PROCEDURE — 3074F SYST BP LT 130 MM HG: CPT | Performed by: STUDENT IN AN ORGANIZED HEALTH CARE EDUCATION/TRAINING PROGRAM

## 2023-02-08 RX ORDER — AMLODIPINE BESYLATE 10 MG/1
10 TABLET ORAL DAILY
Qty: 90 TABLET | Refills: 2 | Status: SHIPPED | OUTPATIENT
Start: 2023-02-08

## 2023-02-08 NOTE — PROGRESS NOTES
Rupal Crisostomo is a 58 y.o. presents today for   Chief Complaint   Patient presents with    Follow-up     Is someone accompanying this pt? No    Is the patient using any DME equipment during OV? No    Visit Vitals  /87 (BP 1 Location: Left upper arm, BP Patient Position: Sitting, BP Cuff Size: Adult)   Pulse 70   Temp 98.4 °F (36.9 °C) (Temporal)   Resp 16   Ht 5' 3\" (1.6 m)   Wt 287 lb 9.6 oz (130.5 kg)   SpO2 100%   BMI 50.95 kg/m²       Depression Screening:   3 most recent PHQ Screens 2/8/2023   Little interest or pleasure in doing things Not at all   Feeling down, depressed, irritable, or hopeless Not at all   Total Score PHQ 2 0       Health Maintenance: reviewed and discussed and ordered per Provider. Health Maintenance Due   Topic Date Due    Hepatitis B Vaccine (1 of 3 - Risk 3-dose series) Never done    DTaP/Tdap/Td series (1 - Tdap) Never done    Shingles Vaccine (1 of 2) Never done    COVID-19 Vaccine (3 - Booster for Pfizer series) 12/14/2021    Cervical cancer screen  09/11/2022         Coordination of Care:   1. \"Have you been to the ER, urgent care clinic since your last visit? Hospitalized since your last visit? \" No    2. \"Have you seen or consulted any other health care providers outside of the [address] [place] [address] since your last visit? \" No     3. For patients aged 39-70: Has the patient had a colonoscopy / FIT/ Cologuard? Yes - no Care Gap present    If the patient is female:    4. For patients aged 41-77: Has the patient had a mammogram within the past 2 years? Yes - no Care Gap present    5. For patients aged 21-65: Has the patient had a pap smear?  No     Nanci Duke CMA

## 2023-02-09 ENCOUNTER — TRANSCRIBE ORDERS (OUTPATIENT)
Facility: HOSPITAL | Age: 63
End: 2023-02-09

## 2023-02-09 DIAGNOSIS — E04.1 NONTOXIC UNINODULAR GOITER: Primary | ICD-10-CM

## 2023-02-09 LAB
25(OH)D3 SERPL-MCNC: 59.6 NG/ML (ref 32–100)
ABSOLUTE LYMPHOCYTE COUNT, 10803: 1.2 K/UL (ref 1–4.8)
ANION GAP SERPL CALC-SCNC: 15 MMOL/L (ref 3–15)
BASOPHILS # BLD: 0.1 K/UL (ref 0–0.2)
BASOPHILS NFR BLD: 1 % (ref 0–2)
BUN SERPL-MCNC: 13 MG/DL (ref 6–22)
CALCIUM SERPL-MCNC: 9.5 MG/DL (ref 8.4–10.5)
CHLORIDE SERPL-SCNC: 104 MMOL/L (ref 98–110)
CO2 SERPL-SCNC: 26 MMOL/L (ref 20–32)
CREAT SERPL-MCNC: 0.7 MG/DL (ref 0.8–1.4)
EOSINOPHIL # BLD: 0.2 K/UL (ref 0–0.5)
EOSINOPHIL NFR BLD: 3 % (ref 0–6)
ERYTHROCYTE [DISTWIDTH] IN BLOOD BY AUTOMATED COUNT: 13.9 % (ref 10–15.5)
GLOMERULAR FILTRATION RATE: >60 ML/MIN/1.73 SQ.M.
GLUCOSE SERPL-MCNC: 90 MG/DL (ref 70–99)
GRANULOCYTES,GRANS: 69 % (ref 40–75)
HCT VFR BLD AUTO: 47.1 % (ref 35.1–48)
HGB BLD-MCNC: 14.8 G/DL (ref 11.7–16)
LYMPHOCYTES, LYMLT: 20 % (ref 20–45)
MACROCYTOSIS,MACRO: ABNORMAL
MCH RBC QN AUTO: 31 PG (ref 26–34)
MCHC RBC AUTO-ENTMCNC: 31 G/DL (ref 31–36)
MCV RBC AUTO: 97 FL (ref 80–99)
MONOCYTES # BLD: 0.5 K/UL (ref 0.1–1)
MONOCYTES NFR BLD: 8 % (ref 3–12)
NEUTROPHILS # BLD AUTO: 4.3 K/UL (ref 1.8–7.7)
PLATELET # BLD AUTO: NORMAL 10*3/UL
POTASSIUM SERPL-SCNC: 3.7 MMOL/L (ref 3.5–5.5)
RBC # BLD AUTO: 4.84 M/UL (ref 3.8–5.2)
SMEAR EVAL, 1131: ABNORMAL
SODIUM SERPL-SCNC: 145 MMOL/L (ref 133–145)
T4 FREE SERPL-MCNC: 1.3 NG/DL (ref 0.9–1.8)
TSH SERPL DL<=0.005 MIU/L-ACNC: 2.65 MCU/ML (ref 0.27–4.2)
WBC # BLD AUTO: 6.3 K/UL (ref 4–11)

## 2023-02-09 NOTE — PROGRESS NOTES
Harpal Mandujano is a 58 y.o.  female and presents with    Chief Complaint   Patient presents with    Follow-up           Subjective:    Patient is here to follow-up about her thyroid tests that were done by Dr. Bella Givens. She states that she has been doing well, she feels like she is ready for the surgery.     Hypertension follow up:  Taking medications as prescribed: YES  Checking BP at home: YES  Symptoms: no symptoms  Low sodium diet: YES  Exercise: YES         Patient Active Problem List   Diagnosis Code    Rheumatoid arthritis involving multiple sites with positive rheumatoid factor (HCC) M05.79    Obesity, morbid (HCC) E66.01    Chronic hepatitis C without hepatic coma (HCC) B18.2    Chronic pain syndrome G89.4    Left Achilles tendinitis M76.62    Bilateral primary osteoarthritis of knee M17.0    Elevated liver enzymes R74.8    Gastritis K29.70    Gastroesophageal reflux disease K21.9    Hypertension I10    Reflux esophagitis K21.00    Viral hepatitis C B19.20    Rheumatoid arthritis ([address] M06.9      Past Medical History:   Diagnosis Date    Arthritis     Asthma     Gastritis     Hepatitis C     starting treatment 6/1/2019    Hypertension     Diagnosed [address] a few months ago    Psychiatric disorder     SOME DEPRESSION      Past Surgical History:   Procedure Laterality Date    COLONOSCOPY N/A 5/29/2019    COLONOSCOPY performed by Lan Hollins MD at [facility] ENDOSCOPY    HX CHOLECYSTECTOMY  2013    lap    HX COLONOSCOPY      Completed in [facility]      Family History   Problem Relation Age of Onset    Diabetes Mother     Hypertension Father     No Known Problems Sister     No Known Problems Brother     No Known Problems Sister     No Known Problems Sister     No Known Problems Brother     No Known Problems Brother     No Known Problems Brother      Social History     Socioeconomic History    Marital status: SINGLE     Spouse name: Not on file    Number of children: Not on file    Years of education: Not on file    Highest education level: Not on file   Occupational History    Not on file   Tobacco Use    Smoking status: Former     Types: Cigarettes     Quit date: 2017     Years since quittin.1    Smokeless tobacco: Never   Vaping Use    Vaping Use: Never used   Substance and Sexual Activity    Alcohol use: No    Drug use: No    Sexual activity: Yes     Partners: Male     Birth control/protection: None   Other Topics Concern    Not on file   Social History Narrative    Not on file     Social Determinants of Health     Financial Resource Strain: Not on file   Food Insecurity: Not on file   Transportation Needs: Not on file   Physical Activity: Not on file   Stress: Not on file   Social Connections: Not on file   Intimate Partner Violence: Not on file   Housing Stability: Not on file        Current Outpatient Medications   Medication Sig Dispense Refill    amLODIPine (NORVASC) 10 mg tablet Take 1 Tablet by mouth daily. 90 Tablet 2    albuterol-ipratropium (DUO-NEB) 2.5 mg-0.5 mg/3 ml nebu INHALE 1 AMPOLLETA A TRAVES DEL NEBULIZADOR CADA [address]A SILBIDOS 90 mL 10    acetaminophen (TYLENOL) 500 mg tablet Take 2 Tablets by mouth every six (6) hours as needed for Pain. 90 Tablet 0    ergocalciferol (ERGOCALCIFEROL) 1,250 mcg (50,000 unit) capsule TAKE 1 CAPSULE BY MOUTH EVERY SEVEN (7) DAYS. FOR 8 WEEKS. INDICATIONS: LOW VITAMIN D LEVELS 4 Capsule 1    famotidine (PEPCID) 40 mg tablet TOME MAJOR TABLETA TODOS LOS ALICEA 90 Tablet 2    hydroCHLOROthiazide (HYDRODIURIL) 50 mg tablet JONES MAJOR(1) TABLETA POR VIA ORAL DIARIAMENTE 30 Tablet 10        ROS   Review of Systems   Constitutional:  Negative for chills, fever and malaise/fatigue. HENT:  Negative for congestion, ear discharge and ear pain. Eyes:  Negative for blurred vision, pain and discharge. Respiratory:  Negative for cough and shortness of breath. Cardiovascular:  Negative for chest pain and palpitations. Gastrointestinal:  Negative for abdominal pain, nausea and vomiting. Genitourinary:  Negative for dysuria, frequency and urgency. Skin:  Negative for itching and rash. Neurological:  Negative for dizziness, seizures, loss of consciousness and headaches. Psychiatric/Behavioral:  Negative for substance abuse. Objective:  Vitals:    02/08/23 1036   BP: 135/87   Pulse: 70   Resp: 16   Temp: 98.4 °F (36.9 °C)   TempSrc: Temporal   SpO2: 100%   Weight: 287 lb 9.6 oz (130.5 kg)   Height: 5' 3\" (1.6 m)   PainSc:   0 - No pain       Physical Exam  Vitals reviewed. Constitutional:       Appearance: Normal appearance. She is obese. Eyes:      General: No scleral icterus. Right eye: No discharge. Left eye: No discharge. Cardiovascular:      Rate and Rhythm: Normal rate and regular rhythm. Pulmonary:      Effort: Pulmonary effort is normal. No respiratory distress. Breath sounds: Normal breath sounds. Musculoskeletal:      Cervical back: Normal range of motion and neck supple. Neurological:      Mental Status: She is alert and oriented to person, place, and time. Cranial Nerves: No cranial nerve deficit. Psychiatric:         Mood and Affect: Mood normal.         Behavior: Behavior normal.         Thought Content: Thought content normal.         Judgment: Judgment normal.         LABS     TESTS      Assessment/Plan:    1. Essential hypertension with goal blood pressure less than 140/90  - amLODIPine (NORVASC) 10 mg tablet; Take 1 Tablet by mouth daily. Dispense: 90 Tablet; Refill: 2  - CBC WITH AUTOMATED DIFF; Future  - METABOLIC PANEL, BASIC; Future    2. Elevated TSH  - TSH 3RD GENERATION; Future  - T4, FREE; Future    3. Thyroid nodule  - US THYROID/PARATHYROID/SOFT TISS; Future    4.  Vitamin D deficiency  - VITAMIN D, 25 HYDROXY; Future       Lab review: orders written for new lab studies as appropriate; see orders      I have discussed the diagnosis with the patient and the intended plan as seen in the above orders. The patient has received an after-visit summary and questions were answered concerning future plans. I have discussed medication side effects and warnings with the patient as well. I have reviewed the plan of care with the patient, accepted their input and they are in agreement with the treatment goals.          Matthew Potts MD

## 2023-02-15 ENCOUNTER — HOSPITAL ENCOUNTER (OUTPATIENT)
Facility: HOSPITAL | Age: 63
Discharge: HOME | End: 2023-02-18
Payer: COMMERCIAL

## 2023-02-15 DIAGNOSIS — E04.1 NONTOXIC UNINODULAR GOITER: ICD-10-CM

## 2023-02-15 PROCEDURE — 76536 US EXAM OF HEAD AND NECK: CPT

## 2023-02-20 RX ORDER — BENZONATATE 100 MG/1
CAPSULE ORAL
Qty: 60 CAPSULE | Refills: 10 | Status: SHIPPED | OUTPATIENT
Start: 2023-02-20

## 2023-02-20 RX ORDER — DULOXETIN HYDROCHLORIDE 30 MG/1
CAPSULE, DELAYED RELEASE ORAL
Qty: 60 CAPSULE | Refills: 10 | Status: SHIPPED | OUTPATIENT
Start: 2023-02-20

## 2023-02-20 NOTE — TELEPHONE ENCOUNTER
This patient contacted the office for the following prescriptions to be refilled:    Medication requested :   Requested Prescriptions     Pending Prescriptions Disp Refills    DULoxetine (CYMBALTA) 30 MG extended release capsule [Pharmacy Med Name: DULOXETINE DR 30MG CAP 30 Capsule] 60 capsule 10     Sig: ANDIE CRISTINA CAPSULA POR VIA ORAL DOS VECES AL CAITLIN    benzonatate (TESSALON) 100 MG capsule [Pharmacy Med Name: BENZONATATE 100 MG CAPS 100 Capsule] 60 capsule 10     Sig: ANDIE CRISTINA CAPSULA POR VIA ORAL MARIANN VECES AL CAITLIN FABIOLA SEA NECESARIO PARA [address] TOS      PCP: Josh Farias MD    NOV DMA: 5/9/2023  FUTURE APPT:   Future Appointments   Date Time Provider Katy Singhi   5/9/2023  9:40 AM Josh Farias MD DMA BS AMB   10/24/2023  3:30 PM Jaime Newberry MD [facility] BS AMB         Thank you.

## 2023-03-01 ENCOUNTER — TELEPHONE (OUTPATIENT)
Age: 63
End: 2023-03-01

## 2023-03-01 ENCOUNTER — TELEPHONE (OUTPATIENT)
Facility: CLINIC | Age: 63
End: 2023-03-01

## 2023-03-01 NOTE — TELEPHONE ENCOUNTER
Called Emily back, discussed with her the patient was cleared that normal thyroid results previously had been forwarded to Dr. Richelle Workman. Requested that I place a note with clearance. This has been done.

## 2023-03-01 NOTE — TELEPHONE ENCOUNTER
Received a call in order to ask if patient was cleared for surgery. At this time I do feel that this patient is a good candidate for weight loss surgery due to her morbid obesity and health issues. Thyroid, kidney, and liver functions are all within normal.  Vitamin D within normal limits.

## 2023-03-01 NOTE — TELEPHONE ENCOUNTER
Patients surgery office called asking if the patient is medically cleared for surgery with Dr. Rossy Nicole office. Please complete another TSH said patient per the office. Thank you. Phone- [phone]              [address]

## 2023-03-02 NOTE — TELEPHONE ENCOUNTER
This patient contacted the office for the following prescriptions to be refilled:    Medication requested :   Requested Prescriptions     Pending Prescriptions Disp Refills    fluticasone (FLONASE) 50 MCG/ACT nasal spray [Pharmacy Med Name: FLUTICASONE 50MCG NASAL 16 50 NOREEN] 16 g 10     Sig: Bettina Krabbe (2) Sun[id number]      PCP: MD CASH Govea DMA: 5/9/2023  FUTURE APPT:   Future Appointments   Date Time Provider Katy Zeenat   5/9/2023  9:40 AM Doug Muir MD [facility] BS AMB   10/24/2023  3:30 PM Jaime Retana MD [facility] BS AMB         Thank you.

## 2023-03-03 ENCOUNTER — TELEPHONE (OUTPATIENT)
Age: 63
End: 2023-03-03

## 2023-03-13 RX ORDER — FLUTICASONE PROPIONATE 50 MCG
SPRAY, SUSPENSION (ML) NASAL
Qty: 16 G | Refills: 10 | Status: SHIPPED | OUTPATIENT
Start: 2023-03-13

## 2023-03-14 ENCOUNTER — CLINICAL DOCUMENTATION (OUTPATIENT)
Facility: HOSPITAL | Age: 63
End: 2023-03-14

## 2023-03-14 ENCOUNTER — FOLLOWUP TELEPHONE ENCOUNTER (OUTPATIENT)
Facility: HOSPITAL | Age: 63
End: 2023-03-14

## 2023-03-14 NOTE — PROGRESS NOTES
CLINICAL NUTRITION PRE-OPERATIVE EDUCATION    Patient's Name: Tian Torre   Age: 58 y.o. YOB: 1960   Sex: female    Education & Materials Provided:   - Soft Protein Diet Shopping List  -  Supplemental Resource Guide: MVI, B12, Calcium Citrate, Vitamin D, Vitamin B1,   and iron recommendations  - Protein Supplement Information  - Fluid Requirements/ No Straws  - No Caffeine or Carbonation   - No alcohol              - No Snacks or No Concentrated Sweets     - Exercising   - Support System at [facility] of Support Group meetings. Support System after surgery includes: x     - Key Diet Principles            - Addressed Current Habits/Changes to Make   - Patient has been educated on the liquid diet to begin 1 week prior to surgery. Patient understands the transition of the diet. Attendance of support group: Yes    Summary:  Patient has completed the required amount of visits with the Registered Dietitian. During these nutrition visits, we focused on dietary changes, behavior changes, and the importance of establishing an exercise routine. The patient understands about the 10 day pre op liquid diet. At today's session, patient was educated on the post-op diet protocol. Patient understands the importance of keeping total fat and sugar less than 3 grams per serving. Patient is aware of the transition of the diet stages and is aware that they will be on clear liquids for 7days, followed by soft protein for 5 weeks. Patient understands the body needs ~ 60-70 grams of protein per day. During the liquid phase, patient will need 60 grams of protein from shakes. Once eating soft protein, patient will only need 1-2  protein shakes per day. Patient is aware of the importance of the vitamins and protein drinks. We spent a lot of time talking about the vitamins.   Patient understands the importance of being compliant with the diet protocol and the complications and risks that can occur if they are non-compliant with the nutritional protocol and non-compliant with the vitamins. Patient has also been educated on the pre-op liquid diet for 1 week. Patient understands that failure to comply in pre-op liquid diet could result in surgery being canceled. Patient's 6 week post-op nutrition appointment has been scheduled. At this 6 week visit, RD will assess how patient is tolerating soft protein and advance to vegetables, if tolerating soft protein without difficulty. Patient will also see RD again at 9 months post-op. This visit will assess patient's compliance with current protocol, including diet, vitamins, protein shakes, and exercise. Post-op diet guidelines will be reinforced. RD is available for questions and to meet with patient outside of the 6 week and 9 month post-op visit. Ok to proceed.      Candidate for surgery: Yes  Re-evaluation Date:     Lisa Hunt RD    3/14/2023

## 2023-03-14 NOTE — TELEPHONE ENCOUNTER
Gastric Bypass Instruct patient to read and understand how their surgery works. The laparoscopic Disha-en-Y Gastric Bypass -- often called gastric  bypass -- is considered the gold standard of weight loss surgery. The procedure: The gastric bypass is one of the most frequently performed weight  loss procedures in the [place]. In this procedure, stapling  creates a small (15 to 20 milliliters) stomach pouch. The remainder  of the stomach is not removed but is completely stapled shut and divided from the stomach  pouch. The outlet from this newly formed pouch empties directly into the lower portion of the  small intestine, thus bypassing calorie absorption. This is done by dividing the small intestine just  beyond the duodenum for the purpose of bringing it up and constructing a connection with the  newly formed stomach pouch. The other end is connected into the side of the Disha limb of the  intestine creating the Y shape that gives the technique its name. The length of either segment of  the intestine can be increased to produce lower or higher levels of malabsorption. Most importantly, the rerouting of the food stream produces changes in gut hormones that  promote feeling of fullness, suppress hunger, and reverse one of the primary mechanisms by which  obesity induces Type 2 diabetes. Advantages: The average excess weight loss after the gastric bypass procedure is generally higher in a  compliant patient than with purely restrictive procedures. One year after surgery, weight loss can average 60 to 80 percent of excess body weight. Studies show that after 10 to 14 years, 50 to 60 percent of excess body weight loss has been  maintained by some patients. A 2000 study of 500 patients showed that 96 percent of associated health conditions (back  pain, sleep apnea, high blood pressure, diabetes and depression) were improved or resolved.   Disadvantages:   Because the duodenum is bypassed, poor absorption of iron and calcium can result in the  lowering of total body iron and a predisposition to iron deficiency anemia. A chronic anemia caused by vitamin B-12 deficiency may occur. This problem usually can be  prevented with vitamin B-12 pills under the tongue or injections. In some cases, the effectiveness of the procedure may be reduced if the stomach pouch is  stretched and/or if it is initially left larger than 15 to 30 cc. The bypassed portion of the stomach, duodenum and segments of the small intestine cannot  be easily visualized using X-ray or endoscopy if there are any problems, such as ulcers,  bleeding or malignancy. Sleeve Gastrectomy  The laparoscopic sleeve gastrectomy -- often called the  sleeve -- is performed by removing approximately 80 percent  of the stomach. The remaining stomach is a tubular pouch that  resembles a banana. The procedure:  During the sleeve gastrectomy procedure, a thin, vertical sleeve  of stomach is created by using a stapling device. The sleeve is about the size of a banana. The rest  of the stomach is removed. By creating a smaller stomach pouch, a sleeve gastrectomy limits the  amount of food that can be eaten at one time so you feel full sooner and stay full longer. As you  eat less food, your body will stop storing excess calories and start using its fat supply for energy. The greater impact, however, seems to be the effect the surgery has on gut hormones that impact  a number of factors including hunger, feeling of fullness, and blood sugar control. Advantages:   Eliminates the portion of the stomach that produces the hormones that stimulate hunger. Minimizes the chance of an ulcer occurring. Is an appealing option for people who are concerned about the complications of intestinal  bypass procedures or who have anemia, Crohns disease or various other conditions that make  intestinal bypass procedures too risky.   -- 13 --  PATIENT GUIDE TO BARIATRIC [facility]  Disadvantages:   Potential for inadequate weight loss or weight regain. While this is true for all procedures, it is  more possible with procedures that do not have an intestinal bypass. Higher BMI patients may need to have a second-stage procedure later to help lose additional  weight. Remember, two stages may ultimately be safer and more effective than one operation  for higher BMI patients. This procedure does involve stomach stapling and therefore, leaks and other complications  related to stapling may occur. This procedure is not reversible. Pre op Appoitments  PE LOCATION PROVIDER  2 Weeks  6-Week Nutrition  3 Months*  6 Months*  1 Year*  Patient Acknowledgement of Risks, Benefits,  and Alternatives to Bariatric Surgical Procedures  Patient Name:_________________________________________________________________  :_ _______________________________________________________________________  I am requesting bariatric surgery be performed on me. I believe I may benefit from bariatric  surgery. This form is designed to ensure that I understand the risks, benefits, and alternatives to  having bariatric surgery. Bariatric surgery, or surgery for morbid obesity, is major surgery. The  options available include restrictive procedures, or those that limit digestive capacity. Examples  include vertical sleeve gastrectomy, or the adjustable gastric band (Lap-Band¢ç/Realize). Malabsorptive procedures, such as distal gastric bypass produce weight loss by decreasing nutrient  absorption. Biliopancreatic diversion with duodenal switch (BPD/DS) and Disha-en-Y gastric  bypass combine these two processes to varying degrees. While most procedures can be performed  laparoscopically (through multiple small incisions), there is a possibility that an open technique  may be required (through a large incision).  There are alternatives to surgery including medications,  diet and exercise, and behavior modification. I understand that obesity is a chronic disease with no  known cure. I am choosing bariatric surgery as treatment for this disease. Patient initials: ______________  I am informed of the potential benefits from bariatric surgery which may include improvements  in associated comorbidities (ie; diabetes, obstructive sleep apnea, GERD, high blood pressure),  potential weight loss of 50-80 percent excess weight, and general improvement in quality of life. The benefits are not guaranteed, and are dependent upon me making the necessary lifestyle  changes for success. I am aware that some patients may not lose as much weight, or still may  require treatment for medical problems after bariatric surgery. Some patients may gain back some  or all of the weight lost after bariatric surgery. Bariatric surgery is a treatment tool, not a cure for  obesity. Compliance with the dietary and lifestyle recommendations is necessary for maintenance  of lost weight in the long term. For example, I have been taught that it is recommended that  all patients maintain a healthy diet consisting of low-carbohydrate, low-sugar foods rich in lean  protein, and non-starchy vegetables. Regular exercise including aerobic activity and weight  training is encouraged, as well as regular attendance at support group meetings. Patient initials: ______________  -- 23 --  PATIENT GUIDE TO Jackelin Ugarte  Eliminating habits that could be detrimental to my health such as drinking alcohol or smoking  is required for all patients. I am aware that the risks of smoking and alcohol use after bariatric  surgery include anesthesia complications, stomach ulcers, liver diseases and malnutrition.   In addition, research has shown an increase in sensitivity to alcohol particularly after gastric bypass  procedures resulting in rapid increases in blood alcohol levels and possible addiction. Patient initials: ______________  Because bariatric surgery is considered major surgery, there are many potential complications that  could arise. Some of the problems are related to the bariatric procedure itself, while others are  related to anesthesia and operating on the abdomen. I understand the serious potential complications include: deep venous thrombosis/pulmonary  embolism (blood clots in the legs and or lungs); gastrointestinal leak (leakage of digestive contents  into the abdomen); sepsis (serious infection); injury to adjacent organs such as esophagus, spleen,  pancreas, liver, diaphragm (requiring intervention or surgical removal); excessive bleeding; bowel  obstruction (blockage of the intestines); or organ failure. I am informed that these complications  can be life threatening. The overall mortality rate (risk of death) from bariatric surgery is close to  0.1 percent (1/1,000), but can be as high as 0.5 percent (1/200) for some patients. Patient initials: ______________  I understand that complications requiring re-operation may occur, either immediately after initial  surgery, or later in the recovery process. Patient initials: ______________  Other potential complications which I may have include: wound infections or seromas  (fluid collection under skin); hernias (breakdown of tissue holding in abdominal contents);  gastritis or stomach ulcer (inflammation of the stomach); formation of gallstones; formation  of kidney stones or urinary tract infection; or pneumonia. Device related complications such as  foreign body reaction, band slippage, or erosion may occur with the adjustable gastric band  (Lap-Band¢ç/Realize). Patient initials: ______________  -- 21 --  PATIENT GUIDE TO Jackelin Ugarte  I may struggle with food intolerances after bariatric surgery. These may include sugars, fats, and  lactose (milk sugar).  My taste for certain foods may change as well. Dumping Syndrome (reaction  caused by sugar rapidly entering the intestine -- symptoms include: nausea, sweating, weakness,  dizziness, flushing, possible vomiting and/or diarrhea) occurs often after bariatric surgery. Vomiting and changes in bowel habits may occur, and may be the result of eating too fast, taking  too large a bite, inappropriate food choice or not chewing properly. Chronic vomiting may be a  result of stenosis (tightening) in the stomach pouch and intestine, and may require that I have  treatment with endoscopy or surgery. Malabsorption may lead to diarrhea, foul smelling gas and  protein malnutrition. Though rarely, I may require feedings through tubes into the digestive tract  or veins for nourishment. I am aware that in some patients hair loss or thinning may occur in the  rapid weight loss phase. This is usually temporary, but can be permanent in some cases. I have  been taught about the importance of proper hydration after bariatric surgery, and understand that  dehydration is the most common reason for re-admission to the hospital after surgery. Patient initials: ______________  I understand that over time, and especially with forced overeating, the stomach pouch may stretch  (dilate) or the staple lines may break. This can result in weight regain, ulcer formation or both. Patient initials: ______________  As a female undergoing bariatric surgery, I acknowledge that I must prevent pregnancies for at  least 12 to 18 months following surgery. Pregnancy during the rapid weight loss phase can be  dangerous and harmful to both the mother and fetus. Patient initials: ______________  Vitamin and mineral deficiencies can occur after bariatric surgery. I am instructed to take vitamin  and mineral supplements daily for life (including multivitamin, iron, B vitamins, calcium and vitamin  D).  Failure to comply with these recommendations could result in my experiencing weakness,  nerve or brain damage, confusion, fatigue, rashes, anemia, hair loss, bone loss, and mood changes. I agree to have lab work at regular intervals to assess for vitamin deficiencies. I understand that it  is imperative that I receive continued follow up care after my bariatric surgery by my surgeon, my  program, or other clinician experienced in bariatric care. Patient initials: ______________  -- 21 --  PATIENT GUIDE TO Jackelin Ugarte  If I have an adjustable gastric band (Lap-Band¢ç/Realize), needle adjustments (addition/removal  of saline solution) are required for weight loss and to maintain weight loss. Patient initials: ______________  After I lose weight, the skin of my arms, legs, abdomen, neck, and face may become wrinkled,  droop or sag. Rashes and infections may occur in between my skin folds. Cosmetic surgery may  be indicated in some cases. This is not considered medically necessary in most cases and is rarely  covered by insurance. Patient initials: ______________  I understand that psychological changes may occur with weight loss as a result of bariatric surgery,  which can affect relationships with my loved ones. I agree to re-engage with a mental health  provider as needed. Patient initials: ______________  Some patients elect to have revisional bariatric surgery (correcting anatomic defects from a  previous bariatric operation). I am aware that in these cases, all of the previously addressed risks  apply, however they are three to five times more common. Patient initials: ______________  Follow-up appointments are vital. I agree to the following schedule of appointments after surgery:  two to three weeks, three months, six months, 12 months, and then annually for life. Additional  appointments may be necessary. Gastric Band patient follow-up is determined by my need for  adjustments but is at least once per year after the first year.   Patient initials: ______________  -- 25 --  PATIENT GUIDE  Patient Acknowledgement of Risks, Benefits,  and Alternatives to Bariatric Surgical Procedures  Patient Name:_________________________________________________________________  :_ _______________________________________________________________________  I am requesting bariatric surgery be performed on me. I believe I may benefit from bariatric  surgery. This form is designed to ensure that I understand the risks, benefits, and alternatives to  having bariatric surgery. Bariatric surgery, or surgery for morbid obesity, is major surgery. The  options available include restrictive procedures, or those that limit digestive capacity. Examples  include vertical sleeve gastrectomy, or the adjustable gastric band (Lap-Band¢ç/Realize). Malabsorptive procedures, such as distal gastric bypass produce weight loss by decreasing nutrient  absorption. Biliopancreatic diversion with duodenal switch (BPD/DS) and Disha-en-Y gastric  bypass combine these two processes to varying degrees. While most procedures can be performed  laparoscopically (through multiple small incisions), there is a possibility that an open technique  may be required (through a large incision). There are alternatives to surgery including medications,  diet and exercise, and behavior modification. I understand that obesity is a chronic disease with no  known cure. I am choosing bariatric surgery as treatment for this disease. Patient initials: ______________  I am informed of the potential benefits from bariatric surgery which may include improvements  in associated comorbidities (ie; diabetes, obstructive sleep apnea, GERD, high blood pressure),  potential weight loss of 50-80 percent excess weight, and general improvement in quality of life. The benefits are not guaranteed, and are dependent upon me making the necessary lifestyle  changes for success.  I am aware that some patients may not lose as much weight, or still may  require treatment for medical problems after bariatric surgery. Some patients may gain back some  or all of the weight lost after bariatric surgery. Bariatric surgery is a treatment tool, not a cure for  obesity. Compliance with the dietary and lifestyle recommendations is necessary for maintenance  of lost weight in the long term. For example, I have been taught that it is recommended that  all patients maintain a healthy diet consisting of low-carbohydrate, low-sugar foods rich in lean  protein, and non-starchy vegetables. Regular exercise including aerobic activity and weight  training is encouraged, as well as regular attendance at support group meetings. Patient initials: ______________  -- 23 --  PATIENT GUIDE TO Jackelin Ugarte  Eliminating habits that could be detrimental to my health such as drinking alcohol or smoking  is required for all patients. I am aware that the risks of smoking and alcohol use after bariatric  surgery include anesthesia complications, stomach ulcers, liver diseases and malnutrition. In addition, research has shown an increase in sensitivity to alcohol particularly after gastric bypass  procedures resulting in rapid increases in blood alcohol levels and possible addiction. Patient initials: ______________  Because bariatric surgery is considered major surgery, there are many potential complications that  could arise. Some of the problems are related to the bariatric procedure itself, while others are  related to anesthesia and operating on the abdomen.   I understand the serious potential complications include: deep venous thrombosis/pulmonary  embolism (blood clots in the legs and or lungs); gastrointestinal leak (leakage of digestive contents  into the abdomen); sepsis (serious infection); injury to adjacent organs such as esophagus, spleen,  pancreas, liver, diaphragm (requiring intervention or surgical removal); excessive bleeding; bowel  obstruction (blockage of the intestines); or organ failure. I am informed that these complications  can be life threatening. The overall mortality rate (risk of death) from bariatric surgery is close to  0.1 percent (1/1,000), but can be as high as 0.5 percent (1/200) for some patients. Patient initials: ______________  I understand that complications requiring re-operation may occur, either immediately after initial  surgery, or later in the recovery process. Patient initials: ______________  Other potential complications which I may have include: wound infections or seromas  (fluid collection under skin); hernias (breakdown of tissue holding in abdominal contents);  gastritis or stomach ulcer (inflammation of the stomach); formation of gallstones; formation  of kidney stones or urinary tract infection; or pneumonia. Device related complications such as  foreign body reaction, band slippage, or erosion may occur with the adjustable gastric band  (Lap-Band¢ç/Realize). Patient initials: ______________  -- 21 --  PATIENT GUIDE TO Jackelin Ugarte  I may struggle with food intolerances after bariatric surgery. These may include sugars, fats, and  lactose (milk sugar). My taste for certain foods may change as well. Dumping Syndrome (reaction  caused by sugar rapidly entering the intestine -- symptoms include: nausea, sweating, weakness,  dizziness, flushing, possible vomiting and/or diarrhea) occurs often after bariatric surgery. Vomiting and changes in bowel habits may occur, and may be the result of eating too fast, taking  too large a bite, inappropriate food choice or not chewing properly. Chronic vomiting may be a  result of stenosis (tightening) in the stomach pouch and intestine, and may require that I have  treatment with endoscopy or surgery. Malabsorption may lead to diarrhea, foul smelling gas and  protein malnutrition.  Though rarely, I may require feedings through tubes into the digestive tract  or veins for nourishment. I am aware that in some patients hair loss or thinning may occur in the  rapid weight loss phase. This is usually temporary, but can be permanent in some cases. I have  been taught about the importance of proper hydration after bariatric surgery, and understand that  dehydration is the most common reason for re-admission to the hospital after surgery. Patient initials: ______________  I understand that over time, and especially with forced overeating, the stomach pouch may stretch  (dilate) or the staple lines may break. This can result in weight regain, ulcer formation or both. Patient initials: ______________  As a female undergoing bariatric surgery, I acknowledge that I must prevent pregnancies for at  least 12 to 18 months following surgery. Pregnancy during the rapid weight loss phase can be  dangerous and harmful to both the mother and fetus. Patient initials: ______________  Vitamin and mineral deficiencies can occur after bariatric surgery. I am instructed to take vitamin  and mineral supplements daily for life (including multivitamin, iron, B vitamins, calcium and vitamin  D). Failure to comply with these recommendations could result in my experiencing weakness,  nerve or brain damage, confusion, fatigue, rashes, anemia, hair loss, bone loss, and mood changes. I agree to have lab work at regular intervals to assess for vitamin deficiencies. I understand that it  is imperative that I receive continued follow up care after my bariatric surgery by my surgeon, my  program, or other clinician experienced in bariatric care. If I have an adjustable gastric band (Lap-Band¢ç/Realize), needle adjustments (addition/removal  of saline solution) are required for weight loss and to maintain weight loss.   Patient initials: ______________  After I lose weight, the skin of my arms, legs, abdomen, neck, and face may become wrinkled,  droop or sag. Rashes and infections may occur in between my skin folds. Cosmetic surgery may  be indicated in some cases. This is not considered medically necessary in most cases and is rarely  covered by insurance. Patient initials: ______________  I understand that psychological changes may occur with weight loss as a result of bariatric surgery,  which can affect relationships with my loved ones. I agree to re-engage with a mental health  provider as needed. Patient initials: ______________  Some patients elect to have revisional bariatric surgery (correcting anatomic defects from a  previous bariatric operation). I am aware that in these cases, all of the previously addressed risks  apply, however they are three to five times more common. Patient initials: ______________  Follow-up appointments are vital. I agree to the following schedule of appointments after surgery:  two to three weeks, three months, six months, 12 months, and then annually for life. Additional  appointments may be necessary. Gastric Band patient follow-up is determined by my need for  adjustments but is at least once per year after the first year  Diet Quick Review  7-Day Preoperative Liquid Diet  Benefits:   Reducing intake before surgery will shrink  your liver by depleting glycogen (a form of  stored energy). Reduced liver size gives better access to  stomach during surgery, which translates  to a safer surgery. Prevents the last supper syndrome. Experiencing weight loss before the  procedure encourages postoperative  compliance and jump-starts weight loss. Specifics:   Start seven days before surgery:  ____________________. NO SOLID FOODS!! Your surgery will be CANCELED if this  diet is not followed!!! Minimum of 64 ounces of fluid daily,  including protein drinks. No added sugar or carbonated beverages.    Continue to take all your prescribed  medication and your vitamin supplements  during this preoperative diet phase.  Clear liquids:   Water.   Sugar free, non-carbonated beverages  (crystal light, propel).   Sugar free popsicles.   Sugar free Jell-O.   Fat free, reduced sodium broth .   Decaffeinated coffee or decaffeinated tea  with artificial sweeteners.  Protein:   60 grams of protein daily  (in liquid supplements).   Pre-made protein drinks.   Protein powder added to water.   3 gram rule -- limit sugar and fat to less  than 3 grams per 8 ounces.   4 to 6 ounces of low fat/low sugar yogurt  OR cottage cheese three to four times  during the week.     [facility] Gastric Bypass and Sleeve Dietary Progression Quick Review     Date of Surgery: _      __________Clear liquid diet.   Begin bariatric clear liquid diet on: ______________________________________________   64 ounces of fluid per day.   Low calorie, low sugar, non-carbonated beverages:  -- Water, Crystal Light, Propel Water, Sugar Free Jell-O, Sugar Free Popsicles, bouillon.  -- Start protein supplement during this stage (60 to 70 grams per day).  -- Start all vitamin supplements during this stage (see pages 57-58).  -- Getting your fluid in and staying hydrated is your number one priority!   The clear liquid diet will last for seven days.____________________________________________________     t.   Begin bariatric soft and moist diet on: _____________________________________________   This stage of the diet will last for five weeks, unless otherwise instructed by your surgeon.   Begin -- one week after surgery.   End -- six weeks after surgery (or when you follow up with the registered dietitian).   Soft, moist, high protein foods -- three meals per day plus protein supplements.  -- Portions should emphasize on soft protein.  -- Portions will be a MAXIMUM of 1 ounce of solid food and 2 to 3 ounces of cottage cheese and yogurt.  -- Protein supplements should be between meals and provide 30 to 40 grams per day during  soft protein diet.  --  Continue to get 64 ounces of fluid in per day. Protein foods that are OK (SLOW TRANSITION) on the soft and moist diet:  -- First week on soft protein should focus on yogurt, cottage cheese, eggs, VEGETARIAN refried  beans, black beans, kidney beans and white beans. (NO BAKED BEANS.)  -- Second through fourth weeks should focus on yogurt, cottage cheese, eggs, canned tuna,  canned chicken, tilapia and fish (needs to be soft enough to be cut up with a fork). -- Fifth week on soft protein diet should focus on yogurt, cottage cheese, eggs, canned tuna,  canned chicken, tilapia, fish, salmon, chicken breast or turkey. Remember to continue to get 64 ounces of fluid daily on ALL stages. To be advanced to bariatric maintenance stage of the bariatric diet, follow up with the dietitian  six weeks after surgery, around:   ___________________________________________________  After having a sleeve gastrectomy I will not be able to take NSAIDs  (non-steroidal anti-inflammatory drugs) for _________ weeks. 15. After having a gastric bypass I will not be able to take NSAIDs  (non-steroidal anti-inflammatory drugs) for _____________ weeks     Please discuss all of your current medications with your surgeon at your preoperative appointment. Your surgeon will inform you regarding which medications to stop before surgery and which  medications you are to take the morning of surgery. Medications to Stop  To minimize the risk of blood loss during surgery,  you must avoid or stop taking medicines that  contain anti-inflammatories, blood thinners, arthritis  medications, and herbal supplements seven to 14 days  before your surgery. A nurse from pre-anesthesia  testing will review your list of medication. Your current  medications will be reviewed at your preoperative  appointment with your surgeon. Note: You may take prescribed narcotics, such as  Vicodin, Ultram and Neurontin.   Diabetic Medications  Adjustments in your diabetic medications will be discussed with your surgeon at your  preoperative appointment. Birth Control  In order to decrease your chance of getting a postoperative blood clot you will be required to stop  any oral contraceptive two weeks before your surgery date. During this time it is your responsibility  to use an effective form of birth control. You will be required to take a pregnancy test the morning  of surgery at the hospital and surgery will be canceled if you are pregnant. We strongly encourage  that you resume your birth control two weeks after surgery or after your first menstrual cycle  following surgery. Do NOT start any new medications within a month of surgery without  discussing it with your surgeon and/or bariatric team first.  Non-Steroidal Anti-Inflammatory Drugs (NSAIDs)  Bypass:  One class of medications to avoid after Disha-en-Y gastric  bypass is NSAIDs. These can cause ulcers or stomach irritation  and are linked to a kind of ulcer called a marginal ulcer after  gastric bypass. Marginal ulcers can bleed or perforate. Usually  they are not fatal, but they can cause months or years of pain,  and are a common cause of re-operation and reversal of gastric  bypass. You will NEVER be able to take NSAIDs again. Your only choice for over the counter pain medication will be  Tylenol (acetaminophen). Steroid use can also be harmful to your stomach but may be necessary in some situations. Please consult with your bariatric surgeon and prescribing physician for approval.  Sleeve gastrectomy:  Following a sleeve gastrectomy you will not be allowed to take NSAIDs unless it has been  discussed and approved by your surgeon. Most commonly taken NSAIDs to be AVOIDED!! 1. Ibuprofen  2. Advil  3. Motrin  4. Excedrin  5. Aspirin  6. Celebrex  7. Naproxen  8. Aleve  9. Voltaren  10. Mobic     ___Pregnancy  It is in your best interest to avoid pregnancy for  12 to 18 months after surgery.  Pregnancy during  the rapid weight loss phase can be dangerous  and harmful to both mother and fetus. Do you have an effective method of birth  control? Please consult with your OB/GYN or  PCP for consultation. Alcohol  Alcohol is not recommended after bariatric  surgery. Alcohol contains calories but minimal  nutrition and will work against your weight  loss goal. For example, wine contains twice  the calories per ounce that regular soda does. The absorption of alcohol changes with gastric  bypass and gastric sleeve because an enzyme  in the stomach which usually begins to digest  alcohol is absent or greatly reduced making  alcohol more potent. Alcohol may also be absorbed more quickly  into the body after gastric bypass or gastric  sleeve. The absorbed alcohol will be more  potent, and studies have demonstrated  that obesity surgery patients reach a higher  alcohol level and maintain the higher levels for  a longer period than others. In some patients  alcohol use can increase and lead to alcohol  dependence or addiction. For all of these  reasons, it is recommended to avoid alcohol  after bariatric surgery. ___________________________________________  Smoking  It is required that ALL patients stop smoking  (including e-cigarettes and marijuana) and  chewing tobacco three months before  surgery. Prior to surgery your surgeon will  order a test to verify that you have quit. Your  surgery will be canceled if you are smoking. Smoking or chewing tobacco leads to  decreased blood supply to your bodys tissues  and delays healing. Smoking harms every  organ in the body and has been linked to:   Blood clots (the leading cause of death after  bariatric surgery). Marginal ulcers after gastric bypass. Heart disease. Stroke. Chronic obstructive pulmonary  (lung) disease. Increased risk for hip fracture. Cataracts.    Cancer of the mouth, throat, esophagus,  larynx (voice box), stomach, pancreas,  bladder, cervix, and kidney. Packing For the [address] your suitcase for the hospital a day or two before your surgery. Anti-skid socks will be provided. Items to Include in Your Bag   Clothing such as short gowns, short pajamas,  shorts, tops, loose-fitting shorts, bathrobe,  capris, etc.   Tennis shoes or flat runner sole shoes that tie. Toiletries. Waterless hand . Eyeglasses, contact lenses and denture cases. A list of medications you are currently taking,  including frequency and dosages. Magazines, books, needle work, crossword  puzzles, etc.   A method of payment to pay for prescriptions. What Not to Bring to the [address] wallet or purse. Jewelry or other valuables. Open-toe slippers or shoes without backs. Countdown to Surgery  14 to 30 Days   Attend a preoperative class with the  dietitian and bariatric coordinator. Pre-admission testing will contact you. Schedule your preoperative appointment  with your surgeon. 10 to 14 Days   Stop taking medications as instructed by  your physician. Seven Days   Start liquid diet. Stop all NSAIDS/aspirin. Three Days Before Surgery   Begin CHG skin prep. Day Before Surgery   Pack for the hospital.   Surgical time will be provided via phone call. YOU MAY NOT HAVE ANYTHING TO EAT  OR DRINK AFTER MIDNIGHT ON THE DAY  BEFORE YOUR SURGERY. This includes gum,  mints, water, etc. You may brush your teeth  the morning of surgery but do not swallow  the water. Simply rinse your mouth out. Day of Surgery   Take medications and brush your teeth  with a sip (1 teaspoon) of water (only the  medications you have been instructed to  take by your surgeon). Remember to report two hours before your  surgery time.  This will give the nursing staff  sufficient time to start IVs, prep you for  surgery and answer questions  If instructed by your surgeon to use sliding scale insulin   o Use regular insulin (Novolog Pen) according to the following insulin sliding scale:  BLOOD SUGAR: AMOUNT OF INSULIN:  Under 150 no insulin  150-200 2 units  201-250 4 units  251-300 6 units  301-350 8 units  351-400 10 units  400 or greater 12 units and call physician     Things to do following the preoperative class:  ? Thoroughly read this binder before surgery. Things to do before surgery:  ? Start the preoperative liquid diet on: _____________________________________________  ? Stop all NSAIDS (see page 30) and aspirin seven days before my surgery: _________________ .  Fuentes Singh my doctor(PCP or surgeon) regarding stopping Coumadin, Plavix or  other blood thinners. ? Purchase bariatric clear liquids (Crystal Lite, sugar free Jell-O, broth, sugar free popsicles,  protein supplement) and bariatric soft and moist foods (low fat yogurt, cottage cheese, eggs,  tuna, fish, chicken) so that Im prepared when I get home from the hospital.  ? Purchase all vitamins that will be required following surgery. o Chewable multivitamin -- two per day (ex: Flintstones Complete). o Calcium Citrate -- 1,500 milligrams (500 milligrams, three times a day). o Vitamin B-12 -- 1,000 micrograms daily. o Vitamin D3 -- 5,000 IU daily. Vitamin B1 100mg daily  ? Create a system to keep track of how many ounces of fluid I am drinking daily  o Postoperative GOAL = minimum of 64 ounces per day. ? Purchase a protein supplement that I like.  o Brand: _ _________________________________________________________________ .  o Ounces: _________________________________________________________________ .  Srikanth Buys of protein per serving: _________________________________________________ . -- 28 --  PATIENT GUIDE TO Jackelin Parker 950  6. YOUR SURGERY  Day of Surgery  Before You Leave For the Hospital   Brush your teeth -- upon awakening, you may brush your teeth and rinse with water, but do not  swallow the water.    Take medication -- take only the medications as instructed by your provider with a small sip of  water as soon as you get up. Wear proper clothing that is loose-fitting and easily removed. Avoid back zippers and pantyhose. Please remove ALL jewelry (leave ALL jewelry and valuables at home). Avoid using perfumes, deodorant, shaving creams or any scented lotions. Bring a case with your name on it to hold your eyeglasses, contact lenses, hearing aids  and dentures. Reporting to the [address] [place] [address] will be asked to arrive approximately two  hours before your scheduled surgery. It is important  that you arrive on time to the hospital to avoid any  problems with starting your surgery on time. In some  cases lateness could result in moving your surgery to  a much later time. Your physicians office will provide  your time of arrival to the hospital.  Where Do You Report the Day of Surgery? [place]: [phone] , [place], [address]. Enter through the main entrance. Turn left just past the information desk into the  Registration Office. You will check in for surgery there. You may designate one person to be contacted when your surgery has been completed. -- 36 --  [facility]  Before Surgery  Preoperative     Preoperative Preparation Area  Once you arrive at the hospital you will be escorted to the preoperative preparation area. During the preoperative phase, a nurse will review your medical records and conduct a brief  physical examination to include vital signs (i.e., blood pressure, pulse, temperature, respirations or  breathing). An intravenous tube will be inserted with IV fluids. Your skin will be cleansed with CHG  wipes. If you wear dentures, eyeglasses or contact lenses you will need to remove them at this time.   You will see your surgeon, your anesthesiologist and meet the members of your surgical team.  Medications, such as antibiotics, may be given by anesthetists to decrease your infection risk. Other medications may be given to allay any anxiety you may have. You are allowed to have two family members or friends in the preoperative area before surgery. Going into Surgery  The operating room team will escort you into the operating room where your abdomen will be  prepared for surgery. There will be a time out -- a verification of the correct operative site for  patient safety purposes -- performed. Once in the operating room, monitoring devices, such  as a blood pressure cuff, heart monitor and oxygen monitor, will be attached. You will be given  supplemental oxygen as you are readied for anesthesia. The average length of time for a laparoscopic sleeve gastrectomy is 60 to 90 minutes. The average length of time for a Disha-en-Y gastric bypass is two to two and a half hours. In the Recovery Area  After your surgery is completed, you will be wheeled into the recovery room. In the recovery room:   Nurses will frequently check your vital signs (i.e., blood pressure, pulse, breathing). Nurses will medicate you for your pain as needed through the IV line. Nurses will encourage you to take deep breaths and to move your ankles and feet. Please inform your family the length of time in the recoveryYou will move to your hospital room from the recovery area when you are ready. Your post-surgical  recovery begins here. room will loan  What to Expect During [address]  From the recovery room, you will be transferred to your hospital room on the bariatric unit  known as 2 Surgical. The private rooms are spacious with large windows and beautiful views. The staff is specially trained in caring for bariatric patients and are extremely friendly and  knowledgeable. We look forward to caring for you during your hospital stay. IV -- IV fluids will be given to help nourish your body after surgery. You will also be given IV pain  medication.  IV fluids will continue until you are discharged from the hospital.  Heart rate monitor (telemetry) -- A heart rate monitor will be worn only in the recovery room  unless otherwise indicated.  Ayoub catheter -- A Ayoub catheter will be placed in your bladder while you are in the OR and  removed when you arrive to the recovery room.  Nasal cannula -- Oxygen will be worn in the nose the night of surgery.  Anticoagulation -- A blood thinner may be administered to you to prevent blood clots. Lovenox,  an injectable medication will be used.  Drainage tube -- A drainage tube may be inserted into your abdomen during surgery. This tube  collects bloody drainage after surgery. This may be removed prior to discharge from the hospital or  you may be discharged with the drain and it will be removed by your surgeon at your postoperative  visit. If you are discharged with a drain you will be taught how to empty it and care for it.     After Surgery   If you do not see your providers clean their hands, please ask them to do so.   Family and friends who visit you should not touch the surgical wound or dressings.   Family and friends should clean their hands with soap and water or an alcohol-based hand  rub before and after visiting you. If you do not see them clean their hands, ask them to clean  their hands.   Make sure you understand how to care for your incision before you leave the hospital.   Always clean your hands before and after caring for your incision.   Make sure you know who to contact if you have questions or problems after you get home.   If you have any symptoms of an infection, such as redness and pain at the surgery site, drainage,  or fever, call your doctor immediately.   Ask your nursing staff to help you out of bed to walk within five hours of arriving at your  hospital room. Getting out of bed to walk helps to decrease complications, such as blood clots  and pneumonia.   of Stay  Yl be required to stay in the hospital for at least ONE  night, possibly TWO. Lngth of Stay  Chelsea Mcnair be required to stay in the hospital for at least ONE night, possibly TWO.  edications and Pain Control Options  There are many types of pain control methods that are available to control discomfort. Effective  pain control will allow you to be up and walking shortly after surgery. Your doctor will choose  the method that is right for you. Regardless of the method of pain medication being used, it is  important for you to communicate with your nurse if the pain medication is not enough. Call your  nurse for pain medication when the pain is moderate instead of waiting for when pain is severe. What type of pain should I expect? Abdominal pain   Rib pain   Shoulder pain   Brick feeling in the center of your chest  Nausea:  Nausea following bariatric surgery is very  common. Causes include: Anesthesia   Drinking too fast   Pain medication   Surgery itself           Length of Stay  You willExpectations on your Day of Surgery   You will be allowed 1 to 2 ounces of ice chips per hour when you are admitted to the floor. They are solely for your comfort. They are not required. You will be expected to walk the night of your surgery. Please ask your nurse or nursing assistant  for help the first time you walk. Please do not walk if you still feel groggy or unsteady on your feet. Your pain will be controlled with oral and IV pain medication on the day of surgery. In order to prevent pneumonia after surgery you please use your incentive spirometer (ICS)  at least 10 times per hour (see below). be [email or web address]  Bariatric Clear Liquid Diet  You will be started on the bariatric clear liquid diet the morning after your surgery. Your GOAL  will be to drink 4 ounces per hour (SLOW and STEADY) of the following liquids: water, crystal lite,  Jell-O, broth and Unjury (protein supplement). Feel free to bring your favorite protein supplement  from home.    Two important requirements when drinking is you must slowly SIP the fluid and you must be  SITTING up. Walking  while in the hospital you are expected to walk EVERY hour in the hallway. During your hospital  stay you will also be expected to sit in the recliner throughout the day rather than lie in bed. Pain Medication  The morning after surgery you will continue with oral pain medication ONLY for your pain control. Incentive Spirometer  Continue using your incentive spirometer(ICS) 10 times per hour.

## 2023-03-21 RX ORDER — TRAMADOL HYDROCHLORIDE 50 MG/1
50 TABLET ORAL
Status: ON HOLD | COMMUNITY
Start: 2023-02-10 | End: 2023-03-24 | Stop reason: HOSPADM

## 2023-03-21 RX ORDER — ALBUTEROL SULFATE 90 UG/1
1-2 AEROSOL, METERED RESPIRATORY (INHALATION)
COMMUNITY
Start: 2023-02-01

## 2023-03-21 NOTE — PERIOP NOTE
PRE-SURGICAL INSTRUCTIONS        Patient's Name:  Dori Gonzalez      KPIDK'W Date:  3/21/2023            Covid Testing Date and Time:    Surgery Date:  03/22              Do NOT eat or drink anything, including candy, gum, or ice chips after midnight on 03/22, unless you have specific instructions from your surgeon or anesthesia provider to do so. You may brush your teeth before coming to the hospital.  No smoking 24 hours prior to the day of surgery. No alcohol 24 hours prior to the day of surgery. No recreational drugs for one week prior to the day of surgery. Leave all valuables, including money/purse, at home. Remove all jewelry, nail polish, acrylic nails, and makeup (including mascara); no lotions powders, deodorant, or perfume/cologne/after shave on the skin. Follow instruction for Hibiclens washes and CHG wipes from surgeon's office. Glasses/contact lenses and dentures may be worn to the hospital.  They will be removed prior to surgery. Call your doctor if symptoms of a cold or illness develop within 24-48 hours prior to your surgery. 11.  If you are having an outpatient procedure, please make arrangements for a responsible ADULT TO [address] [place] [address] and stay with you for 24 hours after your surgery. 12. ONE VISITOR in the hospital at this time for outpatient procedures. Exceptions may be made for surgical admissions, per nursing unit guidelines      Special Instructions:      Bring list of CURRENT medications. Bring inhaler. Bring any pertinent legal medical records. Take these medications the morning of surgery with a sip of water:  Per SALUD.FQVAB  Follow physician instructions about stopping anticoagulants. On the day of surgery, come in the main entrance of [facility]. Let the  at the desk know you are there for surgery. A staff member will come escort you to the surgical area on the second floor.     If you have any questions or concerns, please do not hesitate to call:     (Prior to the day of surgery) [facility] department:  [phone]   (Day of surgery) Pre-Op department:  [phone]    These surgical instructions were reviewed with Rodriguez Williamson and Pradip Potts during the [facility] phone call.

## 2023-03-22 ENCOUNTER — PREP FOR PROCEDURE (OUTPATIENT)
Age: 63
End: 2023-03-22

## 2023-03-22 ENCOUNTER — ANESTHESIA EVENT (OUTPATIENT)
Facility: HOSPITAL | Age: 63
End: 2023-03-22
Payer: COMMERCIAL

## 2023-03-22 RX ORDER — SODIUM CHLORIDE 0.9 % (FLUSH) 0.9 %
5-40 SYRINGE (ML) INJECTION EVERY 12 HOURS SCHEDULED
Status: CANCELLED | OUTPATIENT
Start: 2023-03-22

## 2023-03-22 RX ORDER — SODIUM CHLORIDE, SODIUM LACTATE, POTASSIUM CHLORIDE, CALCIUM CHLORIDE 600; 310; 30; 20 MG/100ML; MG/100ML; MG/100ML; MG/100ML
INJECTION, SOLUTION INTRAVENOUS CONTINUOUS
Status: CANCELLED | OUTPATIENT
Start: 2023-03-22

## 2023-03-22 RX ORDER — APREPITANT 40 MG/1
40 CAPSULE ORAL ONCE
Status: CANCELLED | OUTPATIENT
Start: 2023-03-22

## 2023-03-22 RX ORDER — ENOXAPARIN SODIUM 100 MG/ML
40 INJECTION SUBCUTANEOUS ONCE
Status: CANCELLED | OUTPATIENT
Start: 2023-03-22 | End: 2023-03-22

## 2023-03-22 RX ORDER — SCOLOPAMINE TRANSDERMAL SYSTEM 1 MG/1
1 PATCH, EXTENDED RELEASE TRANSDERMAL
Status: CANCELLED | OUTPATIENT
Start: 2023-03-22 | End: 2023-03-25

## 2023-03-22 RX ORDER — SODIUM CHLORIDE 0.9 % (FLUSH) 0.9 %
5-40 SYRINGE (ML) INJECTION PRN
Status: CANCELLED | OUTPATIENT
Start: 2023-03-22

## 2023-03-22 RX ORDER — FAMOTIDINE 10 MG/ML
20 INJECTION, SOLUTION INTRAVENOUS ONCE
Status: CANCELLED | OUTPATIENT
Start: 2023-03-22 | End: 2023-03-22

## 2023-03-22 RX ORDER — SODIUM CHLORIDE 9 MG/ML
INJECTION, SOLUTION INTRAVENOUS PRN
Status: CANCELLED | OUTPATIENT
Start: 2023-03-22

## 2023-03-23 ENCOUNTER — ANESTHESIA (OUTPATIENT)
Facility: HOSPITAL | Age: 63
End: 2023-03-23
Payer: COMMERCIAL

## 2023-03-23 ENCOUNTER — HOSPITAL ENCOUNTER (INPATIENT)
Facility: HOSPITAL | Age: 63
LOS: 1 days | Discharge: HOME | End: 2023-03-24
Attending: SURGERY | Admitting: SURGERY
Payer: COMMERCIAL

## 2023-03-23 DIAGNOSIS — G89.18 ACUTE POST-OPERATIVE PAIN: Primary | ICD-10-CM

## 2023-03-23 PROBLEM — E66.01 MORBID OBESITY (HCC): Status: ACTIVE | Noted: 2023-03-23

## 2023-03-23 LAB
ABO + RH BLD: NORMAL
ANION GAP SERPL CALC-SCNC: 6 MMOL/L (ref 3–18)
BLOOD GROUP ANTIBODIES SERPL: NORMAL
BUN SERPL-MCNC: 21 MG/DL (ref 7–18)
BUN/CREAT SERPL: 28 (ref 12–20)
CALCIUM SERPL-MCNC: 9.8 MG/DL (ref 8.5–10.1)
CHLORIDE SERPL-SCNC: 107 MMOL/L (ref 100–111)
CO2 SERPL-SCNC: 25 MMOL/L (ref 21–32)
CREAT SERPL-MCNC: 0.74 MG/DL (ref 0.6–1.3)
GLUCOSE SERPL-MCNC: 86 MG/DL (ref 74–99)
POTASSIUM SERPL-SCNC: 3.9 MMOL/L (ref 3.5–5.5)
SODIUM SERPL-SCNC: 138 MMOL/L (ref 136–145)
SPECIMEN EXP DATE BLD: NORMAL

## 2023-03-23 PROCEDURE — 2580000003 HC RX 258: Performed by: SURGERY

## 2023-03-23 PROCEDURE — 86900 BLOOD TYPING SEROLOGIC ABO: CPT

## 2023-03-23 PROCEDURE — 2580000003 HC RX 258: Performed by: NURSE ANESTHETIST, CERTIFIED REGISTERED

## 2023-03-23 PROCEDURE — 6360000002 HC RX W HCPCS: Performed by: NURSE ANESTHETIST, CERTIFIED REGISTERED

## 2023-03-23 PROCEDURE — 6360000002 HC RX W HCPCS: Performed by: SURGERY

## 2023-03-23 PROCEDURE — 3700000001 HC ADD 15 MINUTES (ANESTHESIA): Performed by: SURGERY

## 2023-03-23 PROCEDURE — 7100000000 HC PACU RECOVERY - FIRST 15 MIN: Performed by: SURGERY

## 2023-03-23 PROCEDURE — 2500000003 HC RX 250 WO HCPCS: Performed by: SURGERY

## 2023-03-23 PROCEDURE — 6370000000 HC RX 637 (ALT 250 FOR IP): Performed by: SURGERY

## 2023-03-23 PROCEDURE — 80048 BASIC METABOLIC PNL TOTAL CA: CPT

## 2023-03-23 PROCEDURE — 43644 LAP GASTRIC BYPASS/ROUX-EN-Y: CPT | Performed by: SURGERY

## 2023-03-23 PROCEDURE — 3600000012 HC SURGERY LEVEL 2 ADDTL 15MIN: Performed by: SURGERY

## 2023-03-23 PROCEDURE — C9113 INJ PANTOPRAZOLE SODIUM, VIA: HCPCS | Performed by: SURGERY

## 2023-03-23 PROCEDURE — 7100000001 HC PACU RECOVERY - ADDTL 15 MIN: Performed by: SURGERY

## 2023-03-23 PROCEDURE — 47379 UNLISTED LAPS PX LIVER: CPT | Performed by: SURGERY

## 2023-03-23 PROCEDURE — 2500000003 HC RX 250 WO HCPCS: Performed by: NURSE ANESTHETIST, CERTIFIED REGISTERED

## 2023-03-23 PROCEDURE — 0FB24ZX EXCISION OF LEFT LOBE LIVER, PERCUTANEOUS ENDOSCOPIC APPROACH, DIAGNOSTIC: ICD-10-PCS | Performed by: SURGERY

## 2023-03-23 PROCEDURE — C9290 INJ, BUPIVACAINE LIPOSOME: HCPCS | Performed by: SURGERY

## 2023-03-23 PROCEDURE — A4216 STERILE WATER/SALINE, 10 ML: HCPCS | Performed by: SURGERY

## 2023-03-23 PROCEDURE — 3600000002 HC SURGERY LEVEL 2 BASE: Performed by: SURGERY

## 2023-03-23 PROCEDURE — 3700000000 HC ANESTHESIA ATTENDED CARE: Performed by: SURGERY

## 2023-03-23 PROCEDURE — 2709999900 HC NON-CHARGEABLE SUPPLY: Performed by: SURGERY

## 2023-03-23 PROCEDURE — 88313 SPECIAL STAINS GROUP 2: CPT

## 2023-03-23 PROCEDURE — 0D164ZA BYPASS STOMACH TO JEJUNUM, PERCUTANEOUS ENDOSCOPIC APPROACH: ICD-10-PCS | Performed by: SURGERY

## 2023-03-23 PROCEDURE — 43281 LAP PARAESOPHAG HERN REPAIR: CPT | Performed by: SURGERY

## 2023-03-23 PROCEDURE — 36415 COLL VENOUS BLD VENIPUNCTURE: CPT

## 2023-03-23 PROCEDURE — 0BQT4ZZ REPAIR DIAPHRAGM, PERCUTANEOUS ENDOSCOPIC APPROACH: ICD-10-PCS | Performed by: SURGERY

## 2023-03-23 PROCEDURE — 88307 TISSUE EXAM BY PATHOLOGIST: CPT

## 2023-03-23 PROCEDURE — 1100000000 HC RM PRIVATE

## 2023-03-23 PROCEDURE — 2720000010 HC SURG SUPPLY STERILE: Performed by: SURGERY

## 2023-03-23 RX ORDER — NEOSTIGMINE METHYLSULFATE 1 MG/ML
INJECTION, SOLUTION INTRAVENOUS PRN
Status: DISCONTINUED | OUTPATIENT
Start: 2023-03-23 | End: 2023-03-23 | Stop reason: SDUPTHER

## 2023-03-23 RX ORDER — ONDANSETRON 2 MG/ML
INJECTION INTRAMUSCULAR; INTRAVENOUS PRN
Status: DISCONTINUED | OUTPATIENT
Start: 2023-03-23 | End: 2023-03-23 | Stop reason: SDUPTHER

## 2023-03-23 RX ORDER — ROCURONIUM BROMIDE 10 MG/ML
INJECTION, SOLUTION INTRAVENOUS PRN
Status: DISCONTINUED | OUTPATIENT
Start: 2023-03-23 | End: 2023-03-23 | Stop reason: SDUPTHER

## 2023-03-23 RX ORDER — KETOROLAC TROMETHAMINE 15 MG/ML
15 INJECTION, SOLUTION INTRAMUSCULAR; INTRAVENOUS EVERY 6 HOURS PRN
Status: DISCONTINUED | OUTPATIENT
Start: 2023-03-23 | End: 2023-03-24 | Stop reason: HOSPADM

## 2023-03-23 RX ORDER — FAMOTIDINE 10 MG/ML
INJECTION, SOLUTION INTRAVENOUS
Status: DISCONTINUED
Start: 2023-03-23 | End: 2023-03-23

## 2023-03-23 RX ORDER — SODIUM CHLORIDE 0.9 % (FLUSH) 0.9 %
5-40 SYRINGE (ML) INJECTION PRN
Status: DISCONTINUED | OUTPATIENT
Start: 2023-03-23 | End: 2023-03-23 | Stop reason: HOSPADM

## 2023-03-23 RX ORDER — SODIUM CHLORIDE, SODIUM LACTATE, POTASSIUM CHLORIDE, CALCIUM CHLORIDE 600; 310; 30; 20 MG/100ML; MG/100ML; MG/100ML; MG/100ML
INJECTION, SOLUTION INTRAVENOUS CONTINUOUS
Status: DISCONTINUED | OUTPATIENT
Start: 2023-03-23 | End: 2023-03-23 | Stop reason: HOSPADM

## 2023-03-23 RX ORDER — DIPHENHYDRAMINE HCL 25 MG
25 CAPSULE ORAL EVERY 6 HOURS PRN
Status: DISCONTINUED | OUTPATIENT
Start: 2023-03-23 | End: 2023-03-24 | Stop reason: HOSPADM

## 2023-03-23 RX ORDER — ACETAMINOPHEN 325 MG/1
650 TABLET ORAL EVERY 6 HOURS PRN
Status: DISCONTINUED | OUTPATIENT
Start: 2023-03-23 | End: 2023-03-24 | Stop reason: HOSPADM

## 2023-03-23 RX ORDER — FAMOTIDINE 20 MG/1
20 TABLET, FILM COATED ORAL ONCE
Status: DISCONTINUED | OUTPATIENT
Start: 2023-03-23 | End: 2023-03-23 | Stop reason: HOSPADM

## 2023-03-23 RX ORDER — GLYCOPYRROLATE 0.2 MG/ML
INJECTION INTRAMUSCULAR; INTRAVENOUS PRN
Status: DISCONTINUED | OUTPATIENT
Start: 2023-03-23 | End: 2023-03-23 | Stop reason: SDUPTHER

## 2023-03-23 RX ORDER — ENOXAPARIN SODIUM 100 MG/ML
40 INJECTION SUBCUTANEOUS ONCE
Status: COMPLETED | OUTPATIENT
Start: 2023-03-23 | End: 2023-03-23

## 2023-03-23 RX ORDER — ENOXAPARIN SODIUM 100 MG/ML
40 INJECTION SUBCUTANEOUS EVERY 12 HOURS SCHEDULED
Status: DISCONTINUED | OUTPATIENT
Start: 2023-03-23 | End: 2023-03-24 | Stop reason: HOSPADM

## 2023-03-23 RX ORDER — SODIUM CHLORIDE 9 MG/ML
INJECTION, SOLUTION INTRAVENOUS PRN
Status: DISCONTINUED | OUTPATIENT
Start: 2023-03-23 | End: 2023-03-23 | Stop reason: HOSPADM

## 2023-03-23 RX ORDER — FENTANYL CITRATE 50 UG/ML
INJECTION, SOLUTION INTRAMUSCULAR; INTRAVENOUS PRN
Status: DISCONTINUED | OUTPATIENT
Start: 2023-03-23 | End: 2023-03-23 | Stop reason: SDUPTHER

## 2023-03-23 RX ORDER — ACETAMINOPHEN 325 MG/1
650 TABLET ORAL EVERY 6 HOURS
Status: DISCONTINUED | OUTPATIENT
Start: 2023-03-23 | End: 2023-03-24 | Stop reason: HOSPADM

## 2023-03-23 RX ORDER — DEXAMETHASONE SODIUM PHOSPHATE 4 MG/ML
INJECTION, SOLUTION INTRA-ARTICULAR; INTRALESIONAL; INTRAMUSCULAR; INTRAVENOUS; SOFT TISSUE PRN
Status: DISCONTINUED | OUTPATIENT
Start: 2023-03-23 | End: 2023-03-23 | Stop reason: SDUPTHER

## 2023-03-23 RX ORDER — SODIUM CHLORIDE 0.9 % (FLUSH) 0.9 %
5-40 SYRINGE (ML) INJECTION PRN
Status: DISCONTINUED | OUTPATIENT
Start: 2023-03-23 | End: 2023-03-24 | Stop reason: HOSPADM

## 2023-03-23 RX ORDER — BUPIVACAINE HYDROCHLORIDE 2.5 MG/ML
INJECTION, SOLUTION EPIDURAL; INFILTRATION; INTRACAUDAL PRN
Status: DISCONTINUED | OUTPATIENT
Start: 2023-03-23 | End: 2023-03-23 | Stop reason: ALTCHOICE

## 2023-03-23 RX ORDER — PROPOFOL 10 MG/ML
INJECTION, EMULSION INTRAVENOUS PRN
Status: DISCONTINUED | OUTPATIENT
Start: 2023-03-23 | End: 2023-03-23 | Stop reason: SDUPTHER

## 2023-03-23 RX ORDER — SODIUM CHLORIDE 9 MG/ML
INJECTION, SOLUTION INTRAVENOUS PRN
Status: DISCONTINUED | OUTPATIENT
Start: 2023-03-23 | End: 2023-03-24 | Stop reason: HOSPADM

## 2023-03-23 RX ORDER — OXYCODONE HYDROCHLORIDE 5 MG/1
5 TABLET ORAL EVERY 4 HOURS PRN
Status: DISCONTINUED | OUTPATIENT
Start: 2023-03-23 | End: 2023-03-24 | Stop reason: HOSPADM

## 2023-03-23 RX ORDER — SUCCINYLCHOLINE/SOD CL,ISO/PF 100 MG/5ML
SYRINGE (ML) INTRAVENOUS PRN
Status: DISCONTINUED | OUTPATIENT
Start: 2023-03-23 | End: 2023-03-23 | Stop reason: SDUPTHER

## 2023-03-23 RX ORDER — ACETAMINOPHEN 120 MG/1
SUPPOSITORY RECTAL PRN
Status: DISCONTINUED | OUTPATIENT
Start: 2023-03-23 | End: 2023-03-23 | Stop reason: ALTCHOICE

## 2023-03-23 RX ORDER — SODIUM CHLORIDE 0.9 % (FLUSH) 0.9 %
5-40 SYRINGE (ML) INJECTION EVERY 12 HOURS SCHEDULED
Status: DISCONTINUED | OUTPATIENT
Start: 2023-03-23 | End: 2023-03-23 | Stop reason: HOSPADM

## 2023-03-23 RX ORDER — LIDOCAINE HYDROCHLORIDE 20 MG/ML
INJECTION, SOLUTION EPIDURAL; INFILTRATION; INTRACAUDAL; PERINEURAL PRN
Status: DISCONTINUED | OUTPATIENT
Start: 2023-03-23 | End: 2023-03-23 | Stop reason: SDUPTHER

## 2023-03-23 RX ORDER — PROCHLORPERAZINE EDISYLATE 5 MG/ML
10 INJECTION INTRAMUSCULAR; INTRAVENOUS EVERY 6 HOURS PRN
Status: DISCONTINUED | OUTPATIENT
Start: 2023-03-23 | End: 2023-03-24 | Stop reason: HOSPADM

## 2023-03-23 RX ORDER — LIDOCAINE HYDROCHLORIDE 10 MG/ML
1 INJECTION, SOLUTION EPIDURAL; INFILTRATION; INTRACAUDAL; PERINEURAL
Status: DISCONTINUED | OUTPATIENT
Start: 2023-03-23 | End: 2023-03-23 | Stop reason: HOSPADM

## 2023-03-23 RX ORDER — SCOLOPAMINE TRANSDERMAL SYSTEM 1 MG/1
1 PATCH, EXTENDED RELEASE TRANSDERMAL
Status: DISCONTINUED | OUTPATIENT
Start: 2023-03-23 | End: 2023-03-24 | Stop reason: HOSPADM

## 2023-03-23 RX ORDER — HYDROMORPHONE HCL 110MG/55ML
PATIENT CONTROLLED ANALGESIA SYRINGE INTRAVENOUS PRN
Status: DISCONTINUED | OUTPATIENT
Start: 2023-03-23 | End: 2023-03-23 | Stop reason: SDUPTHER

## 2023-03-23 RX ORDER — SODIUM CHLORIDE, SODIUM LACTATE, POTASSIUM CHLORIDE, CALCIUM CHLORIDE 600; 310; 30; 20 MG/100ML; MG/100ML; MG/100ML; MG/100ML
INJECTION, SOLUTION INTRAVENOUS CONTINUOUS
Status: DISCONTINUED | OUTPATIENT
Start: 2023-03-23 | End: 2023-03-24 | Stop reason: HOSPADM

## 2023-03-23 RX ORDER — ONDANSETRON 2 MG/ML
4 INJECTION INTRAMUSCULAR; INTRAVENOUS EVERY 4 HOURS
Status: DISCONTINUED | OUTPATIENT
Start: 2023-03-23 | End: 2023-03-24 | Stop reason: HOSPADM

## 2023-03-23 RX ORDER — SODIUM CHLORIDE 0.9 % (FLUSH) 0.9 %
5-40 SYRINGE (ML) INJECTION EVERY 12 HOURS SCHEDULED
Status: DISCONTINUED | OUTPATIENT
Start: 2023-03-23 | End: 2023-03-24 | Stop reason: HOSPADM

## 2023-03-23 RX ORDER — INDOCYANINE GREEN AND WATER 25 MG
KIT INJECTION PRN
Status: DISCONTINUED | OUTPATIENT
Start: 2023-03-23 | End: 2023-03-23 | Stop reason: SDUPTHER

## 2023-03-23 RX ORDER — KETAMINE HCL 50MG/ML(1)
SYRINGE (ML) INTRAVENOUS PRN
Status: DISCONTINUED | OUTPATIENT
Start: 2023-03-23 | End: 2023-03-23 | Stop reason: SDUPTHER

## 2023-03-23 RX ORDER — APREPITANT 40 MG/1
40 CAPSULE ORAL ONCE
Status: COMPLETED | OUTPATIENT
Start: 2023-03-23 | End: 2023-03-23

## 2023-03-23 RX ORDER — DIPHENHYDRAMINE HYDROCHLORIDE 50 MG/ML
25 INJECTION INTRAMUSCULAR; INTRAVENOUS EVERY 6 HOURS PRN
Status: DISCONTINUED | OUTPATIENT
Start: 2023-03-23 | End: 2023-03-24 | Stop reason: HOSPADM

## 2023-03-23 RX ORDER — MIDAZOLAM HYDROCHLORIDE 1 MG/ML
INJECTION INTRAMUSCULAR; INTRAVENOUS PRN
Status: DISCONTINUED | OUTPATIENT
Start: 2023-03-23 | End: 2023-03-23 | Stop reason: SDUPTHER

## 2023-03-23 RX ADMIN — ROCURONIUM BROMIDE 40 MG: 10 INJECTION, SOLUTION INTRAVENOUS at 08:57

## 2023-03-23 RX ADMIN — Medication 25 MG: at 09:02

## 2023-03-23 RX ADMIN — INDOCYANINE GREEN AND WATER 2.5 MG: KIT at 10:40

## 2023-03-23 RX ADMIN — DEXAMETHASONE SODIUM PHOSPHATE 8 MG: 4 INJECTION, SOLUTION INTRAMUSCULAR; INTRAVENOUS at 09:05

## 2023-03-23 RX ADMIN — FENTANYL CITRATE 25 MCG: 50 INJECTION, SOLUTION INTRAMUSCULAR; INTRAVENOUS at 08:46

## 2023-03-23 RX ADMIN — APREPITANT 40 MG: 40 CAPSULE ORAL at 06:39

## 2023-03-23 RX ADMIN — SODIUM CHLORIDE 40 MG: 9 INJECTION INTRAMUSCULAR; INTRAVENOUS; SUBCUTANEOUS at 16:24

## 2023-03-23 RX ADMIN — KETOROLAC TROMETHAMINE 15 MG: 15 INJECTION, SOLUTION INTRAMUSCULAR; INTRAVENOUS at 19:39

## 2023-03-23 RX ADMIN — ACETAMINOPHEN 650 MG: 325 TABLET ORAL at 19:39

## 2023-03-23 RX ADMIN — DEXMEDETOMIDINE HYDROCHLORIDE 4 MCG: 100 INJECTION, SOLUTION INTRAVENOUS at 09:18

## 2023-03-23 RX ADMIN — DEXMEDETOMIDINE HYDROCHLORIDE 4 MCG: 100 INJECTION, SOLUTION INTRAVENOUS at 09:30

## 2023-03-23 RX ADMIN — ONDANSETRON 4 MG: 2 INJECTION INTRAMUSCULAR; INTRAVENOUS at 16:21

## 2023-03-23 RX ADMIN — ENOXAPARIN SODIUM 40 MG: 100 INJECTION SUBCUTANEOUS at 19:38

## 2023-03-23 RX ADMIN — FENTANYL CITRATE 25 MCG: 50 INJECTION, SOLUTION INTRAMUSCULAR; INTRAVENOUS at 09:04

## 2023-03-23 RX ADMIN — HYDROMORPHONE HYDROCHLORIDE 0.4 MG: 2 INJECTION INTRAMUSCULAR; INTRAVENOUS; SUBCUTANEOUS at 10:49

## 2023-03-23 RX ADMIN — DEXMEDETOMIDINE HYDROCHLORIDE 4 MCG: 100 INJECTION, SOLUTION INTRAVENOUS at 09:10

## 2023-03-23 RX ADMIN — HYDROMORPHONE HYDROCHLORIDE 1 MG: 2 INJECTION INTRAMUSCULAR; INTRAVENOUS; SUBCUTANEOUS at 09:56

## 2023-03-23 RX ADMIN — PROPOFOL 170 MG: 10 INJECTION, EMULSION INTRAVENOUS at 08:47

## 2023-03-23 RX ADMIN — ROCURONIUM BROMIDE 10 MG: 10 INJECTION, SOLUTION INTRAVENOUS at 09:32

## 2023-03-23 RX ADMIN — SODIUM CHLORIDE, SODIUM LACTATE, POTASSIUM CHLORIDE, AND CALCIUM CHLORIDE: 600; 310; 30; 20 INJECTION, SOLUTION INTRAVENOUS at 07:07

## 2023-03-23 RX ADMIN — ONDANSETRON 4 MG: 2 INJECTION INTRAMUSCULAR; INTRAVENOUS at 19:39

## 2023-03-23 RX ADMIN — SODIUM CHLORIDE, SODIUM LACTATE, POTASSIUM CHLORIDE, AND CALCIUM CHLORIDE: 600; 310; 30; 20 INJECTION, SOLUTION INTRAVENOUS at 10:59

## 2023-03-23 RX ADMIN — KETOROLAC TROMETHAMINE 15 MG: 15 INJECTION, SOLUTION INTRAMUSCULAR; INTRAVENOUS at 13:13

## 2023-03-23 RX ADMIN — DEXMEDETOMIDINE HYDROCHLORIDE 4 MCG: 100 INJECTION, SOLUTION INTRAVENOUS at 10:34

## 2023-03-23 RX ADMIN — GLYCOPYRROLATE 0.8 MG: 0.2 INJECTION, SOLUTION INTRAMUSCULAR; INTRAVENOUS at 11:05

## 2023-03-23 RX ADMIN — NEOSTIGMINE METHYLSULFATE 5 MG: 1 INJECTION, SOLUTION INTRAVENOUS at 11:05

## 2023-03-23 RX ADMIN — SODIUM CHLORIDE, POTASSIUM CHLORIDE, SODIUM LACTATE AND CALCIUM CHLORIDE: 600; 310; 30; 20 INJECTION, SOLUTION INTRAVENOUS at 13:13

## 2023-03-23 RX ADMIN — ONDANSETRON 4 MG: 2 INJECTION INTRAMUSCULAR; INTRAVENOUS at 11:05

## 2023-03-23 RX ADMIN — LIDOCAINE HYDROCHLORIDE 100 MG: 20 INJECTION, SOLUTION EPIDURAL; INFILTRATION; INTRACAUDAL; PERINEURAL at 08:47

## 2023-03-23 RX ADMIN — ACETAMINOPHEN 650 MG: 325 TABLET ORAL at 17:05

## 2023-03-23 RX ADMIN — ENOXAPARIN SODIUM 40 MG: 100 INJECTION SUBCUTANEOUS at 06:41

## 2023-03-23 RX ADMIN — Medication 100 MG: at 08:48

## 2023-03-23 RX ADMIN — FENTANYL CITRATE 50 MCG: 50 INJECTION, SOLUTION INTRAMUSCULAR; INTRAVENOUS at 08:42

## 2023-03-23 RX ADMIN — MIDAZOLAM HYDROCHLORIDE 2 MG: 2 INJECTION, SOLUTION INTRAMUSCULAR; INTRAVENOUS at 08:40

## 2023-03-23 RX ADMIN — FAMOTIDINE 20 MG: 10 INJECTION INTRAVENOUS at 07:12

## 2023-03-23 ASSESSMENT — PAIN DESCRIPTION - LOCATION
LOCATION: ABDOMEN
LOCATION: ABDOMEN;BACK
LOCATION: ABDOMEN;CHEST

## 2023-03-23 ASSESSMENT — PAIN DESCRIPTION - PAIN TYPE
TYPE: SURGICAL PAIN
TYPE: ACUTE PAIN

## 2023-03-23 ASSESSMENT — PAIN SCALES - GENERAL
PAINLEVEL_OUTOF10: 4
PAINLEVEL_OUTOF10: 0
PAINLEVEL_OUTOF10: 5
PAINLEVEL_OUTOF10: 8

## 2023-03-23 ASSESSMENT — PAIN DESCRIPTION - ORIENTATION
ORIENTATION: RIGHT
ORIENTATION: ANTERIOR
ORIENTATION: RIGHT;LEFT;ANTERIOR;UPPER

## 2023-03-23 ASSESSMENT — PAIN - FUNCTIONAL ASSESSMENT
PAIN_FUNCTIONAL_ASSESSMENT: 0-10
PAIN_FUNCTIONAL_ASSESSMENT: ACTIVITIES ARE NOT PREVENTED

## 2023-03-23 ASSESSMENT — PAIN DESCRIPTION - ONSET
ONSET: GRADUAL
ONSET: SUDDEN

## 2023-03-23 ASSESSMENT — PAIN DESCRIPTION - DIRECTION: RADIATING_TOWARDS: ABDOMEN

## 2023-03-23 ASSESSMENT — PAIN DESCRIPTION - DESCRIPTORS
DESCRIPTORS: ACHING
DESCRIPTORS: ACHING

## 2023-03-23 NOTE — PROGRESS NOTES
1300 patient recived via stetcher.  O2 2liters  Sites cdi  Toradol given for pain  Patient sleeping  Oob to bathroom voided  Report given to Francia Vazquez with sbar  No nausea reported

## 2023-03-23 NOTE — ANESTHESIA POSTPROCEDURE EVALUATION
Department of Anesthesiology  Postprocedure Note    Patient: Dori Gonzalez  MRN: [id number]  YOB: 1960  Date of evaluation: 3/23/2023      Procedure Summary     Date: 03/23/23 Room / Location: [facility] MAIN 03 / [facility] MAIN OR    Anesthesia Start: 0840 Anesthesia Stop: 0459    Procedure: LAPAROSCOPIC CLEMENT-EN-Y GASTRIC BYPASS,  LIVER WEDGE BIOPSY, and hiatal hernia repair (Abdomen) Diagnosis:       Morbid obesity (HCC)      Gastroesophageal reflux disease, unspecified whether esophagitis present      Essential hypertension      Abnormal levels of other serum enzymes      Pre-op examination      Obstructive sleep apnea      (Morbid obesity ([facility] [address] [E66.01])      (Gastroesophageal reflux disease, unspecified whether esophagitis present [K21.9])      (Essential hypertension [I10])      (Abnormal levels of other serum enzymes [R74.8])      (Pre-op examination [Z01.818])    Surgeons: Kristine Perry MD Responsible Provider: Rosita Garrison MD    Anesthesia Type: General ASA Status: 3          Anesthesia Type: General    Zeny Phase I: Zeny Score: 8    Zeny Phase II:        Anesthesia Post Evaluation    Patient location during evaluation: PACU  Patient participation: complete - patient participated  Level of consciousness: sleepy but conscious  Pain score: 0  Airway patency: patent  Nausea & Vomiting: no nausea and no vomiting  Complications: no  Cardiovascular status: blood pressure returned to baseline  Respiratory status: acceptable  Hydration status: euvolemic

## 2023-03-23 NOTE — ANESTHESIA PRE PROCEDURE
Department of Anesthesiology  Preprocedure Note       Name:  Pat Luna   Age:  58 y.o.  :  1960                                          MRN:  [id number]         Date:  3/23/2023      Surgeon: April Cotton):  Tyler Kang MD    Procedure: Procedure(s):  LAPAROSCOPIC CLEMENT-EN-Y GASTRIC BYPASS, HIATAL HERNIA REPAIR, POSSIBLE LIVER WEDGE BIOPSY    Medications prior to admission:   Prior to Admission medications    Medication Sig Start Date End Date Taking? Authorizing Provider   albuterol sulfate HFA (PROVENTIL;VENTOLIN;PROAIR) 108 (90 Base) MCG/ACT inhaler Inhale 1-2 puffs into the lungs every 4-6 hours as needed 23   Historical Provider, MD   traMADol (ULTRAM) 50 MG tablet Take 50 mg by mouth every 4-6 hours as needed.   Patient not taking: Reported on 3/23/2023 2/10/23   Historical Provider, MD   fluticasone (FLONASE) 50 MCG/ACT nasal spray JULIET ROCK (2) [facility] 1046  Patient not taking: Reported on 3/23/2023 3/13/23   Batool Pedersen MD   benzonatate (TESSALON) 100 MG capsule ANDIE CRISTINA CAPSULA POR VIA ORAL MARIANN VECES AL CAITLIN FABIOLA SEA NECESARIO PARA LA TOS  Patient not taking: Reported on 3/23/2023 2/20/23   Batool Pedersen MD   acetaminophen (TYLENOL) 500 MG tablet Take 1,000 mg by mouth every 6 hours as needed 22   Ar Automatic Reconciliation   amLODIPine (NORVASC) 10 MG tablet Take 10 mg by mouth daily 22   Ar Automatic Reconciliation   famotidine (PEPCID) 40 MG tablet TOME CRISTINA TABLETA TODOS LOS CLAROS 10/26/22   Ar Automatic Reconciliation   hydroCHLOROthiazide (HYDRODIURIL) 50 MG tablet ANDIE CRISTINA(1) TABLETA -[address]  Patient not taking: Reported on 3/23/2023 7/18/22   Ar Automatic Reconciliation   ipratropium-albuterol (DUONEB) 0.5-2.5 (3) MG/3ML SOLN nebulizer solution INHALE 1 AMPOLLETA A TRAVES DEL NEBULIZADOR CADA 4 HORAS FABIOLA SEA NECESARIO PARA SILBIDOS 5/3/22   Ar Automatic Reconciliation       Current

## 2023-03-23 NOTE — PROGRESS NOTES
conducted an initial consultation and Spiritual Assessment for [facility], who is a 58 y.o.,female. Patient's Primary Language is: Slovenian. According to the patient's EMR Oriental orthodox Affiliation is: Anabaptism. The reason the Patient came to the hospital is:   Patient Active Problem List    Diagnosis Date Noted    Morbid obesity ([facility] 03/23/2023    Elevated liver enzymes 06/24/2022    Gastritis 06/24/2022    Gastroesophageal reflux disease 06/24/2022    Hypertension 06/24/2022    Reflux esophagitis 06/24/2022    Viral hepatitis C 06/24/2022    Rheumatoid arthritis ([facility] 06/24/2022    Left Achilles tendinitis 05/30/2019    Chronic hepatitis C without hepatic coma ([facility] 12/04/2018    Chronic pain syndrome 12/04/2018    Obesity, morbid ([facility] 12/03/2018    Bilateral primary osteoarthritis of knee 08/16/2018    Rheumatoid arthritis involving multiple sites with positive rheumatoid factor ([facility] 06/20/2018        The  provided the following Interventions:  Initiated a relationship of care and support. Explored issues of norberto, belief, spirituality and Caodaism/ritual needs while hospitalized. Provided information about Spiritual Care Services. Offered prayer and assurance of continued prayers on patient's behalf. Chart reviewed. The following outcomes where achieved:   confirmed Patient's Oriental orthodox Affiliation. Patient expressed gratitude for 's visit. Assessment:  Patient does not have any Caodaism/cultural needs that will affect patient's preferences in health care. There are no spiritual or Caodaism issues which require intervention at this time. Plan:  Chaplains will continue to follow and will provide pastoral care on an as needed/requested basis.  recommends bedside caregivers page  on duty if patient shows signs of acute spiritual or emotional distress.     [address] [facility]   [phone]

## 2023-03-23 NOTE — BRIEF OP NOTE
Brief Postoperative Note      Patient: Jay Lei  YOB: 1960  MRN: [id number]    Date of Procedure: 3/23/2023    Pre-Op Diagnosis: Morbid obesity ([facility] [E66.01]  Gastroesophageal reflux disease, unspecified whether esophagitis present [K21.9]  Essential hypertension [I10]  Abnormal levels of other serum enzymes [R74.8]  Paraesophageal hernia    Post-Op Diagnosis:   Morbid obesity ([facility] [E66.01]  Gastroesophageal reflux disease, unspecified whether esophagitis present [K21.9]  Essential hypertension [I10]  Abnormal levels of other serum enzymes [R74.8]  Paraesophageal hernia  ZURITA, possible cirrhosis  Hepatomegaly       Procedure(s):  LAPAROSCOPIC CLEMENT-EN-Y GASTRIC BYPASS,  LIVER WEDGE BIOPSY, and hiatal hernia repair    Surgeon(s):  Alba Hill MD    Assistant:  Surgical Assistant: Kevan Soria    Anesthesia: General    Estimated Blood Loss (mL): Minimal    Complications: None    Specimens:   ID Type Source Tests Collected by Time Destination   A : liver biopsy Tissue Liver SURGICAL PATHOLOGY Ari Melendez RN 3/23/2023 1106        Implants:  * No implants in log *      Drains: * No LDAs found *    Findings:   Preop EGD diagnosed a paraesophageal hernia which was confirmed intraoperatively and repaired uneventfully  Macronodular/fibrotic appearance of liver, suggestive of ZURITA with possible cirrhosis  Uneventful LRYGB 120/70, sewn GJ    Electronically signed by Alba Hill MD on 3/23/2023 at 11:14 AM

## 2023-03-23 NOTE — H&P
Patient presents for a preop appointment for bariatric surgery having completed the insurance-mandated and clinically-relevant clearances/counseling. REVIEW:  Labs: 7/11/2022  H. Pylori: Negative  Vit D 27.4: Prescribed cholecalciferol 50,000 unit cap, take 1 cap q 7 days for 8 weeks.    TSH: 5.32- needs to review with PCP, Dr. Noelle Mcwilliams     EKG 7/11/2022: Normal sinus rhythm, Normal ECG     Finished dietary education, cleared by RD  Psychiatric evaluation: Cleared by Dr. Kitty Nunez on 8/8/2022  Cardiac risk stratification in chart, echo results unconcerning     Support Group: September 2022     Chronic NSAID use due to RA     EGD / UGI reviewed / issues:   Findings:   LA grade A esophagitis  Hiatal hernia, Hill grade 4 hiatal anatomy  Gastritis  Normal duodenum  No polyps  Difficulty with sedation, aborted prior to biopsy of gastritis     Previous relevant surgeries: lap cholecystectomy          Patient Active Problem List     Diagnosis Date Noted    Elevated liver enzymes 06/24/2022    Gastritis 06/24/2022    Gastroesophageal reflux disease 06/24/2022    Hypertension 06/24/2022    Reflux esophagitis 06/24/2022    Viral hepatitis C 06/24/2022    Rheumatoid arthritis ([address] 06/24/2022    Left Achilles tendinitis 05/30/2019    Chronic hepatitis C without hepatic coma ([address] 12/04/2018    Chronic pain syndrome 12/04/2018    Obesity, morbid ([address] 12/03/2018    Bilateral primary osteoarthritis of knee 08/16/2018    Rheumatoid arthritis involving multiple sites with positive rheumatoid factor ([address] 06/20/2018           Past Medical History:   Diagnosis Date    Arthritis      Asthma      Gastritis      Hepatitis C       starting treatment 6/1/2019    Hypertension       Diagnosed in [address] a few months ago    Psychiatric disorder       SOME DEPRESSION            Past Surgical History:   Procedure Laterality Date    COLONOSCOPY N/A 5/29/2019     COLONOSCOPY performed by Richard Hoff MD at [facility] ENDOSCOPY    HX

## 2023-03-23 NOTE — OP NOTE
Operative Report    Patient: Fabián Ramirez MRN: [id number]  SSN: [id number]    YOB: 1960  Age: 58 y.o. Sex: female       Date of Surgery: [unfilled]     Preoperative Diagnosis: Morbid obesity ([facility] [E66.01]  Gastroesophageal reflux disease, unspecified whether esophagitis present [K21.9]  Essential hypertension [I10]  Abnormal levels of other serum enzymes [R74.8]  Paraesophageal hernia    Postoperative Diagnosis:   Morbid obesity ([facility] [E66.01]  Gastroesophageal reflux disease, unspecified whether esophagitis present [K21.9]  Essential hypertension [I10]  Abnormal levels of other serum enzymes [R74.8]  Paraesophageal hernia  ZURITA, possible cirrhosis  Hepatomegaly    Surgeon(s) and Role:     * Gemini Sabillon MD - Primary    Anesthesia: General     Procedure:   Laparoscopic paraesophageal hernia repair  Laparoscopic reji-en-y gastric bypass  Laparoscopic wedge biopsy of the left lobe of the liver    Findings:   Preop EGD diagnosed a paraesophageal hernia which was confirmed intraoperatively and repaired uneventfully  Macronodular/fibrotic appearance of liver, suggestive of ZURITA with possible cirrhosis, biopsied  Uneventful LRYGB 120/70, sewn GJ    Procedure in Detail: Fabián Ramirez was identified in the pre-operative holding area. Informed consent was obtained after a complete discussion of risks, benefits and alternatives to surgery were had with the patient. The patient was brought back to the operating room and placed under general endotracheal anesthesia in the supine position on the operating room table. SCDs were applied. Preop VTE prophylaxis as well as all other multimodal analgesia, GI prophylaxis, etc were verified. The patient was then prepped and draped in the usual sterile fashion after being appropriately padded and secured to the table.  A timeout was performed verifying patient identity, planned procedure, medications, and all other pertinent aspects of the case.       An incision was made at Perez's point and a Verress needle was introduced into the peritoneal cavity. Saline drop test showed no blood on aspiration and free flow of saline into the peritoneal cavity. The peritoneal cavity was insufflated to 15mmHg without incident. A 5mm optical trocar was introduced, visualizing the layers of the abdominal wall on entry. No evidence of visceral injury was noted from trocar or verress needle placement. A 12mm trocar was placed above and to the left of the umbilicus, another above and to the right of the umbilicus, and a 5mm trocar placed in the right upper quadrant, all under vision of the laparoscope. A KARLEE block was performed under laparoscopic visualization bilaterally. We began by removing the omentum from the pelvis and placing up over the colon. We lifted the colonic mesentery and clearly identified the Ligament of Treitz. We then ran counted down 75-100cm on the jejunum. We used a 60 mm white stapler load to divide the jejunum to create the BP limb. We then use the ultrasonic estrella to divide the small bowel mesentery. Next, as the assistant held the BP limb for orientation, I ran 125 cm of jejunum to create the Disha limb. We created enterotomies with the ultrasonic estrella in the BP limb and JJ portion of the Disha limb. After confirming orientation and anatomy, we used a 60 mm white load to create a stapled JJ anastomosis. I then used absorbable suture in running fashion to close the common enterotomy followed by a 2-0 permanent suture to close the JJ mesenteric defect. We then divided the greater omentum with the ultrasonic estrella to decrease future tension on the Disha limb. We then made an incision in the epigastrium and inserted the liver retractor. The left lobe of liver was elevated. There were considerable adhesions between the liver and the lesser curve mesentery of the stomach which were lysed.      There was clear hepatomegaly and

## 2023-03-24 VITALS
TEMPERATURE: 97.5 F | BODY MASS INDEX: 50.32 KG/M2 | HEART RATE: 70 BPM | SYSTOLIC BLOOD PRESSURE: 128 MMHG | OXYGEN SATURATION: 95 % | RESPIRATION RATE: 18 BRPM | DIASTOLIC BLOOD PRESSURE: 68 MMHG | HEIGHT: 63 IN | WEIGHT: 284 LBS

## 2023-03-24 LAB
BASOPHILS # BLD: 0 K/UL (ref 0–0.1)
BASOPHILS NFR BLD: 0 % (ref 0–2)
DIFFERENTIAL METHOD BLD: ABNORMAL
EOSINOPHIL # BLD: 0 K/UL (ref 0–0.4)
EOSINOPHIL NFR BLD: 0 % (ref 0–5)
ERYTHROCYTE [DISTWIDTH] IN BLOOD BY AUTOMATED COUNT: 13.1 % (ref 11.6–14.5)
HCT VFR BLD AUTO: 40.7 % (ref 35–45)
HGB BLD-MCNC: 13.2 G/DL (ref 12–16)
IMM GRANULOCYTES # BLD AUTO: 0 K/UL (ref 0–0.04)
IMM GRANULOCYTES NFR BLD AUTO: 0 % (ref 0–0.5)
LYMPHOCYTES # BLD: 0.6 K/UL (ref 0.9–3.6)
LYMPHOCYTES NFR BLD: 6 % (ref 21–52)
MCH RBC QN AUTO: 29.8 PG (ref 24–34)
MCHC RBC AUTO-ENTMCNC: 32.4 G/DL (ref 31–37)
MCV RBC AUTO: 91.9 FL (ref 78–100)
MONOCYTES # BLD: 0.4 K/UL (ref 0.05–1.2)
MONOCYTES NFR BLD: 4 % (ref 3–10)
NEUTS SEG # BLD: 9.4 K/UL (ref 1.8–8)
NEUTS SEG NFR BLD: 90 % (ref 40–73)
NRBC # BLD: 0 K/UL (ref 0–0.01)
NRBC BLD-RTO: 0 PER 100 WBC
PLATELET # BLD AUTO: 203 K/UL (ref 135–420)
PMV BLD AUTO: 13.3 FL (ref 9.2–11.8)
RBC # BLD AUTO: 4.43 M/UL (ref 4.2–5.3)
WBC # BLD AUTO: 10.4 K/UL (ref 4.6–13.2)

## 2023-03-24 PROCEDURE — C9113 INJ PANTOPRAZOLE SODIUM, VIA: HCPCS | Performed by: SURGERY

## 2023-03-24 PROCEDURE — 6370000000 HC RX 637 (ALT 250 FOR IP): Performed by: SURGERY

## 2023-03-24 PROCEDURE — 36415 COLL VENOUS BLD VENIPUNCTURE: CPT

## 2023-03-24 PROCEDURE — 6360000002 HC RX W HCPCS: Performed by: SURGERY

## 2023-03-24 PROCEDURE — 85025 COMPLETE CBC W/AUTO DIFF WBC: CPT

## 2023-03-24 PROCEDURE — 2580000003 HC RX 258: Performed by: SURGERY

## 2023-03-24 PROCEDURE — A4216 STERILE WATER/SALINE, 10 ML: HCPCS | Performed by: SURGERY

## 2023-03-24 RX ORDER — ONDANSETRON 4 MG/1
4 TABLET, ORALLY DISINTEGRATING ORAL EVERY 8 HOURS PRN
Qty: 15 TABLET | Refills: 0 | Status: SHIPPED | OUTPATIENT
Start: 2023-03-24

## 2023-03-24 RX ORDER — OXYCODONE HYDROCHLORIDE 5 MG/1
5 TABLET ORAL EVERY 6 HOURS PRN
Qty: 10 TABLET | Refills: 0 | Status: SHIPPED | OUTPATIENT
Start: 2023-03-24 | End: 2023-03-27

## 2023-03-24 RX ORDER — AMLODIPINE BESYLATE 5 MG/1
5 TABLET ORAL DAILY
Qty: 30 TABLET | Refills: 0 | Status: SHIPPED | OUTPATIENT
Start: 2023-03-24 | End: 2023-03-30 | Stop reason: SDUPTHER

## 2023-03-24 RX ADMIN — ONDANSETRON 4 MG: 2 INJECTION INTRAMUSCULAR; INTRAVENOUS at 00:25

## 2023-03-24 RX ADMIN — ONDANSETRON 4 MG: 2 INJECTION INTRAMUSCULAR; INTRAVENOUS at 04:37

## 2023-03-24 RX ADMIN — ACETAMINOPHEN 650 MG: 325 TABLET ORAL at 09:29

## 2023-03-24 RX ADMIN — ENOXAPARIN SODIUM 40 MG: 100 INJECTION SUBCUTANEOUS at 09:29

## 2023-03-24 RX ADMIN — OXYCODONE HYDROCHLORIDE 5 MG: 5 TABLET ORAL at 00:25

## 2023-03-24 RX ADMIN — HYOSCYAMINE SULFATE 125 MCG: 0.12 TABLET ORAL; SUBLINGUAL at 00:25

## 2023-03-24 RX ADMIN — OXYCODONE HYDROCHLORIDE 5 MG: 5 TABLET ORAL at 04:37

## 2023-03-24 RX ADMIN — SODIUM CHLORIDE, PRESERVATIVE FREE 10 ML: 5 INJECTION INTRAVENOUS at 09:29

## 2023-03-24 RX ADMIN — SODIUM CHLORIDE 40 MG: 9 INJECTION INTRAMUSCULAR; INTRAVENOUS; SUBCUTANEOUS at 09:29

## 2023-03-24 ASSESSMENT — PAIN SCALES - GENERAL
PAINLEVEL_OUTOF10: 3
PAINLEVEL_OUTOF10: 5
PAINLEVEL_OUTOF10: 0
PAINLEVEL_OUTOF10: 5

## 2023-03-24 ASSESSMENT — PAIN DESCRIPTION - LOCATION: LOCATION: ABDOMEN

## 2023-03-24 NOTE — PROGRESS NOTES
Flintstones Complete: two per day, taken separately. Sublingual Vitamin B-12: 1,000 micrograms daily. Iron for menstruating women or patients with a history of low iron: 65 milligrams daily. We recommend going to www.bariatricadvantage. com and purchasing iron from there. The lemon-lime has 60 milligrams. This iron is better absorbed than over-the-counter iron. Clear Liquid Log  Getting your fluid in is top priority during this week. Fluids (MINIUM of 64 ounces per day):  ? 8 oz. ? 8 oz. ? 8 oz. ? 8 oz. ? 8 oz. ? 8 oz. ? 8 oz. ? 8 oz. ? 8 oz. ? 8 oz. ? 8 oz. ? 8 oz. Flintstones Complete Chewable: ? a.m. ? p.m. Calcium Citrate (1,500 milligrams/day):  Pill form  ? Two crushed pills (morning) ? Two crushed pills (afternoon) ? Two crushed pills (evening)  OR Upcal D (powder)  ? One pack/scoop ? One pack/scoop ? One pack/scoop  OR Celebrate Chewable Vitamins (500 mg each) or Bariatric Advantage Chewables (500 mg)  ? One chewable (morning) ? One chewable (afternoon) ? One chewable (evening)  OR Liquid Calcium Citrate  ? 1 tbsp. Calcium Citrate ? 1 tbsp. Calcium Citrate ? 1 tbsp. Calcium Citrate  Vitamin D3: ? 5,000 IU daily. Vitamin B-12: ? 1,000 micrograms per day. Iron (menstruating women or patients with a history of low iron):  ? 60 milligrams per day from Bariatric Advantage. Protein drinks (protein drinks should be under 3 grams of sugar and 3 grams of fat). Protein shake (60 grams per day): ? a.m. ? p.m. Exercise: ? 30 to 40 minutes per day. Bariatric Soft and Moist Diet Shopping List   alcium Citrate (1,500 milligrams/day):  Pill form  ? Two crushed pills (morning) ? Two crushed pills (afternoon) ? Two crushed pills (evening)  OR Upcal D (powder)  ? One pack/scoop ? One pack/scoop ? One pack/scoop  OR Celebrate Chewable Vitamins (500 mg each) or Bariatric Advantage Chewables (500 mg)  ? One chewable (morning) ? One chewable (afternoon) ? One chewable (evening)  OR Liquid Calcium Citrate  ?  1 tbsp. Calcium Citrate ? 1 tbsp. Calcium Citrate ? 1 tbsp. Calcium Citrate  Vitamin D3: ? 5,000 IU daily  Vitamin B-12: ? 1,000 micrograms per day  Iron (menstruating women or  patients with a history of low iron):  ? 60 milligrams per day  from Bariatric Advantage  Protein drinks (protein drinks should be under  3 grams of sugar and 3 grams of fat). Protein shake (30 to 40 grams per day):  ? a.m. ? p.m. Exercise: ? 30 to 40 minutes per  Educated on Diet Progression    Austin Burr Gastric Bypass and Sleeve Dietary Progression    Patient Name:   Date of Surgery: Ice Chips start once admitted on floor. Begin Bariatric Clear Liquid Diet on:     Clear Liquid Diet: 64 oz. of fluid per day  Low calorie, low sugar, non-carbonated beverages  Water, Crystal Light, Propel Water, Sugar Free Jell-O, Sugar Free Popsicles, Bouillon  Start protein supplement during this stage. (60-70 grams per day)  Getting your fluid in and staying hydrated is your #1 priority! The clear liquid diet will last for 7 days. Begin Bariatric Soft and Moist on: This stage of the diet will last for 5 weeks, unless otherwise instructed by your surgeon. Begin:  1 week post-op   End:  6 weeks post-op (or when you follow up with the Registered Dietitian)    Soft, moist, high protein foods: 3 meals per day plus protein supplements. Portions should emphasize on soft protein. Portions will be a MAXIMUM of:   1 ounce of solid food   2-3 ounces of cottage cheese and yogurt. Protein supplements should be between meals and provide 30-40 grams per day during soft protein diet. Continue to get 64 ounces of fluid in per day. Protein foods that are ok on the Soft and Moist Diet include:  Slow transition:  1st week on soft protein should focus on:  Yogurt, cottage cheese, eggs  2nd -4th  week on soft protein diet should focus on: yogurt, cottage cheese, eggs, canned tuna, canned chicken, tilapia, fish (needs to be soft enough to be cut up

## 2023-03-24 NOTE — DISCHARGE INSTRUCTIONS
DISCHARGE SUMMARY from Nurse    PATIENT INSTRUCTIONS:    After general anesthesia or intravenous sedation, for 24 hours or while taking prescription Narcotics:  Limit your activities  Do not drive and operate hazardous machinery  Do not make important personal or business decisions  Do  not drink alcoholic beverages  If you have not urinated within 8 hours after discharge, please contact your surgeon on call. Report the following to your surgeon:  Excessive pain, swelling, redness or odor of or around the surgical area  Temperature over 100.5  Nausea and vomiting lasting longer than 4 hours or if unable to take medications  Any signs of decreased circulation or nerve impairment to extremity: change in color, persistent  numbness, tingling, coldness or increase pain  Any questions    What to do at Home:  Recommended activity: activity as tolerated,     If you experience any of the following symptoms Refer to [facility], [facility], please follow up with Dr. Richelle Workman. *  Please give a list of your current medications to your Primary Care Provider. *  Please update this list whenever your medications are discontinued, doses are      changed, or new medications (including over-the-counter products) are added. *  Please carry medication information at all times in case of emergency situations. These are general instructions for a healthy lifestyle:    No smoking/ No tobacco products/ Avoid exposure to second hand smoke  Surgeon General's Warning:  Quitting smoking now greatly reduces serious risk to your health.     Obesity, smoking, and sedentary lifestyle greatly increases your risk for illness    A healthy diet, regular physical exercise & weight monitoring are important for maintaining a healthy lifestyle    You may be retaining fluid if you have a history of heart failure or if you experience any of the following symptoms:  Weight gain of 3 pounds or more overnight or 5 pounds in a week, increased swelling in our

## 2023-03-24 NOTE — PLAN OF CARE
Problem: Pain  Goal: Verbalizes/displays adequate comfort level or baseline comfort level  3/24/2023 1231 by Mikayla Pantoja RN  Outcome: Completed  3/24/2023 1231 by Mikayla Pantoja RN  Outcome: Adequate for Discharge  3/24/2023 1230 by Mikayla Pantoja RN  Outcome: Progressing     Problem: Safety - Adult  Goal: Free from fall injury  3/24/2023 1231 by Mikayla Pantoja RN  Outcome: Completed  3/24/2023 1231 by Mikayla Pantoja RN  Outcome: Adequate for Discharge  3/24/2023 1230 by Mikayla Pantoja RN  Outcome: Progressing     Problem: Discharge Planning  Goal: Discharge to home or other facility with appropriate resources  3/24/2023 1231 by Mikayla Pantoja RN  Outcome: Completed  3/24/2023 1231 by Mikayla Pantoja RN  Outcome: Adequate for Discharge  3/24/2023 1230 by Mikayla Pantoja RN  Outcome: Progressing     Problem: ABCDS Injury Assessment  Goal: Absence of physical injury  3/24/2023 1231 by Mikayla Pantoja RN  Outcome: Completed  3/24/2023 1231 by Mikayla Pantoja RN  Outcome: Adequate for Discharge  3/24/2023 1230 by Mikayla Patnoja RN  Outcome: Progressing

## 2023-03-24 NOTE — DISCHARGE SUMMARY
Amount: 20 mg            CHANGE how you take these medications      amLODIPine 5 MG tablet  Commonly known as: NORVASC  Take 1 tablet by mouth daily  What changed:   medication strength  how much to take            CONTINUE taking these medications      acetaminophen 500 MG tablet  Commonly known as: TYLENOL     albuterol sulfate  (90 Base) MCG/ACT inhaler  Commonly known as: PROVENTIL;VENTOLIN;PROAIR     ipratropium-albuterol 0.5-2.5 (3) MG/3ML Soln nebulizer solution  Commonly known as: DUONEB            STOP taking these medications      benzonatate 100 MG capsule  Commonly known as: TESSALON     famotidine 40 MG tablet  Commonly known as: PEPCID     fluticasone 50 MCG/ACT nasal spray  Commonly known as: FLONASE     hydroCHLOROthiazide 50 MG tablet  Commonly known as: HYDRODIURIL     traMADol 50 MG tablet  Commonly known as: ULTRAM               Where to Get Your Medications        These medications were sent to [address], [address] [facility] [phone]  [address] [place] [address]      Phone: [phone]   amLODIPine 5 MG tablet  ondansetron 4 MG disintegrating tablet  oxyCODONE 5 MG immediate release tablet         Must Take:   Bariatric Chewable vitamins, 2 orally daily for life  Calcium Citrate 1500 mg orally daily for life  Vitamin B12 1000 micrograms sublingual daily for life  Vitamin D3 5,000 units orally daily for life   Vitamin B1 100 mg orally daily for life    Discharge disposition: home    Discharge condition: stable      Local wound care with daily showers, keep wounds clean and dry     Activity: as desired, no lifting, pushing, or pulling greater than 15lbs or situps for 30 days     Special Instructions:            - No driving until activity is not influenced by incisional pain and off narcotics            - Norvasc dose changed to 5 mg tab.  Monitor BP daily at home            - No bath or hot tub until wounds are healed - Check pulse and temperature twice daily for 10 days            - Notify [facility] for a Temp >100.5 or Pulse>115  - Schedule to see PCP within 30 days of surgery to discuss any medication or health status changes.      Follow-up with your surgeon in 2 weeks, call office for appointment [phone] ([phone] [address] [facility])

## 2023-03-24 NOTE — PROGRESS NOTES
Surgery Progress Note    3/24/2023    Admit Date: 3/23/2023    Subjective:     Patient has no complaints and is controlled with current plan. Patient has been ambulating in halls. Bowel Movements: Tolerating initial drinking trial well so far     Objective:     Blood pressure 128/68, pulse 70, temperature 97.5 °F (36.4 °C), temperature source Oral, resp. rate 18, height 5' 3\" (1.6 m), weight 284 lb (128.8 kg), SpO2 95 %. No intake/output data recorded. 03/22 1901 - 03/24 0700  In: 2000 [P.O.:150;  I.V.:1850]  Out: 1810 [Urine:1800]    EXAM: GENERAL: alert, pleasant, no distress   HEART: regular rate and rhythm   LUNGS: clear to auscultation   ABDOMEN:  Soft, obese, appropriate incisional tenderness, +BS, non-distended, surgical incisions clean, dry, no erythema or drainage   EXTREMITIES: warm, well perfused    Data Review    Recent Results (from the past 24 hour(s))   CBC with Auto Differential    Collection Time: 03/24/23  4:15 AM   Result Value Ref Range    WBC 10.4 4.6 - 13.2 K/uL    RBC 4.43 4.20 - 5.30 M/uL    Hemoglobin 13.2 12.0 - 16.0 g/dL    Hematocrit 40.7 35.0 - 45.0 %    MCV 91.9 78.0 - 100.0 FL    MCH 29.8 24.0 - 34.0 PG    MCHC 32.4 31.0 - 37.0 g/dL    RDW 13.1 11.6 - 14.5 %    Platelets 020 197 - 101 K/uL    MPV 13.3 (H) 9.2 - 11.8 FL    Nucleated RBCs 0.0 0  WBC    nRBC 0.00 0.00 - 0.01 K/uL    Seg Neutrophils 90 (H) 40 - 73 %    Lymphocytes 6 (L) 21 - 52 %    Monocytes 4 3 - 10 %    Eosinophils % 0 0 - 5 %    Basophils 0 0 - 2 %    Immature Granulocytes 0 0.0 - 0.5 %    Segs Absolute 9.4 (H) 1.8 - 8.0 K/UL    Absolute Lymph # 0.6 (L) 0.9 - 3.6 K/UL    Absolute Mono # 0.4 0.05 - 1.2 K/UL    Absolute Eos # 0.0 0.0 - 0.4 K/UL    Basophils Absolute 0.0 0.0 - 0.1 K/UL    Absolute Immature Granulocyte 0.0 0.00 - 0.04 K/UL    Differential Type AUTOMATED         Assessment:   Abel Taylor is a 58 y.o. female,  day 1 status post LRYGB with PEHR  Condition: Good    Plan:   -Ambulate

## 2023-03-24 NOTE — PROGRESS NOTES
Discharge teaching compleded at beside with patient. Opportunity provided for clarifying questions. All answered to patient satisfaction. IV removed. ID removed and shredded.  Patient discharged via wheelchair

## 2023-03-27 ENCOUNTER — TELEPHONE (OUTPATIENT)
Facility: CLINIC | Age: 63
End: 2023-03-27

## 2023-03-27 ENCOUNTER — FOLLOWUP TELEPHONE ENCOUNTER (OUTPATIENT)
Facility: HOSPITAL | Age: 63
End: 2023-03-27

## 2023-03-27 NOTE — TELEPHONE ENCOUNTER
Care Transitions Initial Follow Up Call    Outreach made within 2 business days of discharge: Yes    Patient: Satinder Schwab Patient : 1960   MRN: [id number]  Reason for Admission: There are no discharge diagnoses documented for the most recent discharge. Discharge Date: 3/24/23       Spoke with: Patient    [facility]     TCM Interactive Patient Contact:  Was patient able to fill all prescriptions: Yes  Was patient instructed to bring all medications to the follow-up visit: Yes  Is patient taking all medications as directed in the discharge summary?  Yes  Does patient understand their discharge instructions: Yes  Does patient have questions or concerns that need addressed prior to 7-14 day follow up office visit: no    Scheduled appointment with PCP within 7-14 days    Follow Up  Future Appointments   Date Time Provider Katy Epperson   2023  2:45 PM Jamarcus Younger MD [facility] BS AMB   5/3/2023 11:15 AM [address] [facility]   2023  9:40 AM Debbie Arciniega MD DMA BS AMB   10/24/2023  3:30 PM Chelsea Guevara MD [address] [facility]

## 2023-03-28 RX ORDER — FLUTICASONE PROPIONATE 50 MCG
SPRAY, SUSPENSION (ML) NASAL
Qty: 16 G | Refills: 10 | Status: SHIPPED | OUTPATIENT
Start: 2023-03-28

## 2023-03-28 NOTE — TELEPHONE ENCOUNTER
This patient contacted the office for the following prescriptions to be refilled:    Medication requested :   Requested Prescriptions     Pending Prescriptions Disp Refills    fluticasone (FLONASE) 50 MCG/ACT nasal spray [Pharmacy Med Name: FLUTICASONE 50MCG NASAL 16 50 NOREEN] 16 g 10     Sig: Daphine Romberg (2) [id number]      PCP: MD CASH Cherry DMA: 3/30/2023  FUTURE APPT:   Future Appointments   Date Time Provider Katy Zeenat   3/30/2023 10:20 AM Emma Abrams MD DMA BS AMB   4/7/2023  2:45 PM Allyson Deluca MD [facility] BS AMB   5/3/2023 11:15 AM [facility] [address] DIETITIAN [address] [facility]   5/9/2023  9:40 AM Travis Baum MD DMA BS AMB   10/24/2023  3:30 PM Jaime Haro MD [facility] BS AMB         Thank you.

## 2023-03-30 ENCOUNTER — OFFICE VISIT (OUTPATIENT)
Facility: CLINIC | Age: 63
End: 2023-03-30

## 2023-03-30 VITALS
RESPIRATION RATE: 16 BRPM | HEART RATE: 83 BPM | BODY MASS INDEX: 47.48 KG/M2 | DIASTOLIC BLOOD PRESSURE: 78 MMHG | OXYGEN SATURATION: 98 % | SYSTOLIC BLOOD PRESSURE: 123 MMHG | WEIGHT: 268 LBS | HEIGHT: 63 IN | TEMPERATURE: 98.1 F

## 2023-03-30 DIAGNOSIS — Z09 HOSPITAL DISCHARGE FOLLOW-UP: ICD-10-CM

## 2023-03-30 DIAGNOSIS — I10 ESSENTIAL (PRIMARY) HYPERTENSION: Primary | ICD-10-CM

## 2023-03-30 DIAGNOSIS — E66.01 OBESITY, MORBID (HCC): ICD-10-CM

## 2023-03-30 DIAGNOSIS — I10 ESSENTIAL (PRIMARY) HYPERTENSION: ICD-10-CM

## 2023-03-30 DIAGNOSIS — Z98.890 POST-OPERATIVE STATE: ICD-10-CM

## 2023-03-30 LAB
HCT VFR BLD CALC: 51.2 % (ref 35.1–48)
HEMOGLOBIN: 16.2 G/DL (ref 11.7–16)
MCH RBC QN AUTO: 30 PG (ref 26–34)
MCHC RBC AUTO-ENTMCNC: 32 G/DL (ref 31–36)
MCV RBC AUTO: 93 FL (ref 80–99)
PDW BLD-RTO: 13.1 % (ref 10–15.5)
PLATELET # BLD: 255 K/UL (ref 140–440)
PMV BLD AUTO: 14.7 FL (ref 9–13)
RBC: 5.5 M/UL (ref 3.8–5.2)
WBC: 11.4 K/UL (ref 4–11)

## 2023-03-30 RX ORDER — AMLODIPINE BESYLATE 10 MG/1
TABLET ORAL
Qty: 30 TABLET | Refills: 2 | OUTPATIENT
Start: 2023-03-30

## 2023-03-30 RX ORDER — AMLODIPINE BESYLATE 5 MG/1
5 TABLET ORAL DAILY
Qty: 30 TABLET | Refills: 0 | Status: SHIPPED | OUTPATIENT
Start: 2023-03-30

## 2023-03-30 ASSESSMENT — PATIENT HEALTH QUESTIONNAIRE - PHQ9
SUM OF ALL RESPONSES TO PHQ QUESTIONS 1-9: 0
SUM OF ALL RESPONSES TO PHQ9 QUESTIONS 1 & 2: 0
1. LITTLE INTEREST OR PLEASURE IN DOING THINGS: 0
SUM OF ALL RESPONSES TO PHQ QUESTIONS 1-9: 0
2. FEELING DOWN, DEPRESSED OR HOPELESS: 0

## 2023-03-30 ASSESSMENT — ENCOUNTER SYMPTOMS
BACK PAIN: 0
CONSTIPATION: 0
EYE PAIN: 0
COLOR CHANGE: 0
FACIAL SWELLING: 0
EYE DISCHARGE: 0
EYE REDNESS: 0
SHORTNESS OF BREATH: 0
DIARRHEA: 0
ABDOMINAL PAIN: 0
EYE ITCHING: 0
VOMITING: 0

## 2023-03-30 NOTE — PROGRESS NOTES
Red Mckeon is a 58 y.o. presents today for   Chief Complaint   Patient presents with    Follow-Up from Hospital     Bariatric surgery f/u     Abdominal Pain     Cramping since yesterday. Is someone accompanying this pt? No     Is the patient using any DME equipment during OV? No     Health Maintenance Due   Topic Date Due    Hepatitis A vaccine (1 of 2 - Risk 2-dose series) Never done    HIV screen  Never done    Hepatitis B vaccine (1 of 3 - Risk 3-dose series) Never done    DTaP/Tdap/Td vaccine (1 - Tdap) Never done    Cervical cancer screen  Never done    Shingles vaccine (1 of 2) Never done    COVID-19 Vaccine (3 - Booster for Pfizer series) 12/14/2021         Coordination of Care:   1. \"Have you been to the ER, urgent care clinic since your last visit? Hospitalized since your last visit? \" No    2. \"Have you seen or consulted any other health care providers outside of the [address] [place] [address] since your last visit? \" No     3. For patients aged 39-70: Has the patient had a colonoscopy / FIT/ Cologuard? Yes - no Care Gap present      If the patient is female:    4. For patients aged 41-77: Has the patient had a mammogram within the past 2 years? Yes - no Care Gap present      5. For patients aged 21-65: Has the patient had a pap smear?  Yes - no Care Gap present    Amelia Villar
oriented to person, place, and time. Gait: Gait normal.   Psychiatric:         Mood and Affect: Mood normal.         Behavior: Behavior normal.         Thought Content: Thought content normal.         Judgment: Judgment normal.         LABS     TESTS      Assessment/Plan:    1. Essential (primary) hypertension  - amLODIPine (NORVASC) 5 MG tablet; Take 1 tablet by mouth daily  Dispense: 30 tablet; Refill: 0    2. Obesity, morbid ([address]    3. Post-operative state  Discussed w/ pt that likely the dark stool could be normal. However, if this happens one more time she needs to make sure to inform her surgeon.   - CBC; Future    4. Hospital discharge follow-up  - [place] DISCHARGE MEDS RECONCILED W/ CURRENT OUTPATIENT MED LIST      Lab review: orders written for new lab studies as appropriate; see orders        I have discussed the diagnosis with the patient and the intended plan as seen in the above orders. The patient has received an after-visit summary and questions were answered concerning future plans. I have discussed medication side effects and warnings with the patient as well. I have reviewed the plan of care with the patient, accepted their input and they are in agreement with the treatment goals.      Carolee Rich MD

## 2023-03-31 NOTE — TELEPHONE ENCOUNTER
This patient contacted the office for the following prescriptions to be refilled:    Medication requested :   Requested Prescriptions     Pending Prescriptions Disp Refills    ipratropium-albuterol (DUONEB) 0.5-2.5 (3) MG/3ML SOLN nebulizer solution [Pharmacy Med Name: IPRAT-ALBUT 0.5MG 30CT 0.5-2.5 (3) Solution] 90 mL 10     Sig: INHALE 1 AMPOLLETA A TRAVES DEL NEBULIZADOR CADA [address]      PCP: Ambar Reeves MD    NOV DMA: 5/9/2023  FUTURE APPT:   Future Appointments   Date Time Provider [facility] Zeenat   4/7/2023  3:00 PM Milagros Thao MD [facility] BS AMB   5/3/2023 11:15 AM [address] DIETITIAN [address] [facility]   5/9/2023  9:40 AM Ambar Reeves MD [facility] BS AMB   5/15/2023  9:40 AM Ambar Reeves MD [facility] BS AMB   10/24/2023  3:30 PM Jaime Neville MD [facility] BS AMB         Thank you.

## 2023-04-04 RX ORDER — IPRATROPIUM BROMIDE AND ALBUTEROL SULFATE 2.5; .5 MG/3ML; MG/3ML
SOLUTION RESPIRATORY (INHALATION)
Qty: 90 ML | Refills: 10 | Status: SHIPPED | OUTPATIENT
Start: 2023-04-04

## 2023-04-07 ENCOUNTER — OFFICE VISIT (OUTPATIENT)
Age: 63
End: 2023-04-07

## 2023-04-07 VITALS
HEIGHT: 63 IN | TEMPERATURE: 97.9 F | OXYGEN SATURATION: 97 % | WEIGHT: 263 LBS | HEART RATE: 82 BPM | RESPIRATION RATE: 16 BRPM | BODY MASS INDEX: 46.6 KG/M2 | DIASTOLIC BLOOD PRESSURE: 76 MMHG | SYSTOLIC BLOOD PRESSURE: 134 MMHG

## 2023-04-07 DIAGNOSIS — K21.9 GASTROESOPHAGEAL REFLUX DISEASE, UNSPECIFIED WHETHER ESOPHAGITIS PRESENT: ICD-10-CM

## 2023-04-07 DIAGNOSIS — Z13.21 MALNUTRITION SCREEN: ICD-10-CM

## 2023-04-07 DIAGNOSIS — E66.01 MORBID OBESITY (HCC): Primary | ICD-10-CM

## 2023-04-07 DIAGNOSIS — K74.60 CIRRHOSIS OF LIVER WITHOUT ASCITES, UNSPECIFIED HEPATIC CIRRHOSIS TYPE (HCC): ICD-10-CM

## 2023-04-07 DIAGNOSIS — Z98.84 BARIATRIC SURGERY STATUS: ICD-10-CM

## 2023-04-07 DIAGNOSIS — R74.8 ELEVATED LIVER ENZYMES: ICD-10-CM

## 2023-04-07 DIAGNOSIS — I10 HYPERTENSION, UNSPECIFIED TYPE: ICD-10-CM

## 2023-04-07 DIAGNOSIS — E66.01 MORBID OBESITY (HCC): ICD-10-CM

## 2023-04-07 DIAGNOSIS — K91.2 POSTSURGICAL MALABSORPTION: ICD-10-CM

## 2023-04-07 PROCEDURE — 99024 POSTOP FOLLOW-UP VISIT: CPT | Performed by: SURGERY

## 2023-04-07 RX ORDER — OMEPRAZOLE 40 MG/1
40 CAPSULE, DELAYED RELEASE ORAL DAILY
Qty: 90 CAPSULE | Refills: 2 | Status: SHIPPED | OUTPATIENT
Start: 2023-04-07 | End: 2023-07-06

## 2023-04-07 RX ORDER — SIMETHICONE 80 MG
80 TABLET,CHEWABLE ORAL EVERY 6 HOURS PRN
COMMUNITY

## 2023-04-07 RX ORDER — UBIDECARENONE 75 MG
50 CAPSULE ORAL DAILY
COMMUNITY

## 2023-04-07 RX ORDER — CHOLECALCIFEROL (VITAMIN D3) 1250 MCG
CAPSULE ORAL
COMMUNITY

## 2023-04-07 RX ORDER — PEDI MV NO.227/FERROUS SULFATE 10 MG
TABLET,CHEWABLE ORAL
COMMUNITY

## 2023-04-07 NOTE — PROGRESS NOTES
Candy Hurd is a 58 y.o. female (: 1960) presenting to address:post op 3/23/23 LGBP. The patient states that she is constipated and nausea. The patient states that she was drinking 64 oz of fluids but is now only drinking 28    Chief Complaint   Patient presents with    Post-Op Check     LGBP 3/23/23       Medication list and allergies have been reviewed with Candy Hurd and updated as of today's date. I have gone over all Medical, Surgical and Social History with Candy Hurd and updated/added the information accordingly. 1. Have you been to the ER, urgent care clinic since your last visit? Hospitalized since your last visit? No    2. Have you seen or consulted any other health care providers outside of the [address] [place] [address] since your last visit? Include any pap smears or colon screening.  No

## 2023-04-07 NOTE — PROGRESS NOTES
Bariatric Surgery Post Op Progress Note    CC: follow up LRYGB with PEHR and liver biopsy  Subjective:     Ifeoma Mcmahon  is a 58 y.o. female who presents for follow-up after LRYGB with PEHR and liver biopsy. . She has lost a total of 22 pounds since surgery. Body mass index is 46.59 kg/m². .     Path shows early cirrhosis and steatosis. GI consult placed for surveillance    Denies f/c/cp/sob/peripheral swelling    60+ g protein per day  Was getting 64 oz of fluids per day, but has gotten constipated and has decreased fluid intake due to fullness    Nausea: Yes, intermittent  Vomiting: No  Dysphagia: No  Reflux: No  Exercise: Yes  MVI: Yes, [place], B12, D3, biotin  Calcium citrate: Yes    10 tablets of opiate medication taken postop. Changes in their medical history and medications have been reviewed.     Comorbidities:   RA, Hep C, GERD, cirrhosis, OA    Patient Active Problem List   Diagnosis    Chronic hepatitis C without hepatic coma (HCC)    Rheumatoid arthritis involving multiple sites with positive rheumatoid factor (HCC)    Obesity, morbid (HCC)    Left Achilles tendinitis    Chronic pain syndrome    Bilateral primary osteoarthritis of knee    Elevated liver enzymes    Gastritis    Gastroesophageal reflux disease    Hypertension    Reflux esophagitis    Viral hepatitis C    Rheumatoid arthritis ([address]    Morbid obesity ([address]    Bariatric surgery status     Past Medical History:   Diagnosis Date    Arthritis     Asthma     Gastritis     Hepatitis C     Treated    Hypertension     Psychiatric disorder     SOME DEPRESSION     Past Surgical History:   Procedure Laterality Date    CHOLECYSTECTOMY  2013    lap    COLONOSCOPY N/A 5/29/2019    COLONOSCOPY performed by Sg Abdi MD at [address] [place] [address]      Completed in [facility] N/A 03/23/2023    LAPAROSCOPIC CLEMENT-EN-Y GASTRIC BYPASS,  LIVER WEDGE BIOPSY, and hiatal hernia repair performed by [employer]

## 2023-04-17 DIAGNOSIS — I10 ESSENTIAL (PRIMARY) HYPERTENSION: ICD-10-CM

## 2023-04-17 RX ORDER — AMLODIPINE BESYLATE 5 MG/1
TABLET ORAL
Qty: 90 TABLET | Refills: 3 | Status: SHIPPED | OUTPATIENT
Start: 2023-04-17

## 2023-04-17 NOTE — TELEPHONE ENCOUNTER
This patient contacted the office for the following prescriptions to be refilled:    Medication requested :   Requested Prescriptions     Pending Prescriptions Disp Refills    amLODIPine (NORVASC) 5 MG tablet [Pharmacy Med Name: AMLODIPINE 5MG TAB 5 Tablet] 90 tablet 3     Sig: ANDIE 1 TABLETA -[address]      PCP: MD CASH Peña DMA: 5/9/2023  FUTURE APPT:   Future Appointments   Date Time Provider Katy Singhi   5/3/2023 11:15 AM  [address] [facility]   5/9/2023  9:40 AM Anna Bahena MD DMA BS AMB   5/15/2023  9:40 AM Anna Bahena MD DMA BS AMB   7/19/2023  9:30 AM VENKAT Velazquez NP [facility] BS AMB   10/24/2023  3:30 PM Jaime Singh MD [facility] BS AMB         Thank you.

## 2023-05-03 ENCOUNTER — CLINICAL DOCUMENTATION (OUTPATIENT)
Facility: HOSPITAL | Age: 63
End: 2023-05-03

## 2023-05-03 NOTE — PROGRESS NOTES
Patient no-showed this 6 week post-op nutrition visit with RD. ALAN e-mailed to try to reschedule.   Wilma Alpers, RD

## 2023-05-07 RX ORDER — BENZONATATE 100 MG/1
CAPSULE ORAL
Qty: 60 CAPSULE | Refills: 10 | OUTPATIENT
Start: 2023-05-07

## 2023-05-07 RX ORDER — DULOXETIN HYDROCHLORIDE 30 MG/1
CAPSULE, DELAYED RELEASE ORAL
Qty: 60 CAPSULE | Refills: 10 | OUTPATIENT
Start: 2023-05-07

## 2023-05-15 ENCOUNTER — OFFICE VISIT (OUTPATIENT)
Facility: CLINIC | Age: 63
End: 2023-05-15
Payer: COMMERCIAL

## 2023-05-15 VITALS
WEIGHT: 245.4 LBS | DIASTOLIC BLOOD PRESSURE: 83 MMHG | BODY MASS INDEX: 43.48 KG/M2 | RESPIRATION RATE: 16 BRPM | OXYGEN SATURATION: 98 % | SYSTOLIC BLOOD PRESSURE: 119 MMHG | HEIGHT: 63 IN | HEART RATE: 89 BPM | TEMPERATURE: 97.2 F

## 2023-05-15 DIAGNOSIS — R42 DIZZINESS: ICD-10-CM

## 2023-05-15 DIAGNOSIS — Z98.890 POST-OPERATIVE STATE: ICD-10-CM

## 2023-05-15 DIAGNOSIS — I10 ESSENTIAL (PRIMARY) HYPERTENSION: Primary | ICD-10-CM

## 2023-05-15 PROCEDURE — 3074F SYST BP LT 130 MM HG: CPT | Performed by: STUDENT IN AN ORGANIZED HEALTH CARE EDUCATION/TRAINING PROGRAM

## 2023-05-15 PROCEDURE — 3079F DIAST BP 80-89 MM HG: CPT | Performed by: STUDENT IN AN ORGANIZED HEALTH CARE EDUCATION/TRAINING PROGRAM

## 2023-05-15 PROCEDURE — 99214 OFFICE O/P EST MOD 30 MIN: CPT | Performed by: STUDENT IN AN ORGANIZED HEALTH CARE EDUCATION/TRAINING PROGRAM

## 2023-05-15 SDOH — ECONOMIC STABILITY: HOUSING INSECURITY
IN THE LAST 12 MONTHS, WAS THERE A TIME WHEN YOU DID NOT HAVE A STEADY PLACE TO SLEEP OR SLEPT IN A SHELTER (INCLUDING NOW)?: PATIENT REFUSED

## 2023-05-15 SDOH — ECONOMIC STABILITY: FOOD INSECURITY: WITHIN THE PAST 12 MONTHS, YOU WORRIED THAT YOUR FOOD WOULD RUN OUT BEFORE YOU GOT MONEY TO BUY MORE.: PATIENT DECLINED

## 2023-05-15 SDOH — ECONOMIC STABILITY: INCOME INSECURITY: HOW HARD IS IT FOR YOU TO PAY FOR THE VERY BASICS LIKE FOOD, HOUSING, MEDICAL CARE, AND HEATING?: PATIENT DECLINED

## 2023-05-15 SDOH — ECONOMIC STABILITY: FOOD INSECURITY: WITHIN THE PAST 12 MONTHS, THE FOOD YOU BOUGHT JUST DIDN'T LAST AND YOU DIDN'T HAVE MONEY TO GET MORE.: PATIENT DECLINED

## 2023-05-15 ASSESSMENT — PATIENT HEALTH QUESTIONNAIRE - PHQ9
SUM OF ALL RESPONSES TO PHQ QUESTIONS 1-9: 1
2. FEELING DOWN, DEPRESSED OR HOPELESS: 1
SUM OF ALL RESPONSES TO PHQ9 QUESTIONS 1 & 2: 1
SUM OF ALL RESPONSES TO PHQ QUESTIONS 1-9: 1
1. LITTLE INTEREST OR PLEASURE IN DOING THINGS: 0

## 2023-05-15 ASSESSMENT — ENCOUNTER SYMPTOMS
EYE DISCHARGE: 0
CONSTIPATION: 0
VOMITING: 0
ABDOMINAL PAIN: 0
COLOR CHANGE: 0
SHORTNESS OF BREATH: 0
FACIAL SWELLING: 0
EYE REDNESS: 0
DIARRHEA: 0
EYE PAIN: 0
EYE ITCHING: 0
BACK PAIN: 0

## 2023-05-15 NOTE — PROGRESS NOTES
Mauro Means is a 58 y.o. presents today for   Chief Complaint   Patient presents with    Hypertension    Dizziness     For 3 weeks      Is someone accompanying this pt? No     Is the patient using any DME equipment during OV? No     Health Maintenance Due   Topic Date Due    Hepatitis A vaccine (1 of 2 - Risk 2-dose series) Never done    HIV screen  Never done    Hepatitis B vaccine (1 of 3 - Risk 3-dose series) Never done    DTaP/Tdap/Td vaccine (1 - Tdap) Never done    Cervical cancer screen  Never done    Shingles vaccine (1 of 2) Never done    COVID-19 Vaccine (3 - Booster for Pfizer series) 12/14/2021         Coordination of Care:   1. \"Have you been to the ER, urgent care clinic since your last visit? Hospitalized since your last visit? \" No    2. \"Have you seen or consulted any other health care providers outside of the [address] [place] [address] since your last visit? \" No     3. For patients aged 39-70: Has the patient had a colonoscopy / FIT/ Cologuard? Yes - no Care Gap present      If the patient is female:    4. For patients aged 41-77: Has the patient had a mammogram within the past 2 years? Yes - no Care Gap present      5. For patients aged 21-65: Has the patient had a pap smear?  No    Angelo Rg CMA
Known Problems Sister     No Known Problems Brother     No Known Problems Brother     No Known Problems Sister     Hypertension Father     No Known Problems Sister     No Known Problems Brother     Diabetes Mother     No Known Problems Brother      Social History     Socioeconomic History    Marital status: Single     Spouse name: Not on file    Number of children: Not on file    Years of education: Not on file    Highest education level: Not on file   Occupational History    Not on file   Tobacco Use    Smoking status: Former     Types: Cigarettes     Quit date: 2017     Years since quittin.3    Smokeless tobacco: Never   Vaping Use    Vaping Use: Never used   Substance and Sexual Activity    Alcohol use: Never    Drug use: Never    Sexual activity: Not on file   Other Topics Concern    Not on file   Social History Narrative    Not on file     Social Determinants of Health     Financial Resource Strain: Unknown    Difficulty of Paying Living Expenses: Patient refused   Food Insecurity: Unknown    Worried About [address] in the Last Year: Patient refused    [address] in the Last Year: Patient refused   Transportation Needs: Unknown    Lack of Transportation (Medical):  Not on file    Lack of Transportation (Non-Medical): Patient refused   Physical Activity: Not on file   Stress: Not on file   Social Connections: Not on file   Intimate Partner Violence: Not on file   Housing Stability: Unknown    Unable to Pay for Housing in the Last Year: Not on file    Number of [place] in the Last Year: Not on file    Unstable Housing in the Last Year: Patient refused        Current Outpatient Medications   Medication Sig Dispense Refill    amLODIPine (NORVASC) 5 MG tablet ANDIE 1 TABLETA POR VIA ORAL DIARIAMENTE 90 tablet 3    simethicone (MYLICON) 80 MG chewable tablet Take 1 tablet by mouth every 6 hours as needed for Flatulence      Cholecalciferol (VITAMIN D3) 1.25 MG (27585 UT) CAPS Take by mouth

## 2023-06-01 RX ORDER — HYDROCHLOROTHIAZIDE 50 MG/1
TABLET ORAL
Qty: 90 TABLET | Refills: 1 | Status: SHIPPED | OUTPATIENT
Start: 2023-06-01

## 2023-06-01 NOTE — TELEPHONE ENCOUNTER
This patient contacted the office for the following prescriptions to be refilled:    Medication requested :   Requested Prescriptions     Pending Prescriptions Disp Refills    hydroCHLOROthiazide (HYDRODIURIL) 50 MG tablet [Pharmacy Med Name: HYDROCHLOROTHIAZ 50MG TAB 50 Tablet] 30 tablet 10     Sig: Cottie Holter (1) [facility]      PCP: Dai Covarrubias MD    NOV DMA: 6/26/2023  FUTURE APPT:   Future Appointments   Date Time Provider [address] [place] [address]   6/26/2023 10:00 AM Debbie Coleman MD [facility] BS AMB   7/19/2023  9:30 AM VENKAT Capellan NP [facility] BS AMB   10/24/2023  3:30 PM Chloe Bueno MD [facility] BS AMB         Thank you.

## 2023-06-02 ENCOUNTER — OFFICE VISIT (OUTPATIENT)
Age: 63
End: 2023-06-02

## 2023-06-02 VITALS
BODY MASS INDEX: 42.7 KG/M2 | HEART RATE: 71 BPM | DIASTOLIC BLOOD PRESSURE: 69 MMHG | HEIGHT: 63 IN | TEMPERATURE: 98 F | SYSTOLIC BLOOD PRESSURE: 119 MMHG | OXYGEN SATURATION: 99 % | WEIGHT: 241 LBS

## 2023-06-02 DIAGNOSIS — E66.01 MORBID OBESITY (HCC): ICD-10-CM

## 2023-06-02 DIAGNOSIS — R11.2 NAUSEA AND VOMITING, UNSPECIFIED VOMITING TYPE: ICD-10-CM

## 2023-06-02 DIAGNOSIS — K91.2 POST-RESECTION MALABSORPTION: Primary | ICD-10-CM

## 2023-06-02 DIAGNOSIS — Z98.84 S/P GASTRIC BYPASS: ICD-10-CM

## 2023-06-02 RX ORDER — ONDANSETRON 4 MG/1
4 TABLET, ORALLY DISINTEGRATING ORAL EVERY 8 HOURS PRN
Qty: 30 TABLET | Refills: 1 | Status: SHIPPED | OUTPATIENT
Start: 2023-06-02

## 2023-06-02 RX ORDER — CYANOCOBALAMIN 1000 UG/ML
1000 INJECTION, SOLUTION INTRAMUSCULAR; SUBCUTANEOUS ONCE
Status: COMPLETED | OUTPATIENT
Start: 2023-06-02 | End: 2023-06-02

## 2023-06-02 RX ORDER — THIAMINE HYDROCHLORIDE 100 MG/ML
200 INJECTION, SOLUTION INTRAMUSCULAR; INTRAVENOUS ONCE
Status: COMPLETED | OUTPATIENT
Start: 2023-06-02 | End: 2023-06-02

## 2023-06-02 RX ADMIN — CYANOCOBALAMIN 1000 MCG: 1000 INJECTION, SOLUTION INTRAMUSCULAR; SUBCUTANEOUS at 15:31

## 2023-06-02 RX ADMIN — THIAMINE HYDROCHLORIDE 200 MG: 100 INJECTION, SOLUTION INTRAMUSCULAR; INTRAVENOUS at 15:39

## 2023-06-02 ASSESSMENT — ENCOUNTER SYMPTOMS
ABDOMINAL PAIN: 1
SHORTNESS OF BREATH: 0
CHEST TIGHTNESS: 0

## 2023-06-02 NOTE — PROGRESS NOTES
Bariatric Postoperative Progress Note    Chief Complaint   Patient presents with    Follow-up     Abdominal discomfort, LGBP 3/23/23     Froy Bradley is a 58 y.o. female now 2 months status post laparoscopic gastric bypass surgery performed on 3/23/23. Accompanied by daughter to assist with interpretation but pt speaks adequate [place]. Diet consists of chicken, soup, fish, eggs, berries, yogurt, smoothies. Taking in 64 oz sugar free fluids, 30 g protein. 30 min of activity 2 days a week. Bowel movements are constipated. Takes Gas X PRN. She is taking a bariatric multivitamin daily. Reports dizziness, nausea, and epigastric discomfort after eating. Description appears to related to food aversion. Denies globus sensation. Weight Loss Metrics 6/2/2023 5/15/2023 4/7/2023 3/30/2023 3/23/2023 3/21/2023 2/8/2023   Pre-op weight (manual entry) 285 lbs - 285 lbs - - - -   Height 5' 3\" 5' 3\" 5' 3\" 5' 3\" 5' 3\" 5' 3\" 5' 3\"   Weight 241 lbs 245 lbs 6 oz 263 lbs 268 lbs 284 lbs 284 lbs 287 lbs 10 oz   BMI (Calculated) 42.8 kg/m2 43.6 kg/m2 46.7 kg/m2 47.6 kg/m2 50.4 kg/m2 50.4 kg/m2 51.1 kg/m2   Ideal body weight (manual entry) 128 lbs - 128 lbs - - - -   EBW in lbs. 157 - 157 - - - -   Goal weight 159 lbs - 159 lbs - - - -   Weight loss to date in lbs.  44 - 22 - - - -   Percent weight loss (%) 15.44 % - 7.72 % - - - -   Percent EBW loss (%) 28 % - 14 % - - - -      Comorbidities:  Hypertension: not applicable  Diabetes: not applicable  Obstructive Sleep Apnea: not applicable  Hyperlipidemia: not applicable  Stress Urinary Incontinence: not applicable  Gastroesophageal Reflux: improved  Weight related arthropathy:not applicable      Past Medical History:   Diagnosis Date    Arthritis     Asthma     Gastritis     Hepatitis C     Treated    Hypertension     Psychiatric disorder     SOME DEPRESSION       Past Surgical History:   Procedure Laterality Date    CHOLECYSTECTOMY  2013    lap    COLONOSCOPY N/A

## 2023-06-09 ENCOUNTER — CLINICAL DOCUMENTATION (OUTPATIENT)
Facility: HOSPITAL | Age: 63
End: 2023-06-09

## 2023-06-09 NOTE — PROGRESS NOTES
[facility]  POST-OP NUTRITION FOLLOW UP    Patient's Name: Angie Bowles  YOB: 1960  Surgery Date: 2023      Procedure: LGBP    Surgeon: ELDA    Height: 5' 3\"     Pre-Op Weight: 303     Current Weight: 240  Weight Lost: 63      Attendance of support group: yes        Complications  Readmittance: no  Reoperations: no  Complications: struggle eating  IV Fluids: no  ER Trips: no    Problem Areas:   Nausea: yes   Vomiting: no   Dumping Syndrome: no  Inadequate Protein: no  Inadequate Fluids: no  Food Intolerance: yes  Hunger: not very hungry  Constipation: yes on some days. Eating 3 Meals/Day:yes  Portion Size at Meals: 1-2 ounce     Protein from Food: 25 grams    Foods being consumed:  Breakfast: Time:8:00  protein drink drink  Lunch: Time: 12:00   chicken, vegetables  Dinner:  Time: 8:00   tuna, tomatoes. In-between eating: protein drink    Length of time for meals: 30    food/fluids: yes    Fluids: >64  oz/day   Types of Fluids: water    MVI: Bariatric vitamin    Number/Day: 1     Calcium: calcium citrate    Calcium Dosin    Taken Separately: yes     Protein Supplement: Premier     Grams of Protein: 60   Patient is taking 7 days a week. Exercise: Walking. Riding a indoor bike. Comments:    Patient is doing well overall    Patient was educated on the importance of eating meat and vegetables only. I have talked with patient about the effects of carbohydrates, not only from a weight management perspective, but also what effects it could have on their blood sugar and what reactive hypoglycemia is. Diet Follow Up:  9 month class scheduled.   Elias Aguilera RD    2023

## 2023-06-30 RX ORDER — FAMOTIDINE 40 MG/1
TABLET, FILM COATED ORAL
Qty: 30 TABLET | Refills: 10 | Status: SHIPPED | OUTPATIENT
Start: 2023-06-30

## 2023-07-08 LAB
25(OH)D3+25(OH)D2 SERPL-MCNC: 95.1 NG/ML (ref 30–100)
ALBUMIN SERPL-MCNC: 4.2 G/DL (ref 3.8–4.8)
ALBUMIN/GLOB SERPL: 1.8 {RATIO} (ref 1.2–2.2)
ALP SERPL-CCNC: 100 IU/L (ref 44–121)
ALT SERPL-CCNC: 31 IU/L (ref 0–32)
AST SERPL-CCNC: 34 IU/L (ref 0–40)
BASOPHILS # BLD AUTO: 0 X10E3/UL (ref 0–0.2)
BASOPHILS NFR BLD AUTO: 1 %
BILIRUB SERPL-MCNC: 0.7 MG/DL (ref 0–1.2)
BUN SERPL-MCNC: 22 MG/DL (ref 8–27)
BUN/CREAT SERPL: 31 (ref 12–28)
CALCIUM SERPL-MCNC: 9.9 MG/DL (ref 8.7–10.3)
CHLORIDE SERPL-SCNC: 104 MMOL/L (ref 96–106)
CHOLEST SERPL-MCNC: 165 MG/DL (ref 100–199)
CO2 SERPL-SCNC: 23 MMOL/L (ref 20–29)
CREAT SERPL-MCNC: 0.7 MG/DL (ref 0.57–1)
EGFRCR SERPLBLD CKD-EPI 2021: 98 ML/MIN/1.73
EOSINOPHIL # BLD AUTO: 0.1 X10E3/UL (ref 0–0.4)
EOSINOPHIL NFR BLD AUTO: 3 %
ERYTHROCYTE [DISTWIDTH] IN BLOOD BY AUTOMATED COUNT: 13.5 % (ref 11.7–15.4)
FERRITIN SERPL-MCNC: 202 NG/ML (ref 15–150)
FOLATE SERPL-MCNC: >20 NG/ML
GLOBULIN SER CALC-MCNC: 2.4 G/DL (ref 1.5–4.5)
GLUCOSE SERPL-MCNC: 90 MG/DL (ref 70–99)
HCT VFR BLD AUTO: 44.7 % (ref 34–46.6)
HDLC SERPL-MCNC: 54 MG/DL
HGB BLD-MCNC: 14.5 G/DL (ref 11.1–15.9)
IMM GRANULOCYTES # BLD AUTO: 0 X10E3/UL (ref 0–0.1)
IMM GRANULOCYTES NFR BLD AUTO: 0 %
IRON SERPL-MCNC: 117 UG/DL (ref 27–139)
LDLC SERPL CALC-MCNC: 93 MG/DL (ref 0–99)
LYMPHOCYTES # BLD AUTO: 1.6 X10E3/UL (ref 0.7–3.1)
LYMPHOCYTES NFR BLD AUTO: 31 %
MCH RBC QN AUTO: 29.8 PG (ref 26.6–33)
MCHC RBC AUTO-ENTMCNC: 32.4 G/DL (ref 31.5–35.7)
MCV RBC AUTO: 92 FL (ref 79–97)
MONOCYTES # BLD AUTO: 0.5 X10E3/UL (ref 0.1–0.9)
MONOCYTES NFR BLD AUTO: 9 %
NEUTROPHILS # BLD AUTO: 3.1 X10E3/UL (ref 1.4–7)
NEUTROPHILS NFR BLD AUTO: 56 %
PLATELET # BLD AUTO: 191 X10E3/UL (ref 150–450)
POTASSIUM SERPL-SCNC: 3.8 MMOL/L (ref 3.5–5.2)
PROT SERPL-MCNC: 6.6 G/DL (ref 6–8.5)
RBC # BLD AUTO: 4.86 X10E6/UL (ref 3.77–5.28)
SODIUM SERPL-SCNC: 145 MMOL/L (ref 134–144)
TRIGL SERPL-MCNC: 97 MG/DL (ref 0–149)
VIT B12 SERPL-MCNC: >2000 PG/ML (ref 232–1245)
VLDLC SERPL CALC-MCNC: 18 MG/DL (ref 5–40)
WBC # BLD AUTO: 5.4 X10E3/UL (ref 3.4–10.8)

## 2023-07-10 LAB — VIT B1 BLD-SCNC: 192.1 NMOL/L (ref 66.5–200)

## 2023-09-01 ENCOUNTER — OFFICE VISIT (OUTPATIENT)
Age: 63
End: 2023-09-01
Payer: COMMERCIAL

## 2023-09-01 VITALS
WEIGHT: 222 LBS | HEIGHT: 63 IN | SYSTOLIC BLOOD PRESSURE: 130 MMHG | HEART RATE: 60 BPM | TEMPERATURE: 98 F | OXYGEN SATURATION: 99 % | DIASTOLIC BLOOD PRESSURE: 76 MMHG | BODY MASS INDEX: 39.34 KG/M2

## 2023-09-01 DIAGNOSIS — K90.829 POST-RESECTION MALABSORPTION: ICD-10-CM

## 2023-09-01 DIAGNOSIS — I10 HYPERTENSION, UNSPECIFIED TYPE: ICD-10-CM

## 2023-09-01 DIAGNOSIS — Z98.84 S/P GASTRIC BYPASS: ICD-10-CM

## 2023-09-01 DIAGNOSIS — E66.9 OBESITY, CLASS II, BMI 35-39.9: ICD-10-CM

## 2023-09-01 DIAGNOSIS — K91.2 POST-RESECTION MALABSORPTION: Primary | ICD-10-CM

## 2023-09-01 DIAGNOSIS — E66.01 MORBID OBESITY (HCC): ICD-10-CM

## 2023-09-01 DIAGNOSIS — R11.2 NAUSEA AND VOMITING, UNSPECIFIED VOMITING TYPE: ICD-10-CM

## 2023-09-01 PROCEDURE — 3075F SYST BP GE 130 - 139MM HG: CPT | Performed by: REGISTERED NURSE

## 2023-09-01 PROCEDURE — 99214 OFFICE O/P EST MOD 30 MIN: CPT | Performed by: REGISTERED NURSE

## 2023-09-01 PROCEDURE — 3078F DIAST BP <80 MM HG: CPT | Performed by: REGISTERED NURSE

## 2023-09-01 RX ORDER — ONDANSETRON 4 MG/1
4 TABLET, ORALLY DISINTEGRATING ORAL EVERY 8 HOURS PRN
Qty: 30 TABLET | Refills: 1 | Status: SHIPPED | OUTPATIENT
Start: 2023-09-01

## 2023-09-01 RX ORDER — IBUPROFEN 200 MG
1 CAPSULE ORAL DAILY
COMMUNITY

## 2023-09-01 RX ORDER — SIMETHICONE 80 MG
80 TABLET,CHEWABLE ORAL EVERY 6 HOURS PRN
Qty: 90 TABLET | Refills: 1 | Status: SHIPPED | OUTPATIENT
Start: 2023-09-01

## 2023-09-01 ASSESSMENT — ENCOUNTER SYMPTOMS
ROS SKIN COMMENTS: HAIR THINNING
GASTROINTESTINAL NEGATIVE: 1
SHORTNESS OF BREATH: 0
CHEST TIGHTNESS: 0

## 2023-09-01 NOTE — PROGRESS NOTES
Bariatric Postoperative Progress Note    Chief Complaint   Patient presents with    Follow-up     LGBP 3/23/23     Jm Rodriges is a 58 y.o. female now 5 months status post laparoscopic gastric bypass surgery performed on 3/23/23. Doing well overall. Accompanied by daughter. Refused to use  phone. Diet consists of protein shakes, chicken, beef, turkey, broccoli, peppers, green beans, watermelon, strawberries, nuts. Taking in 64 oz sugar free fluids, 60 g protein. 30 min of activity 3 days a week. Bowel movements are regular. She is compliant with multivitamins, calcium, Vit D, B1, and B12 supplements via [facility]. Denies tobacco, NSAID, and ETOH use. Refill zofran and mylicon, occ nausea from food aversion and overt fullness. Reports hair thinning. Weight Loss Metrics 9/1/2023 6/2/2023 5/15/2023 4/7/2023 3/30/2023 3/23/2023 3/21/2023   Pre-op weight (manual entry) 285 lbs 285 lbs - 285 lbs - - -   Height 5' 3\" 5' 3\" 5' 3\" 5' 3\" 5' 3\" 5' 3\" 5' 3\"   Weight 222 lbs 241 lbs 245 lbs 6 oz 263 lbs 268 lbs 284 lbs 284 lbs   BMI (Calculated) 39.4 kg/m2 42.8 kg/m2 43.6 kg/m2 46.7 kg/m2 47.6 kg/m2 50.4 kg/m2 50.4 kg/m2   Ideal body weight (manual entry) 128 lbs 128 lbs - 128 lbs - - -   EBW in lbs. 157 157 - 157 - - -   Goal weight - 159 lbs - 159 lbs - - -   Weight loss to date in lbs.  63 44 - 22 - - -   Percent weight loss (%) 22.11 % 15.44 % - 7.72 % - - -   Percent EBW loss (%) 40.1 % 28 % - 14 % - - -      Comorbidities:  Hypertension: not applicable  Diabetes: not applicable  Obstructive Sleep Apnea: not applicable  Hyperlipidemia: not applicable  Stress Urinary Incontinence: not applicable  Gastroesophageal Reflux: resolved   Weight related arthropathy:not applicable      Past Medical History:   Diagnosis Date    Arthritis     Asthma     Gastritis     Hepatitis C     Treated    Hypertension     Psychiatric disorder     SOME DEPRESSION       Past Surgical History:   Procedure Laterality

## 2023-11-29 NOTE — TELEPHONE ENCOUNTER
Medication(s) requesting:   Requested Prescriptions     Pending Prescriptions Disp Refills    hydroCHLOROthiazide (HYDRODIURIL) 50 MG tablet [Pharmacy Med Name: HYDROCHLOROTHIAZ 50MG TAB 50 Tablet] 30 tablet 10     Sig: ANDIE EVANS (1) [facility]       Last office visit:  05/15/2023  Next office visit DMA: 12/6/2023

## 2023-12-01 RX ORDER — HYDROCHLOROTHIAZIDE 50 MG/1
TABLET ORAL
Qty: 30 TABLET | Refills: 10 | Status: SHIPPED | OUTPATIENT
Start: 2023-12-01

## 2023-12-06 ENCOUNTER — OFFICE VISIT (OUTPATIENT)
Facility: CLINIC | Age: 63
End: 2023-12-06
Payer: COMMERCIAL

## 2023-12-06 VITALS
RESPIRATION RATE: 16 BRPM | HEART RATE: 62 BPM | WEIGHT: 200.8 LBS | TEMPERATURE: 97.6 F | OXYGEN SATURATION: 100 % | HEIGHT: 63 IN | DIASTOLIC BLOOD PRESSURE: 76 MMHG | SYSTOLIC BLOOD PRESSURE: 114 MMHG | BODY MASS INDEX: 35.58 KG/M2

## 2023-12-06 DIAGNOSIS — Z23 ENCOUNTER FOR IMMUNIZATION: ICD-10-CM

## 2023-12-06 DIAGNOSIS — R11.2 NAUSEA AND VOMITING, UNSPECIFIED VOMITING TYPE: ICD-10-CM

## 2023-12-06 DIAGNOSIS — J45.20 MILD INTERMITTENT ASTHMA WITHOUT COMPLICATION: Primary | ICD-10-CM

## 2023-12-06 DIAGNOSIS — K90.829 SHORT BOWEL SYNDROME, UNSPECIFIED WHETHER COLON IN CONTINUITY: ICD-10-CM

## 2023-12-06 DIAGNOSIS — Z98.84 S/P GASTRIC BYPASS: ICD-10-CM

## 2023-12-06 DIAGNOSIS — F32.9 REACTIVE DEPRESSION: ICD-10-CM

## 2023-12-06 DIAGNOSIS — I10 HYPERTENSION, UNSPECIFIED TYPE: ICD-10-CM

## 2023-12-06 DIAGNOSIS — E66.9 OBESITY, CLASS II, BMI 35-39.9: ICD-10-CM

## 2023-12-06 PROCEDURE — 90471 IMMUNIZATION ADMIN: CPT | Performed by: STUDENT IN AN ORGANIZED HEALTH CARE EDUCATION/TRAINING PROGRAM

## 2023-12-06 PROCEDURE — 3078F DIAST BP <80 MM HG: CPT | Performed by: STUDENT IN AN ORGANIZED HEALTH CARE EDUCATION/TRAINING PROGRAM

## 2023-12-06 PROCEDURE — 90674 CCIIV4 VAC NO PRSV 0.5 ML IM: CPT | Performed by: STUDENT IN AN ORGANIZED HEALTH CARE EDUCATION/TRAINING PROGRAM

## 2023-12-06 PROCEDURE — 3074F SYST BP LT 130 MM HG: CPT | Performed by: STUDENT IN AN ORGANIZED HEALTH CARE EDUCATION/TRAINING PROGRAM

## 2023-12-06 PROCEDURE — 99214 OFFICE O/P EST MOD 30 MIN: CPT | Performed by: STUDENT IN AN ORGANIZED HEALTH CARE EDUCATION/TRAINING PROGRAM

## 2023-12-06 RX ORDER — FAMOTIDINE 40 MG/1
TABLET, FILM COATED ORAL
Qty: 30 TABLET | Refills: 10 | Status: SHIPPED | OUTPATIENT
Start: 2023-12-06

## 2023-12-06 RX ORDER — IBUPROFEN 200 MG
1 CAPSULE ORAL DAILY
Qty: 90 TABLET | Refills: 1 | Status: SHIPPED | OUTPATIENT
Start: 2023-12-06

## 2023-12-06 RX ORDER — ALBUTEROL SULFATE 90 UG/1
1-2 AEROSOL, METERED RESPIRATORY (INHALATION)
Qty: 18 G | Refills: 3 | Status: SHIPPED | OUTPATIENT
Start: 2023-12-06

## 2023-12-06 RX ORDER — ONDANSETRON 4 MG/1
4 TABLET, ORALLY DISINTEGRATING ORAL EVERY 8 HOURS PRN
Qty: 30 TABLET | Refills: 1 | Status: SHIPPED | OUTPATIENT
Start: 2023-12-06

## 2023-12-06 RX ORDER — CHOLECALCIFEROL (VITAMIN D3) 1250 MCG
1 CAPSULE ORAL
Qty: 12 CAPSULE | Refills: 1 | Status: SHIPPED | OUTPATIENT
Start: 2023-12-06

## 2023-12-06 RX ORDER — MULTIVIT-MIN/IRON/FOLIC ACID/K 45-800-120
1 CAPSULE ORAL DAILY
Qty: 90 CAPSULE | Refills: 1 | Status: SHIPPED | OUTPATIENT
Start: 2023-12-06

## 2023-12-06 RX ORDER — SIMETHICONE 80 MG
80 TABLET,CHEWABLE ORAL EVERY 6 HOURS PRN
Qty: 90 TABLET | Refills: 1 | Status: SHIPPED | OUTPATIENT
Start: 2023-12-06

## 2023-12-06 RX ORDER — IPRATROPIUM BROMIDE AND ALBUTEROL SULFATE 2.5; .5 MG/3ML; MG/3ML
SOLUTION RESPIRATORY (INHALATION)
Qty: 90 ML | Refills: 10 | Status: CANCELLED | OUTPATIENT
Start: 2023-12-06

## 2023-12-06 RX ORDER — DULOXETIN HYDROCHLORIDE 30 MG/1
30 CAPSULE, DELAYED RELEASE ORAL DAILY
Qty: 30 CAPSULE | Refills: 1 | Status: SHIPPED | OUTPATIENT
Start: 2023-12-06

## 2023-12-06 RX ORDER — FLUTICASONE PROPIONATE 50 MCG
SPRAY, SUSPENSION (ML) NASAL
Qty: 16 G | Refills: 10 | Status: SHIPPED | OUTPATIENT
Start: 2023-12-06

## 2023-12-06 ASSESSMENT — ENCOUNTER SYMPTOMS
EYE REDNESS: 0
BACK PAIN: 0
EYE DISCHARGE: 0
SHORTNESS OF BREATH: 0
ABDOMINAL PAIN: 0
CONSTIPATION: 0
FACIAL SWELLING: 0
EYE ITCHING: 0
EYE PAIN: 0
VOMITING: 0
COLOR CHANGE: 0
DIARRHEA: 0

## 2023-12-06 ASSESSMENT — PATIENT HEALTH QUESTIONNAIRE - PHQ9
1. LITTLE INTEREST OR PLEASURE IN DOING THINGS: 2
SUM OF ALL RESPONSES TO PHQ QUESTIONS 1-9: 13
4. FEELING TIRED OR HAVING LITTLE ENERGY: 1
8. MOVING OR SPEAKING SO SLOWLY THAT OTHER PEOPLE COULD HAVE NOTICED. OR THE OPPOSITE, BEING SO FIGETY OR RESTLESS THAT YOU HAVE BEEN MOVING AROUND A LOT MORE THAN USUAL: 1
9. THOUGHTS THAT YOU WOULD BE BETTER OFF DEAD, OR OF HURTING YOURSELF: 1
5. POOR APPETITE OR OVEREATING: 2
2. FEELING DOWN, DEPRESSED OR HOPELESS: 3
SUM OF ALL RESPONSES TO PHQ QUESTIONS 1-9: 12
SUM OF ALL RESPONSES TO PHQ QUESTIONS 1-9: 13
3. TROUBLE FALLING OR STAYING ASLEEP: 1
6. FEELING BAD ABOUT YOURSELF - OR THAT YOU ARE A FAILURE OR HAVE LET YOURSELF OR YOUR FAMILY DOWN: 1
7. TROUBLE CONCENTRATING ON THINGS, SUCH AS READING THE NEWSPAPER OR WATCHING TELEVISION: 1
SUM OF ALL RESPONSES TO PHQ9 QUESTIONS 1 & 2: 5
SUM OF ALL RESPONSES TO PHQ QUESTIONS 1-9: 13
10. IF YOU CHECKED OFF ANY PROBLEMS, HOW DIFFICULT HAVE THESE PROBLEMS MADE IT FOR YOU TO DO YOUR WORK, TAKE CARE OF THINGS AT HOME, OR GET ALONG WITH OTHER PEOPLE: 2

## 2023-12-06 ASSESSMENT — ANXIETY QUESTIONNAIRES
IF YOU CHECKED OFF ANY PROBLEMS ON THIS QUESTIONNAIRE, HOW DIFFICULT HAVE THESE PROBLEMS MADE IT FOR YOU TO DO YOUR WORK, TAKE CARE OF THINGS AT HOME, OR GET ALONG WITH OTHER PEOPLE: VERY DIFFICULT
7. FEELING AFRAID AS IF SOMETHING AWFUL MIGHT HAPPEN: 2
3. WORRYING TOO MUCH ABOUT DIFFERENT THINGS: 2
4. TROUBLE RELAXING: 2
6. BECOMING EASILY ANNOYED OR IRRITABLE: 2
1. FEELING NERVOUS, ANXIOUS, OR ON EDGE: 1
2. NOT BEING ABLE TO STOP OR CONTROL WORRYING: 2
5. BEING SO RESTLESS THAT IT IS HARD TO SIT STILL: 2
GAD7 TOTAL SCORE: 13

## 2023-12-06 NOTE — PROGRESS NOTES
Eldon Garay is a 61 y.o. presents today for   Chief Complaint   Patient presents with    Hypertension     Is someone accompanying this pt? No      Is the patient using any DME equipment during OV? No      Health Maintenance Due   Topic Date Due    HIV screen  Never done    Hepatitis A vaccine (1 of 2 - Risk 2-dose series) Never done    DTaP/Tdap/Td vaccine (1 - Tdap) Never done    Cervical cancer screen  Never done    Shingles vaccine (1 of 2) Never done    Hepatitis B vaccine (1 of 3 - Risk 3-dose series) Never done    Respiratory Syncytial Virus (RSV) age 61 yrs+ (1 - 1-dose 60+ series) Never done    Pneumococcal 0-64 years Vaccine (2 - PCV) 11/20/2020    Flu vaccine (1) 08/01/2023    COVID-19 Vaccine (3 - 2023-24 season) 09/01/2023         Coordination of Care:   1. \"Have you been to the ER, urgent care clinic since your last visit? Hospitalized since your last visit? \" No    2. \"Have you seen or consulted any other health care providers outside of the [address] [place] [address] since your last visit? \" No     3. For patients aged 43-73: Has the patient had a colonoscopy / FIT/ Cologuard? Yes - no Care Gap present      If the patient is female:    4. For patients aged 43-66: Has the patient had a mammogram within the past 2 years? Yes - no Care Gap present      5. For patients aged 21-65: Has the patient had a pap smear?  No    Kamari Hernandez CMA
and the intended plan as seen in the above orders. The patient has received an after-visit summary and questions were answered concerning future plans. I have discussed medication side effects and warnings with the patient as well. I have reviewed the plan of care with the patient, accepted their input and they are in agreement with the treatment goals.      Didi Ortiz MD

## 2023-12-14 ENCOUNTER — TELEPHONE (OUTPATIENT)
Facility: CLINIC | Age: 63
End: 2023-12-14

## 2023-12-14 NOTE — TELEPHONE ENCOUNTER
Gisel from [facility] says the pt has a surgery next week and wants to know if the pt has been cleared for that next week. She wants to know if this was discussed during her last appt on 12/6. Please call back a t [phone] Ext [phone]

## 2023-12-18 ENCOUNTER — TELEPHONE (OUTPATIENT)
Facility: CLINIC | Age: 63
End: 2023-12-18

## 2023-12-18 NOTE — TELEPHONE ENCOUNTER
Pt's daughter clled stating the pt is suppose to have surgery this week, and the surgeon has let them know that they have not received any clearance for her to have her surgery. [facility] phone [phone] Ext [phone]

## 2023-12-20 ENCOUNTER — TELEPHONE (OUTPATIENT)
Facility: CLINIC | Age: 63
End: 2023-12-20

## 2023-12-20 NOTE — TELEPHONE ENCOUNTER
Gisel with [facility] called to confirm that patient kept her pre-op appointment this morning.    Also, Gisel is requesting the pre-op office notes be faxed to her attention ASAP because the patient's surgery is scheduled for tomorrow morning.    Phone #:  [phone]  Fax #:  [phone]    Please note:  Per Gisel, if they do not receive patient's pre-op paperwork today, patient's appointment will be canceled.    Please fax.  Thank you.

## 2023-12-28 DIAGNOSIS — F32.9 REACTIVE DEPRESSION: ICD-10-CM

## 2023-12-29 RX ORDER — DULOXETIN HYDROCHLORIDE 30 MG/1
CAPSULE, DELAYED RELEASE ORAL
Qty: 60 CAPSULE | Refills: 10 | Status: SHIPPED | OUTPATIENT
Start: 2023-12-29

## 2024-01-29 DIAGNOSIS — E66.9 OBESITY, CLASS II, BMI 35-39.9: ICD-10-CM

## 2024-01-29 DIAGNOSIS — R11.2 NAUSEA AND VOMITING, UNSPECIFIED VOMITING TYPE: ICD-10-CM

## 2024-01-29 DIAGNOSIS — K90.829 SHORT BOWEL SYNDROME, UNSPECIFIED WHETHER COLON IN CONTINUITY: ICD-10-CM

## 2024-01-29 DIAGNOSIS — Z98.84 S/P GASTRIC BYPASS: ICD-10-CM

## 2024-01-29 DIAGNOSIS — I10 HYPERTENSION, UNSPECIFIED TYPE: ICD-10-CM

## 2024-01-30 NOTE — TELEPHONE ENCOUNTER
Medication(s) requesting:   Requested Prescriptions     Pending Prescriptions Disp Refills    simethicone (MYLICON) 80 MG chewable tablet [Pharmacy Med Name: SIMETHICONE 80MG CHEW 80 Tablet Chewable] 90 tablet 10     Sig: TOME 1 TABLETA POR VIA ORAL CADA 6 HORAS FABIOLA SEA NECESARIO PARA LA FLATULENCIA    ondansetron (ZOFRAN-ODT) 4 MG disintegrating tablet [Pharmacy Med Name: ONDANSETRON 4MG ODT 4 ODT] 30 tablet 10     Sig: COLOCAR 1 TABLETA EN LA LENGUA Y DEJAR DISOLVER CADA 8 HORAS FABIOLA SEA NECESAIRO PARA NAUSEA O VOMITO       Last office visit:12/20/2024  Next office visit DMA: 2/13/2024

## 2024-01-31 RX ORDER — ONDANSETRON 4 MG/1
TABLET, ORALLY DISINTEGRATING ORAL
Qty: 30 TABLET | Refills: 10 | Status: SHIPPED | OUTPATIENT
Start: 2024-01-31

## 2024-01-31 RX ORDER — SIMETHICONE 80 MG
TABLET,CHEWABLE ORAL
Qty: 90 TABLET | Refills: 10 | Status: SHIPPED | OUTPATIENT
Start: 2024-01-31

## 2024-02-01 NOTE — PROGRESS NOTES
Patient is scheduled for US venous comp RLE, but states she has developed edema in the LLE as well; she is wondering if Dr. Reece would like to do US on both legs. Prefers to be called on landline: [phone]    Patient presents today for a blood pressure check. Patient seated and resting for 15 minutes with both feet flat on the floor. Blood pressure taken and documented. Reported blood pressure to Dr. Liset Payne.

## 2024-02-13 ENCOUNTER — OFFICE VISIT (OUTPATIENT)
Facility: CLINIC | Age: 64
End: 2024-02-13
Payer: MEDICAID

## 2024-02-13 VITALS
OXYGEN SATURATION: 98 % | TEMPERATURE: 98.2 F | BODY MASS INDEX: 34.05 KG/M2 | RESPIRATION RATE: 15 BRPM | HEIGHT: 63 IN | DIASTOLIC BLOOD PRESSURE: 74 MMHG | HEART RATE: 66 BPM | WEIGHT: 192.2 LBS | SYSTOLIC BLOOD PRESSURE: 113 MMHG

## 2024-02-13 DIAGNOSIS — R82.90 URINE ABNORMALITY: Primary | ICD-10-CM

## 2024-02-13 DIAGNOSIS — F32.9 REACTIVE DEPRESSION: ICD-10-CM

## 2024-02-13 LAB
BILIRUBIN, URINE, POC: NEGATIVE
BLOOD URINE, POC: NEGATIVE
GLUCOSE URINE, POC: NEGATIVE
KETONES, URINE, POC: NEGATIVE
LEUKOCYTE ESTERASE, URINE, POC: NORMAL
NITRITE, URINE, POC: POSITIVE
PH, URINE, POC: 7.5 (ref 4.6–8)
PROTEIN,URINE, POC: NEGATIVE
SPECIFIC GRAVITY, URINE, POC: 1.02 (ref 1–1.03)
URINALYSIS CLARITY, POC: NORMAL
URINALYSIS COLOR, POC: YELLOW
UROBILINOGEN, POC: NORMAL

## 2024-02-13 PROCEDURE — 81003 URINALYSIS AUTO W/O SCOPE: CPT | Performed by: STUDENT IN AN ORGANIZED HEALTH CARE EDUCATION/TRAINING PROGRAM

## 2024-02-13 PROCEDURE — 99214 OFFICE O/P EST MOD 30 MIN: CPT | Performed by: STUDENT IN AN ORGANIZED HEALTH CARE EDUCATION/TRAINING PROGRAM

## 2024-02-13 PROCEDURE — 3078F DIAST BP <80 MM HG: CPT | Performed by: STUDENT IN AN ORGANIZED HEALTH CARE EDUCATION/TRAINING PROGRAM

## 2024-02-13 PROCEDURE — 3074F SYST BP LT 130 MM HG: CPT | Performed by: STUDENT IN AN ORGANIZED HEALTH CARE EDUCATION/TRAINING PROGRAM

## 2024-02-13 RX ORDER — SULFAMETHOXAZOLE AND TRIMETHOPRIM 800; 160 MG/1; MG/1
1 TABLET ORAL 2 TIMES DAILY
Qty: 6 TABLET | Refills: 0 | Status: SHIPPED | OUTPATIENT
Start: 2024-02-13 | End: 2024-02-16

## 2024-02-13 NOTE — PROGRESS NOTES
Isadora Toribio is a 63 y.o.  female and presents with    Chief Complaint   Patient presents with    Follow-up           Subjective:    Pt had b/l eye surgery for cataracts. Feels like she is doing well.     Has been having knee injection for her knee. She is getting the injections from Dr. Anderson.     She was supposed to have started the duloxetine but she states she never received this. She continues to have depression and anxiety.     She denies dysuria but feels like she has been having lower back pain, and smell in the urine. Has also been experiencing a lower back pain.     Patient Active Problem List   Diagnosis    Chronic hepatitis C without hepatic coma (HCC)    Rheumatoid arthritis involving multiple sites with positive rheumatoid factor (HCC)    Obesity, morbid (HCC)    Left Achilles tendinitis    Chronic pain syndrome    Bilateral primary osteoarthritis of knee    Elevated liver enzymes    Gastritis    Gastroesophageal reflux disease    Hypertension    Reflux esophagitis    Viral hepatitis C    Rheumatoid arthritis (HCC)    Morbid obesity (HCC)    Bariatric surgery status    Reactive depression      Past Medical History:   Diagnosis Date    Arthritis     Asthma     Gastritis     Hepatitis C     Treated    Hypertension     Psychiatric disorder     SOME DEPRESSION      Past Surgical History:   Procedure Laterality Date    CHOLECYSTECTOMY  2013    lap    COLONOSCOPY N/A 5/29/2019    COLONOS[place] Noel De La Rosa [place] [facility] [place]    COLONOSCOPY      Completed in [place]    CLEMENT-EN-Y GASTRIC BYPASS N/A 03/23/2023    LAPAROSCOPIC CLEMENT-EN-Y GASTRIC BYPASS,  LIVER WEDGE BIOPSY, and hiatal hernia repair performed by Az Stoll MD at [facility] MAIN OR      Family History   Problem Relation Age of Onset    No Known Problems Sister     No Known Problems Brother     No Known Problems Brother     No Known Problems Sister     Hypertension Father     No Known Problems Sister     No

## 2024-02-16 LAB
BACTERIA: PRESENT
BILIRUB SERPL-MCNC: NEGATIVE MG/DL
BLOOD: NEGATIVE
CLARITY: ABNORMAL
COLOR: ABNORMAL
EPITHELIAL CELLS: ABNORMAL /HPF
GLUCOSE: NEGATIVE MG/DL
HYALINE CASTS: ABNORMAL /LPF (ref 0–2)
KETONES, URINE: NEGATIVE MG/DL
LEUKOCYTE ESTERASE, URINE: ABNORMAL
NITRITE, URINE: NEGATIVE
PH, URINE: 7.5 PH (ref 5–8)
PROTEIN UA: NEGATIVE MG/DL
RBC URINE: ABNORMAL /HPF
RESULT: ABNORMAL
SPECIFIC GRAVITY: 1.01 (ref 1–1.03)
UROBILINOGEN: 1 MG/DL
WBC UA: ABNORMAL /HPF (ref 0–5)

## 2024-02-20 NOTE — PROGRESS NOTES
1. Have you been to the ER, urgent care clinic since your last visit? Hospitalized since your last visit? No    2. Have you seen or consulted any other health care providers outside of the [address] [place] [address] since your last visit? Include any pap smears or colon screening.  No Writer called patient there was no answer, left message to call office back at [phone].  Would like to discuss answerers to PHQ-9 online questionnaire from 2/20/2024 (scored high)      Thank you,  Vijay Gazra RN

## 2024-03-27 DIAGNOSIS — I10 ESSENTIAL (PRIMARY) HYPERTENSION: ICD-10-CM

## 2024-03-27 DIAGNOSIS — J45.20 MILD INTERMITTENT ASTHMA WITHOUT COMPLICATION: ICD-10-CM

## 2024-03-28 RX ORDER — AMLODIPINE BESYLATE 5 MG/1
TABLET ORAL
Qty: 30 TABLET | Refills: 10 | OUTPATIENT
Start: 2024-03-28

## 2024-03-28 RX ORDER — IPRATROPIUM BROMIDE AND ALBUTEROL SULFATE 2.5; .5 MG/3ML; MG/3ML
SOLUTION RESPIRATORY (INHALATION)
Qty: 180 ML | Refills: 10 | Status: SHIPPED | OUTPATIENT
Start: 2024-03-28

## 2024-03-28 RX ORDER — ALBUTEROL SULFATE 90 UG/1
AEROSOL, METERED RESPIRATORY (INHALATION)
Qty: 18 G | Refills: 10 | Status: SHIPPED | OUTPATIENT
Start: 2024-03-28

## 2024-04-10 DIAGNOSIS — N30.00 ACUTE CYSTITIS WITHOUT HEMATURIA: Primary | ICD-10-CM

## 2024-04-10 RX ORDER — SULFAMETHOXAZOLE AND TRIMETHOPRIM 800; 160 MG/1; MG/1
1 TABLET ORAL 2 TIMES DAILY
Qty: 10 TABLET | Refills: 0 | Status: SHIPPED | OUTPATIENT
Start: 2024-04-10 | End: 2024-04-15

## 2024-04-25 DIAGNOSIS — I10 HYPERTENSION, UNSPECIFIED TYPE: ICD-10-CM

## 2024-04-25 DIAGNOSIS — Z98.84 S/P GASTRIC BYPASS: ICD-10-CM

## 2024-04-25 DIAGNOSIS — R11.2 NAUSEA AND VOMITING, UNSPECIFIED VOMITING TYPE: ICD-10-CM

## 2024-04-25 DIAGNOSIS — K90.829 SHORT BOWEL SYNDROME, UNSPECIFIED WHETHER COLON IN CONTINUITY: ICD-10-CM

## 2024-04-25 DIAGNOSIS — E66.9 OBESITY, CLASS II, BMI 35-39.9: ICD-10-CM

## 2024-04-26 RX ORDER — METHOCARBAMOL 750 MG/1
TABLET ORAL
Qty: 4 CAPSULE | Refills: 10 | Status: SHIPPED | OUTPATIENT
Start: 2024-04-26

## 2024-04-26 NOTE — TELEPHONE ENCOUNTER
Medication(s) requesting:   Requested Prescriptions     Pending Prescriptions Disp Refills    D3-50 1.25 MG (64416 UT) CAPS [Pharmacy Med Name: VIT D-3 50,000IU CAPSULE 00781 UNIT Capsule] 4 capsule 10     Sig: TOME 1 CAPSULA POR VIA ORAL CADA 7 CLAROS       Last office visit:  02/13/2024  Next office visit DMA: 5/24/2024

## 2024-05-24 ENCOUNTER — OFFICE VISIT (OUTPATIENT)
Facility: CLINIC | Age: 64
End: 2024-05-24

## 2024-05-24 VITALS
TEMPERATURE: 97.2 F | HEART RATE: 58 BPM | BODY MASS INDEX: 32.25 KG/M2 | DIASTOLIC BLOOD PRESSURE: 72 MMHG | RESPIRATION RATE: 15 BRPM | HEIGHT: 63 IN | WEIGHT: 182 LBS | OXYGEN SATURATION: 98 % | SYSTOLIC BLOOD PRESSURE: 114 MMHG

## 2024-05-24 DIAGNOSIS — R11.2 NAUSEA AND VOMITING, UNSPECIFIED VOMITING TYPE: ICD-10-CM

## 2024-05-24 DIAGNOSIS — I10 HYPERTENSION, UNSPECIFIED TYPE: ICD-10-CM

## 2024-05-24 DIAGNOSIS — K90.829 SHORT BOWEL SYNDROME, UNSPECIFIED WHETHER COLON IN CONTINUITY: ICD-10-CM

## 2024-05-24 DIAGNOSIS — R82.90 URINE ABNORMALITY: Primary | ICD-10-CM

## 2024-05-24 DIAGNOSIS — E66.9 OBESITY, CLASS II, BMI 35-39.9: ICD-10-CM

## 2024-05-24 DIAGNOSIS — Z98.84 S/P GASTRIC BYPASS: ICD-10-CM

## 2024-05-24 DIAGNOSIS — M54.50 MYOFASCIAL LOW BACK PAIN: ICD-10-CM

## 2024-05-24 RX ORDER — CYCLOBENZAPRINE HCL 10 MG
10 TABLET ORAL 3 TIMES DAILY PRN
Qty: 30 TABLET | Refills: 0 | Status: SHIPPED | OUTPATIENT
Start: 2024-05-24 | End: 2024-06-03

## 2024-05-24 SDOH — ECONOMIC STABILITY: FOOD INSECURITY: WITHIN THE PAST 12 MONTHS, YOU WORRIED THAT YOUR FOOD WOULD RUN OUT BEFORE YOU GOT MONEY TO BUY MORE.: NEVER TRUE

## 2024-05-24 SDOH — ECONOMIC STABILITY: HOUSING INSECURITY
IN THE LAST 12 MONTHS, WAS THERE A TIME WHEN YOU DID NOT HAVE A STEADY PLACE TO SLEEP OR SLEPT IN A SHELTER (INCLUDING NOW)?: NO

## 2024-05-24 SDOH — ECONOMIC STABILITY: INCOME INSECURITY: HOW HARD IS IT FOR YOU TO PAY FOR THE VERY BASICS LIKE FOOD, HOUSING, MEDICAL CARE, AND HEATING?: NOT HARD AT ALL

## 2024-05-24 SDOH — ECONOMIC STABILITY: FOOD INSECURITY: WITHIN THE PAST 12 MONTHS, THE FOOD YOU BOUGHT JUST DIDN'T LAST AND YOU DIDN'T HAVE MONEY TO GET MORE.: NEVER TRUE

## 2024-05-24 ASSESSMENT — ENCOUNTER SYMPTOMS
ABDOMINAL PAIN: 0
VOMITING: 0
COLOR CHANGE: 0
DIARRHEA: 0
EYE PAIN: 0
EYE DISCHARGE: 0
FACIAL SWELLING: 0
BACK PAIN: 1
SHORTNESS OF BREATH: 0
EYE REDNESS: 0
CONSTIPATION: 0
EYE ITCHING: 0

## 2024-05-24 NOTE — PROGRESS NOTES
Isadora Toribio is a 63 y.o.  female and presents with    Chief Complaint   Patient presents with    Follow-up           Subjective:    She has noted that there is a smell when she voids. Denies dysuria or frequency.     She has also has been having b/l low back pain. She states this is a chronic issues that does tend to flare up. Itis hard to get up from sitting position to walk.       Patient Active Problem List   Diagnosis    Chronic hepatitis C without hepatic coma (HCC)    Rheumatoid arthritis involving multiple sites with positive rheumatoid factor (HCC)    Obesity, morbid (HCC)    Left Achilles tendinitis    Chronic pain syndrome    Bilateral primary osteoarthritis of knee    Elevated liver enzymes    Gastritis    Gastroesophageal reflux disease    Hypertension    Reflux esophagitis    Viral hepatitis C    Rheumatoid arthritis (HCC)    Morbid obesity (HCC)    Bariatric surgery status    Reactive depression      Past Medical History:   Diagnosis Date    Arthritis     Asthma     Gastritis     Hepatitis C     Treated    Hypertension     Psychiatric disorder     SOME DEPRESSION      Past Surgical History:   Procedure Laterality Date    CHOLECYSTECTOMY  2013    lap    COLONOSCOPY N/A 5/29/2019    COLONOSCOPY performed by Noel De La Rosa MD [place] [facility] [place]    COLONOSCOPY      Completed in [place]    CLEMENT-EN-Y GASTRIC BYPASS N/A 03/23/2023    LAPAROSCOPIC CLEMENT-EN-Y GASTRIC BYPASS,  LIVER WEDGE BIOPSY, and hiatal hernia repair performed by Az Stoll MD at [facility] MAIN OR      Family History   Problem Relation Age of Onset    No Known Problems Sister     No Known Problems Brother     No Known Problems Brother     No Known Problems Sister     Hypertension Father     No Known Problems Sister     No Known Problems Brother     Diabetes Mother     No Known Problems Brother      Social History     Socioeconomic History    Marital status: Single     Spouse name: Not on file    Number of children: Not

## 2024-05-24 NOTE — PROGRESS NOTES
Isadora Toribio is a 63 y.o. year old female who presents today for   Chief Complaint   Patient presents with    Follow-up       Is someone accompanying this pt? no    Is the patient using any DME equipment during OV? no    Depression Screenin/20/2023     9:57 AM 2023    10:05 AM 5/15/2023     9:53 AM 3/30/2023    10:26 AM 2023    10:34 AM 2022    11:10 AM 10/25/2022     3:30 PM   PHQ-9 Questionaire   Little interest or pleasure in doing things 0 2 0 0 0 0 0   Feeling down, depressed, or hopeless 2 3 1 0 0 1 0   Trouble falling or staying asleep, or sleeping too much 0 1        Feeling tired or having little energy 0 1        Poor appetite or overeating 0 2        Feeling bad about yourself - or that you are a failure or have let yourself or your family down 0 1        Trouble concentrating on things, such as reading the newspaper or watching television 0 1        Moving or speaking so slowly that other people could have noticed. Or the opposite - being so fidgety or restless that you have been moving around a lot more than usual 0 1        Thoughts that you would be better off dead, or of hurting yourself in some way 0 1        PHQ-9 Total Score 2 13 1 0 0 1 0   If you checked off any problems, how difficult have these problems made it for you to do your work, take care of things at home, or get along with other people? 0 2            Abuse Screening:       No data to display                Learning Assessment:  No question data found.    Fall Risk:       No data to display                    Coordination of Care:   1. \"Have you been to the ER, urgent care clinic since your last visit?  Hospitalized since your last visit?\" no    2. \"Have you seen or consulted any other health care providers outside of the [facility] since your last visit?\" no    3. For patients aged 45-75: Has the patient had a colonoscopy / FIT/ Cologuard? Not due    If the patient is female:    4.

## 2024-05-28 DIAGNOSIS — N30.00 ACUTE CYSTITIS WITHOUT HEMATURIA: Primary | ICD-10-CM

## 2024-05-28 LAB
BACTERIA: PRESENT
BILIRUB SERPL-MCNC: NEGATIVE MG/DL
BLOOD: NEGATIVE
CALCIUM OXALATE CRYSTALS: PRESENT
CLARITY: ABNORMAL
COLOR: ABNORMAL
EPITHELIAL CELLS: ABNORMAL /HPF
GLUCOSE: NEGATIVE MG/DL
HYALINE CASTS: ABNORMAL /LPF (ref 0–2)
KETONES, URINE: NEGATIVE MG/DL
LEUKOCYTE ESTERASE, URINE: ABNORMAL
NITRITE, URINE: NEGATIVE
PH, URINE: 6 PH (ref 5–8)
PROTEIN UA: NEGATIVE MG/DL
RBC URINE: ABNORMAL /HPF
RESULT: ABNORMAL
SPECIFIC GRAVITY UA: 1.01 (ref 1–1.03)
UROBILINOGEN, URINE: 0.2 MG/DL
WBC UA: ABNORMAL /HPF (ref 0–5)

## 2024-05-28 RX ORDER — SULFAMETHOXAZOLE AND TRIMETHOPRIM 800; 160 MG/1; MG/1
1 TABLET ORAL 2 TIMES DAILY
Qty: 6 TABLET | Refills: 0 | Status: SHIPPED | OUTPATIENT
Start: 2024-05-28 | End: 2024-05-31

## 2024-05-28 RX ORDER — IBUPROFEN 200 MG
CAPSULE ORAL
Qty: 30 TABLET | Refills: 10 | Status: SHIPPED | OUTPATIENT
Start: 2024-05-28

## 2024-05-28 NOTE — TELEPHONE ENCOUNTER
Medication(s) requesting:   Requested Prescriptions     Pending Prescriptions Disp Refills    calcium citrate (CALCITRATE) 950 (200 Ca) MG tablet [Pharmacy Med Name: CALCIUM CIT 200MG TAB (950) 200 Tablet] 30 tablet 10     Sig: TOME 1 TABLETA POR VIA ORAL CRISTINA AUSTIN       Last office visit:  05/24/2024  Next office visit DMA: 8/26/2024

## 2024-05-29 DIAGNOSIS — M54.50 MYOFASCIAL LOW BACK PAIN: ICD-10-CM

## 2024-05-30 NOTE — TELEPHONE ENCOUNTER
Medication(s) requesting:   Requested Prescriptions     Pending Prescriptions Disp Refills    cyclobenzaprine (FLEXERIL) 10 MG tablet [Pharmacy Med Name: CYCLOBENZAPRINE 10MG TAB 10 Tablet] 30 tablet 10     Sig: TOME 1 TABLETA POR VIA ORAL 3 VECES AL CAITLIN FABIOLA SEA NECESSARIO PARA ESPASMOS MUSCULARES HIL       Last office visit:  05/24/2024  Next office visit DMA: 8/26/2024

## 2024-05-31 LAB
BASOPHILS # BLD AUTO: 0 X10E3/UL (ref 0–0.2)
BASOPHILS NFR BLD AUTO: 1 %
EOSINOPHIL # BLD AUTO: 0.1 X10E3/UL (ref 0–0.4)
EOSINOPHIL NFR BLD AUTO: 2 %
ERYTHROCYTE [DISTWIDTH] IN BLOOD BY AUTOMATED COUNT: 12.3 % (ref 11.7–15.4)
HCT VFR BLD AUTO: 44.4 % (ref 34–46.6)
HGB BLD-MCNC: 14.4 G/DL (ref 11.1–15.9)
IMM GRANULOCYTES # BLD AUTO: 0 X10E3/UL (ref 0–0.1)
IMM GRANULOCYTES NFR BLD AUTO: 0 %
LYMPHOCYTES # BLD AUTO: 2.1 X10E3/UL (ref 0.7–3.1)
LYMPHOCYTES NFR BLD AUTO: 39 %
MCH RBC QN AUTO: 30.3 PG (ref 26.6–33)
MCHC RBC AUTO-ENTMCNC: 32.4 G/DL (ref 31.5–35.7)
MCV RBC AUTO: 93 FL (ref 79–97)
MONOCYTES # BLD AUTO: 0.4 X10E3/UL (ref 0.1–0.9)
MONOCYTES NFR BLD AUTO: 8 %
NEUTROPHILS # BLD AUTO: 2.7 X10E3/UL (ref 1.4–7)
NEUTROPHILS NFR BLD AUTO: 50 %
PLATELET # BLD AUTO: 196 X10E3/UL (ref 150–450)
RBC # BLD AUTO: 4.76 X10E6/UL (ref 3.77–5.28)
SPECIMEN STATUS REPORT: NORMAL
WBC # BLD AUTO: 5.5 X10E3/UL (ref 3.4–10.8)

## 2024-06-01 LAB
25(OH)D3+25(OH)D2 SERPL-MCNC: 55.2 NG/ML (ref 30–100)
ALBUMIN SERPL-MCNC: 4.5 G/DL (ref 3.9–4.9)
ALBUMIN/GLOB SERPL: 1.7 {RATIO} (ref 1.2–2.2)
ALP SERPL-CCNC: 139 IU/L (ref 44–121)
ALT SERPL-CCNC: 40 IU/L (ref 0–32)
AST SERPL-CCNC: 39 IU/L (ref 0–40)
BILIRUB SERPL-MCNC: 0.7 MG/DL (ref 0–1.2)
BUN SERPL-MCNC: 15 MG/DL (ref 8–27)
BUN/CREAT SERPL: 17 (ref 12–28)
CALCIUM SERPL-MCNC: 10.2 MG/DL (ref 8.7–10.3)
CHLORIDE SERPL-SCNC: 103 MMOL/L (ref 96–106)
CO2 SERPL-SCNC: 29 MMOL/L (ref 20–29)
CREAT SERPL-MCNC: 0.87 MG/DL (ref 0.57–1)
EGFRCR SERPLBLD CKD-EPI 2021: 75 ML/MIN/1.73
FERRITIN SERPL-MCNC: 280 NG/ML (ref 15–150)
GLOBULIN SER CALC-MCNC: 2.6 G/DL (ref 1.5–4.5)
GLUCOSE SERPL-MCNC: 88 MG/DL (ref 70–99)
IRON SERPL-MCNC: 122 UG/DL (ref 27–139)
POTASSIUM SERPL-SCNC: 4.8 MMOL/L (ref 3.5–5.2)
PROT SERPL-MCNC: 7.1 G/DL (ref 6–8.5)
SODIUM SERPL-SCNC: 146 MMOL/L (ref 134–144)
VIT B12 SERPL-MCNC: >2000 PG/ML (ref 232–1245)

## 2024-06-03 LAB — VIT B1 BLD-SCNC: 194.1 NMOL/L (ref 66.5–200)

## 2024-06-07 RX ORDER — CYCLOBENZAPRINE HCL 10 MG
TABLET ORAL
Qty: 30 TABLET | Refills: 1 | Status: SHIPPED | OUTPATIENT
Start: 2024-06-07

## 2024-06-10 ENCOUNTER — OFFICE VISIT (OUTPATIENT)
Age: 64
End: 2024-06-10
Payer: MEDICAID

## 2024-06-10 VITALS
BODY MASS INDEX: 32.25 KG/M2 | OXYGEN SATURATION: 100 % | HEART RATE: 58 BPM | HEIGHT: 63 IN | DIASTOLIC BLOOD PRESSURE: 71 MMHG | WEIGHT: 182 LBS | SYSTOLIC BLOOD PRESSURE: 124 MMHG | TEMPERATURE: 97 F

## 2024-06-10 DIAGNOSIS — R53.83 FATIGUE, UNSPECIFIED TYPE: ICD-10-CM

## 2024-06-10 DIAGNOSIS — R11.0 NAUSEA: ICD-10-CM

## 2024-06-10 DIAGNOSIS — Z98.84 S/P GASTRIC BYPASS: ICD-10-CM

## 2024-06-10 DIAGNOSIS — K91.2 POSTSURGICAL MALABSORPTION: ICD-10-CM

## 2024-06-10 DIAGNOSIS — R74.8 ELEVATED LIVER ENZYMES: ICD-10-CM

## 2024-06-10 DIAGNOSIS — Z91.89 AT HIGH RISK FOR DEHYDRATION: ICD-10-CM

## 2024-06-10 DIAGNOSIS — K91.2 POSTSURGICAL MALABSORPTION: Primary | ICD-10-CM

## 2024-06-10 DIAGNOSIS — K90.829 SHORT BOWEL SYNDROME, UNSPECIFIED WHETHER COLON IN CONTINUITY: ICD-10-CM

## 2024-06-10 PROCEDURE — 3078F DIAST BP <80 MM HG: CPT | Performed by: REGISTERED NURSE

## 2024-06-10 PROCEDURE — 3074F SYST BP LT 130 MM HG: CPT | Performed by: REGISTERED NURSE

## 2024-06-10 PROCEDURE — 99214 OFFICE O/P EST MOD 30 MIN: CPT | Performed by: REGISTERED NURSE

## 2024-06-10 RX ORDER — SODIUM CHLORIDE 0.9 % (FLUSH) 0.9 %
5-40 SYRINGE (ML) INJECTION PRN
Status: CANCELLED | OUTPATIENT
Start: 2024-06-10

## 2024-06-10 RX ORDER — DIPHENHYDRAMINE HYDROCHLORIDE 50 MG/ML
50 INJECTION INTRAMUSCULAR; INTRAVENOUS
Status: CANCELLED | OUTPATIENT
Start: 2024-06-10

## 2024-06-10 RX ORDER — SODIUM CHLORIDE 9 MG/ML
INJECTION, SOLUTION INTRAVENOUS CONTINUOUS
Status: CANCELLED | OUTPATIENT
Start: 2024-06-10

## 2024-06-10 RX ORDER — FAMOTIDINE 40 MG/1
TABLET, FILM COATED ORAL
Qty: 30 TABLET | Refills: 2 | Status: SHIPPED | OUTPATIENT
Start: 2024-06-10

## 2024-06-10 RX ORDER — ACETAMINOPHEN 325 MG/1
650 TABLET ORAL
Status: CANCELLED | OUTPATIENT
Start: 2024-06-10

## 2024-06-10 RX ORDER — SODIUM CHLORIDE 9 MG/ML
5-250 INJECTION, SOLUTION INTRAVENOUS PRN
Status: CANCELLED | OUTPATIENT
Start: 2024-06-10

## 2024-06-10 RX ORDER — CYANOCOBALAMIN 1000 UG/ML
1000 INJECTION, SOLUTION INTRAMUSCULAR; SUBCUTANEOUS ONCE
Status: COMPLETED | OUTPATIENT
Start: 2024-06-10 | End: 2024-06-10

## 2024-06-10 RX ORDER — ONDANSETRON 2 MG/ML
8 INJECTION INTRAMUSCULAR; INTRAVENOUS
Status: CANCELLED | OUTPATIENT
Start: 2024-06-10

## 2024-06-10 RX ORDER — ALBUTEROL SULFATE 90 UG/1
4 AEROSOL, METERED RESPIRATORY (INHALATION) PRN
Status: CANCELLED | OUTPATIENT
Start: 2024-06-10

## 2024-06-10 RX ORDER — THIAMINE HYDROCHLORIDE 100 MG/ML
200 INJECTION, SOLUTION INTRAMUSCULAR; INTRAVENOUS ONCE
Status: COMPLETED | OUTPATIENT
Start: 2024-06-10 | End: 2024-06-10

## 2024-06-10 RX ORDER — FAMOTIDINE 10 MG/ML
20 INJECTION, SOLUTION INTRAVENOUS
Status: CANCELLED | OUTPATIENT
Start: 2024-06-10

## 2024-06-10 RX ORDER — EPINEPHRINE 1 MG/ML
0.3 INJECTION, SOLUTION, CONCENTRATE INTRAVENOUS PRN
Status: CANCELLED | OUTPATIENT
Start: 2024-06-10

## 2024-06-10 RX ORDER — SODIUM CHLORIDE, SODIUM LACTATE, POTASSIUM CHLORIDE, AND CALCIUM CHLORIDE .6; .31; .03; .02 G/100ML; G/100ML; G/100ML; G/100ML
1000 INJECTION, SOLUTION INTRAVENOUS ONCE
Status: CANCELLED
Start: 2024-06-12

## 2024-06-10 RX ADMIN — CYANOCOBALAMIN 1000 MCG: 1000 INJECTION, SOLUTION INTRAMUSCULAR; SUBCUTANEOUS at 11:32

## 2024-06-10 RX ADMIN — THIAMINE HYDROCHLORIDE 200 MG: 100 INJECTION, SOLUTION INTRAMUSCULAR; INTRAVENOUS at 11:34

## 2024-06-10 ASSESSMENT — ENCOUNTER SYMPTOMS
ABDOMINAL PAIN: 0
VOMITING: 0
NAUSEA: 1
SHORTNESS OF BREATH: 0
CHEST TIGHTNESS: 0

## 2024-06-10 NOTE — TELEPHONE ENCOUNTER
Medication(s) requesting:   Requested Prescriptions     Pending Prescriptions Disp Refills    famotidine (PEPCID) 40 MG tablet [Pharmacy Med Name: FAMOTIDINE 40 MG TABLET 40 Tablet] 30 tablet 10     Sig: ANDIE CLAROS       Last office visit:  05/24/2024  Next office visit DMA: 8/26/2024

## 2024-06-10 NOTE — PROGRESS NOTES
Bariatric Postoperative Nurse Note    Isadora Arvizu Catarino is a 63 y.o. female status post laparoscopic gastric bypass surgery performed on 3/23/23.    All Post-Ops (including two weeks)  -# of grams of protein daily? 60 g   -sources of protein? Protein shake, beef, chicken  -# of oz of sugar free fluids from all sources daily? 16 oz   -Nausea? Yes  -Vomiting? No  -Difficulty swallow/food sticking? No  -Heartburn/regurgitation? No  -Character of bowel movements (diarrhea/constipation/bloody stools?) alternating between constipation and regularity, resolved after switching to ProCare without iron.   -Which multivitamin product are you taking? Procare   -What dose and how frequently are you taking calcium citrate? BID  - from any iron-containing multivitamin by 2 hours? No  -Ulcer risk exposures:   NSAID No  Tobacco No  Alcohol No  Steroids No  -Minutes of physical activity and what type? Biking 20 min 2 times a week         
05/31/24  9:36 AM   Result Value Ref Range    Specimen Status Report COMMENT    Comprehensive Metabolic Panel    Collection Time: 05/31/24  9:36 AM   Result Value Ref Range    Glucose 88 70 - 99 mg/dL    BUN 15 8 - 27 mg/dL    Creatinine 0.87 0.57 - 1.00 mg/dL    Est, Glom Filt Rate 75 >59 mL/min/1.73    BUN/Creatinine Ratio 17 12 - 28    Sodium 146 (H) 134 - 144 mmol/L    Potassium 4.8 3.5 - 5.2 mmol/L    Chloride 103 96 - 106 mmol/L    CO2 29 20 - 29 mmol/L    Calcium 10.2 8.7 - 10.3 mg/dL    Total Protein 7.1 6.0 - 8.5 g/dL    Albumin 4.5 3.9 - 4.9 g/dL    Globulin, Total 2.6 1.5 - 4.5 g/dL    Albumin/Globulin Ratio 1.7 1.2 - 2.2    Total Bilirubin 0.7 0.0 - 1.2 mg/dL    Alkaline Phosphatase 139 (H) 44 - 121 IU/L    AST 39 0 - 40 IU/L    ALT 40 (H) 0 - 32 IU/L   Vitamin D 25 Hydroxy    Collection Time: 05/31/24  9:36 AM   Result Value Ref Range    Vit D, 25-Hydroxy 55.2 30.0 - 100.0 ng/mL   Iron    Collection Time: 05/31/24  9:36 AM   Result Value Ref Range    Iron 122 27 - 139 ug/dL   Vitamin B12    Collection Time: 05/31/24  9:36 AM   Result Value Ref Range    Vitamin B-12 >2000 (H) 232 - 1245 pg/mL   Ferritin    Collection Time: 05/31/24  9:36 AM   Result Value Ref Range    Ferritin 280 (H) 15 - 150 ng/mL   Vitamin B1, Whole Blood    Collection Time: 05/31/24  9:36 AM   Result Value Ref Range    Vitamin B1,Whole Blood 194.1 66.5 - 200.0 nmol/L     Assessment/Plan:   She is currently 1 year s/p laparoscopic gastric bypass surgery with a total weight loss of 103 lbs to date.   Labs were reviewed and pt was instructed to continue MVI/B1/B12/dolly/vit D supplementation. Hx of Hep C. Noted elevated Alk phos and ALT. Experiencing life stressors. Recently treated for UTI. Encouraged pt to increase fluid intake. Follow up with PCP if symptoms worsen. Recheck liver profile in 3 months to assess trend.     Order B1 200 mg IM inj and B12 1000 mcg IM inj in clinic today for symptoms of fatigue. Pt provided consent.

## 2024-06-12 ENCOUNTER — HOSPITAL ENCOUNTER (OUTPATIENT)
Facility: HOSPITAL | Age: 64
Setting detail: INFUSION SERIES
Discharge: HOME | End: 2024-06-15
Payer: MEDICAID

## 2024-06-12 VITALS
OXYGEN SATURATION: 100 % | TEMPERATURE: 98.4 F | RESPIRATION RATE: 16 BRPM | DIASTOLIC BLOOD PRESSURE: 84 MMHG | SYSTOLIC BLOOD PRESSURE: 127 MMHG | HEART RATE: 61 BPM

## 2024-06-12 DIAGNOSIS — Z98.84 S/P GASTRIC BYPASS: ICD-10-CM

## 2024-06-12 DIAGNOSIS — Z91.89 AT HIGH RISK FOR DEHYDRATION: Primary | ICD-10-CM

## 2024-06-12 PROCEDURE — 2500000003 HC RX 250 WO HCPCS: Performed by: REGISTERED NURSE

## 2024-06-12 PROCEDURE — 96361 HYDRATE IV INFUSION ADD-ON: CPT

## 2024-06-12 PROCEDURE — 2580000003 HC RX 258: Performed by: REGISTERED NURSE

## 2024-06-12 PROCEDURE — 96365 THER/PROPH/DIAG IV INF INIT: CPT

## 2024-06-12 RX ORDER — ACETAMINOPHEN 325 MG/1
650 TABLET ORAL
OUTPATIENT
Start: 2024-06-12

## 2024-06-12 RX ORDER — SODIUM CHLORIDE 9 MG/ML
INJECTION, SOLUTION INTRAVENOUS CONTINUOUS
OUTPATIENT
Start: 2024-06-12

## 2024-06-12 RX ORDER — SODIUM CHLORIDE, SODIUM LACTATE, POTASSIUM CHLORIDE, AND CALCIUM CHLORIDE .6; .31; .03; .02 G/100ML; G/100ML; G/100ML; G/100ML
1000 INJECTION, SOLUTION INTRAVENOUS ONCE
Status: CANCELLED
Start: 2024-06-12 | End: 2024-06-12

## 2024-06-12 RX ORDER — SODIUM CHLORIDE 0.9 % (FLUSH) 0.9 %
5-40 SYRINGE (ML) INJECTION PRN
OUTPATIENT
Start: 2024-06-12

## 2024-06-12 RX ORDER — EPINEPHRINE 1 MG/ML
0.3 INJECTION, SOLUTION, CONCENTRATE INTRAVENOUS PRN
OUTPATIENT
Start: 2024-06-12

## 2024-06-12 RX ORDER — ALBUTEROL SULFATE 90 UG/1
4 AEROSOL, METERED RESPIRATORY (INHALATION) PRN
OUTPATIENT
Start: 2024-06-12

## 2024-06-12 RX ORDER — ONDANSETRON 2 MG/ML
8 INJECTION INTRAMUSCULAR; INTRAVENOUS
OUTPATIENT
Start: 2024-06-12

## 2024-06-12 RX ORDER — DIPHENHYDRAMINE HYDROCHLORIDE 50 MG/ML
50 INJECTION INTRAMUSCULAR; INTRAVENOUS
OUTPATIENT
Start: 2024-06-12

## 2024-06-12 RX ORDER — SODIUM CHLORIDE 0.9 % (FLUSH) 0.9 %
5-40 SYRINGE (ML) INJECTION PRN
Status: ACTIVE | OUTPATIENT
Start: 2024-06-12 | End: 2024-06-13

## 2024-06-12 RX ORDER — SODIUM CHLORIDE 9 MG/ML
5-250 INJECTION, SOLUTION INTRAVENOUS PRN
OUTPATIENT
Start: 2024-06-12

## 2024-06-12 RX ORDER — SODIUM CHLORIDE, SODIUM LACTATE, POTASSIUM CHLORIDE, AND CALCIUM CHLORIDE .6; .31; .03; .02 G/100ML; G/100ML; G/100ML; G/100ML
1000 INJECTION, SOLUTION INTRAVENOUS ONCE
Status: COMPLETED | OUTPATIENT
Start: 2024-06-12 | End: 2024-06-12

## 2024-06-12 RX ADMIN — SODIUM CHLORIDE, PRESERVATIVE FREE 10 ML: 5 INJECTION INTRAVENOUS at 13:15

## 2024-06-12 RX ADMIN — SODIUM CHLORIDE, SODIUM LACTATE, POTASSIUM CHLORIDE, AND CALCIUM CHLORIDE 1000 ML: .6; .31; .03; .02 INJECTION, SOLUTION INTRAVENOUS at 13:22

## 2024-06-12 RX ADMIN — ASCORBIC ACID, VITAMIN A PALMITATE, CHOLECALCIFEROL, THIAMINE HYDROCHLORIDE, RIBOFLAVIN-5 PHOSPHATE SODIUM, PYRIDOXINE HYDROCHLORIDE, NIACINAMIDE, DEXPANTHENOL, ALPHA-TOCOPHEROL ACETATE, VITAMIN K1, FOLIC ACID, BIOTIN, CYANOCOBALAMIN: 200; 3300; 200; 6; 3.6; 6; 40; 15; 10; 150; 600; 60; 5 INJECTION, SOLUTION INTRAVENOUS at 14:37

## 2024-06-12 ASSESSMENT — PAIN - FUNCTIONAL ASSESSMENT
PAIN_FUNCTIONAL_ASSESSMENT: NONE - DENIES PAIN
PAIN_FUNCTIONAL_ASSESSMENT: NONE - DENIES PAIN

## 2024-06-12 NOTE — PROGRESS NOTES
[facility] Progress Note    Date: 2024    Name: Isadora Toribio    MRN: [id number]         : 1960      IV HYDRATION (1 liter LR & 1 liter Banana Bag)      Ms. Nolberto Toribio arrived to [facility] at 1250 with her daughter.    Patient is Italian speaking, daughter speaks English. Daughter declined  on behalf of patient. This RN did use  services to translate contents waiver of  services form. Patient signed  waiver form. Scanned into patient's chart.    Ms. Nolberto Toribio was assessed and education was provided.     Ms. Nolberto Toribio's vitals were reviewed.  Vitals:    24 1303   BP: 128/80   Pulse: 61   Resp: 16   Temp: 98.3 °F (36.8 °C)   SpO2: 100%     24g IV inserted in patient's right hand x1 attempt.  Positive for blood return/ flushes without difficulty.    1 liter Lactated Ringers administered as ordered followed by NS flush.    1 Banana bag administered as ordered over approximately 1 hour    Ms. Nolberto Toribio tolerated well without complaints.    IV removed/ intact.  Gauze/ coban to site. Discharge instructions provided to patient, instructed to f/u with referring provider.    [facility] Nolberto [facility]ib[facility] at 1605.  She has no further [facility] appointments at this time.    Bhavana Hilton RN  2024

## 2024-07-25 DIAGNOSIS — M54.50 MYOFASCIAL LOW BACK PAIN: ICD-10-CM

## 2024-07-26 RX ORDER — CYCLOBENZAPRINE HCL 10 MG
TABLET ORAL
Qty: 30 TABLET | Refills: 2 | Status: SHIPPED | OUTPATIENT
Start: 2024-07-26

## 2024-07-26 NOTE — TELEPHONE ENCOUNTER
Medication(s) requesting:   Requested Prescriptions     Pending Prescriptions Disp Refills    cyclobenzaprine (FLEXERIL) 10 MG tablet [Pharmacy Med Name: CYCLOBENZAPRINE 10MG TAB 10 Tablet] 30 tablet 10     Sig: TOME 1 TABLETA POR VIA ORAL 3 VECES AL CAITLIN FABIOLA SEA NECESSARIO PARA ESPASMOS MUSCULARES       Last office visit:  05/24/2024  Next office visit DMA: 8/26/2024

## 2024-08-17 DIAGNOSIS — N30.00 ACUTE CYSTITIS WITHOUT HEMATURIA: ICD-10-CM

## 2024-08-19 RX ORDER — SULFAMETHOXAZOLE/TRIMETHOPRIM 800-160 MG
TABLET ORAL
Qty: 10 TABLET | Refills: 0 | OUTPATIENT
Start: 2024-08-19

## 2024-08-19 NOTE — TELEPHONE ENCOUNTER
Medication(s) requesting:   Requested Prescriptions     Pending Prescriptions Disp Refills    sulfamethoxazole-trimethoprim (BACTRIM DS;SEPTRA DS) 800-160 MG per tablet [Pharmacy Med Name: SULFAMETHOXAZOLE-TMP DS TABLET] 10 tablet 0     Sig: TAKE 1 TABLET BY MOUTH TWICE A DAY FOR 5 DAYS       Last office visit:  05/24/2024  Next office visit DMA: 8/26/2024

## 2024-08-23 DIAGNOSIS — K90.829 SHORT BOWEL SYNDROME, UNSPECIFIED WHETHER COLON IN CONTINUITY: ICD-10-CM

## 2024-08-26 ENCOUNTER — OFFICE VISIT (OUTPATIENT)
Facility: CLINIC | Age: 64
End: 2024-08-26
Payer: MEDICAID

## 2024-08-26 VITALS
DIASTOLIC BLOOD PRESSURE: 84 MMHG | RESPIRATION RATE: 14 BRPM | SYSTOLIC BLOOD PRESSURE: 124 MMHG | WEIGHT: 181.6 LBS | TEMPERATURE: 98 F | BODY MASS INDEX: 32.18 KG/M2 | HEIGHT: 63 IN | HEART RATE: 64 BPM | OXYGEN SATURATION: 98 %

## 2024-08-26 DIAGNOSIS — G47.09 OTHER INSOMNIA: ICD-10-CM

## 2024-08-26 DIAGNOSIS — R53.83 OTHER FATIGUE: Primary | ICD-10-CM

## 2024-08-26 PROCEDURE — 3079F DIAST BP 80-89 MM HG: CPT | Performed by: STUDENT IN AN ORGANIZED HEALTH CARE EDUCATION/TRAINING PROGRAM

## 2024-08-26 PROCEDURE — 99214 OFFICE O/P EST MOD 30 MIN: CPT | Performed by: STUDENT IN AN ORGANIZED HEALTH CARE EDUCATION/TRAINING PROGRAM

## 2024-08-26 PROCEDURE — 3074F SYST BP LT 130 MM HG: CPT | Performed by: STUDENT IN AN ORGANIZED HEALTH CARE EDUCATION/TRAINING PROGRAM

## 2024-08-26 RX ORDER — TRAZODONE HYDROCHLORIDE 50 MG/1
50 TABLET, FILM COATED ORAL NIGHTLY PRN
Qty: 30 TABLET | Refills: 5 | Status: SHIPPED | OUTPATIENT
Start: 2024-08-26

## 2024-08-26 ASSESSMENT — PATIENT HEALTH QUESTIONNAIRE - PHQ9
5. POOR APPETITE OR OVEREATING: NOT AT ALL
4. FEELING TIRED OR HAVING LITTLE ENERGY: NOT AT ALL
9. THOUGHTS THAT YOU WOULD BE BETTER OFF DEAD, OR OF HURTING YOURSELF: NOT AT ALL
SUM OF ALL RESPONSES TO PHQ QUESTIONS 1-9: 1
6. FEELING BAD ABOUT YOURSELF - OR THAT YOU ARE A FAILURE OR HAVE LET YOURSELF OR YOUR FAMILY DOWN: NOT AT ALL
7. TROUBLE CONCENTRATING ON THINGS, SUCH AS READING THE NEWSPAPER OR WATCHING TELEVISION: NOT AT ALL
SUM OF ALL RESPONSES TO PHQ QUESTIONS 1-9: 1
SUM OF ALL RESPONSES TO PHQ9 QUESTIONS 1 & 2: 1
3. TROUBLE FALLING OR STAYING ASLEEP: NOT AT ALL
2. FEELING DOWN, DEPRESSED OR HOPELESS: SEVERAL DAYS
SUM OF ALL RESPONSES TO PHQ QUESTIONS 1-9: 1
SUM OF ALL RESPONSES TO PHQ QUESTIONS 1-9: 1
1. LITTLE INTEREST OR PLEASURE IN DOING THINGS: NOT AT ALL
10. IF YOU CHECKED OFF ANY PROBLEMS, HOW DIFFICULT HAVE THESE PROBLEMS MADE IT FOR YOU TO DO YOUR WORK, TAKE CARE OF THINGS AT HOME, OR GET ALONG WITH OTHER PEOPLE: NOT DIFFICULT AT ALL
8. MOVING OR SPEAKING SO SLOWLY THAT OTHER PEOPLE COULD HAVE NOTICED. OR THE OPPOSITE, BEING SO FIGETY OR RESTLESS THAT YOU HAVE BEEN MOVING AROUND A LOT MORE THAN USUAL: NOT AT ALL

## 2024-08-26 ASSESSMENT — ENCOUNTER SYMPTOMS
BACK PAIN: 0
EYE PAIN: 0
EYE ITCHING: 0
FACIAL SWELLING: 0
ABDOMINAL PAIN: 0
EYE DISCHARGE: 0
VOMITING: 0
DIARRHEA: 0
EYE REDNESS: 0
COLOR CHANGE: 0
CONSTIPATION: 0
SHORTNESS OF BREATH: 0

## 2024-08-26 NOTE — TELEPHONE ENCOUNTER
Medication(s) requesting:   Requested Prescriptions     Pending Prescriptions Disp Refills    famotidine (PEPCID) 40 MG tablet [Pharmacy Med Name: FAMOTIDINE 40 MG TABLET 40 Tablet] 30 tablet 10     Sig: ANDIE CLAROS       Last office visit:  08/26/2024  Next office visit DMA: 8/26/2024

## 2024-08-26 NOTE — PROGRESS NOTES
Isadora Toribio is a 63 y.o.  female and presents with    Chief Complaint   Patient presents with    Follow-up     3 month f/u        Other     Whole body pain level 7      Fatigue    Dizziness           Subjective:    She has been feeling very weak and tired. She has pain in her back from her neck down to her lower back. She feels like her head is lightheaded. She feels like she is lightheaded.     She has only been sleeping for about 4-5 hour a night. She does get tired, and goes to bed, but then she is not able to fall asleep.       Patient Active Problem List   Diagnosis    Chronic hepatitis C without hepatic coma (HCC)    Rheumatoid arthritis involving multiple sites with positive rheumatoid factor (HCC)    Obesity, morbid (HCC)    Left Achilles tendinitis    Chronic pain syndrome    Bilateral primary osteoarthritis of knee    Elevated liver enzymes    Gastritis    Gastroesophageal reflux disease    Hypertension    Reflux esophagitis    Viral hepatitis C    Rheumatoid arthritis (HCC)    Morbid obesity (HCC)    Bariatric surgery status    Reactive depression    S/P gastric bypass    At high risk for dehydration      Past Medical History:   Diagnosis Date    Arthritis     Asthma     Gastritis     Hepatitis C     Treated    Hypertension     Psychiatric disorder     SOME DEPRESSION      Past Surgical History:   Procedure Laterality Date    CHOLECYSTECTOMY  2013    lap    COLONOSCOPY N/A 5/29/2019    COLONOSCOPY performed by Noel De La Rosa MD at [facility] [place] [facility]COPY    COLONOSCOPY      Completed in [place]    CLEMENT-EN-Y GASTRIC BYPASS N/A 03/23/2023    LAPAROSCOPIC CLEMENT-EN-Y GASTRIC BYPASS,  LIVER WEDGE BIOPSY, and hiatal hernia repair performed by Az Stoll MD at [facility] MAIN OR      Family History   Problem Relation Age of Onset    No Known Problems Sister     No Known Problems Brother     No Known Problems Brother     No Known Problems Sister     Hypertension Father     No Known Problems Sister   discussing the diagnosis and importance of compliance with the treatment plan as well as documenting on the day of the visit.    I have discussed the diagnosis with the patient and the intended plan as seen in the above orders.  The patient has received an after-visit summary and questions were answered concerning future plans.  I have discussed medication side effects and warnings with the patient as well. I have reviewed the plan of care with the patient, accepted their input and they are in agreement with the treatment goals.     Debbie Larson MD

## 2024-08-26 NOTE — PROGRESS NOTES
Isadora Toribio is a 63 y.o. year old female who presents today for   Chief Complaint   Patient presents with    Follow-up     3 month f/u        Other     Whole body pain level 7      Fatigue    Dizziness       Is someone accompanying this pt? No    Is the patient using any DME equipment during OV? No    Depression Screenin/26/2024    12:06 PM 2023     9:57 AM 2023    10:05 AM 5/15/2023     9:53 AM 3/30/2023    10:26 AM 2023    10:34 AM 2022    11:10 AM   PHQ-9 Questionaire   Little interest or pleasure in doing things 0 0 2 0 0 0 0   Feeling down, depressed, or hopeless 1 2 3 1 0 0 1   Trouble falling or staying asleep, or sleeping too much 0 0 1       Feeling tired or having little energy 0 0 1       Poor appetite or overeating 0 0 2       Feeling bad about yourself - or that you are a failure or have let yourself or your family down 0 0 1       Trouble concentrating on things, such as reading the newspaper or watching television 0 0 1       Moving or speaking so slowly that other people could have noticed. Or the opposite - being so fidgety or restless that you have been moving around a lot more than usual 0 0 1       Thoughts that you would be better off dead, or of hurting yourself in some way 0 0 1       PHQ-9 Total Score 1 2 13 1 0 0 1   If you checked off any problems, how difficult have these problems made it for you to do your work, take care of things at home, or get along with other people? 0 0 2           Abuse Screening:       No data to display                Learning Assessment:  No question data found.    Fall Risk:       No data to display                    Coordination of Care:   1. \"Have you been to the ER, urgent care clinic since your last visit?  Hospitalized since your last visit?\" No    2. \"Have you seen or consulted any other health care providers outside of the [facility] since your last visit?\" Yes    3. For patients aged 45-75: Has

## 2024-08-27 LAB
BASOPHILS ABSOLUTE: 0 K/UL (ref 0–0.2)
BASOPHILS RELATIVE PERCENT: 1 % (ref 0–2)
EOSINOPHIL # BLD: 3 % (ref 0–6)
EOSINOPHILS ABSOLUTE: 0.2 K/UL (ref 0–0.5)
HCT VFR BLD CALC: 44.1 % (ref 35.1–48)
HEMOGLOBIN: 13.6 G/DL (ref 11.7–16)
LYMPHOCYTES # BLD: 41 % (ref 20–45)
LYMPHOCYTES ABSOLUTE: 2.2 K/UL (ref 1–4.8)
MACROCYTOSIS: ABNORMAL
MCH RBC QN AUTO: 30 PG (ref 26–34)
MCHC RBC AUTO-ENTMCNC: 31 G/DL (ref 31–36)
MCV RBC AUTO: 97 FL (ref 80–99)
MONOCYTES ABSOLUTE: 0.4 K/UL (ref 0.1–1)
MONOCYTES: 7 % (ref 3–12)
NEUTROPHILS ABSOLUTE: 2.6 K/UL (ref 1.8–7.7)
NEUTROPHILS: 48 % (ref 40–75)
NORMACHROMIC RBC: ABNORMAL
PDW BLD-RTO: 13.2 % (ref 10–15.5)
PLATELET # BLD: 172 K/UL (ref 140–440)
PMV BLD AUTO: 14.5 FL (ref 9–13)
RBC # BLD: 4.53 M/UL (ref 3.8–5.2)
SMEAR REVIEW: ABNORMAL
TSH SERPL DL<=0.05 MIU/L-ACNC: 2.54 MCU/ML (ref 0.27–4.2)
WBC # BLD: 5.4 K/UL (ref 4–11)

## 2024-08-27 RX ORDER — FAMOTIDINE 40 MG/1
TABLET, FILM COATED ORAL
Qty: 30 TABLET | Refills: 10 | Status: SHIPPED | OUTPATIENT
Start: 2024-08-27

## 2024-09-09 DIAGNOSIS — G47.09 OTHER INSOMNIA: ICD-10-CM

## 2024-09-12 RX ORDER — TRAZODONE HYDROCHLORIDE 50 MG/1
50 TABLET, FILM COATED ORAL DAILY PRN
Qty: 90 TABLET | Refills: 2 | Status: SHIPPED | OUTPATIENT
Start: 2024-09-12

## 2024-09-30 ENCOUNTER — TELEPHONE (OUTPATIENT)
Age: 64
End: 2024-09-30

## 2024-10-07 ENCOUNTER — OFFICE VISIT (OUTPATIENT)
Age: 64
End: 2024-10-07
Payer: MEDICAID

## 2024-10-07 ENCOUNTER — OFFICE VISIT (OUTPATIENT)
Facility: CLINIC | Age: 64
End: 2024-10-07

## 2024-10-07 VITALS
OXYGEN SATURATION: 98 % | BODY MASS INDEX: 32.43 KG/M2 | RESPIRATION RATE: 14 BRPM | HEIGHT: 63 IN | SYSTOLIC BLOOD PRESSURE: 128 MMHG | HEART RATE: 57 BPM | TEMPERATURE: 97.7 F | WEIGHT: 183 LBS | DIASTOLIC BLOOD PRESSURE: 73 MMHG

## 2024-10-07 VITALS
TEMPERATURE: 97 F | WEIGHT: 183 LBS | HEIGHT: 63 IN | SYSTOLIC BLOOD PRESSURE: 136 MMHG | BODY MASS INDEX: 32.43 KG/M2 | HEART RATE: 60 BPM | OXYGEN SATURATION: 99 % | DIASTOLIC BLOOD PRESSURE: 73 MMHG

## 2024-10-07 DIAGNOSIS — K91.2 POSTSURGICAL MALABSORPTION: ICD-10-CM

## 2024-10-07 DIAGNOSIS — R10.84 STOMACH CRAMPS, GENERALIZED: ICD-10-CM

## 2024-10-07 DIAGNOSIS — K59.00 CONSTIPATION, UNSPECIFIED CONSTIPATION TYPE: ICD-10-CM

## 2024-10-07 DIAGNOSIS — K74.60 CIRRHOSIS OF LIVER WITHOUT ASCITES, UNSPECIFIED HEPATIC CIRRHOSIS TYPE (HCC): ICD-10-CM

## 2024-10-07 DIAGNOSIS — M54.50 ACUTE LEFT-SIDED LOW BACK PAIN WITHOUT SCIATICA: ICD-10-CM

## 2024-10-07 DIAGNOSIS — R74.8 ELEVATED LIVER ENZYMES: ICD-10-CM

## 2024-10-07 DIAGNOSIS — Z98.84 S/P GASTRIC BYPASS: Primary | ICD-10-CM

## 2024-10-07 DIAGNOSIS — M79.18 MYALGIA OF MUSCLE OF NECK: ICD-10-CM

## 2024-10-07 DIAGNOSIS — Z12.31 ENCOUNTER FOR SCREENING MAMMOGRAM FOR BREAST CANCER: ICD-10-CM

## 2024-10-07 DIAGNOSIS — E04.1 NONTOXIC SINGLE THYROID NODULE: ICD-10-CM

## 2024-10-07 DIAGNOSIS — R11.0 NAUSEA: ICD-10-CM

## 2024-10-07 DIAGNOSIS — Z23 FLU VACCINE NEED: Primary | ICD-10-CM

## 2024-10-07 PROCEDURE — 3075F SYST BP GE 130 - 139MM HG: CPT | Performed by: REGISTERED NURSE

## 2024-10-07 PROCEDURE — 99214 OFFICE O/P EST MOD 30 MIN: CPT | Performed by: REGISTERED NURSE

## 2024-10-07 PROCEDURE — 3078F DIAST BP <80 MM HG: CPT | Performed by: REGISTERED NURSE

## 2024-10-07 RX ORDER — POLYETHYLENE GLYCOL 3350 17 G/17G
17 POWDER, FOR SOLUTION ORAL 2 TIMES DAILY
Qty: 510 G | Refills: 1 | Status: SHIPPED | OUTPATIENT
Start: 2024-10-07 | End: 2024-11-06

## 2024-10-07 RX ORDER — KETOROLAC TROMETHAMINE 30 MG/ML
30 INJECTION, SOLUTION INTRAMUSCULAR; INTRAVENOUS ONCE
Status: COMPLETED | OUTPATIENT
Start: 2024-10-07 | End: 2024-10-07

## 2024-10-07 RX ORDER — NICOTINE 14MG/24HR
250 PATCH, TRANSDERMAL 24 HOURS TRANSDERMAL DAILY
Qty: 60 CAPSULE | Refills: 1 | Status: SHIPPED | OUTPATIENT
Start: 2024-10-07

## 2024-10-07 RX ADMIN — KETOROLAC TROMETHAMINE 30 MG: 30 INJECTION, SOLUTION INTRAMUSCULAR; INTRAVENOUS at 12:57

## 2024-10-07 ASSESSMENT — ENCOUNTER SYMPTOMS
ABDOMINAL PAIN: 1
NAUSEA: 1
CHEST TIGHTNESS: 0
SHORTNESS OF BREATH: 0
VOMITING: 0

## 2024-10-07 NOTE — PROGRESS NOTES
Isadora Toribio is a 63 y.o. year old female who presents today for   Chief Complaint   Patient presents with    Discuss Medications     6 wk f/u        \"Have you been to the ER, urgent care clinic since your last visit?  Hospitalized since your last visit?\"   NO     “Have you seen or consulted any other health care providers outside our system since your last visit?”   YES - When: approximately 1 days ago.  Where and Why: Surgery.     Have you had a mammogram?”   NO    Date of last Mammogram: 7/7/2022      “Have you had a pap smear?”    NO    No cervical cancer screening on file             - FLAKITA Ashley  [facility]  Phone: [phone]  Fax: [phone]

## 2024-10-07 NOTE — PROGRESS NOTES
Isadora Toribio is a 63 y.o.  female and presents with    Chief Complaint   Patient presents with    Discuss Medications     6 wk f/u           Subjective:    She feels like the Trazodone helps her to sleep, but it gives her HA.     Patient states she has been having neck/shoulder pain.  It is tender to palpation.  She would like to have some medication for it at this time.      Patient Active Problem List   Diagnosis    Chronic hepatitis C without hepatic coma (HCC)    Rheumatoid arthritis involving multiple sites with positive rheumatoid factor (HCC)    Obesity, morbid    Left Achilles tendinitis    Chronic pain syndrome    Bilateral primary osteoarthritis of knee    Elevated liver enzymes    Gastritis    Gastroesophageal reflux disease    Hypertension    Reflux esophagitis    Viral hepatitis C    Rheumatoid arthritis (HCC)    Morbid obesity    Bariatric surgery status    Reactive depression    S/P gastric bypass    At high risk for dehydration      Past Medical History:   Diagnosis Date    Arthritis     Asthma     Gastritis     Hepatitis C     Treated    Hypertension     Psychiatric disorder     SOME DEPRESSION      Past Surgical History:   Procedure Laterality Date    CHOLECYSTECTOMY  2013    lap    COLONOSCOPY N/A 5/29/2019    COLONOSCOPY performed by Noel De La Rosa MD at [facility] [place] [facility]COPY    COLONOSCOPY      Completed in [place]    CLEMENT-EN-Y GASTRIC BYPASS N/A 03/23/2023    LAPAROSCOPIC CLEMENT-EN-Y GASTRIC BYPASS,  LIVER WEDGE BIOPSY, and hiatal hernia repair performed by Az Stoll MD at [facility] MAIN OR      Family History   Problem Relation Age of Onset    No Known Problems Sister     No Known Problems Brother     No Known Problems Brother     No Known Problems Sister     Hypertension Father     No Known Problems Sister     No Known Problems Brother     Diabetes Mother     No Known Problems Brother      Social History     Socioeconomic History    Marital status: Single     Spouse name: Not

## 2024-10-07 NOTE — PROGRESS NOTES
Bariatric Postoperative Nurse Note    Isadora Arvizu Catarino is a 63 y.o. female status post laparoscopic gastric bypass surgery performed on 3/23/23.    All Post-Ops (including two weeks)  -# of grams of protein daily? 60 g   -sources of protein? Chicken, beans  -# of oz of sugar free fluids from all sources daily? Unknown   -Nausea? Yes, related to food aversion, epigastric discomfort  -Vomiting? No  -Difficulty swallow/food sticking? No  -Heartburn/regurgitation? No  -Character of bowel movements (diarrhea/constipation/bloody stools?) constipation  -Which multivitamin product are you taking? Procare   -What dose and how frequently are you taking calcium citrate? BID  - from any iron-containing multivitamin by 2 hours? Yes  -Ulcer risk exposures:   NSAID No  Tobacco No  Alcohol No  Steroids No  -Minutes of physical activity and what type? Walking and biking 3 days a week    
  Diagnosis Date    Arthritis     Asthma     Gastritis     Hepatitis C     Treated    Hypertension     Psychiatric disorder     SOME DEPRESSION     Past Surgical History:   Procedure Laterality Date    CHOLECYSTECTOMY  2013    lap    COLONOSCOPY N/A 5/29/2019    COLONOSCOPY [place] Noel De La Rosa [place] [facility] [place]    COLONOSCOPY      Completed in [place]    CLEMENT-EN-Y GASTRIC BYPASS N/A 03/23/2023    LAPAROSCOPIC CLEMENT-EN-Y GASTRIC BYPASS,  LIVER WEDGE BIOPSY, and hiatal hernia repair performed by Az Stoll MD at [facility] MAIN OR     Current Outpatient Medications   Medication Sig Dispense Refill    hyoscyamine (LEVSIN/SL) 0.125 MG sublingual tablet Place 1 tablet under the tongue every 4 hours as needed for Cramping 90 tablet 3    polyethylene glycol (GLYCOLAX) 17 GM/SCOOP powder Take 17 g by mouth 2 times daily 510 g 1    Saccharomyces boulardii (PROBIOTIC) 250 MG CAPS Take 250 mg by mouth daily 60 capsule 1    traZODone (DESYREL) 50 MG tablet TAKE 1 TABLET BY MOUTH EVERY DAY AT BEDTIME AS NEEDED FOR SLEEP 90 tablet 2    famotidine (PEPCID) 40 MG tablet ANDIE CRISTINA TABLETA TODOS LOS CLAROS 30 tablet 10    calcium citrate (CALCITRATE) 950 (200 Ca) MG tablet TOME 1 TABLETA POR VIA ORAL CRISTINA VEZ AL CAITLIN 30 tablet 10    D3-50 1.25 MG (65243 UT) CAPS TOME 1 CAPSULA POR VIA ORAL CADA 7 CLAROS 4 capsule 10    ipratropium 0.5 mg-albuterol 2.5 mg (DUONEB) 0.5-2.5 (3) MG/3ML SOLN nebulizer solution INHALE 1 AMPOLLETA A TRAVES DEL NEBULIZADOR CADA 4 HORAS FABIOLA SEA NECESARIO PARA SILBIDOS 180 mL 10    albuterol sulfate HFA (PROVENTIL;VENTOLIN;PROAIR) 108 (90 Base) MCG/ACT inhaler INHALE 1-2 BOCANADAS EN LOS PULMONES CADA 4-6 HORAS FABIOLA SEA NECESARIO PARA LA DIFICULTAD PARA RESPIRAR 18 g 10    ondansetron (ZOFRAN-ODT) 4 MG disintegrating tablet COLOCAR 1 TABLETA EN LA LENGUA Y DEJAR DISOLVER CADA 8 HORAS FABIOLA SEA NECESAIRO PARA NAUSEA O VOMITO 30 tablet 10    fluticasone (FLONASE) 50 MCG/ACT nasal spray JULIET ESTRELLA2)

## 2024-10-08 ASSESSMENT — ENCOUNTER SYMPTOMS
BLOOD IN STOOL: 0
ANAL BLEEDING: 0
DIARRHEA: 0
ABDOMINAL DISTENTION: 0
CONSTIPATION: 1

## 2024-10-11 ASSESSMENT — ENCOUNTER SYMPTOMS
EYE DISCHARGE: 0
FACIAL SWELLING: 0
BACK PAIN: 0
EYE REDNESS: 0
EYE PAIN: 0
VOMITING: 0
DIARRHEA: 0
EYE ITCHING: 0
ABDOMINAL PAIN: 0
SHORTNESS OF BREATH: 0
COLOR CHANGE: 0
CONSTIPATION: 0

## 2024-10-16 DIAGNOSIS — K74.60 CIRRHOSIS OF LIVER WITHOUT ASCITES, UNSPECIFIED HEPATIC CIRRHOSIS TYPE (HCC): ICD-10-CM

## 2024-10-16 DIAGNOSIS — R10.84 STOMACH CRAMPS, GENERALIZED: ICD-10-CM

## 2024-10-16 DIAGNOSIS — R11.0 NAUSEA: ICD-10-CM

## 2024-10-16 DIAGNOSIS — K59.00 CONSTIPATION, UNSPECIFIED CONSTIPATION TYPE: ICD-10-CM

## 2024-10-16 DIAGNOSIS — Z98.84 S/P GASTRIC BYPASS: ICD-10-CM

## 2024-10-16 DIAGNOSIS — K91.2 POSTSURGICAL MALABSORPTION: ICD-10-CM

## 2024-10-24 NOTE — TELEPHONE ENCOUNTER
Medication(s) requesting:   Requested Prescriptions     Pending Prescriptions Disp Refills    hydroCHLOROthiazide (HYDRODIURIL) 50 MG tablet [Pharmacy Med Name: HYDROCHLOROTHIAZ 50MG TAB 50 Tablet] 30 tablet 10     Sig: ANDIE EVANS (1) TABLETA POR VIA ORAL DIARIAMENTE       Last office visit:  10/07/2024  Next office visit DMA: 11/27/2024

## 2024-10-25 RX ORDER — HYDROCHLOROTHIAZIDE 50 MG/1
TABLET ORAL
Qty: 30 TABLET | Refills: 10 | OUTPATIENT
Start: 2024-10-25

## 2024-11-11 ENCOUNTER — TELEPHONE (OUTPATIENT)
Age: 64
End: 2024-11-11

## 2024-11-11 ENCOUNTER — COMMUNITY OUTREACH (OUTPATIENT)
Facility: CLINIC | Age: 64
End: 2024-11-11

## 2024-11-11 NOTE — TELEPHONE ENCOUNTER
Patient called to cancel her appointment scheduled for today due to being sick with the flu. It has been rescheduled for 24 at 11:15. Patient requested that the latest medication prescribed to be updated due to the prescription being .

## 2024-11-12 RX ORDER — HYDROCHLOROTHIAZIDE 50 MG/1
TABLET ORAL
Qty: 30 TABLET | Refills: 10 | OUTPATIENT
Start: 2024-11-12

## 2024-11-13 DIAGNOSIS — R11.0 NAUSEA: ICD-10-CM

## 2024-11-13 DIAGNOSIS — K91.2 POSTSURGICAL MALABSORPTION: ICD-10-CM

## 2024-11-13 DIAGNOSIS — K74.60 CIRRHOSIS OF LIVER WITHOUT ASCITES, UNSPECIFIED HEPATIC CIRRHOSIS TYPE (HCC): ICD-10-CM

## 2024-11-13 DIAGNOSIS — Z98.84 S/P GASTRIC BYPASS: ICD-10-CM

## 2024-11-13 DIAGNOSIS — R10.84 STOMACH CRAMPS, GENERALIZED: Primary | ICD-10-CM

## 2024-11-13 DIAGNOSIS — K59.00 CONSTIPATION, UNSPECIFIED CONSTIPATION TYPE: ICD-10-CM

## 2024-11-14 NOTE — TELEPHONE ENCOUNTER
Called the patient and left a vm informing her that Levsin was reordered and sent to her pharmacy, [facility] on [address]. I also let her know to use GoodRx coupon since her insurance will not cover it.

## 2024-11-21 DIAGNOSIS — F32.9 REACTIVE DEPRESSION: ICD-10-CM

## 2024-11-22 NOTE — TELEPHONE ENCOUNTER
Medication(s) requesting:   Requested Prescriptions     Pending Prescriptions Disp Refills    DULoxetine (CYMBALTA) 30 MG extended release capsule [Pharmacy Med Name: DULOXETINE DR 30MG CAP 30 Capsule] 60 capsule 10     Sig: ANDIE CRISTINA CAPSULA POR VIA ORAL DOS VECES AL CAITLIN       Last office visit:  10/07/2024  Next office visit DMA: 11/27/2024

## 2024-11-26 RX ORDER — DULOXETIN HYDROCHLORIDE 30 MG/1
CAPSULE, DELAYED RELEASE ORAL
Qty: 60 CAPSULE | Refills: 10 | OUTPATIENT
Start: 2024-11-26

## 2024-12-02 ENCOUNTER — OFFICE VISIT (OUTPATIENT)
Age: 64
End: 2024-12-02
Payer: MEDICAID

## 2024-12-02 VITALS
HEIGHT: 63 IN | RESPIRATION RATE: 16 BRPM | DIASTOLIC BLOOD PRESSURE: 74 MMHG | SYSTOLIC BLOOD PRESSURE: 137 MMHG | WEIGHT: 182 LBS | BODY MASS INDEX: 32.25 KG/M2 | TEMPERATURE: 97.6 F | HEART RATE: 74 BPM

## 2024-12-02 DIAGNOSIS — K59.00 CONSTIPATION, UNSPECIFIED CONSTIPATION TYPE: ICD-10-CM

## 2024-12-02 DIAGNOSIS — K91.2 POSTSURGICAL MALABSORPTION: ICD-10-CM

## 2024-12-02 DIAGNOSIS — E66.811 OBESITY (BMI 30.0-34.9): ICD-10-CM

## 2024-12-02 DIAGNOSIS — Z98.84 S/P GASTRIC BYPASS: ICD-10-CM

## 2024-12-02 DIAGNOSIS — R10.84 STOMACH CRAMPS, GENERALIZED: ICD-10-CM

## 2024-12-02 DIAGNOSIS — R11.0 NAUSEA: Primary | ICD-10-CM

## 2024-12-02 PROCEDURE — 3075F SYST BP GE 130 - 139MM HG: CPT | Performed by: REGISTERED NURSE

## 2024-12-02 PROCEDURE — 3078F DIAST BP <80 MM HG: CPT | Performed by: REGISTERED NURSE

## 2024-12-02 PROCEDURE — 99214 OFFICE O/P EST MOD 30 MIN: CPT | Performed by: REGISTERED NURSE

## 2024-12-02 RX ORDER — FAMOTIDINE 20 MG/1
20 TABLET, FILM COATED ORAL 2 TIMES DAILY
Qty: 60 TABLET | Refills: 2 | Status: SHIPPED | OUTPATIENT
Start: 2024-12-02 | End: 2025-03-02

## 2024-12-02 NOTE — PROGRESS NOTES
Bariatric Postoperative Nurse Note    Isadora Arvizu Catarino is a 64 y.o. female status post laparoscopic gastric bypass surgery performed on 3/23/2023.    All Post-Ops (including two weeks)  -# of grams of protein daily? 60 grams  -sources of protein? Meat and vegetables  -# of oz of sugar free fluids from all sources daily? 32 oz. Encouraged to increase fluid intake.  -Nausea? No   -Vomiting? No  -Difficulty swallow/food sticking? No  -Heartburn/regurgitation? No  -Character of bowel movements (diarrhea/constipation/bloody stools?) constipation  -Which multivitamin product are you taking? Procare   -What dose and how frequently are you taking calcium citrate? 2 times daily  - from any iron-containing multivitamin by 2 hours? Yes  -Ulcer risk exposures:   NSAID No  Tobacco No  Alcohol No  Steroids No  -Minutes of physical activity and what type? None due to knee discomfort

## 2024-12-03 ASSESSMENT — ENCOUNTER SYMPTOMS
ANAL BLEEDING: 0
SHORTNESS OF BREATH: 0
ABDOMINAL PAIN: 1
NAUSEA: 1
BLOOD IN STOOL: 0
ABDOMINAL DISTENTION: 0
VOMITING: 0
DIARRHEA: 0
CONSTIPATION: 1
CHEST TIGHTNESS: 0

## 2024-12-04 NOTE — PROGRESS NOTES
Bariatric Postoperative Progress Note    Chief Complaint   Patient presents with    Follow-up     LGBP 3/23/2023, med check Levsin      Isadora Toribio is a 64 y.o. female now 1 year 8 months status post laparoscopic gastric bypass surgery performed on 3/23/23.    Radha, #[id number] assisting with interpreting. Here for symptom follow up. Reports pain and nausea after eating, cramping sensation. Denies globus sensation, reflux symptoms, difficulty swallowing, vomiting, continued abdominal pain. Resolves shortly after eating. Hx of constipation, 2 days since last BM. Passing gas. Did not start Levsin due to cost. Drinks 3 cups of coffee daily, 2 in am and 1 in pm.     See nurse note for additional subjective information.         12/2/2024    11:12 AM 10/7/2024    11:44 AM 10/7/2024     8:00 AM 8/26/2024    12:04 PM 6/10/2024     9:00 AM 5/24/2024    10:47 AM 2/13/2024    11:52 AM   Weight Loss Metrics   Pre-op weight (manual entry) 285 lbs  285 lbs  285 lbs     Height 5' 3\" 5' 3\" 5' 3\" 5' 3\" 5' 3\" 5' 3\" 5' 3\"   Weight - Scale 182 lbs 183 lbs 183 lbs 181 lbs 10 oz 182 lbs 182 lbs 192 lbs 3 oz   BMI (Calculated) 32.3 kg/m2 32.5 kg/m2 32.5 kg/m2 32.2 kg/m2 32.3 kg/m2 32.3 kg/m2 34.1 kg/m2   Ideal body weight (manual entry) 128 lbs  128 lbs  128 lbs     EBW in lbs. 157  157  157     Weight loss to date in lbs. 103  102  103     Percent weight loss (%) 36.14 %  35.79 %  36.14 %     Percent EBW loss (%) 65.6 %  65 %  65.6 %        Comorbidities:  Hypertension: not applicable  Diabetes: not applicable  Obstructive Sleep Apnea: not applicable  Hyperlipidemia: not applicable  Stress Urinary Incontinence: not applicable  Gastroesophageal Reflux: resolved   Weight related arthropathy: not applicable      Past Medical History:   Diagnosis Date    Arthritis     Asthma     Gastritis     Hepatitis C     Treated    Hypertension     Psychiatric disorder     SOME DEPRESSION     Past Surgical History:   Procedure Laterality Date

## 2024-12-09 DIAGNOSIS — J45.20 MILD INTERMITTENT ASTHMA WITHOUT COMPLICATION: ICD-10-CM

## 2024-12-09 NOTE — TELEPHONE ENCOUNTER
Medication(s) requesting:   Requested Prescriptions     Pending Prescriptions Disp Refills    fluticasone (FLONASE) 50 MCG/ACT nasal spray [Pharmacy Med Name: FLUTICASONE 50MCG NASAL 16 50 NOREEN] 16 g 10     Sig: ROCIE DOS (2) PULVERIZACIONES EN CADA FOSA NASAL DIARIAMENTE       Last office visit:  10/07/2024  Next office visit DMA: 1/13/2025

## 2024-12-10 RX ORDER — FLUTICASONE PROPIONATE 50 MCG
SPRAY, SUSPENSION (ML) NASAL
Qty: 16 G | Refills: 3 | Status: SHIPPED | OUTPATIENT
Start: 2024-12-10

## 2024-12-23 DIAGNOSIS — E66.812 OBESITY, CLASS II, BMI 35-39.9: ICD-10-CM

## 2024-12-23 DIAGNOSIS — Z98.84 S/P GASTRIC BYPASS: ICD-10-CM

## 2024-12-23 DIAGNOSIS — I10 HYPERTENSION, UNSPECIFIED TYPE: ICD-10-CM

## 2024-12-23 DIAGNOSIS — R11.2 NAUSEA AND VOMITING, UNSPECIFIED VOMITING TYPE: ICD-10-CM

## 2024-12-23 DIAGNOSIS — K90.829 SHORT BOWEL SYNDROME, UNSPECIFIED WHETHER COLON IN CONTINUITY: ICD-10-CM

## 2024-12-24 RX ORDER — ONDANSETRON 4 MG/1
TABLET, ORALLY DISINTEGRATING ORAL
Qty: 30 TABLET | Refills: 10 | Status: SHIPPED | OUTPATIENT
Start: 2024-12-24

## 2024-12-24 NOTE — TELEPHONE ENCOUNTER
Medication(s) requesting:   Requested Prescriptions     Pending Prescriptions Disp Refills    ondansetron (ZOFRAN-ODT) 4 MG disintegrating tablet [Pharmacy Med Name: ONDANSETRON 4MG ODT TAB 4 ODT] 30 tablet 10     Sig: COLOCAR 1 TABLETA EN LA LENGUA Y DEJAR DISOLVER CADA 8 HORAS FABIOLA SEA NECESAIRO PARA NAUSEA O VOMITO       Last office visit:  10/07/24  Next office visit DMA: 1/13/2025

## 2025-01-13 ENCOUNTER — OFFICE VISIT (OUTPATIENT)
Facility: CLINIC | Age: 65
End: 2025-01-13
Payer: MEDICAID

## 2025-01-13 VITALS
BODY MASS INDEX: 32.25 KG/M2 | SYSTOLIC BLOOD PRESSURE: 132 MMHG | OXYGEN SATURATION: 98 % | HEART RATE: 60 BPM | TEMPERATURE: 97.9 F | HEIGHT: 63 IN | RESPIRATION RATE: 14 BRPM | DIASTOLIC BLOOD PRESSURE: 83 MMHG | WEIGHT: 182 LBS

## 2025-01-13 DIAGNOSIS — M79.18 MYOFASCIAL PAIN: Primary | ICD-10-CM

## 2025-01-13 DIAGNOSIS — G44.209 TENSION HEADACHE: ICD-10-CM

## 2025-01-13 PROCEDURE — 3079F DIAST BP 80-89 MM HG: CPT | Performed by: STUDENT IN AN ORGANIZED HEALTH CARE EDUCATION/TRAINING PROGRAM

## 2025-01-13 PROCEDURE — 99214 OFFICE O/P EST MOD 30 MIN: CPT | Performed by: STUDENT IN AN ORGANIZED HEALTH CARE EDUCATION/TRAINING PROGRAM

## 2025-01-13 PROCEDURE — 3075F SYST BP GE 130 - 139MM HG: CPT | Performed by: STUDENT IN AN ORGANIZED HEALTH CARE EDUCATION/TRAINING PROGRAM

## 2025-01-13 RX ORDER — METHOCARBAMOL 750 MG/1
750 TABLET, FILM COATED ORAL 4 TIMES DAILY
Qty: 40 TABLET | Refills: 0 | Status: SHIPPED | OUTPATIENT
Start: 2025-01-13 | End: 2025-01-23

## 2025-01-13 SDOH — ECONOMIC STABILITY: FOOD INSECURITY: WITHIN THE PAST 12 MONTHS, YOU WORRIED THAT YOUR FOOD WOULD RUN OUT BEFORE YOU GOT MONEY TO BUY MORE.: NEVER TRUE

## 2025-01-13 SDOH — ECONOMIC STABILITY: FOOD INSECURITY: WITHIN THE PAST 12 MONTHS, THE FOOD YOU BOUGHT JUST DIDN'T LAST AND YOU DIDN'T HAVE MONEY TO GET MORE.: NEVER TRUE

## 2025-01-13 ASSESSMENT — ENCOUNTER SYMPTOMS
EYE ITCHING: 0
DIARRHEA: 0
ABDOMINAL PAIN: 0
SHORTNESS OF BREATH: 0
EYE DISCHARGE: 0
COLOR CHANGE: 0
BACK PAIN: 0
CONSTIPATION: 0
VOMITING: 0
EYE PAIN: 0
FACIAL SWELLING: 0
EYE REDNESS: 0

## 2025-01-13 ASSESSMENT — PATIENT HEALTH QUESTIONNAIRE - PHQ9
SUM OF ALL RESPONSES TO PHQ QUESTIONS 1-9: 0
SUM OF ALL RESPONSES TO PHQ QUESTIONS 1-9: 0
4. FEELING TIRED OR HAVING LITTLE ENERGY: NOT AT ALL
10. IF YOU CHECKED OFF ANY PROBLEMS, HOW DIFFICULT HAVE THESE PROBLEMS MADE IT FOR YOU TO DO YOUR WORK, TAKE CARE OF THINGS AT HOME, OR GET ALONG WITH OTHER PEOPLE: NOT DIFFICULT AT ALL
5. POOR APPETITE OR OVEREATING: NOT AT ALL
3. TROUBLE FALLING OR STAYING ASLEEP: NOT AT ALL
6. FEELING BAD ABOUT YOURSELF - OR THAT YOU ARE A FAILURE OR HAVE LET YOURSELF OR YOUR FAMILY DOWN: NOT AT ALL
SUM OF ALL RESPONSES TO PHQ QUESTIONS 1-9: 0
1. LITTLE INTEREST OR PLEASURE IN DOING THINGS: NOT AT ALL
2. FEELING DOWN, DEPRESSED OR HOPELESS: NOT AT ALL
8. MOVING OR SPEAKING SO SLOWLY THAT OTHER PEOPLE COULD HAVE NOTICED. OR THE OPPOSITE, BEING SO FIGETY OR RESTLESS THAT YOU HAVE BEEN MOVING AROUND A LOT MORE THAN USUAL: NOT AT ALL
7. TROUBLE CONCENTRATING ON THINGS, SUCH AS READING THE NEWSPAPER OR WATCHING TELEVISION: NOT AT ALL
SUM OF ALL RESPONSES TO PHQ9 QUESTIONS 1 & 2: 0
SUM OF ALL RESPONSES TO PHQ QUESTIONS 1-9: 0
9. THOUGHTS THAT YOU WOULD BE BETTER OFF DEAD, OR OF HURTING YOURSELF: NOT AT ALL

## 2025-01-13 NOTE — PROGRESS NOTES
Isadora Toribio is a 64 y.o. year old female who presents today for   Chief Complaint   Patient presents with    Follow-up     11 day f/u    Headache        \"Have you been to the ER, urgent care clinic since your last visit?  Hospitalized since your last visit?\"   NO     “Have you seen or consulted any other health care providers outside our system since your last visit?”   NO     Have you had a mammogram?”   NO    Date of last Mammogram: 7/7/2022      “Have you had a pap smear?”    NO    No cervical cancer screening on file             - FLAKITA Ashley  [facility]  Phone: [phone]  Fax: [phone]  
to myofascial pain    Lab review: no lab studies available for review at time of visit    On this date 1/13/2025 I have spent 30 minutes reviewing previous notes, test results and face to face with the patient discussing the diagnosis and importance of compliance with the treatment plan as well as documenting on the day of the visit.    I have discussed the diagnosis with the patient and the intended plan as seen in the above orders.  The patient has received an after-visit summary and questions were answered concerning future plans.  I have discussed medication side effects and warnings with the patient as well. I have reviewed the plan of care with the patient, accepted their input and they are in agreement with the treatment goals.     Debbie Larson MD

## 2025-02-11 DIAGNOSIS — M79.18 MYOFASCIAL PAIN: ICD-10-CM

## 2025-02-11 NOTE — TELEPHONE ENCOUNTER
Medication(s) requesting:   Requested Prescriptions     Pending Prescriptions Disp Refills    methocarbamol (ROBAXIN) 750 MG tablet [Pharmacy Med Name: METHOCARBAMOL 750 MG TABS 750 Tablet] 40 tablet 10     Sig: TOME 1 TABLETA PRO VIA ORAL 4 VECES AL CAITLIN POR 10 CLAROS       Last office visit:  1/13/2025  Next office visit DMA: 4/14/2025   [facility] returned call.  He stated Kwesi was not authorized in the first place to see Radha with his insurance.  He stated I'm working on a retroactive date to get his first appt with Radha approved.  He stated we will need to get an authorization from [employer].  He stated I checked the [facility], there are no records on Kwesi.  He stated the [place] have records but nothing related to neuro.  He stated the only notes that I have that Radha can see is from Deloris De Leon.      Zach placed writer on hold so he can get the authorization through the [employer].  Writer was on hold for 19 minutes, hung up.      Katty, please check on authorization for pt.  He has Jose Alejandro.  He's scheduled with Radha again on 11/14/17.  Pt was not authorized through his insurance to see Radha.      Per Katty, Zach will call her ext. In 15 minutes so she can do the authorization.

## 2025-02-18 RX ORDER — METHOCARBAMOL 750 MG/1
TABLET, FILM COATED ORAL
Qty: 40 TABLET | Refills: 3 | Status: SHIPPED | OUTPATIENT
Start: 2025-02-18

## 2025-02-21 DIAGNOSIS — J45.20 MILD INTERMITTENT ASTHMA WITHOUT COMPLICATION: ICD-10-CM

## 2025-02-22 DIAGNOSIS — I10 HYPERTENSION, UNSPECIFIED TYPE: ICD-10-CM

## 2025-02-22 DIAGNOSIS — R11.2 NAUSEA AND VOMITING, UNSPECIFIED VOMITING TYPE: ICD-10-CM

## 2025-02-22 DIAGNOSIS — Z98.84 S/P GASTRIC BYPASS: ICD-10-CM

## 2025-02-22 DIAGNOSIS — K90.829 SHORT BOWEL SYNDROME, UNSPECIFIED WHETHER COLON IN CONTINUITY: ICD-10-CM

## 2025-02-22 DIAGNOSIS — E66.812 OBESITY, CLASS II, BMI 35-39.9: ICD-10-CM

## 2025-02-24 NOTE — TELEPHONE ENCOUNTER
Medication(s) requesting:   Requested Prescriptions     Pending Prescriptions Disp Refills    albuterol sulfate HFA (PROVENTIL;VENTOLIN;PROAIR) 108 (90 Base) MCG/ACT inhaler [Pharmacy Med Name: ALBUTEROL HFA*VENT* 90MCG 108 (90 BAS Aerosol] 18 g 10     Sig: INHALE 1-2 BOCANADAS EN LOS PULMONES CADA 4-6 HORAS FABIOLA SEA NECESARIO PARA LA DIFICULTAD PARA RESPIRAR    ipratropium 0.5 mg-albuterol 2.5 mg (DUONEB) 0.5-2.5 (3) MG/3ML SOLN nebulizer solution [Pharmacy Med Name: IPRAT-ALBUT 0.5MG 60CT 0.5-3MG/3 AMPS] 180 mL 10     Sig: INHALE 1 AMPOLLETA A TRAVES DEL NEBULIZADOR CADA 4 HORAS FABIOLA SEA NECESARIO PARA SILBIDOS       Last office visit:  01/13/2025  Next office visit DMA: 2/22/2025

## 2025-02-27 DIAGNOSIS — J45.20 MILD INTERMITTENT ASTHMA WITHOUT COMPLICATION: ICD-10-CM

## 2025-02-27 RX ORDER — ALBUTEROL SULFATE 90 UG/1
INHALANT RESPIRATORY (INHALATION)
Qty: 18 G | Refills: 10 | Status: SHIPPED | OUTPATIENT
Start: 2025-02-27

## 2025-02-27 RX ORDER — IPRATROPIUM BROMIDE AND ALBUTEROL SULFATE 2.5; .5 MG/3ML; MG/3ML
SOLUTION RESPIRATORY (INHALATION)
Qty: 180 ML | Refills: 10 | Status: SHIPPED | OUTPATIENT
Start: 2025-02-27

## 2025-02-28 RX ORDER — CHOLECALCIFEROL (VITAMIN D3) 1250 MCG
CAPSULE ORAL
Qty: 4 CAPSULE | Refills: 10 | Status: SHIPPED | OUTPATIENT
Start: 2025-02-28

## 2025-02-28 RX ORDER — ALBUTEROL SULFATE 90 UG/1
INHALANT RESPIRATORY (INHALATION)
Qty: 18 G | Refills: 10 | OUTPATIENT
Start: 2025-02-28

## 2025-02-28 RX ORDER — IPRATROPIUM BROMIDE AND ALBUTEROL SULFATE 2.5; .5 MG/3ML; MG/3ML
SOLUTION RESPIRATORY (INHALATION)
Qty: 180 ML | Refills: 10 | OUTPATIENT
Start: 2025-02-28

## 2025-03-02 DIAGNOSIS — R11.2 NAUSEA AND VOMITING, UNSPECIFIED VOMITING TYPE: ICD-10-CM

## 2025-03-02 DIAGNOSIS — E66.812 OBESITY, CLASS II, BMI 35-39.9: ICD-10-CM

## 2025-03-02 DIAGNOSIS — I10 HYPERTENSION, UNSPECIFIED TYPE: ICD-10-CM

## 2025-03-02 DIAGNOSIS — Z98.84 S/P GASTRIC BYPASS: ICD-10-CM

## 2025-03-02 DIAGNOSIS — K90.829 SHORT BOWEL SYNDROME, UNSPECIFIED WHETHER COLON IN CONTINUITY: ICD-10-CM

## 2025-03-03 NOTE — TELEPHONE ENCOUNTER
Medication(s) requesting:   Requested Prescriptions     Pending Prescriptions Disp Refills    Cholecalciferol (VITAMIN D3) 1.25 MG (34103 UT) CAPS [Pharmacy Med Name: VIT D3 50,000U CAP 21344 UNIT Capsule] 4 capsule 10     Sig: TOME 1 CAPSULA POR VIA ORAL CADA 7 CLAROS       Last office visit:  01/13/2025  Next office visit DMA: 4/14/2025

## 2025-03-04 RX ORDER — CHOLECALCIFEROL (VITAMIN D3) 1250 MCG
CAPSULE ORAL
Qty: 4 CAPSULE | Refills: 10 | Status: SHIPPED | OUTPATIENT
Start: 2025-03-04

## 2025-03-24 DIAGNOSIS — J45.20 MILD INTERMITTENT ASTHMA WITHOUT COMPLICATION: ICD-10-CM

## 2025-03-25 NOTE — TELEPHONE ENCOUNTER
Medication(s) requesting:   Requested Prescriptions     Pending Prescriptions Disp Refills    fluticasone (FLONASE) 50 MCG/ACT nasal spray [Pharmacy Med Name: FLUTICASONE 50MCG NASAL 16 50 NOREEN] 16 g 10     Sig: ROCIE DOS (2) PULVERIZACIONES EN CADA FOSA NASAL DIARIAMENTE       Last office visit:  01/13/2025  Next office visit DMA: 4/14/2025

## 2025-03-26 RX ORDER — FLUTICASONE PROPIONATE 50 MCG
SPRAY, SUSPENSION (ML) NASAL
Qty: 16 G | Refills: 10 | Status: SHIPPED | OUTPATIENT
Start: 2025-03-26

## 2025-04-14 ENCOUNTER — OFFICE VISIT (OUTPATIENT)
Facility: CLINIC | Age: 65
End: 2025-04-14

## 2025-04-14 ENCOUNTER — OFFICE VISIT (OUTPATIENT)
Age: 65
End: 2025-04-14
Payer: MEDICAID

## 2025-04-14 ENCOUNTER — TELEPHONE (OUTPATIENT)
Age: 65
End: 2025-04-14

## 2025-04-14 ENCOUNTER — HOSPITAL ENCOUNTER (OUTPATIENT)
Facility: HOSPITAL | Age: 65
Setting detail: SPECIMEN
Discharge: HOME | End: 2025-04-17

## 2025-04-14 VITALS
OXYGEN SATURATION: 98 % | SYSTOLIC BLOOD PRESSURE: 126 MMHG | WEIGHT: 184.6 LBS | TEMPERATURE: 97.2 F | HEART RATE: 62 BPM | DIASTOLIC BLOOD PRESSURE: 83 MMHG | RESPIRATION RATE: 14 BRPM | BODY MASS INDEX: 32.71 KG/M2 | HEIGHT: 63 IN

## 2025-04-14 VITALS
HEART RATE: 61 BPM | DIASTOLIC BLOOD PRESSURE: 81 MMHG | HEIGHT: 63 IN | TEMPERATURE: 97.9 F | OXYGEN SATURATION: 100 % | WEIGHT: 184 LBS | SYSTOLIC BLOOD PRESSURE: 131 MMHG | BODY MASS INDEX: 32.6 KG/M2 | RESPIRATION RATE: 16 BRPM

## 2025-04-14 DIAGNOSIS — M19.90 ARTHRITIS: ICD-10-CM

## 2025-04-14 DIAGNOSIS — K74.60 CIRRHOSIS OF LIVER WITHOUT ASCITES, UNSPECIFIED HEPATIC CIRRHOSIS TYPE (HCC): ICD-10-CM

## 2025-04-14 DIAGNOSIS — Z98.84 S/P GASTRIC BYPASS: ICD-10-CM

## 2025-04-14 DIAGNOSIS — E66.811 OBESITY (BMI 30.0-34.9): ICD-10-CM

## 2025-04-14 DIAGNOSIS — K91.2 POSTSURGICAL MALABSORPTION: ICD-10-CM

## 2025-04-14 DIAGNOSIS — Z98.84 HISTORY OF BARIATRIC SURGERY: Primary | ICD-10-CM

## 2025-04-14 DIAGNOSIS — Z12.31 ENCOUNTER FOR SCREENING MAMMOGRAM FOR BREAST CANCER: ICD-10-CM

## 2025-04-14 DIAGNOSIS — R10.84 STOMACH CRAMPS, GENERALIZED: ICD-10-CM

## 2025-04-14 DIAGNOSIS — R82.90 URINE ABNORMALITY: ICD-10-CM

## 2025-04-14 LAB — SENTARA SPECIMEN COLLECTION: NORMAL

## 2025-04-14 PROCEDURE — 3075F SYST BP GE 130 - 139MM HG: CPT | Performed by: REGISTERED NURSE

## 2025-04-14 PROCEDURE — 99214 OFFICE O/P EST MOD 30 MIN: CPT | Performed by: REGISTERED NURSE

## 2025-04-14 PROCEDURE — 99001 SPECIMEN HANDLING PT-LAB: CPT

## 2025-04-14 PROCEDURE — 3079F DIAST BP 80-89 MM HG: CPT | Performed by: REGISTERED NURSE

## 2025-04-14 RX ORDER — KETOROLAC TROMETHAMINE 30 MG/ML
30 INJECTION, SOLUTION INTRAMUSCULAR; INTRAVENOUS ONCE
Status: COMPLETED | OUTPATIENT
Start: 2025-04-14 | End: 2025-04-14

## 2025-04-14 RX ORDER — DICLOFENAC EPOLAMINE 0.01 G/1
1 SYSTEM TOPICAL 2 TIMES DAILY
Qty: 60 PATCH | Refills: 3 | Status: SHIPPED | OUTPATIENT
Start: 2025-04-14

## 2025-04-14 RX ADMIN — KETOROLAC TROMETHAMINE 30 MG: 30 INJECTION, SOLUTION INTRAMUSCULAR; INTRAVENOUS at 09:26

## 2025-04-14 ASSESSMENT — ENCOUNTER SYMPTOMS
FACIAL SWELLING: 0
CONSTIPATION: 0
ANAL BLEEDING: 0
ABDOMINAL DISTENTION: 0
NAUSEA: 1
EYE PAIN: 0
EYE DISCHARGE: 0
VOMITING: 0
BACK PAIN: 0
SHORTNESS OF BREATH: 0
CONSTIPATION: 1
ABDOMINAL PAIN: 0
SHORTNESS OF BREATH: 0
CHEST TIGHTNESS: 0
DIARRHEA: 0
VOMITING: 0
EYE REDNESS: 0
COLOR CHANGE: 0
ABDOMINAL PAIN: 1
EYE ITCHING: 0
BLOOD IN STOOL: 0
DIARRHEA: 0

## 2025-04-14 NOTE — PROGRESS NOTES
Bariatric Postoperative Progress Note    Chief Complaint   Patient presents with    Follow-up     LGBP 3/23/2023     Isadora Toribio is a 64 y.o. female status post laparoscopic gastric bypass surgery performed on 3/23/23.    Armando, #[id number] assisting with interpreting. Here for symptom follow up. Reports improved pain and nausea after eating, but still c/o intermittent cramping sensation.Takes omeprazole and probiotics with some relief.     Hx of Hep C. Has not been able to schedule follow up GI consult due to language barrier.     See nurse note for additional subjective information.     Last Surgical Weight Loss:      4/14/2025    10:18 AM   Surgical Weight Loss Tracker   Date 4/14/2025   Visit Type  Follow Up Visit   Height 5' 3\"   Initial Weight 285 lb   Initial BMI 50.5   Ideal Body Weight 135 lb   Initial Excess Body Weight (EBW) 150 lb   Surgery Date 3/23/2023   Pre-Surgical Weight 285 lb   Pre Surgery BMI 50.5   Preop Weight Change 0 lb   Preop % Weight Change 0%   Pre-Surgical EBW 9 lb 6 oz   Date 4/14/2025   Weight 184 lb   BMI 32.6   Wt Change Since Last Visit 0 lb   Total Wt Change Since Surgery 0 lb   % EBWL <UNK>   % Total Body Wt Loss 0%      Comorbidities:  Hypertension: not applicable  Diabetes: not applicable  Obstructive Sleep Apnea: not applicable  Hyperlipidemia: not applicable  Stress Urinary Incontinence: not applicable  Gastroesophageal Reflux: resolved   Weight related arthropathy: not applicable      Past Medical History:   Diagnosis Date    Arthritis     Asthma     Gastritis     Hepatitis C     Treated    Hypertension     Psychiatric disorder     SOME DEPRESSION     Past Surgical History:   Procedure Laterality Date    CHOLECYSTECTOMY  2013    lap    COLONOSCOPY N/A 5/29/2019    COLONOSCOPY performed by Noel De La Rosa MD at [facility] ENDOSCOPY    COLONOSCOPY      Completed in [place]    CLEMENT-EN-Y GASTRIC BYPASS N/A 03/23/2023    LAPAROSCOPIC CLEMENT-EN-Y GASTRIC BYPASS,  LIVER WEDGE

## 2025-04-14 NOTE — PROGRESS NOTES
Isadora Toribio is a 64 y.o. year old female who presents today for No chief complaint on file.       \"Have you been to the ER, urgent care clinic since your last visit?  Hospitalized since your last visit?\"   NO     “Have you seen or consulted any other health care providers outside our system since your last visit?”   NO     Have you had a mammogram?”   NO    Date of last Mammogram: 7/7/2022      “Have you had a pap smear?”    NO    No cervical cancer screening on file             - FLAKITA Ashley  [facility]  Phone: [phone]  Fax: [phone]

## 2025-04-14 NOTE — TELEPHONE ENCOUNTER
Called the patient to let her know she is scheduled for a CT at [facility] on Monday 4/28 at 11:30 am. She is aware to arrive at 10 am and not drink or eat anything 4 hours prior.

## 2025-04-14 NOTE — PROGRESS NOTES
Bariatric Postoperative Nurse Note    Isadora Arvizu Catarino is a 64 y.o. female status post laparoscopic gastric bypass surgery performed on 3/23/2023.    All Post-Ops (including two weeks)  -# of grams of protein daily? 60 grams  -sources of protein? Chicken and fish  -# of oz of sugar free fluids from all sources daily? 62 oz  -Nausea? Yes  -Vomiting? No  -Difficulty swallow/food sticking? No  -Heartburn/regurgitation? Yes  -Character of bowel movements (diarrhea/constipation/bloody stools?) constipation  -Which multivitamin product are you taking? Procare   -What dose and how frequently are you taking calcium citrate? 2 times daily  - from any iron-containing multivitamin by 2 hours? No  -Ulcer risk exposures:   NSAID No  Tobacco No  Alcohol No  Steroids No  -Minutes of physical activity and what type? 30 minutes a day

## 2025-04-14 NOTE — PROGRESS NOTES
Isadora Toribio is a 64 y.o.  female and presents with    Chief Complaint   Patient presents with    3 Month Follow-Up    Headache     Left side              Subjective:    Was seen by Dr. Anderson. Was told that her knee pain was arthritis. Had injction which helped a little. She has been having severe knee pain.     She has been having issues on her L leg pain,  she thinks is d/t the her ankle tendon.     She feels like she has chuy having a HA. She is not sure if it is her diet, and is causing her issues d/t constipation      Patient Active Problem List   Diagnosis    Chronic hepatitis C without hepatic coma (HCC)    Rheumatoid arthritis involving multiple sites with positive rheumatoid factor (HCC)    Obesity, morbid    Left Achilles tendinitis    Chronic pain syndrome    Bilateral primary osteoarthritis of knee    Elevated liver enzymes    Gastritis    Gastroesophageal reflux disease    Hypertension    Reflux esophagitis    Viral hepatitis C    Rheumatoid arthritis (HCC)    Morbid obesity    Bariatric surgery status    Reactive depression    S/P gastric bypass    At high risk for dehydration      Past Medical History:   Diagnosis Date    Arthritis     Asthma     Gastritis     Hepatitis C     Treated    Hypertension     Psychiatric disorder     SOME DEPRESSION      Past Surgical History:   Procedure Laterality Date    CHOLECYSTECTOMY  2013    lap    COLONOSCOPY N/A 5/29/2019    COLONOSCOPY performed by Noel De La Rosa MD at [facility] ENDOSCOPY    COLONOSCOPY      Completed in [place]    CLEMENT-EN-Y GASTRIC BYPASS N/A 03/23/2023    LAPAROSCOPIC CLEMENT-EN-Y GASTRIC BYPASS,  LIVER WEDGE BIOPSY, and hiatal hernia repair performed by Az Stoll MD at [facility] MAIN OR      Family History   Problem Relation Age of Onset    No Known Problems Sister     No Known Problems Brother     No Known Problems Brother     No Known Problems Sister     Hypertension Father     No Known Problems Sister     No Known Problems

## 2025-04-15 LAB
BACTERIA, URINE: PRESENT
BILIRUBIN, URINE: NEGATIVE
CLARITY, UA: CLEAR
COLOR, UA: YELLOW
GLUCOSE URINE: NEGATIVE MG/DL
HYALINE CASTS: ABNORMAL /LPF (ref 0–2)
KETONES, URINE: NEGATIVE MG/DL
LEUKOCYTE ESTERASE, URINE: NEGATIVE
NITRITE, URINE: POSITIVE
OCCULT BLOOD,URINE: NEGATIVE
PH, URINE: 6.5 PH (ref 5–8)
PROTEIN, URINE: NEGATIVE MG/DL
RBC URINE: ABNORMAL /HPF
SPECIFIC GRAVITY UA: 1.02 (ref 1–1.03)
SQUAMOUS EPITHELIAL CELLS: ABNORMAL /HPF
UROBILINOGEN, URINE: 0.2 MG/DL
WBC URINE: ABNORMAL /HPF (ref 0–5)

## 2025-04-16 LAB — URINE CULTURE RESULT: NORMAL

## 2025-04-22 DIAGNOSIS — E66.812 OBESITY, CLASS II, BMI 35-39.9: ICD-10-CM

## 2025-04-22 DIAGNOSIS — I10 HYPERTENSION, UNSPECIFIED TYPE: ICD-10-CM

## 2025-04-22 DIAGNOSIS — K90.829 SHORT BOWEL SYNDROME, UNSPECIFIED WHETHER COLON IN CONTINUITY: ICD-10-CM

## 2025-04-22 DIAGNOSIS — R11.2 NAUSEA AND VOMITING, UNSPECIFIED VOMITING TYPE: ICD-10-CM

## 2025-04-22 DIAGNOSIS — Z98.84 S/P GASTRIC BYPASS: ICD-10-CM

## 2025-04-22 LAB
BASOPHILS # BLD AUTO: 0.1 X10E3/UL (ref 0–0.2)
BASOPHILS NFR BLD AUTO: 1 %
EOSINOPHIL # BLD AUTO: 0.1 X10E3/UL (ref 0–0.4)
EOSINOPHIL NFR BLD AUTO: 2 %
ERYTHROCYTE [DISTWIDTH] IN BLOOD BY AUTOMATED COUNT: 12.8 % (ref 11.7–15.4)
HCT VFR BLD AUTO: 45.8 % (ref 34–46.6)
HGB BLD-MCNC: 15.3 G/DL (ref 11.1–15.9)
IMM GRANULOCYTES # BLD AUTO: 0 X10E3/UL (ref 0–0.1)
IMM GRANULOCYTES NFR BLD AUTO: 0 %
LYMPHOCYTES # BLD AUTO: 2.1 X10E3/UL (ref 0.7–3.1)
LYMPHOCYTES NFR BLD AUTO: 34 %
MCH RBC QN AUTO: 30.8 PG (ref 26.6–33)
MCHC RBC AUTO-ENTMCNC: 33.4 G/DL (ref 31.5–35.7)
MCV RBC AUTO: 92 FL (ref 79–97)
MONOCYTES # BLD AUTO: 0.4 X10E3/UL (ref 0.1–0.9)
MONOCYTES NFR BLD AUTO: 7 %
NEUTROPHILS # BLD AUTO: 3.4 X10E3/UL (ref 1.4–7)
NEUTROPHILS NFR BLD AUTO: 56 %
PLATELET # BLD AUTO: 164 X10E3/UL (ref 150–450)
RBC # BLD AUTO: 4.96 X10E6/UL (ref 3.77–5.28)
WBC # BLD AUTO: 6.1 X10E3/UL (ref 3.4–10.8)

## 2025-04-23 LAB
25(OH)D3+25(OH)D2 SERPL-MCNC: 67.9 NG/ML (ref 30–100)
ALBUMIN SERPL-MCNC: 4.6 G/DL (ref 3.9–4.9)
ALP SERPL-CCNC: 147 IU/L (ref 44–121)
ALT SERPL-CCNC: 52 IU/L (ref 0–32)
AST SERPL-CCNC: 41 IU/L (ref 0–40)
BILIRUB SERPL-MCNC: 0.8 MG/DL (ref 0–1.2)
BUN SERPL-MCNC: 15 MG/DL (ref 8–27)
BUN/CREAT SERPL: 21 (ref 12–28)
CALCIUM SERPL-MCNC: 10.2 MG/DL (ref 8.7–10.3)
CHLORIDE SERPL-SCNC: 104 MMOL/L (ref 96–106)
CHOLEST SERPL-MCNC: 210 MG/DL (ref 100–199)
CO2 SERPL-SCNC: 25 MMOL/L (ref 20–29)
CREAT SERPL-MCNC: 0.73 MG/DL (ref 0.57–1)
EGFRCR SERPLBLD CKD-EPI 2021: 92 ML/MIN/1.73
FERRITIN SERPL-MCNC: 252 NG/ML (ref 15–150)
FOLATE SERPL-MCNC: 17.4 NG/ML
GLOBULIN SER CALC-MCNC: 2.6 G/DL (ref 1.5–4.5)
GLUCOSE SERPL-MCNC: 76 MG/DL (ref 70–99)
HDLC SERPL-MCNC: 88 MG/DL
IRON SERPL-MCNC: 142 UG/DL (ref 27–139)
LDLC SERPL CALC-MCNC: 105 MG/DL (ref 0–99)
POTASSIUM SERPL-SCNC: 3.9 MMOL/L (ref 3.5–5.2)
PROT SERPL-MCNC: 7.2 G/DL (ref 6–8.5)
SODIUM SERPL-SCNC: 147 MMOL/L (ref 134–144)
TRIGL SERPL-MCNC: 101 MG/DL (ref 0–149)
VIT B12 SERPL-MCNC: >2000 PG/ML (ref 232–1245)
VLDLC SERPL CALC-MCNC: 17 MG/DL (ref 5–40)

## 2025-04-23 RX ORDER — IBUPROFEN 200 MG
CAPSULE ORAL
Qty: 30 TABLET | Refills: 2 | Status: SHIPPED | OUTPATIENT
Start: 2025-04-23

## 2025-04-23 NOTE — TELEPHONE ENCOUNTER
Medication(s) requesting:   Requested Prescriptions     Pending Prescriptions Disp Refills    calcium citrate (CALCITRATE) 950 (200 Ca) MG tablet [Pharmacy Med Name: CALCIUM CIT 200MG TAB (950) 200 Tablet] 30 tablet 11     Sig: TOME 1 TABLETA POR VIA ORAL CRISTINA AUSTIN       Last office visit:  04/14/2025  Next office visit DMA: 6/9/2025

## 2025-04-25 LAB — VIT B1 BLD-SCNC: 178.6 NMOL/L (ref 66.5–200)

## 2025-05-07 ENCOUNTER — HOSPITAL ENCOUNTER (OUTPATIENT)
Age: 65
Discharge: HOME | End: 2025-05-10
Payer: MEDICAID

## 2025-05-07 DIAGNOSIS — R10.84 STOMACH CRAMPS, GENERALIZED: ICD-10-CM

## 2025-05-07 DIAGNOSIS — Z98.84 S/P GASTRIC BYPASS: ICD-10-CM

## 2025-05-07 DIAGNOSIS — K91.2 POSTSURGICAL MALABSORPTION: ICD-10-CM

## 2025-05-07 DIAGNOSIS — E66.811 OBESITY (BMI 30.0-34.9): ICD-10-CM

## 2025-05-07 DIAGNOSIS — K74.60 CIRRHOSIS OF LIVER WITHOUT ASCITES, UNSPECIFIED HEPATIC CIRRHOSIS TYPE (HCC): ICD-10-CM

## 2025-05-07 PROCEDURE — 6360000004 HC RX CONTRAST MEDICATION: Performed by: REGISTERED NURSE

## 2025-05-07 PROCEDURE — 74177 CT ABD & PELVIS W/CONTRAST: CPT

## 2025-05-07 RX ORDER — IOPAMIDOL 612 MG/ML
100 INJECTION, SOLUTION INTRAVASCULAR
Status: COMPLETED | OUTPATIENT
Start: 2025-05-07 | End: 2025-05-07

## 2025-05-07 RX ORDER — DIATRIZOATE MEGLUMINE AND DIATRIZOATE SODIUM 660; 100 MG/ML; MG/ML
30 SOLUTION ORAL; RECTAL
Status: DISCONTINUED | OUTPATIENT
Start: 2025-05-07 | End: 2025-05-11 | Stop reason: HOSPADM

## 2025-05-07 RX ADMIN — IOPAMIDOL 100 ML: 612 INJECTION, SOLUTION INTRAVENOUS at 19:50

## 2025-05-07 RX ADMIN — DIATRIZOATE MEGLUMINE AND DIATRIZOATE SODIUM 30 ML: 660; 100 LIQUID ORAL; RECTAL at 17:10

## 2025-05-16 ENCOUNTER — PATIENT MESSAGE (OUTPATIENT)
Age: 65
End: 2025-05-16

## 2025-05-23 NOTE — TELEPHONE ENCOUNTER
Emily, surgery scheduler, assisted with interpretation. Encouraged pt to increase fluid intake. Gets about 50-60 oz daily. Discussed CT and lab results. Noted increasing ALT/AST. Hx of Hep C, liver cirrhosis. Will schedule appt with Dr. De La Rosa, gastroenterology, for surveillance. Lost to follow up due to deaths of parents. Will continue to follow.

## 2025-06-25 DIAGNOSIS — M79.18 MYOFASCIAL PAIN: ICD-10-CM

## 2025-06-26 RX ORDER — METHOCARBAMOL 750 MG/1
TABLET, FILM COATED ORAL
Qty: 40 TABLET | Refills: 2 | Status: SHIPPED | OUTPATIENT
Start: 2025-06-26

## 2025-06-26 NOTE — TELEPHONE ENCOUNTER
Medication(s) requesting:   Requested Prescriptions     Pending Prescriptions Disp Refills    methocarbamol (ROBAXIN) 750 MG tablet [Pharmacy Med Name: METHOCARBAMOL 750 MG TABS 750 Tablet] 40 tablet 11     Sig: TOME 1 TABLETA PRO VIA ORAL 4 VECES AL CAITLIN POR 10 CLAROS       Last office visit:  04/14/2025  Next office visit DMA: 8/1/2025

## 2025-07-22 DIAGNOSIS — Z98.84 S/P GASTRIC BYPASS: ICD-10-CM

## 2025-07-22 DIAGNOSIS — K90.829 SHORT BOWEL SYNDROME, UNSPECIFIED WHETHER COLON IN CONTINUITY: ICD-10-CM

## 2025-07-22 DIAGNOSIS — E66.812 OBESITY, CLASS II, BMI 35-39.9: ICD-10-CM

## 2025-07-22 DIAGNOSIS — R11.2 NAUSEA AND VOMITING, UNSPECIFIED VOMITING TYPE: ICD-10-CM

## 2025-07-22 DIAGNOSIS — I10 HYPERTENSION, UNSPECIFIED TYPE: ICD-10-CM

## 2025-07-23 RX ORDER — IBUPROFEN 200 MG
CAPSULE ORAL
Qty: 30 TABLET | Refills: 11 | Status: SHIPPED | OUTPATIENT
Start: 2025-07-23

## 2025-07-23 NOTE — TELEPHONE ENCOUNTER
Medication(s) requesting:   Requested Prescriptions     Pending Prescriptions Disp Refills    calcium citrate (CALCITRATE) 950 (200 Ca) MG tablet [Pharmacy Med Name: CALCIUM CIT 200MG TAB (950) 200 Tablet] 30 tablet 11     Sig: TOME 1 TABLETA POR VIA ORAL CRISTINA [place]USTIN       Last office visit:  04/14/25  Next office visit DMA: 8/1/2025

## 2025-08-01 ENCOUNTER — OFFICE VISIT (OUTPATIENT)
Facility: CLINIC | Age: 65
End: 2025-08-01

## 2025-08-01 ENCOUNTER — HOSPITAL ENCOUNTER (OUTPATIENT)
Facility: HOSPITAL | Age: 65
Setting detail: SPECIMEN
Discharge: HOME | End: 2025-08-04

## 2025-08-01 VITALS
TEMPERATURE: 97.3 F | SYSTOLIC BLOOD PRESSURE: 116 MMHG | WEIGHT: 188.8 LBS | RESPIRATION RATE: 16 BRPM | DIASTOLIC BLOOD PRESSURE: 78 MMHG | BODY MASS INDEX: 33.45 KG/M2 | OXYGEN SATURATION: 98 % | HEART RATE: 59 BPM | HEIGHT: 63 IN

## 2025-08-01 DIAGNOSIS — E04.1 NONTOXIC SINGLE THYROID NODULE: ICD-10-CM

## 2025-08-01 DIAGNOSIS — K74.69 OTHER CIRRHOSIS OF LIVER (HCC): ICD-10-CM

## 2025-08-01 DIAGNOSIS — R60.0 BILATERAL LEG EDEMA: ICD-10-CM

## 2025-08-01 DIAGNOSIS — M79.7 FIBROMYALGIA: ICD-10-CM

## 2025-08-01 DIAGNOSIS — R74.8 ELEVATED LIVER ENZYMES: Primary | ICD-10-CM

## 2025-08-01 LAB
BASOPHILS # BLD: 1 % (ref 0–2)
BASOPHILS ABSOLUTE: 0.1 K/UL (ref 0–0.2)
EOSINOPHIL # BLD: 3 % (ref 0–6)
EOSINOPHILS ABSOLUTE: 0.2 K/UL (ref 0–0.5)
HCT VFR BLD CALC: 45 % (ref 35.1–48)
HEMOGLOBIN: 14.1 G/DL (ref 11.7–16)
INR BLD: 1.02 (ref 0.93–1.29)
LYMPHOCYTES # BLD: 42 % (ref 20–45)
LYMPHOCYTES ABSOLUTE: 2.3 K/UL (ref 1–4.8)
MCH RBC QN AUTO: 31 PG (ref 26–34)
MCHC RBC AUTO-ENTMCNC: 31 G/DL (ref 31–36)
MCV RBC AUTO: 97 FL (ref 80–99)
MONOCYTES ABSOLUTE: 0.5 K/UL (ref 0.1–1)
MONOCYTES: 9 % (ref 3–12)
NEUTROPHILS ABSOLUTE: 2.5 K/UL (ref 1.8–7.7)
NEUTROPHILS SEGMENTED: 45 % (ref 40–75)
PDW BLD-RTO: 12.5 % (ref 10–15.5)
PLATELET # BLD: 213 K/UL (ref 140–440)
PMV BLD AUTO: 13 FL (ref 9–13)
PROTHROMBIN TIME: 10.9 SEC (ref 9–13)
RBC # BLD: 4.62 M/UL (ref 3.8–5.2)
WBC # BLD: 5.5 K/UL (ref 4–11)

## 2025-08-01 RX ORDER — KETOROLAC TROMETHAMINE 30 MG/ML
30 INJECTION, SOLUTION INTRAMUSCULAR; INTRAVENOUS ONCE
Status: COMPLETED | OUTPATIENT
Start: 2025-08-01 | End: 2025-08-01

## 2025-08-01 RX ADMIN — KETOROLAC TROMETHAMINE 30 MG: 30 INJECTION, SOLUTION INTRAMUSCULAR; INTRAVENOUS at 14:21

## 2025-08-01 ASSESSMENT — ENCOUNTER SYMPTOMS
VOMITING: 0
DIARRHEA: 0
COLOR CHANGE: 0
ABDOMINAL PAIN: 0
EYE REDNESS: 0
EYE ITCHING: 0
SHORTNESS OF BREATH: 0
CONSTIPATION: 0
FACIAL SWELLING: 0
EYE DISCHARGE: 0
BACK PAIN: 0
EYE PAIN: 0

## 2025-08-01 NOTE — PROGRESS NOTES
Isadora Toribio is a 64 y.o. year old female who presents today for   Chief Complaint   Patient presents with    Discuss Labs        \"Have you been to the ER, urgent care clinic since your last visit?  Hospitalized since your last visit?\"   NO     “Have you seen or consulted any other health care providers outside our system since your last visit?”   NO     Have you had a mammogram?”   NO    Date of last Mammogram: 7/7/2022      “Have you had a pap smear?”    NO APPT is scheduled.     No cervical cancer screening on file             - FLAKITA Ashley  [facility]  Phone: [phone]  Fax: [phone]

## 2025-08-01 NOTE — PROGRESS NOTES
Isadora Toribio is a 64 y.o.  female and presents with    Chief Complaint   Patient presents with    Discuss Labs    Pain     Whole body            Subjective:    Has appt w/ Dr. De La Rosa on August 12th after it was noted that her liver enzymes slightly elevated by bariatric. She had tx for hep C by Dr. De La Rosa.     She has no longer been having abdominal pain    Patient Active Problem List   Diagnosis    Chronic hepatitis C without hepatic coma (HCC)    Rheumatoid arthritis involving multiple sites with positive rheumatoid factor (HCC)    Obesity, morbid (HCC)    Left Achilles tendinitis    Chronic pain syndrome    Bilateral primary osteoarthritis of knee    Elevated liver enzymes    Gastritis    Gastroesophageal reflux disease    Hypertension    Reflux esophagitis    Viral hepatitis C    Rheumatoid arthritis (HCC)    Morbid obesity (HCC)    Bariatric surgery status    Reactive depression    S/P gastric bypass    At high risk for dehydration      Past Medical History:   Diagnosis Date    Arthritis     Asthma     Gastritis     Hepatitis C     Treated    Hypertension     Psychiatric disorder     SOME DEPRESSION      Past Surgical History:   Procedure Laterality Date    CHOLECYSTECTOMY  2013    lap    COLONOSCOPY N/A 5/29/2019    COLONOSCOPY performed by Noel De La Rosa MD at [facility] ENDOSCOPY    COLONOSCOPY      Completed in [place]    CLEMENT-EN-Y GASTRIC BYPASS N/A 03/23/2023    LAPAROSCOPIC CLEMENT-EN-Y GASTRIC BYPASS,  LIVER WEDGE BIOPSY, and hiatal hernia repair performed by Az Stoll MD at [facility] MAIN OR      Family History   Problem Relation Age of Onset    No Known Problems Sister     No Known Problems Brother     No Known Problems Brother     No Known Problems Sister     Hypertension Father     No Known Problems Sister     No Known Problems Brother     Diabetes Mother     No Known Problems Brother      Social History     Socioeconomic History    Marital status: Single     Spouse name: Not on file  Vaccine Information Sheet, \"Influenza - Inactivated\"  given to Jose Judge, or parent/legal guardian of  Jose Judge and verbalized understanding. Patient responses:    Have you ever had a reaction to a flu vaccine? No  Do you have any current illness? No  Have you ever had Guillian Peterson Syndrome? No  Do you have a serious allergy to any of the following: Neomycin, Polymyxin, Thimerosal, eggs or egg products? No    Flu vaccine given per order. Please see immunization tab. Risks and benefits explained. Current VIS given.

## 2025-08-02 LAB
A/G RATIO: 1.6 RATIO (ref 1.1–2.6)
ALBUMIN: 4.2 G/DL (ref 3.5–5)
ALBUMIN: 4.2 G/DL (ref 3.5–5)
ALP BLD-CCNC: 126 U/L (ref 40–120)
ALT SERPL-CCNC: 20 U/L (ref 5–40)
ANION GAP SERPL CALCULATED.3IONS-SCNC: 13 MMOL/L (ref 3–15)
AST SERPL-CCNC: 29 U/L (ref 10–37)
BILIRUB SERPL-MCNC: 0.5 MG/DL (ref 0.2–1.2)
BILIRUBIN DIRECT: <0.2 MG/DL (ref 0–0.3)
BUN BLDV-MCNC: 14 MG/DL (ref 6–22)
CALCIUM SERPL-MCNC: 9.7 MG/DL (ref 8.4–10.5)
CHLORIDE BLD-SCNC: 102 MMOL/L (ref 98–110)
CO2: 27 MMOL/L (ref 20–32)
CREAT SERPL-MCNC: 0.7 MG/DL (ref 0.8–1.4)
GFR, ESTIMATED: >90 ML/MIN/1.73 SQ.M.
GLOBULIN: 2.6 G/DL (ref 2–4)
GLUCOSE: 74 MG/DL (ref 70–99)
PHOSPHORUS: 3.6 MG/DL (ref 2.5–4.5)
POTASSIUM SERPL-SCNC: 4.5 MMOL/L (ref 3.5–5.5)
SODIUM BLD-SCNC: 142 MMOL/L (ref 133–145)
TOTAL PROTEIN: 6.8 G/DL (ref 6.2–8.1)

## 2025-08-04 LAB — SENTARA SPECIMEN COLLECTION: NORMAL

## 2025-08-04 PROCEDURE — 99001 SPECIMEN HANDLING PT-LAB: CPT

## 2025-08-07 ENCOUNTER — HOSPITAL ENCOUNTER (OUTPATIENT)
Age: 65
Discharge: HOME | End: 2025-08-10
Payer: MEDICAID

## 2025-08-07 DIAGNOSIS — K74.69 OTHER CIRRHOSIS OF LIVER (HCC): ICD-10-CM

## 2025-08-07 DIAGNOSIS — E04.1 NONTOXIC SINGLE THYROID NODULE: ICD-10-CM

## 2025-08-07 PROCEDURE — 76536 US EXAM OF HEAD AND NECK: CPT

## 2025-08-07 PROCEDURE — 76705 ECHO EXAM OF ABDOMEN: CPT

## (undated) DEVICE — Device: Brand: DEFENDO VALVE AND CONNECTOR KIT

## (undated) DEVICE — SYR 10ML LUER LOK 1/5ML GRAD --

## (undated) DEVICE — SOLUTION IRRIG 1000ML H2O STRL BLT

## (undated) DEVICE — GAUZE,SPONGE,4"X4",16PLY,STRL,LF,10/TRAY: Brand: MEDLINE

## (undated) DEVICE — SYR 50ML SLIP TIP NSAF LF STRL --

## (undated) DEVICE — SYRINGE, LUER LOCK, 10ML: Brand: MEDLINE

## (undated) DEVICE — NEEDLE LAP VERRES 12 CM

## (undated) DEVICE — STERILE POLYISOPRENE POWDER-FREE SURGICAL GLOVES: Brand: PROTEXIS

## (undated) DEVICE — TRAY MYEL SFTY +

## (undated) DEVICE — BANDAGE,GAUZE,BULKEE II,4.5"X4.1YD,STRL: Brand: MEDLINE

## (undated) DEVICE — BLOCK BITE BLOX W DENTAL RIM --

## (undated) DEVICE — SCISSORS ENDOSCP DIA5MM CRV MPLR CAUT W/ RATCH HNDL

## (undated) DEVICE — DISPOSABLE LAPAROSCOPIC CLIP APPLIER WITH 20 CLIPS.: Brand: EPIX® UNIVERSAL CLIP APPLIER

## (undated) DEVICE — MEDI-VAC NON-CONDUCTIVE SUCTION TUBING: Brand: CARDINAL HEALTH

## (undated) DEVICE — CATHETER SUCT TR FL TIP 14FR W/ O CTRL

## (undated) DEVICE — SYRINGE MED 50ML LUERLOCK TIP

## (undated) DEVICE — SYRINGE MED 30ML STD CLR PLAS LUERLOCK TIP N CTRL DISP

## (undated) DEVICE — TUBING, SUCTION, 3/16" X 6', STRAIGHT: Brand: MEDLINE

## (undated) DEVICE — RELOAD STPL L60MM H1-2.6MM MESENTERY THN TISS WHT 6 ROW

## (undated) DEVICE — Device

## (undated) DEVICE — BOWL MED L 32OZ PLAS W/ MOLD GRAD EZ OPN PEEL PCH

## (undated) DEVICE — LINER SUCT CANSTR 3000CC PLAS SFT PRE ASSEMB W/OUT TBNG W/

## (undated) DEVICE — ADHESIVE SKIN CLSR 0.7ML TOP DERMBND ADV

## (undated) DEVICE — YANKAUER,SMOOTH HANDLE,HIGH CAPACITY: Brand: MEDLINE INDUSTRIES, INC.

## (undated) DEVICE — KIT COLON W/ 1.1OZ LUB AND 2 END

## (undated) DEVICE — DEVICE SUT SHFT L34CM DIA 10MM 2 JAW LD UNIT ENDOSTCH

## (undated) DEVICE — MEDIA CONTRAST 10ML 200MG/ML 41%

## (undated) DEVICE — CONTAINER PREFIL FRMLN 15ML --

## (undated) DEVICE — SUTURE ENDO STITCH POLYSORB VIOLET SIZE 3/0 7 IN 170070

## (undated) DEVICE — APPLICATOR LAP 35 CM 2 RIGID VISTASEAL

## (undated) DEVICE — BASIN EMESIS 500CC ROSE 250/CS 60/PLT: Brand: MEDEGEN MEDICAL PRODUCTS, LLC

## (undated) DEVICE — ENDOSCOPY PUMP TUBING/ CAP SET: Brand: ERBE

## (undated) DEVICE — SUTURE MCRYL SZ 4-0 L27IN ABSRB UD L24MM PS-1 3/8 CIR PRIM Y935H

## (undated) DEVICE — ELECTRODE PT RET AD L9FT HI MOIST COND ADH HYDRGEL CORDED

## (undated) DEVICE — 2, DISPOSABLE SUCTION/IRRIGATOR WITH DISPOSABLE TIP: Brand: STRYKEFLOW

## (undated) DEVICE — FLEX ADVANTAGE 1500CC: Brand: FLEX ADVANTAGE

## (undated) DEVICE — SEALANT TISS 10 CC FIBRIN VISTASEAL

## (undated) DEVICE — RELOAD STPL L60MM H1.5-3.6MM REG TISS BLU GRIPPING SURF B

## (undated) DEVICE — DEVICE SUT SZ 2-0 L7IN GRN SURGDAC POLY BRAID SGL STIT DISP

## (undated) DEVICE — (D)BNDG ADHESIVE FABRIC 3/4X3 -- DISC BY MFR USE ITEM 357960

## (undated) DEVICE — FORCEPS BX L240CM JAW DIA2.8MM L CAP W/ NDL MIC MESH TOOTH

## (undated) DEVICE — BASIN EMSIS 16OZ GRAPHITE PLAS KID SHP MOLD GRAD FOR ORAL

## (undated) DEVICE — KIT IMAGING SYS RIGID W/SNGL USE KITX6 FLUORESCENT F/ENDOSCOPE PINPOINT DISP SPY-MIS PACK

## (undated) DEVICE — BLANKET WRM W29.9XL79.1IN UP BODY FORC AIR MISTRAL-AIR

## (undated) DEVICE — STAPLER 60MM POWERED ECHELON 3000 LONG 440MM

## (undated) DEVICE — FLUFF AND POLYMER UNDERPAD,EXTRA HEAVY: Brand: WINGS

## (undated) DEVICE — TROCAR: Brand: KII® OPTICAL ACCESS SYSTEM

## (undated) DEVICE — ELECTRODE ES 36CM LAP FLAT L HK COAT DISP CLEANCOAT

## (undated) DEVICE — NEEDLE SPNL 20GA L3.5IN YEL HUB S STL REG WALL FIT STYL W/

## (undated) DEVICE — FORCEPS BX L240CM JAW DIA2.4MM ORNG L CAP W/ NDL DISP RAD

## (undated) DEVICE — SOLUTION ANTIFOG VIS SYS CLEARIFY LAPSCP

## (undated) DEVICE — KENDALL 500 SERIES DIAPHORETIC FOAM MONITORING ELECTRODE - TEAR DROP SHAPE ( 30/PK): Brand: KENDALL

## (undated) DEVICE — SYRINGE MED 25GA 3ML L5/8IN SUBQ PLAS W/ DETACH NDL SFTY

## (undated) DEVICE — LAPAROSCOPIC TROCAR SLEEVE/SINGLE USE: Brand: KII® OPTICAL ACCESS SYSTEM

## (undated) DEVICE — CANNULA NSL AD TBNG L14FT STD PVC O2 CRV CONN NONFLARED NSL

## (undated) DEVICE — DEVICE INFL 60ML 12ATM CONVENIENT LOK REL HNDL HI PRSS FLX

## (undated) DEVICE — INTENDED FOR TISSUE SEPARATION, AND OTHER PROCEDURES THAT REQUIRE A SHARP SURGICAL BLADE TO PUNCTURE OR CUT.: Brand: BARD-PARKER ® STAINLESS STEEL BLADES

## (undated) DEVICE — TAPE ADH W3INXL10YD PLAS TRNSPAR H2O RESIST HYPOALRG CURAD

## (undated) DEVICE — SYRINGE,TOOMEY,IRRIGATION,70CC,STERILE: Brand: MEDLINE

## (undated) DEVICE — SOLUTION IV 1000ML 0.9% SOD CHL

## (undated) DEVICE — BITE BLOCK ENDOSCP UNIV AD 6 TO 9.4 MM

## (undated) DEVICE — TROCAR: Brand: KII® SLEEVE

## (undated) DEVICE — GLOVE ORTHO 7 1/2   MSG9475

## (undated) DEVICE — SHEARS LAP L45CM DIA5MM ULTRASONIC CRV TIP ADV HEMSTAS HARM

## (undated) DEVICE — TRUE CONTENT TO BE POPULATED AS PART OF REBRANDING: Brand: ARGYLE

## (undated) DEVICE — 4-PORT MANIFOLD: Brand: NEPTUNE 2

## (undated) DEVICE — (D)NDL SPNE 22GX15CM -- DISC BY MFR W/NO SUB

## (undated) DEVICE — DRAPE TOWEL: Brand: CONVERTORS

## (undated) DEVICE — Z DISCONTINUED USE 2857930 NEEDLE HYPO 25GA L1.5IN BVL ORIENTED ECLIPSE